# Patient Record
Sex: FEMALE | Race: WHITE | NOT HISPANIC OR LATINO | Employment: OTHER | URBAN - METROPOLITAN AREA
[De-identification: names, ages, dates, MRNs, and addresses within clinical notes are randomized per-mention and may not be internally consistent; named-entity substitution may affect disease eponyms.]

---

## 2017-01-27 ENCOUNTER — OFFICE VISIT (OUTPATIENT)
Dept: INTERNAL MEDICINE | Facility: CLINIC | Age: 67
End: 2017-01-27

## 2017-01-27 VITALS
WEIGHT: 160.8 LBS | BODY MASS INDEX: 25.84 KG/M2 | TEMPERATURE: 97.3 F | SYSTOLIC BLOOD PRESSURE: 150 MMHG | HEIGHT: 66 IN | DIASTOLIC BLOOD PRESSURE: 84 MMHG

## 2017-01-27 DIAGNOSIS — I10 BENIGN ESSENTIAL HYPERTENSION: Primary | ICD-10-CM

## 2017-01-27 DIAGNOSIS — G43.809 OTHER TYPE OF MIGRAINE: ICD-10-CM

## 2017-01-27 DIAGNOSIS — M79.605 PAIN IN BOTH LOWER EXTREMITIES: ICD-10-CM

## 2017-01-27 DIAGNOSIS — R60.9 EDEMA, UNSPECIFIED TYPE: ICD-10-CM

## 2017-01-27 DIAGNOSIS — E78.5 HYPERLIPIDEMIA, UNSPECIFIED HYPERLIPIDEMIA TYPE: ICD-10-CM

## 2017-01-27 DIAGNOSIS — M79.604 PAIN IN BOTH LOWER EXTREMITIES: ICD-10-CM

## 2017-01-27 PROCEDURE — 99214 OFFICE O/P EST MOD 30 MIN: CPT | Performed by: NURSE PRACTITIONER

## 2017-01-27 RX ORDER — VALSARTAN 320 MG/1
320 TABLET ORAL DAILY
Qty: 30 TABLET | Refills: 5 | Status: SHIPPED | OUTPATIENT
Start: 2017-01-27 | End: 2017-03-02

## 2017-01-27 NOTE — PROGRESS NOTES
Subjective   Fabiola Salmeron is a 66 y.o. female    Chief Complaint   Patient presents with   • 3 month follow up   • Hypertension     Would like to discuss changing her BP med. She was looking at side effects of med and has concerns. Hairloss, weight gain and leg/ankle swelling (daily)   • Hyperlipidemia     Has been holding Cholesterol medication.     History of Present Illness     Here for f/u; had labs last week     HL - she took Pravastatin for about 6 months, but developed terrible leg pain.  At her last visit, I asked her to hold this med until her visit today to see if sx's would improve.  States that her legs still hurt and ache.  She denies N/T.  No injury or trauma.  States that she cannot relate the pain to any activity.       Renal artery stenosis is followed by Dr. Garduno. Renal artery US about 3 months ago was stable.       HTN - chronic and stable on Lenny 5/40, but she is interested in changing this med.  She has been holding it for a few days b/c she thinks it is causing swelling in her ankles, weight gain and hair loss.       Now being followed by the pain center at  for her HA's. Not taking any oral meds for her HA's now.  Only takes the prednisone if she has a terrible flare up.       Pap - 2011; 2/2015 (GYN)   Mamm - 2/29/16   Ovarian CA screening - never   DEXA - 3/2016 (osteoporosis, but declines meds)  Colon - declines   Flu shot - declines   Tdap - declines   Pneumovax - declines   Zostavax - declines     The following portions of the patient's history were reviewed and updated as appropriate: allergies, current medications, past family history, past medical history, past social history, past surgical history and problem list.    Current Outpatient Prescriptions:   •  naproxen sodium (ALEVE) 220 MG tablet, Take 1 tablet by mouth 4 (Four) Times a Day Before Meals & at Bedtime As Needed., Disp: , Rfl:   •  predniSONE (DELTASONE) 20 MG tablet, 1-3 tablets PO QAM PRN for HA, Disp: 90 tablet,  "Rfl: 0  •  valsartan (DIOVAN) 320 MG tablet, Take 1 tablet by mouth Daily., Disp: 30 tablet, Rfl: 5     Review of Systems   Constitutional: Negative for chills, fatigue and fever.   Respiratory: Negative for cough, chest tightness and shortness of breath.    Cardiovascular: Negative for chest pain.   Gastrointestinal: Negative for abdominal pain, diarrhea, nausea and vomiting.   Endocrine: Negative for cold intolerance and heat intolerance.   Musculoskeletal: Positive for arthralgias and myalgias.        Leg pain     Neurological: Negative for dizziness.       Objective   Physical Exam   Constitutional: She is oriented to person, place, and time. She appears well-developed and well-nourished.   HENT:   Head: Normocephalic and atraumatic.   Eyes: Conjunctivae and EOM are normal. Pupils are equal, round, and reactive to light.   Neck: Normal range of motion.   Cardiovascular: Normal rate, regular rhythm and normal heart sounds.    Pulmonary/Chest: Effort normal and breath sounds normal.   Abdominal: Soft. Bowel sounds are normal.   Musculoskeletal: Normal range of motion.   Neurological: She is alert and oriented to person, place, and time. She has normal reflexes.   Skin: Skin is warm and dry.   Psychiatric: She has a normal mood and affect. Her behavior is normal. Judgment and thought content normal.     Vitals:    01/27/17 1141   BP: 150/84   Temp: 97.3 °F (36.3 °C)   TempSrc: Temporal Artery    Weight: 160 lb 12.8 oz (72.9 kg)   Height: 65.5\" (166.4 cm)         Assessment/Plan   Fabiola was seen today for 3 month follow up, hypertension and hyperlipidemia.    Diagnoses and all orders for this visit:    Benign essential hypertension  -     CBC & Differential; Future  -     Comprehensive Metabolic Panel; Future  -     Lipid Panel; Future  -     Vitamin D 25 Hydroxy; Future  -     Vitamin B12; Future  -     TSH; Future  -     C-reactive Protein; Future  -     Sedimentation Rate; Future  -     DHARMESH; Future  -     Uric " Acid; Future  -     Rheumatoid Factor; Future  -     CK; Future  -     Magnesium; Future    Other type of migraine  -     CBC & Differential; Future  -     Comprehensive Metabolic Panel; Future  -     Lipid Panel; Future  -     Vitamin D 25 Hydroxy; Future  -     Vitamin B12; Future  -     TSH; Future  -     C-reactive Protein; Future  -     Sedimentation Rate; Future  -     DHARMESH; Future  -     Uric Acid; Future  -     Rheumatoid Factor; Future  -     CK; Future  -     Magnesium; Future    Edema, unspecified type  -     CBC & Differential; Future  -     Comprehensive Metabolic Panel; Future  -     Lipid Panel; Future  -     Vitamin D 25 Hydroxy; Future  -     Vitamin B12; Future  -     TSH; Future  -     C-reactive Protein; Future  -     Sedimentation Rate; Future  -     DHARMESH; Future  -     Uric Acid; Future  -     Rheumatoid Factor; Future  -     CK; Future  -     Magnesium; Future    Hyperlipidemia, unspecified hyperlipidemia type  -     CBC & Differential; Future  -     Comprehensive Metabolic Panel; Future  -     Lipid Panel; Future  -     Vitamin D 25 Hydroxy; Future  -     Vitamin B12; Future  -     TSH; Future  -     C-reactive Protein; Future  -     Sedimentation Rate; Future  -     DHARMESH; Future  -     Uric Acid; Future  -     Rheumatoid Factor; Future  -     CK; Future  -     Magnesium; Future    Pain in both lower extremities  -     CBC & Differential; Future  -     Comprehensive Metabolic Panel; Future  -     Lipid Panel; Future  -     Vitamin D 25 Hydroxy; Future  -     Vitamin B12; Future  -     TSH; Future  -     C-reactive Protein; Future  -     Sedimentation Rate; Future  -     DHARMESH; Future  -     Uric Acid; Future  -     Rheumatoid Factor; Future  -     CK; Future  -     Magnesium; Future    Other orders  -     valsartan (DIOVAN) 320 MG tablet; Take 1 tablet by mouth Daily.      Long discussion with Fabiola in that I do not think her leg pain is still related to the statin since she has been off of this  for 3months.    We will ck some labs, if normal, I would consider vascular studies  We will stop her lotrel and change to Diovan  No other changes  F/U in 6 weeks or sooner with any problems

## 2017-01-27 NOTE — MR AVS SNAPSHOT
Fabiola Salmeron   1/27/2017 11:30 AM   Office Visit    Dept Phone:  [phone]   Encounter #:  [id number]    Provider:  KAREEM Heller   Department:  [facility] INTERNAL MEDICINE                Your Full Care Plan              Today's Medication Changes          These changes are accurate as of: 1/27/17 12:05 PM.  If you have any questions, ask your nurse or doctor.               New Medication(s)Ordered:     valsartan 320 MG tablet   Commonly known as:  DIOVAN   Take 1 tablet by mouth Daily.   Started by:  KAREEM Heller            Where to Get Your Medications      These medications were sent to [address] - [address] [facility] AT [facility] - [phone]  - [phone] FX  [address] [facility] [address]     Phone:  [phone]     valsartan 320 MG tablet                  Your Updated Medication List          This list is accurate as of: 1/27/17 12:05 PM.  Always use your most recent med list.                ALEVE 220 MG tablet   Generic drug:  naproxen sodium       amlodipine-olmesartan 5-40 MG per tablet   Commonly known as:  NATHAN   Take 1 tablet by mouth Daily.       predniSONE 20 MG tablet   Commonly known as:  DELTASONE   1-3 tablets PO QAM PRN for HA       valsartan 320 MG tablet   Commonly known as:  DIOVAN   Take 1 tablet by mouth Daily.               Instructions     None    Patient Instructions History      Upcoming Appointments     Visit Type Date Time Department    FOLLOW UP 1/27/2017 11:30 AM [address]      Qwenty Signup     [facility] Q[employer]enty allows you to send messages to your doctor, view your test results, renew your prescriptions, schedule appointments, and more. To sign up, go to [employer] and click on the Sign Up Now link in the New User? box. Enter your [employer]wenty Activation Code exactly as it appears below along with the last four digits of your Social  "Security Number and your Date of Birth () to complete the sign-up process. If you do not sign up before the expiration date, you must request a new code.    [employer] Activation Code: [email or web address]  Expires: 2/10/2017 12:05 PM    If you have questions, you can email [email or web address] or call [phone] to talk to our [employer] staff. Remember, [employer] is NOT to be used for urgent needs. For medical emergencies, dial 911.               Other Info from Your Visit           Allergies     Codeine        Reason for Visit     3 month follow up     Hypertension Would like to discuss changing her BP med. She was looking at side effects of med and has concerns. Hairloss, weight gain and leg/ankle swelling (daily)    Hyperlipidemia Has been holding Cholesterol medication.      Vital Signs     Blood Pressure Temperature Height Weight Body Mass Index Smoking Status    150/84 97.3 °F (36.3 °C) (Temporal Artery ) 65.5\" (166.4 cm) 160 lb 12.8 oz (72.9 kg) 26.35 kg/m2 Never Smoker        "

## 2017-02-28 ENCOUNTER — TELEPHONE (OUTPATIENT)
Dept: INTERNAL MEDICINE | Facility: CLINIC | Age: 67
End: 2017-02-28

## 2017-02-28 NOTE — TELEPHONE ENCOUNTER
Does not want to take Diovan. She said BP 2 days ago was stilling running high around 150's/90. She was too sure of the readings b/c she does not check it on a normal basis. Also online if says do nott take with aleve, which she needs to take daily and it also said to avoid all alcohol and she just can't do that. So she wants to know if you think this is to risky for her to take and if you need to change this BP medication to something else?

## 2017-02-28 NOTE — TELEPHONE ENCOUNTER
We can stop the Diovan if she is against taking it.  She needs to be on something.  Does she know if she has ever been on a med called metoprolol?

## 2017-03-02 RX ORDER — METOPROLOL TARTRATE 50 MG/1
50 TABLET, FILM COATED ORAL 2 TIMES DAILY
Qty: 60 TABLET | Refills: 2 | Status: SHIPPED | OUTPATIENT
Start: 2017-03-02 | End: 2017-03-24

## 2017-03-24 ENCOUNTER — TELEPHONE (OUTPATIENT)
Dept: INTERNAL MEDICINE | Facility: CLINIC | Age: 67
End: 2017-03-24

## 2017-03-24 DIAGNOSIS — G43.809 OTHER TYPE OF MIGRAINE: ICD-10-CM

## 2017-03-24 RX ORDER — PREDNISONE 20 MG/1
TABLET ORAL
Qty: 90 TABLET | Refills: 0 | Status: SHIPPED | OUTPATIENT
Start: 2017-03-24 | End: 2021-04-22

## 2017-03-24 RX ORDER — VALSARTAN 80 MG/1
80 TABLET ORAL DAILY
Qty: 30 TABLET | Refills: 0 | Status: SHIPPED | OUTPATIENT
Start: 2017-03-24 | End: 2017-04-04 | Stop reason: SDUPTHER

## 2017-03-24 NOTE — TELEPHONE ENCOUNTER
----- Message from Ana Puri sent at 3/24/2017  9:51 AM EDT -----  Pt called needs to talk to you about bp med and 2 other med please call cell [phone]

## 2017-03-24 NOTE — TELEPHONE ENCOUNTER
Patient has been notified! I let her know everything went to [facility] local pharmacy since it is only a 30 day supply.

## 2017-03-24 NOTE — TELEPHONE ENCOUNTER
I have sent both scripts in, but she definitely needs to keep her appt with me to discuss all of this.  I also need her to bring a BP log.

## 2017-03-24 NOTE — TELEPHONE ENCOUNTER
Spoke with Fabiola and she said that she never started the Metoprolol because she had been doing more research on it and it was a beta blocker and did not want to be on that. So she started taking the Diovan 320 that you originally prescribed on 1/27/17 and has been cutting that in half then cuts that half in half to where she is only taking 80mg of the Diovan and has only been doing that for almost a week now and her BP she said is running 150/90's but she is seeing that come down. She wants to know if she can just get a script for the 80mg of Diovan?    She also wanted another script for Prednisone due to her Migraines. You prescribed that back in October. I told her she was suppose to have follow up with you in 6 weeks from her January appt, and her headaches could be due to the uncontrolled BP.     I have her scheduled to see you for a follow up on Tuesday 3/28/17 @ 3:15pm. But she wants to know will you go ahead and send in a script for the Diovan 80mg and the Prednisone? Until she comes in and discuss her BP and going back on the Topamax for her migraines as well

## 2017-03-28 ENCOUNTER — OFFICE VISIT (OUTPATIENT)
Dept: INTERNAL MEDICINE | Facility: CLINIC | Age: 67
End: 2017-03-28

## 2017-03-28 VITALS
DIASTOLIC BLOOD PRESSURE: 92 MMHG | SYSTOLIC BLOOD PRESSURE: 148 MMHG | WEIGHT: 163.6 LBS | HEIGHT: 66 IN | BODY MASS INDEX: 26.29 KG/M2 | TEMPERATURE: 97.5 F

## 2017-03-28 DIAGNOSIS — I10 BENIGN ESSENTIAL HYPERTENSION: ICD-10-CM

## 2017-03-28 DIAGNOSIS — E78.5 HYPERLIPIDEMIA, UNSPECIFIED HYPERLIPIDEMIA TYPE: ICD-10-CM

## 2017-03-28 DIAGNOSIS — G43.809 OTHER TYPE OF MIGRAINE: Primary | ICD-10-CM

## 2017-03-28 PROCEDURE — 99214 OFFICE O/P EST MOD 30 MIN: CPT | Performed by: NURSE PRACTITIONER

## 2017-03-28 NOTE — PROGRESS NOTES
Subjective   Fabiola Salmeron is a 67 y.o. female    Chief Complaint   Patient presents with   • Follow-up   • Hypertension     Taking Diovan 80mg   • Migraine     Discuss starting back on the Topamax     History of Present Illness     HTN- She has tried multiple medications in the past, researched them and was not comfortable with the side effect profiles.  She did have some trouble with the brenda with weight gain and hair loss so she stopped that and was willing to  try the diovan at a low dose only. She has just started taking her diovan daily now in the last week. BP averaging 130/80 since starting the diovan. She is taking it at night and has not had her dose yet today.       Migraines- She is having headaches daily behind her eyes, on top of her head, and in the occipital region.  She used to be on Topamax 200 mg and would like to start this again. She started taking this 4-5 days ago with what she had left over from the past. She has been tolerating well; denies AE.    HL- She was not able to tolerate the statin in th past due to leg pain.  She has been off the statin for about 6 months and feels like the myalgias are finally getting better.  I recommended red yeast rice to her at her last visit, but she di not start taking this once she read up on the potential side effects of myalgia.       The following portions of the patient's history were reviewed and updated as appropriate: allergies, current medications, past family history, past medical history, past social history, past surgical history and problem list.    Current Outpatient Prescriptions:   •  naproxen sodium (ALEVE) 220 MG tablet, Take 1 tablet by mouth 4 (Four) Times a Day Before Meals & at Bedtime As Needed., Disp: , Rfl:   •  predniSONE (DELTASONE) 20 MG tablet, 1-3 tablets PO QAM PRN for HA, Disp: 90 tablet, Rfl: 0  •  valsartan (DIOVAN) 80 MG tablet, Take 1 tablet by mouth Daily., Disp: 30 tablet, Rfl: 0     Review of Systems   Constitutional:  "Negative for chills, fatigue and fever.   Respiratory: Negative for cough, chest tightness and shortness of breath.    Cardiovascular: Negative for chest pain.   Gastrointestinal: Negative for abdominal pain, diarrhea, nausea and vomiting.   Endocrine: Negative for cold intolerance and heat intolerance.   Musculoskeletal: Negative for arthralgias.   Neurological: Negative for dizziness.       Objective   Physical Exam   Constitutional: She is oriented to person, place, and time. She appears well-developed and well-nourished.   HENT:   Head: Normocephalic and atraumatic.   Eyes: Conjunctivae and EOM are normal. Pupils are equal, round, and reactive to light.   Neck: Normal range of motion.   Cardiovascular: Normal rate, regular rhythm and normal heart sounds.    Pulmonary/Chest: Effort normal and breath sounds normal.   Abdominal: Soft. Bowel sounds are normal.   Musculoskeletal: Normal range of motion.   Neurological: She is alert and oriented to person, place, and time. She has normal reflexes.   Skin: Skin is warm and dry.   Psychiatric: She has a normal mood and affect. Her behavior is normal. Judgment and thought content normal.     Vitals:    03/28/17 1519   BP: 148/92   Temp: 97.5 °F (36.4 °C)   TempSrc: Temporal Artery    Weight: 163 lb 9.6 oz (74.2 kg)   Height: 65.5\" (166.4 cm)         Assessment/Plan   Fabiola was seen today for follow-up, hypertension and migraine.    Diagnoses and all orders for this visit:    Other type of migraine  -     topiramate (TOPAMAX) 200 MG tablet; Take 1 tablet by mouth every night at bedtime.    Benign essential hypertension    Hyperlipidemia, unspecified hyperlipidemia type      Long discussion about risk factors for stroke and the need for medications to keep co-morbidities controlled to decrease this risk.   Will restart topamax at her prior dose since she has been tolerating well   Watch BP at home. Call me with a log in 2 weeks, Will need to increase diovan if BP running " >140/90  Co Q 10 OTC for 2 weeks and then add red yeast rice; alternatives include lovastatin or livalo.  RTC 6 weeks or sooner with any problems        Scribed for KAREEM Keane by KAREEM Stinson Student. 3/28/2017  3:37 PM    I, KAREEM Keane, personally performed the services described in this documentation as scribed by the above named individual in my presence, and it is both accurate and complete.  3/30/2017  9:00 AM    KAREEM Simental

## 2017-04-03 ENCOUNTER — TELEPHONE (OUTPATIENT)
Dept: INTERNAL MEDICINE | Facility: CLINIC | Age: 67
End: 2017-04-03

## 2017-04-03 NOTE — TELEPHONE ENCOUNTER
----- Message from Chris De Luna sent at 4/3/2017 11:57 AM EDT -----  WOULD LIKE A CALL BACK ABOUT HR BP MEDS PLEASE CALL @ [phone]

## 2017-04-04 RX ORDER — VALSARTAN 80 MG/1
80 TABLET ORAL DAILY
Qty: 90 TABLET | Refills: 1 | Status: SHIPPED | OUTPATIENT
Start: 2017-04-04 | End: 2017-04-11 | Stop reason: SDUPTHER

## 2017-04-04 NOTE — TELEPHONE ENCOUNTER
Spoke with Patient and she thinks that the Diovan 80mg will be just fine for her. She said that she has been monitoring it and her BP runs anywhere from the 130's/80's and sometimes lower. I went ahead and [facility]

## 2017-04-11 ENCOUNTER — TELEPHONE (OUTPATIENT)
Dept: INTERNAL MEDICINE | Facility: CLINIC | Age: 67
End: 2017-04-11

## 2017-04-11 DIAGNOSIS — G43.809 OTHER TYPE OF MIGRAINE: ICD-10-CM

## 2017-04-11 RX ORDER — VALSARTAN 80 MG/1
80 TABLET ORAL DAILY
Qty: 90 TABLET | Refills: 1 | Status: SHIPPED | OUTPATIENT
Start: 2017-04-11 | End: 2017-04-18 | Stop reason: SDUPTHER

## 2017-04-11 NOTE — TELEPHONE ENCOUNTER
PN: these were sent to the [facility] and I have resent them today.  I also called in 10 pills of each to [facility].

## 2017-04-11 NOTE — TELEPHONE ENCOUNTER
----- Message from Ana Puri sent at 4/11/2017 11:57 AM EDT -----  Pt called regarding she needs to med that was never called in diovan and jermaine send a few to [facility] but she needs [facility]

## 2017-04-18 ENCOUNTER — TELEPHONE (OUTPATIENT)
Dept: INTERNAL MEDICINE | Facility: CLINIC | Age: 67
End: 2017-04-18

## 2017-04-18 DIAGNOSIS — G43.809 OTHER TYPE OF MIGRAINE: ICD-10-CM

## 2017-04-18 RX ORDER — VALSARTAN 80 MG/1
80 TABLET ORAL DAILY
Qty: 90 TABLET | Refills: 1 | Status: SHIPPED | OUTPATIENT
Start: 2017-04-18 | End: 2021-04-22 | Stop reason: SDUPTHER

## 2017-04-18 NOTE — TELEPHONE ENCOUNTER
----- Message from Chris De Luna sent at 4/18/2017 10:24 AM EDT -----  REFILL: GERHARD ALEXIS @ [facility]

## 2021-04-22 ENCOUNTER — OFFICE VISIT (OUTPATIENT)
Dept: FAMILY MEDICINE CLINIC | Facility: CLINIC | Age: 71
End: 2021-04-22

## 2021-04-22 ENCOUNTER — LAB (OUTPATIENT)
Dept: LAB | Facility: HOSPITAL | Age: 71
End: 2021-04-22

## 2021-04-22 VITALS
WEIGHT: 148 LBS | HEIGHT: 66 IN | HEART RATE: 87 BPM | BODY MASS INDEX: 23.78 KG/M2 | DIASTOLIC BLOOD PRESSURE: 82 MMHG | OXYGEN SATURATION: 98 % | TEMPERATURE: 97.1 F | SYSTOLIC BLOOD PRESSURE: 160 MMHG

## 2021-04-22 DIAGNOSIS — E55.9 VITAMIN D DEFICIENCY: ICD-10-CM

## 2021-04-22 DIAGNOSIS — Z13.0 SCREENING FOR DEFICIENCY ANEMIA: ICD-10-CM

## 2021-04-22 DIAGNOSIS — Z13.29 SCREENING FOR THYROID DISORDER: ICD-10-CM

## 2021-04-22 DIAGNOSIS — Z11.59 ENCOUNTER FOR HEPATITIS C SCREENING TEST FOR LOW RISK PATIENT: ICD-10-CM

## 2021-04-22 DIAGNOSIS — Z76.89 ENCOUNTER TO ESTABLISH CARE: Primary | ICD-10-CM

## 2021-04-22 DIAGNOSIS — Z13.220 SCREENING FOR CHOLESTEROL LEVEL: ICD-10-CM

## 2021-04-22 DIAGNOSIS — E78.2 MIXED HYPERLIPIDEMIA: ICD-10-CM

## 2021-04-22 DIAGNOSIS — I10 BENIGN ESSENTIAL HYPERTENSION: ICD-10-CM

## 2021-04-22 DIAGNOSIS — Z13.1 SCREENING FOR DIABETES MELLITUS: ICD-10-CM

## 2021-04-22 DIAGNOSIS — M54.81 BILATERAL OCCIPITAL NEURALGIA: ICD-10-CM

## 2021-04-22 PROBLEM — M85.80 OSTEOPENIA: Status: ACTIVE | Noted: 2019-07-29

## 2021-04-22 LAB
25(OH)D3 SERPL-MCNC: 12.2 NG/ML (ref 30–100)
ALBUMIN SERPL-MCNC: 4.5 G/DL (ref 3.5–5.2)
ALBUMIN/GLOB SERPL: 1.5 G/DL
ALP SERPL-CCNC: 65 U/L (ref 39–117)
ALT SERPL W P-5'-P-CCNC: 14 U/L (ref 1–33)
ANION GAP SERPL CALCULATED.3IONS-SCNC: 12.1 MMOL/L (ref 5–15)
AST SERPL-CCNC: 15 U/L (ref 1–32)
BASOPHILS # BLD AUTO: 0.07 10*3/MM3 (ref 0–0.2)
BASOPHILS NFR BLD AUTO: 1.1 % (ref 0–1.5)
BILIRUB SERPL-MCNC: 0.3 MG/DL (ref 0–1.2)
BUN SERPL-MCNC: 25 MG/DL (ref 8–23)
BUN/CREAT SERPL: 20.5 (ref 7–25)
CALCIUM SPEC-SCNC: 9.5 MG/DL (ref 8.6–10.5)
CHLORIDE SERPL-SCNC: 107 MMOL/L (ref 98–107)
CHOLEST SERPL-MCNC: 309 MG/DL (ref 0–200)
CO2 SERPL-SCNC: 22.9 MMOL/L (ref 22–29)
CREAT SERPL-MCNC: 1.22 MG/DL (ref 0.57–1)
DEPRECATED RDW RBC AUTO: 47.5 FL (ref 37–54)
EOSINOPHIL # BLD AUTO: 0.15 10*3/MM3 (ref 0–0.4)
EOSINOPHIL NFR BLD AUTO: 2.3 % (ref 0.3–6.2)
ERYTHROCYTE [DISTWIDTH] IN BLOOD BY AUTOMATED COUNT: 13.5 % (ref 12.3–15.4)
GFR SERPL CREATININE-BSD FRML MDRD: 43 ML/MIN/1.73
GLOBULIN UR ELPH-MCNC: 3 GM/DL
GLUCOSE SERPL-MCNC: 93 MG/DL (ref 65–99)
HCT VFR BLD AUTO: 39.1 % (ref 34–46.6)
HCV AB SER DONR QL: NORMAL
HDLC SERPL-MCNC: 72 MG/DL (ref 40–60)
HGB BLD-MCNC: 13.3 G/DL (ref 12–15.9)
IMM GRANULOCYTES # BLD AUTO: 0.04 10*3/MM3 (ref 0–0.05)
IMM GRANULOCYTES NFR BLD AUTO: 0.6 % (ref 0–0.5)
LDLC SERPL CALC-MCNC: 217 MG/DL (ref 0–100)
LDLC/HDLC SERPL: 2.97 {RATIO}
LYMPHOCYTES # BLD AUTO: 1.56 10*3/MM3 (ref 0.7–3.1)
LYMPHOCYTES NFR BLD AUTO: 24 % (ref 19.6–45.3)
MCH RBC QN AUTO: 33 PG (ref 26.6–33)
MCHC RBC AUTO-ENTMCNC: 34 G/DL (ref 31.5–35.7)
MCV RBC AUTO: 97 FL (ref 79–97)
MONOCYTES # BLD AUTO: 0.51 10*3/MM3 (ref 0.1–0.9)
MONOCYTES NFR BLD AUTO: 7.9 % (ref 5–12)
NEUTROPHILS NFR BLD AUTO: 4.16 10*3/MM3 (ref 1.7–7)
NEUTROPHILS NFR BLD AUTO: 64.1 % (ref 42.7–76)
NRBC BLD AUTO-RTO: 0 /100 WBC (ref 0–0.2)
PLATELET # BLD AUTO: 334 10*3/MM3 (ref 140–450)
PMV BLD AUTO: 11.2 FL (ref 6–12)
POTASSIUM SERPL-SCNC: 4.3 MMOL/L (ref 3.5–5.2)
PROT SERPL-MCNC: 7.5 G/DL (ref 6–8.5)
RBC # BLD AUTO: 4.03 10*6/MM3 (ref 3.77–5.28)
SODIUM SERPL-SCNC: 142 MMOL/L (ref 136–145)
TRIGL SERPL-MCNC: 116 MG/DL (ref 0–150)
TSH SERPL DL<=0.05 MIU/L-ACNC: 3.15 UIU/ML (ref 0.27–4.2)
VLDLC SERPL-MCNC: 20 MG/DL (ref 5–40)
WBC # BLD AUTO: 6.49 10*3/MM3 (ref 3.4–10.8)

## 2021-04-22 PROCEDURE — 82306 VITAMIN D 25 HYDROXY: CPT

## 2021-04-22 PROCEDURE — 99204 OFFICE O/P NEW MOD 45 MIN: CPT | Performed by: PHYSICIAN ASSISTANT

## 2021-04-22 PROCEDURE — 36415 COLL VENOUS BLD VENIPUNCTURE: CPT

## 2021-04-22 PROCEDURE — 86803 HEPATITIS C AB TEST: CPT

## 2021-04-22 PROCEDURE — 84443 ASSAY THYROID STIM HORMONE: CPT

## 2021-04-22 PROCEDURE — 80061 LIPID PANEL: CPT

## 2021-04-22 PROCEDURE — 80053 COMPREHEN METABOLIC PANEL: CPT

## 2021-04-22 PROCEDURE — 85025 COMPLETE CBC W/AUTO DIFF WBC: CPT

## 2021-04-22 RX ORDER — VALSARTAN 80 MG/1
80 TABLET ORAL DAILY
Qty: 90 TABLET | Refills: 1 | Status: SHIPPED | OUTPATIENT
Start: 2021-04-22 | End: 2021-05-20

## 2021-04-22 RX ORDER — TOPIRAMATE 100 MG/1
200 TABLET, FILM COATED ORAL 2 TIMES DAILY
Qty: 180 TABLET | Refills: 1 | Status: SHIPPED | OUTPATIENT
Start: 2021-04-22 | End: 2021-06-21

## 2021-04-22 RX ORDER — BIMATOPROST 0.01 %
1 DROPS OPHTHALMIC (EYE) NIGHTLY
COMMUNITY

## 2021-04-22 RX ORDER — PREDNISONE 20 MG/1
TABLET ORAL
Qty: 6 TABLET | Refills: 0 | Status: SHIPPED | OUTPATIENT
Start: 2021-04-22 | End: 2021-05-20

## 2021-04-22 NOTE — PROGRESS NOTES
Chief Complaint   Patient presents with   • Establish Care     Pt is here to establish care. Pt states she hasn't seen her last PCP in about 4 years.    • occipital neuralgia     Pt states she was diagnosised by her neurologist. Pt states she does not see a neurologist anymore. Pt states she has not seen a neurologist in over a year. Pt states she has pain on the right side of her head and she has been taking aleve for it.        TODD Salmeron is a 71 y.o. female who presents to establish care.  Patient has decided to transfer her care to our office.  Currently seeing her sister and other family members.  She presents for management of hypertension, subdural neuralgia and hyperlipidemia.  Patient is compliant on valsartan 80 mg daily for blood pressure.  On repeat it is still elevated today.  She states that this is because she is having a really bad headache from her occipital neuralgia.  She monitors it at home and it is usually well controlled.  She has tried multiple different therapies to manage her acute flare ups of occipital neuralgia without success.  The only thing that works is a prednisone taper 60 mg on day 1, 40 mg on day 2 and 20 mg a day 3.  She has been seen by a neurologist in the past and this was the medication that was prescribed at that time.  She does take her Topamax 200 mg nightly.  She has never tried taking this twice daily.  She failed gabapentin.  She reports that she does not have these headaches very often but when she does she requires the prednisone to resolve them.  She has been taking Aleve throughout the day without any relief.  He has not had any labs recently or annual wellness since the pandemic started.  She has been on statin medication in the past and this is caused permanent myalgias in her legs.    Chief Complaint   Patient presents with   • Establish Care     Pt is here to establish care. Pt states she hasn't seen her last PCP in about 4 years.    • occipital  neuralgia     Pt states she was diagnosised by her neurologist. Pt states she does not see a neurologist anymore. Pt states she has not seen a neurologist in over a year. Pt states she has pain on the right side of her head and she has been taking aleve for it.        Past Medical History:   Diagnosis Date   • Candidiasis of vulva and vagina        Past Surgical History:   Procedure Laterality Date   • CRANIAL NEUROSTIMULATOR INSERTION/REPLACEMENT Right    • EYE SURGERY         Family History   Problem Relation Age of Onset   • Lung cancer Father         smoker   • Breast cancer Sister    • Ovarian cancer Neg Hx    • Colon cancer Neg Hx        Social History     Socioeconomic History   • Marital status:      Spouse name: Not on file   • Number of children: Not on file   • Years of education: Not on file   • Highest education level: Not on file   Tobacco Use   • Smoking status: Never Smoker   • Smokeless tobacco: Never Used   Substance and Sexual Activity   • Alcohol use: Yes     Alcohol/week: 5.0 standard drinks     Types: 5 Standard drinks or equivalent per week   • Drug use: No   • Sexual activity: Yes     Birth control/protection: None       Allergies   Allergen Reactions   • Codeine    • Statins Myalgia       ROS    Review of Systems   Constitutional: Positive for fatigue. Negative for activity change, appetite change, chills, diaphoresis, fever, unexpected weight gain and unexpected weight loss.   HENT: Negative for congestion, dental problem, ear pain, hearing loss, nosebleeds, sinus pressure, sore throat and trouble swallowing.    Eyes: Negative for blurred vision, pain, redness and visual disturbance.   Respiratory: Negative for apnea, cough, chest tightness, shortness of breath and wheezing.    Cardiovascular: Negative for chest pain, palpitations and leg swelling.   Gastrointestinal: Negative for abdominal distention, abdominal pain, anal bleeding, blood in stool, constipation, diarrhea, nausea,  "vomiting, GERD and indigestion.   Endocrine: Negative for cold intolerance, heat intolerance, polydipsia, polyphagia and polyuria.   Genitourinary: Negative for decreased urine volume, difficulty urinating, dysuria, frequency, hematuria, urgency and urinary incontinence.   Musculoskeletal: Negative for gait problem, joint swelling and bursitis.   Skin: Negative for dry skin, rash, skin lesions and bruise.   Neurological: Positive for headache. Negative for dizziness, tremors, seizures, syncope, speech difficulty, weakness, light-headedness, memory problem and confusion.   Hematological: Does not bruise/bleed easily.   Psychiatric/Behavioral: Positive for sleep disturbance. Negative for agitation, behavioral problems, decreased concentration, hallucinations, suicidal ideas, depressed mood and stress. The patient is not nervous/anxious.        Vitals:    04/22/21 1330   BP: 160/82   Pulse: 87   Temp: 97.1 °F (36.2 °C)   SpO2: 98%   Weight: 67.1 kg (148 lb)   Height: 167.6 cm (66\")   PainSc: 0-No pain     Body mass index is 23.89 kg/m².    Current Outpatient Medications on File Prior to Visit   Medication Sig Dispense Refill   • naproxen sodium (ALEVE) 220 MG tablet Take 1 tablet by mouth 4 (Four) Times a Day Before Meals & at Bedtime As Needed.     • [DISCONTINUED] topiramate (TOPAMAX) 200 MG tablet Take 1 tablet by mouth every night at bedtime. 90 tablet 1   • [DISCONTINUED] valsartan (DIOVAN) 80 MG tablet Take 1 tablet by mouth Daily. 90 tablet 1   • Acetaminophen (Tylenol) 325 MG capsule Tylenol   PRN     • bimatoprost (Lumigan) 0.01 % ophthalmic drops Lumigan 0.01 % eye drops     • [DISCONTINUED] predniSONE (DELTASONE) 20 MG tablet 1-3 tablets PO QAM PRN for HA 90 tablet 0     No current facility-administered medications on file prior to visit.       Results for orders placed or performed in visit on 10/17/16    MAMMOGRAPHY   Result Value Ref Range     Mammogram mammogram     DEXA SCAN   Result Value Ref " Range     Dexa Scan dexa     PAP SMEAR   Result Value Ref Range     Pap smear pap smear        PE  Physical Exam  Vitals reviewed.   Constitutional:       General: She is not in acute distress.     Appearance: Normal appearance. She is well-developed and normal weight. She is not ill-appearing or diaphoretic.   HENT:      Head: Normocephalic and atraumatic.   Eyes:      Extraocular Movements: Extraocular movements intact.      Conjunctiva/sclera: Conjunctivae normal.   Cardiovascular:      Rate and Rhythm: Normal rate and regular rhythm.      Heart sounds: Normal heart sounds.   Pulmonary:      Effort: Pulmonary effort is normal.      Breath sounds: Normal breath sounds.   Musculoskeletal:         General: Normal range of motion.      Cervical back: Normal range of motion.      Right lower leg: No edema.      Left lower leg: No edema.   Skin:     General: Skin is warm.      Findings: No erythema or rash.   Neurological:      General: No focal deficit present.      Mental Status: She is alert.   Psychiatric:         Attention and Perception: Attention and perception normal. She is attentive.         Mood and Affect: Mood and affect normal.         Speech: Speech normal.         Behavior: Behavior normal. Behavior is cooperative.         Thought Content: Thought content normal.         Cognition and Memory: Cognition and memory normal.         Judgment: Judgment normal.         A/P    Diagnoses and all orders for this visit:    1. Encounter to establish care (Primary)    2. Bilateral occipital neuralgia  -     predniSONE (DELTASONE) 20 MG tablet; Take 3 tablets 1 day, then 2 tablets 1 day and 1 tablet daily  Dispense: 6 tablet; Refill: 0  -     topiramate (Topamax) 100 MG tablet; Take 2 tablets by mouth 2 (Two) Times a Day.  Dispense: 180 tablet; Refill: 1  Ongoing issues for 40+ years.  Has been seen by neurology in the past and they were the ones that recommended prednisone treatment which has always  worked.  She states she does not take prednisone regularly, very infrequent use.  She is aware of side effects of prednisone including osteoporosis, hypertension, insomnia, diabetes and mood changes.  Has been on topamax 200 mg nightly for years.  Trial of 100 mg BID to see if she gets better prevention.  Okay to send in prednisone taper over 3 days.    3. Benign essential hypertension  -     valsartan (Diovan) 80 MG tablet; Take 1 tablet by mouth Daily.  Dispense: 90 tablet; Refill: 1  Elevated on repeat.  Patient monitors at home and reports it is normally stable.  She states that it is probably elevated due to her headache.    4. Mixed hyperlipidemia  Failed statins.  Reports chronic myalgias secondary to statin use.    5. Screening for thyroid disorder  -     TSH Rfx On Abnormal To Free T4; Future    6. Screening for cholesterol level  -     Lipid Panel; Future    7. Screening for deficiency anemia  -     CBC Auto Differential; Future    8. Screening for diabetes mellitus  -     Comprehensive Metabolic Panel; Future    9. Vitamin D deficiency  -     Vitamin D 25 Hydroxy; Future    10. Encounter for hepatitis C screening test for low risk patient  -     Hepatitis C Antibody; Future         Plan of care reviewed with patient at the conclusion of today's visit. Education was provided regarding diagnosis, management and any prescribed or recommended OTC medications.  Patient verbalizes understanding of and agreement with management plan.    Return in about 4 weeks (around 5/20/2021) for Medicare Wellness.     Jessica Roach PA-C

## 2021-05-20 ENCOUNTER — OFFICE VISIT (OUTPATIENT)
Dept: FAMILY MEDICINE CLINIC | Facility: CLINIC | Age: 71
End: 2021-05-20

## 2021-05-20 VITALS
WEIGHT: 147 LBS | HEART RATE: 86 BPM | DIASTOLIC BLOOD PRESSURE: 84 MMHG | SYSTOLIC BLOOD PRESSURE: 178 MMHG | BODY MASS INDEX: 23.63 KG/M2 | HEIGHT: 66 IN | OXYGEN SATURATION: 98 %

## 2021-05-20 DIAGNOSIS — M85.89 OSTEOPENIA OF MULTIPLE SITES: ICD-10-CM

## 2021-05-20 DIAGNOSIS — E78.2 MIXED HYPERLIPIDEMIA: ICD-10-CM

## 2021-05-20 DIAGNOSIS — E55.9 VITAMIN D DEFICIENCY: ICD-10-CM

## 2021-05-20 DIAGNOSIS — I10 BENIGN ESSENTIAL HYPERTENSION: ICD-10-CM

## 2021-05-20 DIAGNOSIS — G43.019 INTRACTABLE MIGRAINE WITHOUT AURA AND WITHOUT STATUS MIGRAINOSUS: ICD-10-CM

## 2021-05-20 DIAGNOSIS — M54.2 CERVICALGIA: ICD-10-CM

## 2021-05-20 DIAGNOSIS — Z00.00 MEDICARE ANNUAL WELLNESS VISIT, SUBSEQUENT: Primary | ICD-10-CM

## 2021-05-20 DIAGNOSIS — M54.81 BILATERAL OCCIPITAL NEURALGIA: ICD-10-CM

## 2021-05-20 PROCEDURE — G0439 PPPS, SUBSEQ VISIT: HCPCS | Performed by: PHYSICIAN ASSISTANT

## 2021-05-20 RX ORDER — TIMOLOL MALEATE 5 MG/ML
0.5 SOLUTION/ DROPS OPHTHALMIC 2 TIMES DAILY
COMMUNITY
Start: 2021-04-25

## 2021-05-20 RX ORDER — INDOMETHACIN 25 MG/1
25 CAPSULE ORAL 3 TIMES DAILY PRN
COMMUNITY
End: 2021-06-03

## 2021-05-20 NOTE — PATIENT INSTRUCTIONS
Stop valsartan.    Start metoprolol 25 mg morning and night.    Record blood pressure and heart rate.

## 2021-05-20 NOTE — PROGRESS NOTES
The ABCs of the Annual Wellness Visit  Initial Medicare Wellness Visit    Chief Complaint   Patient presents with   • Medicare Wellness-subsequent       Subjective   History of Present Illness:  Fabiola Salmeron is a 71 y.o. female who presents for an Initial Medicare Wellness Visit.  Patient presents for management of hypertension, hyperlipidemia, and ongoing headache.  Patient feels her blood pressure is elevated secondary to headache.  She monitors at home with new blood pressure cuff and her readings are well-controlled, in the 120s/80s.  Patient has had chronic headache issues for 40+ years.  She has tried multiple medications and procedures to improve her headaches.  She reports that they are recently worse than they have been in the past.  She has a constant headache daily.  Her recent prednisone medication helped temporarily.  She is taking several aleve daily.  She was seen by a neurologist at [facility] who prescribed a course of indomethacin 25 mg TID for 14 days.  She is currently on her second week and still having headache.  She would like a referral to [facility] neurologist.  Has had stimulator placed in past and botox for headaches without improvement.  Headaches have been diagnosed as migraine, occipital neuralgia, cervicalgia.  Has not had recent head imaging.  Unable to get MRI due to stimulator.  Patient is currently compliant on her topamax 100 mg BID.  Has never been on BB.  Currently on valsartan 80 mg for HTN.  Has been on this for years.  Reviewed labs showing hyperlipidemia.  Patient has chronic myalgia secondary to statin medication in the past.    HEALTH RISK ASSESSMENT    Recent Hospitalizations:  No hospitalization(s) within the last year.    Current Medical Providers:  Patient Care Team:  Jsesica Roach PA-C as PCP - General (Physician Assistant)    Smoking Status:  Social History     Tobacco Use   Smoking Status Never Smoker   Smokeless Tobacco Never Used       Alcohol  Consumption:  Social History     Substance and Sexual Activity   Alcohol Use Yes   • Alcohol/week: 5.0 standard drinks   • Types: 5 Standard drinks or equivalent per week       Depression Screen:   PHQ-2/PHQ-9 Depression Screening 5/20/2021   Little interest or pleasure in doing things 0   Feeling down, depressed, or hopeless 0   Total Score 0       Fall Risk Screen:  KOMAL Fall Risk Assessment was completed, and patient is at LOW risk for falls.Assessment completed on:5/20/2021    Health Habits and Functional and Cognitive Screening:  Functional & Cognitive Status 5/20/2021   Do you have difficulty preparing food and eating? No   Do you have difficulty bathing yourself, getting dressed or grooming yourself? No   Do you have difficulty using the toilet? No   Do you have difficulty moving around from place to place? No   Do you have trouble with steps or getting out of a bed or a chair? No   Current Diet Well Balanced Diet   Dental Exam Up to date   Eye Exam Up to date   Exercise (times per week) 0 times per week   Current Exercise Activities Include None   Do you need help using the phone?  No   Are you deaf or do you have serious difficulty hearing?  No   Do you need help with transportation? No   Do you need help shopping? No   Do you need help preparing meals?  No   Do you need help with housework?  No   Do you need help with laundry? No   Do you need help taking your medications? No   Do you need help managing money? No   Do you ever drive or ride in a car without wearing a seat belt? No   Have you felt unusual stress, anger or loneliness in the last month? No   Who do you live with? Spouse   If you need help, do you have trouble finding someone available to you? No   Have you been bothered in the last four weeks by sexual problems? No   Do you have difficulty concentrating, remembering or making decisions? No         Does the patient have evidence of cognitive impairment? No    Asprin use counseling:Does not  need ASA (and currently is not on it)    Age-appropriate Screening Schedule:  Refer to the list below for future screening recommendations based on patient's age, sex and/or medical conditions. Orders for these recommended tests are listed in the plan section. The patient has been provided with a written plan.    Health Maintenance   Topic Date Due   • TDAP/TD VACCINES (1 - Tdap) Never done   • ZOSTER VACCINE (1 of 2) Never done   • INFLUENZA VACCINE  08/01/2021   • DXA SCAN  01/30/2022   • LIPID PANEL  04/22/2022   • MAMMOGRAM  03/18/2023          The following portions of the patient's history were reviewed and updated as appropriate: allergies, current medications, past family history, past medical history, past social history, past surgical history and problem list.    Outpatient Medications Prior to Visit   Medication Sig Dispense Refill   • Acetaminophen (Tylenol) 325 MG capsule Tylenol   PRN     • bimatoprost (Lumigan) 0.01 % ophthalmic drops Lumigan 0.01 % eye drops     • indomethacin (INDOCIN) 25 MG capsule Take 25 mg by mouth 3 (Three) Times a Day As Needed.     • naproxen sodium (ALEVE) 220 MG tablet Take 1 tablet by mouth 4 (Four) Times a Day Before Meals & at Bedtime As Needed.     • timolol (TIMOPTIC) 0.5 % ophthalmic solution Administer 0.5 drops to both eyes 2 (two) times a day.     • topiramate (Topamax) 100 MG tablet Take 2 tablets by mouth 2 (Two) Times a Day. 180 tablet 1   • predniSONE (DELTASONE) 20 MG tablet Take 3 tablets 1 day, then 2 tablets 1 day and 1 tablet daily 6 tablet 0   • valsartan (Diovan) 80 MG tablet Take 1 tablet by mouth Daily. 90 tablet 1     No facility-administered medications prior to visit.       Patient Active Problem List   Diagnosis   • Benign essential hypertension   • Migraine   • Menopausal syndrome   • Edema   • Postmenopausal bleeding   • Low back pain   • Sciatica   • Abdominal pain   • Abdominal bloating   • Headache   • Hyperlipidemia   • Bilateral occipital  "neuralgia   • Osteopenia       Advanced Care Planning:  ACP discussion was held with the patient during this visit. Patient has an advance directive (not in EMR), copy requested.    Review of Systems   Constitutional: Positive for fatigue. Negative for chills and fever.   HENT: Negative for congestion, ear pain, rhinorrhea, sinus pain and sore throat.    Eyes: Negative for visual disturbance.   Respiratory: Negative for cough, shortness of breath and wheezing.    Cardiovascular: Negative for chest pain, palpitations and leg swelling.   Gastrointestinal: Negative for abdominal pain, blood in stool, constipation, diarrhea, nausea and vomiting.   Endocrine: Negative for polydipsia.   Genitourinary: Negative for dysuria and flank pain.   Musculoskeletal: Negative for arthralgias and myalgias.   Skin: Negative for rash.   Neurological: Positive for headaches. Negative for dizziness.   Psychiatric/Behavioral: Negative for agitation, confusion, self-injury, sleep disturbance and suicidal ideas. The patient is not nervous/anxious.        Compared to one year ago, the patient feels her physical health is worse.  Compared to one year ago, the patient feels her mental health is worse.    Reviewed chart for potential of high risk medication in the elderly: yes  Reviewed chart for potential of harmful drug interactions in the elderly:yes    Objective         Vitals:    05/20/21 1325   BP: 178/84   Pulse: 86   SpO2: 98%   Weight: 66.7 kg (147 lb)   Height: 167.6 cm (65.98\")       Body mass index is 23.74 kg/m².  Discussed the patient's BMI with her. The BMI is in the acceptable range.    Physical Exam  Vitals reviewed.   Constitutional:       General: She is not in acute distress.     Appearance: Normal appearance. She is well-developed and normal weight. She is not ill-appearing or diaphoretic.   HENT:      Head: Normocephalic and atraumatic.      Right Ear: Hearing, tympanic membrane, ear canal and external ear normal.      " Left Ear: Hearing, tympanic membrane, ear canal and external ear normal.      Nose: Nose normal.   Eyes:      General: Lids are normal.      Extraocular Movements: Extraocular movements intact.      Conjunctiva/sclera: Conjunctivae normal.   Neck:      Thyroid: No thyroid mass or thyromegaly.      Trachea: Trachea and phonation normal.   Cardiovascular:      Rate and Rhythm: Normal rate and regular rhythm.      Heart sounds: Normal heart sounds.   Pulmonary:      Effort: Pulmonary effort is normal.      Breath sounds: Normal breath sounds.   Abdominal:      General: Bowel sounds are normal. There is no distension.      Palpations: Abdomen is soft. Abdomen is not rigid.      Tenderness: There is no abdominal tenderness. There is no guarding.   Musculoskeletal:         General: Normal range of motion.      Cervical back: Normal range of motion.      Right lower leg: No edema.      Left lower leg: No edema.   Lymphadenopathy:      Cervical: No cervical adenopathy.      Right cervical: No superficial cervical adenopathy.     Left cervical: No superficial cervical adenopathy.   Skin:     General: Skin is warm.      Findings: No erythema or rash.      Nails: There is no clubbing.   Neurological:      Mental Status: She is alert and oriented to person, place, and time.      Coordination: Coordination normal.      Gait: Gait normal.      Deep Tendon Reflexes: Reflexes are normal and symmetric.      Comments: CN grossly intact   Psychiatric:         Attention and Perception: Attention and perception normal. She is attentive.         Mood and Affect: Mood and affect normal.         Speech: Speech normal.         Behavior: Behavior normal. Behavior is cooperative.         Thought Content: Thought content normal.         Cognition and Memory: Cognition and memory normal.         Judgment: Judgment normal.         Lab Results   Component Value Date    TRIG 116 04/22/2021    HDL 72 (H) 04/22/2021     (H) 04/22/2021    VLDL  20 04/22/2021        Assessment/Plan   Medicare Risks and Personalized Health Plan  CMS Preventative Services Quick Reference  Advance Directive Discussion  Breast Cancer/Mammogram Screening  Colon Cancer Screening  Immunizations Discussed/Encouraged (specific immunizations; COVID19 )    The above risks/problems have been discussed with the patient.  Pertinent information has been shared with the patient in the After Visit Summary.  Follow up plans and orders are seen below in the Assessment/Plan Section.    Diagnoses and all orders for this visit:    1. Medicare annual wellness visit, subsequent (Primary)  PE is unremarkable  Preventative labs ordered  Colonoscopy-declines today, will consider cologuard in future  [id number]  [id number]  Dentist-goes regularly  Ophthalmologist-goes regularly  Dermatologist-goes regulary  Covid-19 vaccination completed    2. Benign essential hypertension  -     metoprolol tartrate (LOPRESSOR) 25 MG tablet; Take 1 tablet by mouth 2 (Two) Times a Day.  Dispense: 60 tablet; Refill: 2  Stop valsartan.  Start metoprolol 25 mg BID.  Monitor BP at home.  Record BP and HR readings.  Call if elevated.    3. Mixed hyperlipidemia  Failed statins.  Reports chronic myalgias secondary to treatment with these years ago.  Discussed dietary changes to help with cholesterol.  Consider other medication in future once headaches are under control.    4. Cervicalgia  5. Bilateral occipital neuralgia  6. Intractable migraine without aura and without status migrainosus  -     Ambulatory Referral to Neurology  -     CT Head Without Contrast; Future  Continue topamax 100 mg BID.  Will order CT head, has been years since patient has had imaging and is reporting worsening headaches.  Can't get MRI due to stimulator.    Complete course of indomethacin.  Stop valsartan, change to metoprolol 25 mg BID.  Will refer to neurology.    7. Vitamin D deficiency  -     cholecalciferol (VITAMIN D3) 1.25 MG  (10615 UT) capsule; Take 1 capsule by mouth 1 (One) Time Per Week.  Dispense: 12 capsule; Refill: 1    8. Osteopenia of multiple sites      Follow Up:  Return in about 13 days (around 6/2/2021) for Recheck, hypertension/migraine.     An After Visit Summary and PPPS were given to the patient.

## 2021-06-03 ENCOUNTER — OFFICE VISIT (OUTPATIENT)
Dept: FAMILY MEDICINE CLINIC | Facility: CLINIC | Age: 71
End: 2021-06-03

## 2021-06-03 VITALS
HEART RATE: 103 BPM | OXYGEN SATURATION: 98 % | DIASTOLIC BLOOD PRESSURE: 92 MMHG | BODY MASS INDEX: 22.98 KG/M2 | SYSTOLIC BLOOD PRESSURE: 170 MMHG | WEIGHT: 143 LBS | HEIGHT: 66 IN

## 2021-06-03 DIAGNOSIS — M54.2 CERVICALGIA: ICD-10-CM

## 2021-06-03 DIAGNOSIS — G43.719 INTRACTABLE CHRONIC MIGRAINE WITHOUT AURA AND WITHOUT STATUS MIGRAINOSUS: ICD-10-CM

## 2021-06-03 DIAGNOSIS — I10 BENIGN ESSENTIAL HYPERTENSION: Primary | ICD-10-CM

## 2021-06-03 PROCEDURE — 99213 OFFICE O/P EST LOW 20 MIN: CPT | Performed by: PHYSICIAN ASSISTANT

## 2021-06-03 RX ORDER — METHYLPREDNISOLONE 4 MG/1
TABLET ORAL
Qty: 21 EACH | Refills: 0 | Status: SHIPPED | OUTPATIENT
Start: 2021-06-03 | End: 2021-07-30 | Stop reason: SDUPTHER

## 2021-06-03 RX ORDER — VALSARTAN 80 MG/1
80 TABLET ORAL DAILY
COMMUNITY
End: 2021-11-15

## 2021-06-03 NOTE — PROGRESS NOTES
Chief Complaint   Patient presents with   • Hypertension     f/u stopped medication after 5 days due to stomach issues and fatigue        HPI     Fabiola Salmeron is a pleasant 71 y.o. female who is here for routine follow-up of hypertension and headache.  Patient started metoprolol 25 mg BID but was unable to tolerate it.  She reports it gave her upset stomach and severe fatigue.  She discontinued and restarted her valsartan 80 mg daily.  She is monitoring her BP at home on a new cuff and it has been running in the 120s/70s at home.  It is elevated today.    She has ongoing headache.  It is unclear if this is associate with migraine or cervicalgia.  She has failed multiple treatments and medications in the past.  Has not been to physical therapy.  No benefit with chiropractor.  On topamax 100 mg BID.  Has upcoming appointment with neurology on 07/26.  Has had relief with medrol dopsak in the past.  No improvement with indomethacin.  Has CT head scheduled for tomorrow.    Past Medical History:   Diagnosis Date   • Candidiasis of vulva and vagina        Past Surgical History:   Procedure Laterality Date   • CRANIAL NEUROSTIMULATOR INSERTION/REPLACEMENT Right    • EYE SURGERY      shunt placement by ophthalmology       Family History   Problem Relation Age of Onset   • Lung cancer Father         smoker   • Breast cancer Sister    • Ovarian cancer Neg Hx    • Colon cancer Neg Hx        Social History     Socioeconomic History   • Marital status:      Spouse name: Not on file   • Number of children: Not on file   • Years of education: Not on file   • Highest education level: Not on file   Tobacco Use   • Smoking status: Never Smoker   • Smokeless tobacco: Never Used   Substance and Sexual Activity   • Alcohol use: Yes     Alcohol/week: 5.0 standard drinks     Types: 5 Standard drinks or equivalent per week   • Drug use: No   • Sexual activity: Yes     Birth control/protection: None       Allergies   Allergen  "Reactions   • Codeine    • Metoprolol Other (See Comments)     Fatigue, stomach issues   • Statins Myalgia       ROS  Review of Systems   Constitutional: Negative for chills and fever.   Respiratory: Negative for cough and shortness of breath.    Cardiovascular: Negative for chest pain and leg swelling.   Neurological: Positive for headache. Negative for dizziness.       Vitals:    06/03/21 1524   BP: 170/92   Pulse: 103   SpO2: 98%   Weight: 64.9 kg (143 lb)   Height: 167.6 cm (65.98\")     Body mass index is 23.09 kg/m².    Current Outpatient Medications on File Prior to Visit   Medication Sig Dispense Refill   • Acetaminophen (Tylenol) 325 MG capsule Tylenol   PRN     • bimatoprost (Lumigan) 0.01 % ophthalmic drops Lumigan 0.01 % eye drops     • cholecalciferol (VITAMIN D3) 1.25 MG (26805 UT) capsule Take 1 capsule by mouth 1 (One) Time Per Week. 12 capsule 1   • naproxen sodium (ALEVE) 220 MG tablet Take 1 tablet by mouth 4 (Four) Times a Day Before Meals & at Bedtime As Needed.     • timolol (TIMOPTIC) 0.5 % ophthalmic solution Administer 0.5 drops to both eyes 2 (two) times a day.     • topiramate (Topamax) 100 MG tablet Take 2 tablets by mouth 2 (Two) Times a Day. 180 tablet 1   • valsartan (DIOVAN) 80 MG tablet Take 80 mg by mouth Daily.     • [DISCONTINUED] indomethacin (INDOCIN) 25 MG capsule Take 25 mg by mouth 3 (Three) Times a Day As Needed.     • [DISCONTINUED] metoprolol tartrate (LOPRESSOR) 25 MG tablet Take 1 tablet by mouth 2 (Two) Times a Day. 60 tablet 2     No current facility-administered medications on file prior to visit.       Results for orders placed or performed in visit on 04/22/21   CBC Auto Differential    Specimen: Blood   Result Value Ref Range    WBC 6.49 3.40 - 10.80 10*3/mm3    RBC 4.03 3.77 - 5.28 10*6/mm3    Hemoglobin 13.3 12.0 - 15.9 g/dL    Hematocrit 39.1 34.0 - 46.6 %    MCV 97.0 79.0 - 97.0 fL    MCH 33.0 26.6 - 33.0 pg    MCHC 34.0 31.5 - 35.7 g/dL    RDW 13.5 12.3 - 15.4 " %    RDW-SD 47.5 37.0 - 54.0 fl    MPV 11.2 6.0 - 12.0 fL    Platelets 334 140 - 450 10*3/mm3    Neutrophil % 64.1 42.7 - 76.0 %    Lymphocyte % 24.0 19.6 - 45.3 %    Monocyte % 7.9 5.0 - 12.0 %    Eosinophil % 2.3 0.3 - 6.2 %    Basophil % 1.1 0.0 - 1.5 %    Immature Grans % 0.6 (H) 0.0 - 0.5 %    Neutrophils, Absolute 4.16 1.70 - 7.00 10*3/mm3    Lymphocytes, Absolute 1.56 0.70 - 3.10 10*3/mm3    Monocytes, Absolute 0.51 0.10 - 0.90 10*3/mm3    Eosinophils, Absolute 0.15 0.00 - 0.40 10*3/mm3    Basophils, Absolute 0.07 0.00 - 0.20 10*3/mm3    Immature Grans, Absolute 0.04 0.00 - 0.05 10*3/mm3    nRBC 0.0 0.0 - 0.2 /100 WBC   Comprehensive Metabolic Panel    Specimen: Blood   Result Value Ref Range    Glucose 93 65 - 99 mg/dL    BUN 25 (H) 8 - 23 mg/dL    Creatinine 1.22 (H) 0.57 - 1.00 mg/dL    Sodium 142 136 - 145 mmol/L    Potassium 4.3 3.5 - 5.2 mmol/L    Chloride 107 98 - 107 mmol/L    CO2 22.9 22.0 - 29.0 mmol/L    Calcium 9.5 8.6 - 10.5 mg/dL    Total Protein 7.5 6.0 - 8.5 g/dL    Albumin 4.50 3.50 - 5.20 g/dL    ALT (SGPT) 14 1 - 33 U/L    AST (SGOT) 15 1 - 32 U/L    Alkaline Phosphatase 65 39 - 117 U/L    Total Bilirubin 0.3 0.0 - 1.2 mg/dL    eGFR Non African Amer 43 (L) >60 mL/min/1.73    Globulin 3.0 gm/dL    A/G Ratio 1.5 g/dL    BUN/Creatinine Ratio 20.5 7.0 - 25.0    Anion Gap 12.1 5.0 - 15.0 mmol/L   TSH Rfx On Abnormal To Free T4    Specimen: Blood   Result Value Ref Range    TSH 3.150 0.270 - 4.200 uIU/mL   Lipid Panel    Specimen: Blood   Result Value Ref Range    Total Cholesterol 309 (H) 0 - 200 mg/dL    Triglycerides 116 0 - 150 mg/dL    HDL Cholesterol 72 (H) 40 - 60 mg/dL    LDL Cholesterol  217 (H) 0 - 100 mg/dL    VLDL Cholesterol 20 5 - 40 mg/dL    LDL/HDL Ratio 2.97    Hepatitis C Antibody    Specimen: Blood   Result Value Ref Range    Hepatitis C Ab Non-Reactive Non-Reactive   Vitamin D 25 Hydroxy    Specimen: Blood   Result Value Ref Range    25 Hydroxy, Vitamin D 12.2 (L) 30.0 - 100.0  ng/ml       PE    Physical Exam  Vitals reviewed.   Constitutional:       General: She is not in acute distress.     Appearance: Normal appearance. She is well-developed and normal weight. She is not ill-appearing or diaphoretic.   HENT:      Head: Normocephalic and atraumatic.   Eyes:      Extraocular Movements: Extraocular movements intact.      Conjunctiva/sclera: Conjunctivae normal.   Pulmonary:      Effort: No respiratory distress.   Musculoskeletal:         General: Normal range of motion.      Cervical back: Normal range of motion.   Neurological:      General: No focal deficit present.      Mental Status: She is alert.   Psychiatric:         Attention and Perception: She is attentive.         Mood and Affect: Mood normal.         Speech: Speech normal.         Behavior: Behavior normal. Behavior is cooperative.         Thought Content: Thought content normal.         Judgment: Judgment normal.         A/P    Diagnoses and all orders for this visit:    1. Benign essential hypertension (Primary)  Failed metoprolol.  Caused significant fatigue and stomach issues.  Started back on valsartan 80 mg daily.  She has been monitoring BP at home and it is usually in the 120s/70s.  It is elevated today.  She reports her BP cuff is new with new batteries at home.    2. Intractable chronic migraine without aura and without status migrainosus  3. Cervicalgia  -     methylPREDNISolone (MEDROL) 4 MG dose pack; Take as directed on package instructions.  Dispense: 21 each; Refill: 0    Patient has ongoing chronic headache.  It is unclear the etiology of headache.  Cervicalgia vs. Migraine.  Has failed multiple medications.  On topamax 100 mg BID currently.  Reports that medrol dosepak has helped in the past, will prescribe.  She will monitor BP while on steroids and will discontinue if home BP is elevated.  Has CT head scheduled for tomorrow.  Pending neurology appointment on 07/26.     Plan of care reviewed with patient at  the conclusion of today's visit. Education was provided regarding diagnosis, management and any prescribed or recommended OTC medications.  Patient verbalizes understanding of and agreement with management plan.    Return in about 2 months (around 8/4/2021) for Recheck, headaches/HTN.     Jessica Roach PA-C

## 2021-06-04 ENCOUNTER — HOSPITAL ENCOUNTER (OUTPATIENT)
Dept: CT IMAGING | Facility: HOSPITAL | Age: 71
Discharge: HOME | End: 2021-06-04
Admitting: PHYSICIAN ASSISTANT

## 2021-06-04 DIAGNOSIS — G43.019 INTRACTABLE MIGRAINE WITHOUT AURA AND WITHOUT STATUS MIGRAINOSUS: ICD-10-CM

## 2021-06-04 PROCEDURE — 70450 CT HEAD/BRAIN W/O DYE: CPT

## 2021-06-19 DIAGNOSIS — M54.81 BILATERAL OCCIPITAL NEURALGIA: ICD-10-CM

## 2021-06-21 RX ORDER — TOPIRAMATE 100 MG/1
TABLET, FILM COATED ORAL
Qty: 120 TABLET | Refills: 0 | Status: SHIPPED | OUTPATIENT
Start: 2021-06-21 | End: 2021-07-22

## 2021-07-21 DIAGNOSIS — M54.81 BILATERAL OCCIPITAL NEURALGIA: ICD-10-CM

## 2021-07-22 RX ORDER — TOPIRAMATE 100 MG/1
TABLET, FILM COATED ORAL
Qty: 120 TABLET | Refills: 0 | Status: SHIPPED | OUTPATIENT
Start: 2021-07-22 | End: 2021-12-29

## 2021-07-26 ENCOUNTER — OFFICE VISIT (OUTPATIENT)
Dept: NEUROLOGY | Facility: CLINIC | Age: 71
End: 2021-07-26

## 2021-07-26 VITALS
TEMPERATURE: 96.4 F | BODY MASS INDEX: 22.02 KG/M2 | OXYGEN SATURATION: 98 % | DIASTOLIC BLOOD PRESSURE: 80 MMHG | WEIGHT: 137 LBS | HEIGHT: 66 IN | HEART RATE: 96 BPM | SYSTOLIC BLOOD PRESSURE: 130 MMHG

## 2021-07-26 DIAGNOSIS — G44.041 INTRACTABLE CHRONIC PAROXYSMAL HEMICRANIA: Primary | ICD-10-CM

## 2021-07-26 DIAGNOSIS — M54.2 NECK PAIN: ICD-10-CM

## 2021-07-26 DIAGNOSIS — G44.021 INTRACTABLE CHRONIC CLUSTER HEADACHE: ICD-10-CM

## 2021-07-26 PROCEDURE — 99203 OFFICE O/P NEW LOW 30 MIN: CPT | Performed by: NURSE PRACTITIONER

## 2021-07-26 RX ORDER — GALCANEZUMAB 100 MG/ML
300 INJECTION, SOLUTION SUBCUTANEOUS
Qty: 3.08 ML | Refills: 2 | Status: SHIPPED | OUTPATIENT
Start: 2021-07-26 | End: 2021-09-17

## 2021-07-26 RX ORDER — GALCANEZUMAB 100 MG/ML
300 INJECTION, SOLUTION SUBCUTANEOUS
Qty: 3.08 ML | Refills: 5 | Status: SHIPPED | OUTPATIENT
Start: 2021-07-26 | End: 2021-07-26

## 2021-07-26 NOTE — PROGRESS NOTES
Subjective:     Patient ID: Fabiola Salmeron is a 71 y.o. female.    CC:   Chief Complaint   Patient presents with   • Neurologic Problem     occipital neuralgia   • Headache       HPI:   History of Present Illness     Ms. Salmeron is a very pleasant 71-year-old female here today for initial neurological evaluation with our clinic.  She reports to be having a very long history of right side occipital headaches ongoing for many years dating back into the early 2000's.  She reports that she has had a posterior headache every day of her life for years.  She has seen a number of specialist in the past including several neurologist, pain management doctors.  She had a spinal cord stimulator placed in 2006 for working diagnosis of occipital neuralgia, stimulator was no help, this is actually nonfunctioning at this time.  She has had occipital nerve blocks many times over the past years and this also was ineffective.  She previously saw Dr. Peck within our clinic, she is also recently seen at [facility] neurology and is also been to  neurology.  She tells me that nothing at all has helped her headache in the past outside of prednisone.  She uses prednisone burst as needed, prednisone does not take her headache away but does decrease the intensity of her headaches when they are severe.  She has been diagnosed in the past with migraines as well as paroxysmal hemicrania and occipital neuralgia.  She tells me she has been on many medications in the past she has taken gabapentin, gralise, amitriptyline, beta-blockers, Depakote, verapamil and sumatriptan in the past with no improvement.  She is currently taking Topamax 100 mg twice a day, she reports this has not really helped her at all either.  She describes her discomfort as severe stabbing pain in the back of her head that can come on randomly throughout the day, pain is severe and last for about 20 to 30 minutes at a time and resolves, she will have 4-5 attacks per day.   With these attacks she does sometimes have eye watering and feels pressure in her right temple, this is not a new issue and she has also been worked up with ENT in the past.  Most recently she saw [facility] neurology in March 2021 and they gave her a prescription for indomethacin to use 3 times a day, she reports that at first she thought this helped but it became ineffective and she is not currently taking this medicine either.  There has been mention in the past of cluster headaches, she does not really think she has cluster headaches because these last all year.  She is unable to have MRI due to spinal stimulator in her neck, she had a CT scan in June which showed no acute changes.  She does not think she is ever had any imaging of her C-spine.  She adamantly denies dizziness, paresthesias or weakness.  She has had surgery on her left eye which is caused her left pupil to be large and nonresponsive  She denies vision loss in the right eye    The following portions of the patient's history were reviewed and updated as appropriate: allergies, current medications, past family history, past medical history, past social history, past surgical history and problem list.    Past Medical History:   Diagnosis Date   • Candidiasis of vulva and vagina        Past Surgical History:   Procedure Laterality Date   • CRANIAL NEUROSTIMULATOR INSERTION/REPLACEMENT Right    • EYE SURGERY      shunt placement by ophthalmology       Social History     Socioeconomic History   • Marital status:      Spouse name: Not on file   • Number of children: Not on file   • Years of education: Not on file   • Highest education level: Not on file   Tobacco Use   • Smoking status: Never Smoker   • Smokeless tobacco: Never Used   Substance and Sexual Activity   • Alcohol use: Yes     Alcohol/week: 5.0 standard drinks     Types: 5 Standard drinks or equivalent per week   • Drug use: No   • Sexual activity: Yes     Birth  "control/protection: None       Family History   Problem Relation Age of Onset   • Lung cancer Father         smoker   • Breast cancer Sister    • Ovarian cancer Neg Hx    • Colon cancer Neg Hx         Review of Systems   Constitutional: Negative.    Eyes: Positive for visual disturbance (poor vision left eye due to multiple surgeries).   Respiratory: Negative.    Cardiovascular: Negative.    Gastrointestinal: Negative.    Musculoskeletal: Positive for neck pain. Negative for arthralgias, back pain, gait problem, joint swelling, myalgias and neck stiffness.   Neurological: Positive for headaches. Negative for dizziness, tremors, seizures, syncope, facial asymmetry, speech difficulty, weakness, light-headedness and numbness.   Hematological: Negative.         Objective:  /80   Pulse 96   Temp 96.4 °F (35.8 °C)   Ht 167.6 cm (66\")   Wt 62.1 kg (137 lb)   SpO2 98%   BMI 22.11 kg/m²     Neurologic Exam     Mental Status   Oriented to person, place, and time.   Attention: normal. Concentration: normal.   Speech: speech is normal   Level of consciousness: alert  Knowledge: consistent with education.   Able to read. Able to write. Normal comprehension.     Cranial Nerves   Cranial nerves II through XII intact.     CN III, IV, VI   Right pupil: Shape: regular. Reactivity: brisk. Consensual response: intact. Accommodation: intact.   Left pupil: Shape: regular. Reactivity: brisk. Consensual response: intact. Accommodation: intact.   CN III: no CN III palsy  CN VI: no CN VI palsy  Nystagmus: none   Upgaze: normal  Downgaze: normal    CN V   Facial sensation intact.     CN VII   Facial expression full, symmetric.     CN VIII   CN VIII normal.     CN IX, X   CN IX normal.   CN X normal.     CN XI   CN XI normal.     CN XII   CN XII normal.   Right pupil 2 mm, left pupil 3 to 4 mm and nonresponsive, patient reports this is due to multiple surgeries in the eye and is chronic     Motor Exam   Muscle bulk: normal  Overall " muscle tone: normal  Right arm tone: normal  Left arm tone: normal  Right arm pronator drift: absent  Left arm pronator drift: absent  Right leg tone: normal  Left leg tone: normal    Strength   Strength 5/5 throughout.     Sensory Exam   Light touch normal.     Gait, Coordination, and Reflexes     Gait  Gait: normal    Coordination   Romberg: negative  Finger to nose coordination: normal  Heel to shin coordination: normal  Tandem walking coordination: normal    Tremor   Resting tremor: absent  Intention tremor: absent  Action tremor: absent    Reflexes   Right plantar: normal  Left plantar: normal  Right Blankenship: absent  Left Blankenship: absentShe is a bit hyperreflexic in upper and lower extremities       Physical Exam  Constitutional:       General: She is not in acute distress.     Appearance: She is not ill-appearing or toxic-appearing.   HENT:      Head:      Comments: She has tenderness in the right occiput, spinal stimulator lead palpable     Nose: Nose normal.      Mouth/Throat:      Mouth: Mucous membranes are moist.   Eyes:      Extraocular Movements: Extraocular movements intact.      Conjunctiva/sclera: Conjunctivae normal.   Cardiovascular:      Rate and Rhythm: Normal rate and regular rhythm.      Pulses: Normal pulses.   Pulmonary:      Effort: Pulmonary effort is normal.   Neurological:      Mental Status: She is alert and oriented to person, place, and time.      Coordination: Finger-Nose-Finger Test, Heel to Shin Test and Romberg Test normal.      Gait: Gait is intact. Tandem walk normal.      Deep Tendon Reflexes: Strength normal.   Psychiatric:         Speech: Speech normal.         Assessment/Plan:       Diagnoses and all orders for this visit:    1. Intractable chronic paroxysmal hemicrania (Primary)  -     CT Cervical Spine Without Contrast; Future    2. Neck pain  -     CT Cervical Spine Without Contrast; Future    3. Intractable chronic cluster headache  -     Discontinue: Galcanezumab-[facility]  (Emgality, 300 MG Dose,) 100 MG/ML solution prefilled syringe; Inject 300 mg under the skin into the appropriate area as directed Every 30 (Thirty) Days. Inject 300 mg under the skin every 30 days while in cluster  Dispense: 3.08 mL; Refill: 5    Other orders  -     Galcanezumab-gnlm (Emgality, 300 MG Dose,) 100 MG/ML solution prefilled syringe; Inject 300 mg under the skin into the appropriate area as directed Every 30 (Thirty) Days.  Dispense: 3.08 mL; Refill: 2    This patient has had chronic right occipital headaches since the early 2000's, headaches are daily in occurrence but, diagnosis likely paroxysmal hemicrania versus atypical cluster.  She has been on every type of medication imaginable including beta-blockers, tricyclic antidepressants, verapamil, Topamax, gabapentin, long-acting gabapentin, Botox and has had multiple occipital nerve blocks and injections with no improvement.  She has most recently taken indomethacin through [facility] neurology with no improvement  We discussed options, will try Emgality for cluster, she does have associated watering of her right eye which could be seen with either cluster or hemicrania.  She has been nonresponsive to all treatments over the years.  If no improvement with Emgality consider oxcarbazepine, patient wants to stay away from any medications that cause adverse side effects so we will start with the Emgality injections.  She has had a recent CT of her brain, will check C-spine CT given her neck pain and she does have some brisk reflexes for her age.  She has a spinal stimulator in place that is nonfunctioning at this point  Offered CTA of the head neck due to pupil irregularity but she reports this is due to a surgical procedure and declines CTA head and neck  I will see her back in about 4 to 6 weeks after starting the injections, she will contact my office with any questions concerns or problems prior to her follow-up appointment         Reviewed  medications, potential side effects and signs and symptoms to report. Discussed risk versus benefits of treatment plan with patient and/or family-including medications, labs and radiology that may be ordered. Addressed questions and concerns during visit. Patient and/or family verbalized understanding and agree with plan.    AS THE PROVIDER, I PERSONALLY WORE PPE DURING ENTIRE FACE TO FACE ENCOUNTER IN CLINIC WITH THE PATIENT. PATIENT ALSO WORE PPE DURING ENTIRE FACE TO FACE ENCOUNTER EXCEPT FOR A MAX OF 30 SECONDS DURING NEUROLOGICAL EVALUATION OF CRANIAL NERVES AND THEN MASK WAS PLACED BACK OVER PATIENT FACE FOR REMAINDER OF VISIT. I WASHED MY HANDS BEFORE AND AFTER VISIT.      Isabella Morelos, APRN  7/26/2021

## 2021-07-28 ENCOUNTER — PRIOR AUTHORIZATION (OUTPATIENT)
Dept: NEUROLOGY | Facility: CLINIC | Age: 71
End: 2021-07-28

## 2021-07-28 NOTE — TELEPHONE ENCOUNTER
PA SUBMITTED FOR EMGALITY (3) 100 MG INJ FOR CLUSTER HA'S. KEY: [id number]. WILL AWAIT DETERMINATION

## 2021-07-30 ENCOUNTER — TELEPHONE (OUTPATIENT)
Dept: FAMILY MEDICINE CLINIC | Facility: CLINIC | Age: 71
End: 2021-07-30

## 2021-07-30 DIAGNOSIS — M54.2 CERVICALGIA: ICD-10-CM

## 2021-07-30 DIAGNOSIS — G43.719 INTRACTABLE CHRONIC MIGRAINE WITHOUT AURA AND WITHOUT STATUS MIGRAINOSUS: ICD-10-CM

## 2021-07-30 RX ORDER — METHYLPREDNISOLONE 4 MG/1
TABLET ORAL
Qty: 21 EACH | Refills: 0 | Status: SHIPPED | OUTPATIENT
Start: 2021-07-30 | End: 2021-09-17

## 2021-08-02 NOTE — TELEPHONE ENCOUNTER
Spoke with pt, let her know that prescription had been called in, pt stated that she needed nothing else at this time.

## 2021-08-09 ENCOUNTER — HOSPITAL ENCOUNTER (OUTPATIENT)
Dept: CT IMAGING | Facility: HOSPITAL | Age: 71
Discharge: HOME | End: 2021-08-09
Admitting: NURSE PRACTITIONER

## 2021-08-09 DIAGNOSIS — G44.041 INTRACTABLE CHRONIC PAROXYSMAL HEMICRANIA: ICD-10-CM

## 2021-08-09 DIAGNOSIS — M54.2 NECK PAIN: ICD-10-CM

## 2021-08-09 PROCEDURE — 72125 CT NECK SPINE W/O DYE: CPT

## 2021-08-10 NOTE — PROGRESS NOTES
Please let patient know her CT of the neck showed multilevel degenerative changes, if she would like to see a surgeon for further evaluation of her scan I would be happy to place this referral for her.  Has she seen a neurosurgeon in the past?

## 2021-08-12 ENCOUNTER — TELEPHONE (OUTPATIENT)
Dept: NEUROLOGY | Facility: CLINIC | Age: 71
End: 2021-08-12

## 2021-08-12 NOTE — TELEPHONE ENCOUNTER
PATIENT INFORMED OF RESULTS. SHE IS NOT SURE ABOUT SEEING THE NEUROSURGEON. SHE HAS A FOLLOW UP IN SEPT AND WILL DISCUSS WITH YOU THEN.

## 2021-08-12 NOTE — TELEPHONE ENCOUNTER
----- Message from KAREEM Gusman sent at 8/10/2021 11:20 AM EDT -----  Please let patient know her CT of the neck showed multilevel degenerative changes, if she would like to see a surgeon for further evaluation of her scan I would be happy to place this referral for her.  Has she seen a neurosurgeon in the past?

## 2021-09-17 ENCOUNTER — OFFICE VISIT (OUTPATIENT)
Dept: FAMILY MEDICINE CLINIC | Facility: CLINIC | Age: 71
End: 2021-09-17

## 2021-09-17 VITALS
OXYGEN SATURATION: 97 % | DIASTOLIC BLOOD PRESSURE: 84 MMHG | SYSTOLIC BLOOD PRESSURE: 128 MMHG | BODY MASS INDEX: 21.57 KG/M2 | HEART RATE: 85 BPM | HEIGHT: 66 IN | WEIGHT: 134.2 LBS

## 2021-09-17 DIAGNOSIS — M85.89 OSTEOPENIA OF MULTIPLE SITES: ICD-10-CM

## 2021-09-17 DIAGNOSIS — K21.9 GASTROESOPHAGEAL REFLUX DISEASE, UNSPECIFIED WHETHER ESOPHAGITIS PRESENT: ICD-10-CM

## 2021-09-17 DIAGNOSIS — M54.81 BILATERAL OCCIPITAL NEURALGIA: Primary | ICD-10-CM

## 2021-09-17 DIAGNOSIS — I10 BENIGN ESSENTIAL HYPERTENSION: ICD-10-CM

## 2021-09-17 PROCEDURE — 99214 OFFICE O/P EST MOD 30 MIN: CPT | Performed by: PHYSICIAN ASSISTANT

## 2021-09-17 RX ORDER — PREDNISONE 1 MG/1
5 TABLET ORAL DAILY
Qty: 30 TABLET | Refills: 2 | Status: SHIPPED | OUTPATIENT
Start: 2021-09-17 | End: 2021-12-17 | Stop reason: SDUPTHER

## 2021-09-17 RX ORDER — PREDNISONE 20 MG/1
20 TABLET ORAL DAILY
Qty: 5 TABLET | Refills: 0 | Status: SHIPPED | OUTPATIENT
Start: 2021-09-17 | End: 2021-09-22

## 2021-09-17 RX ORDER — PANTOPRAZOLE SODIUM 20 MG/1
20 TABLET, DELAYED RELEASE ORAL
Qty: 30 TABLET | Refills: 2 | Status: SHIPPED | OUTPATIENT
Start: 2021-09-17 | End: 2022-05-31 | Stop reason: SDUPTHER

## 2021-09-18 NOTE — PROGRESS NOTES
Chief Complaint   Patient presents with   • Hypertension   • Heartburn     Has a history of. Started back again       HPI     Fabiola Salmeron is a pleasant 71 y.o. female who is here for routine follow-up of bilateral occipital neuralgia, GERD and hypertension.  Patient's blood pressure is stable and well-controlled.  She reports worsening reflux lately.  Not taking antacid at this time.  Has previously taken a medication and this worked well.  Patient has ongoing bilateral occipital neuralgia.  She was recently seen by neurology who ordered CT head and CT neck.  Normal CT head.  CT neck shows C4-5, 6-7 degenerative changes.  Patient denies any radicular pain, paresthesia or weakness in her arms.  She is not interested in seeing neurosurgery.  Her insurance denied emgality.  She reports daily headaches that are improved with prednisone.  She has tried multiple other medications:  Gabapentin, lyrica, indomethacin, aleve, excederin.  Has never tried low dose prednisone daily.  She has only taken high dose prednisone for a few days and this works well temporarily.  She is aware of risk of chronic steroid use.  She is not taking vitamin D or calcium regularly.  Has osteopenia.  No history of diabetes.    Past Medical History:   Diagnosis Date   • Candidiasis of vulva and vagina        Past Surgical History:   Procedure Laterality Date   • CRANIAL NEUROSTIMULATOR INSERTION/REPLACEMENT Right    • EYE SURGERY      shunt placement by ophthalmology       Family History   Problem Relation Age of Onset   • Lung cancer Father         smoker   • Breast cancer Sister    • Ovarian cancer Neg Hx    • Colon cancer Neg Hx        Social History     Socioeconomic History   • Marital status:      Spouse name: Not on file   • Number of children: Not on file   • Years of education: Not on file   • Highest education level: Not on file   Tobacco Use   • Smoking status: Never Smoker   • Smokeless tobacco: Never Used   Substance and  "Sexual Activity   • Alcohol use: Yes     Alcohol/week: 5.0 standard drinks     Types: 5 Standard drinks or equivalent per week   • Drug use: No   • Sexual activity: Yes     Birth control/protection: None       Allergies   Allergen Reactions   • Codeine    • Metoprolol Other (See Comments)     Fatigue, stomach issues   • Statins Myalgia       ROS  Review of Systems   Constitutional: Positive for fatigue. Negative for chills, diaphoresis and fever.   HENT: Negative for congestion, postnasal drip and rhinorrhea.    Respiratory: Negative for cough, shortness of breath and wheezing.    Cardiovascular: Negative for chest pain and leg swelling.   Musculoskeletal: Positive for myalgias and neck pain.   Neurological: Positive for headache. Negative for dizziness.   Psychiatric/Behavioral: Positive for sleep disturbance and stress. Negative for self-injury, suicidal ideas and depressed mood. The patient is not nervous/anxious.        Vitals:    09/17/21 1433   BP: 128/84   Pulse: 85   SpO2: 97%   Weight: 60.9 kg (134 lb 3.2 oz)   Height: 167.6 cm (65.98\")     Body mass index is 21.67 kg/m².    Current Outpatient Medications on File Prior to Visit   Medication Sig Dispense Refill   • Acetaminophen (Tylenol) 325 MG capsule Tylenol   PRN     • bimatoprost (Lumigan) 0.01 % ophthalmic drops Lumigan 0.01 % eye drops     • cholecalciferol (VITAMIN D3) 1.25 MG (00436 UT) capsule Take 1 capsule by mouth 1 (One) Time Per Week. 12 capsule 1   • naproxen sodium (ALEVE) 220 MG tablet Take 1 tablet by mouth 4 (Four) Times a Day Before Meals & at Bedtime As Needed.     • timolol (TIMOPTIC) 0.5 % ophthalmic solution Administer 0.5 drops to both eyes 2 (two) times a day.     • topiramate (TOPAMAX) 100 MG tablet TAKE 2 TABLETS TWICE DAILY (Patient taking differently: Take 100 mg by mouth 2 (Two) Times a Day.) 120 tablet 0   • valsartan (DIOVAN) 80 MG tablet Take 80 mg by mouth Daily.     • [DISCONTINUED] Galcanezumab-gnlm (Emgality, 300 MG " Dose,) 100 MG/ML solution prefilled syringe Inject 300 mg under the skin into the appropriate area as directed Every 30 (Thirty) Days. 3.08 mL 2   • [DISCONTINUED] methylPREDNISolone (MEDROL) 4 MG dose pack Take as directed on package instructions. 21 each 0     No current facility-administered medications on file prior to visit.       Results for orders placed or performed in visit on 04/22/21   CBC Auto Differential    Specimen: Blood   Result Value Ref Range    WBC 6.49 3.40 - 10.80 10*3/mm3    RBC 4.03 3.77 - 5.28 10*6/mm3    Hemoglobin 13.3 12.0 - 15.9 g/dL    Hematocrit 39.1 34.0 - 46.6 %    MCV 97.0 79.0 - 97.0 fL    MCH 33.0 26.6 - 33.0 pg    MCHC 34.0 31.5 - 35.7 g/dL    RDW 13.5 12.3 - 15.4 %    RDW-SD 47.5 37.0 - 54.0 fl    MPV 11.2 6.0 - 12.0 fL    Platelets 334 140 - 450 10*3/mm3    Neutrophil % 64.1 42.7 - 76.0 %    Lymphocyte % 24.0 19.6 - 45.3 %    Monocyte % 7.9 5.0 - 12.0 %    Eosinophil % 2.3 0.3 - 6.2 %    Basophil % 1.1 0.0 - 1.5 %    Immature Grans % 0.6 (H) 0.0 - 0.5 %    Neutrophils, Absolute 4.16 1.70 - 7.00 10*3/mm3    Lymphocytes, Absolute 1.56 0.70 - 3.10 10*3/mm3    Monocytes, Absolute 0.51 0.10 - 0.90 10*3/mm3    Eosinophils, Absolute 0.15 0.00 - 0.40 10*3/mm3    Basophils, Absolute 0.07 0.00 - 0.20 10*3/mm3    Immature Grans, Absolute 0.04 0.00 - 0.05 10*3/mm3    nRBC 0.0 0.0 - 0.2 /100 WBC   Comprehensive Metabolic Panel    Specimen: Blood   Result Value Ref Range    Glucose 93 65 - 99 mg/dL    BUN 25 (H) 8 - 23 mg/dL    Creatinine 1.22 (H) 0.57 - 1.00 mg/dL    Sodium 142 136 - 145 mmol/L    Potassium 4.3 3.5 - 5.2 mmol/L    Chloride 107 98 - 107 mmol/L    CO2 22.9 22.0 - 29.0 mmol/L    Calcium 9.5 8.6 - 10.5 mg/dL    Total Protein 7.5 6.0 - 8.5 g/dL    Albumin 4.50 3.50 - 5.20 g/dL    ALT (SGPT) 14 1 - 33 U/L    AST (SGOT) 15 1 - 32 U/L    Alkaline Phosphatase 65 39 - 117 U/L    Total Bilirubin 0.3 0.0 - 1.2 mg/dL    eGFR Non African Amer 43 (L) >60 mL/min/1.73    Globulin 3.0 gm/dL     A/G Ratio 1.5 g/dL    BUN/Creatinine Ratio 20.5 7.0 - 25.0    Anion Gap 12.1 5.0 - 15.0 mmol/L   TSH Rfx On Abnormal To Free T4    Specimen: Blood   Result Value Ref Range    TSH 3.150 0.270 - 4.200 uIU/mL   Lipid Panel    Specimen: Blood   Result Value Ref Range    Total Cholesterol 309 (H) 0 - 200 mg/dL    Triglycerides 116 0 - 150 mg/dL    HDL Cholesterol 72 (H) 40 - 60 mg/dL    LDL Cholesterol  217 (H) 0 - 100 mg/dL    VLDL Cholesterol 20 5 - 40 mg/dL    LDL/HDL Ratio 2.97    Hepatitis C Antibody    Specimen: Blood   Result Value Ref Range    Hepatitis C Ab Non-Reactive Non-Reactive   Vitamin D 25 Hydroxy    Specimen: Blood   Result Value Ref Range    25 Hydroxy, Vitamin D 12.2 (L) 30.0 - 100.0 ng/ml       PE    Physical Exam  Vitals reviewed.   Constitutional:       General: She is not in acute distress.     Appearance: Normal appearance. She is well-developed and normal weight. She is not ill-appearing or diaphoretic.   HENT:      Head: Normocephalic and atraumatic.   Eyes:      Extraocular Movements: Extraocular movements intact.      Conjunctiva/sclera: Conjunctivae normal.   Cardiovascular:      Rate and Rhythm: Normal rate and regular rhythm.      Heart sounds: Normal heart sounds.   Pulmonary:      Effort: Pulmonary effort is normal.      Breath sounds: Normal breath sounds.   Musculoskeletal:         General: Normal range of motion.      Cervical back: Normal range of motion.        Back:       Right lower leg: No edema.      Left lower leg: No edema.   Skin:     General: Skin is warm.      Findings: No erythema or rash.   Neurological:      General: No focal deficit present.      Mental Status: She is alert.   Psychiatric:         Attention and Perception: She is attentive.         Mood and Affect: Mood normal.         Speech: Speech normal.         Behavior: Behavior normal. Behavior is cooperative.         Thought Content: Thought content normal.         Judgment: Judgment normal.          A/P    Diagnoses and all orders for this visit:    1. Bilateral occipital neuralgia (Primary)  -     predniSONE (DELTASONE) 20 MG tablet; Take 1 tablet by mouth Daily for 5 days.  Dispense: 5 tablet; Refill: 0  -     predniSONE (DELTASONE) 5 MG tablet; Take 1 tablet by mouth Daily.  Dispense: 30 tablet; Refill: 2  Trial of prednisone 20 mg daily for 5 days, then prednisone 5 mg daily.  Patient aware of risks of chronic steroid use and still wants to proceed with trial.  Return in 3 months for labs.  Repeat DEXA in 1 years.    2. Gastroesophageal reflux disease, unspecified whether esophagitis present  -     pantoprazole (Protonix) 20 MG EC tablet; Take 1 tablet by mouth Every Morning Before Breakfast.  Dispense: 30 tablet; Refill: 2  Trial of pantoprazole for at least 2 weeks.    3. Benign essential hypertension  Stable, well-controlled.  Monitor at home.    4. Osteopenia of multiple sites  -     calcium-vitamin D (Oscal 500/200 D-3) 500-200 MG-UNIT per tablet; Take 2 tablets by mouth Daily.  Dispense: 60 tablet; Refill: 11  Repeat DEXA in 1 year.       Plan of care reviewed with patient at the conclusion of today's visit. Education was provided regarding diagnosis, management and any prescribed or recommended OTC medications.  Patient verbalizes understanding of and agreement with management plan.    Return in about 3 months (around 12/17/2021) for Recheck, headaches (AM appointment).     Jessica Roach PA-C

## 2021-11-15 RX ORDER — VALSARTAN 80 MG/1
TABLET ORAL
Qty: 90 TABLET | Refills: 1 | Status: SHIPPED | OUTPATIENT
Start: 2021-11-15 | End: 2022-05-16

## 2021-11-15 NOTE — TELEPHONE ENCOUNTER
Rx Refill Note  Requested Prescriptions     Pending Prescriptions Disp Refills   • valsartan (DIOVAN) 80 MG tablet [Pharmacy Med Name: VALSARTAN 80 MG Tablet] 90 tablet      Sig: TAKE 1 TABLET EVERY DAY      Last office visit with prescribing clinician: 9/17/2021      Next office visit with prescribing clinician: 12/17/2021     Ibis Edwards MA  11/15/21, 09:33 EST     Historical provider

## 2021-11-24 ENCOUNTER — TELEPHONE (OUTPATIENT)
Dept: FAMILY MEDICINE CLINIC | Facility: CLINIC | Age: 71
End: 2021-11-24

## 2021-11-24 NOTE — TELEPHONE ENCOUNTER
PATIENT WOULD LIKE TO COME IN A WEEK BEFORE HER APPOINTMENT AND HAVE LABS DONE BUT SHE NEEDS AN ORDER.

## 2021-11-26 DIAGNOSIS — I10 BENIGN ESSENTIAL HYPERTENSION: ICD-10-CM

## 2021-11-26 DIAGNOSIS — R73.02 IMPAIRED GLUCOSE TOLERANCE: ICD-10-CM

## 2021-11-26 DIAGNOSIS — Z79.52 CURRENT CHRONIC USE OF SYSTEMIC STEROIDS: ICD-10-CM

## 2021-11-26 DIAGNOSIS — E78.2 MIXED HYPERLIPIDEMIA: ICD-10-CM

## 2021-11-26 DIAGNOSIS — E55.9 VITAMIN D DEFICIENCY: Primary | ICD-10-CM

## 2021-12-07 ENCOUNTER — LAB (OUTPATIENT)
Dept: LAB | Facility: HOSPITAL | Age: 71
End: 2021-12-07

## 2021-12-07 DIAGNOSIS — R73.02 IMPAIRED GLUCOSE TOLERANCE: ICD-10-CM

## 2021-12-07 DIAGNOSIS — E78.2 MIXED HYPERLIPIDEMIA: ICD-10-CM

## 2021-12-07 DIAGNOSIS — Z79.52 CURRENT CHRONIC USE OF SYSTEMIC STEROIDS: ICD-10-CM

## 2021-12-07 DIAGNOSIS — I10 BENIGN ESSENTIAL HYPERTENSION: ICD-10-CM

## 2021-12-07 DIAGNOSIS — E55.9 VITAMIN D DEFICIENCY: ICD-10-CM

## 2021-12-07 LAB
25(OH)D3 SERPL-MCNC: 15.3 NG/ML
ALBUMIN SERPL-MCNC: 4.3 G/DL (ref 3.5–5.2)
ALBUMIN/GLOB SERPL: 1.4 G/DL
ALP SERPL-CCNC: 83 U/L (ref 39–117)
ALT SERPL W P-5'-P-CCNC: 21 U/L (ref 1–33)
ANION GAP SERPL CALCULATED.3IONS-SCNC: 11.7 MMOL/L (ref 5–15)
AST SERPL-CCNC: 26 U/L (ref 1–32)
BILIRUB SERPL-MCNC: <0.2 MG/DL (ref 0–1.2)
BUN SERPL-MCNC: 22 MG/DL (ref 8–23)
BUN/CREAT SERPL: 25.9 (ref 7–25)
CALCIUM SPEC-SCNC: 9.8 MG/DL (ref 8.6–10.5)
CHLORIDE SERPL-SCNC: 112 MMOL/L (ref 98–107)
CHOLEST SERPL-MCNC: 261 MG/DL (ref 0–200)
CO2 SERPL-SCNC: 15.3 MMOL/L (ref 22–29)
CREAT SERPL-MCNC: 0.85 MG/DL (ref 0.57–1)
GFR SERPL CREATININE-BSD FRML MDRD: 66 ML/MIN/1.73
GLOBULIN UR ELPH-MCNC: 3.1 GM/DL
GLUCOSE SERPL-MCNC: 83 MG/DL (ref 65–99)
HBA1C MFR BLD: 5.8 % (ref 4.8–5.6)
HDLC SERPL-MCNC: 65 MG/DL (ref 40–60)
LDLC SERPL CALC-MCNC: 163 MG/DL (ref 0–100)
LDLC/HDLC SERPL: 2.45 {RATIO}
POTASSIUM SERPL-SCNC: 5.1 MMOL/L (ref 3.5–5.2)
PROT SERPL-MCNC: 7.4 G/DL (ref 6–8.5)
SODIUM SERPL-SCNC: 139 MMOL/L (ref 136–145)
TRIGL SERPL-MCNC: 183 MG/DL (ref 0–150)
VLDLC SERPL-MCNC: 33 MG/DL (ref 5–40)

## 2021-12-07 PROCEDURE — 80061 LIPID PANEL: CPT

## 2021-12-07 PROCEDURE — 83036 HEMOGLOBIN GLYCOSYLATED A1C: CPT

## 2021-12-07 PROCEDURE — 80053 COMPREHEN METABOLIC PANEL: CPT

## 2021-12-07 PROCEDURE — 82306 VITAMIN D 25 HYDROXY: CPT

## 2021-12-17 ENCOUNTER — OFFICE VISIT (OUTPATIENT)
Dept: FAMILY MEDICINE CLINIC | Facility: CLINIC | Age: 71
End: 2021-12-17

## 2021-12-17 VITALS
HEIGHT: 66 IN | SYSTOLIC BLOOD PRESSURE: 146 MMHG | BODY MASS INDEX: 21.79 KG/M2 | HEART RATE: 76 BPM | WEIGHT: 135.6 LBS | OXYGEN SATURATION: 99 % | TEMPERATURE: 97.7 F | DIASTOLIC BLOOD PRESSURE: 78 MMHG

## 2021-12-17 DIAGNOSIS — R73.02 IMPAIRED GLUCOSE TOLERANCE: ICD-10-CM

## 2021-12-17 DIAGNOSIS — M54.81 BILATERAL OCCIPITAL NEURALGIA: Primary | ICD-10-CM

## 2021-12-17 DIAGNOSIS — I10 BENIGN ESSENTIAL HYPERTENSION: ICD-10-CM

## 2021-12-17 DIAGNOSIS — M85.89 OSTEOPENIA OF MULTIPLE SITES: ICD-10-CM

## 2021-12-17 DIAGNOSIS — E55.9 VITAMIN D DEFICIENCY: ICD-10-CM

## 2021-12-17 PROCEDURE — 99214 OFFICE O/P EST MOD 30 MIN: CPT | Performed by: PHYSICIAN ASSISTANT

## 2021-12-17 RX ORDER — PREDNISONE 1 MG/1
5 TABLET ORAL DAILY
Qty: 30 TABLET | Refills: 2 | Status: SHIPPED | OUTPATIENT
Start: 2021-12-17 | End: 2022-03-22 | Stop reason: SDUPTHER

## 2021-12-17 NOTE — PROGRESS NOTES
Chief Complaint   Patient presents with   • Follow-up   • Headache       HPI     Fabiola Salmeron is a pleasant 71 y.o. female who is here for routine follow-up of bilateral occipital neuralgia.  Patient is compliant on prednisone 5 mg daily.  She reports that since starting this medication, she has had fewer headaches and the headaches she has are less intense.  She did recently have a headache flare-up that was intense but it is subsiding with ongoing prednisone and tylenol.  She has failed multiple medications in the past:  Indomethacin, gabapentin, Lyrica, Aleve and Excedrin.  In the past she has done high-dose steroids for a few days at flareup for resolution of symptoms.  She feels that the daily low-dose steroid works better.  Her blood pressure is elevated today but she reports that it is running within normal limits at home.  It is generally in the 130s/80s.  She is aware of the risks of taking chronic steroids and wants to continue with treatment.  She is compliant on daily vitamin D/calcium Gummies.  She does have a prescription for high-dose vitamin D and will start taking this in addition to her Gummies.  Her cholesterol is elevated and has been for years.  She has tried multiple statins without improvement.    Past Medical History:   Diagnosis Date   • Candidiasis of vulva and vagina        Past Surgical History:   Procedure Laterality Date   • CRANIAL NEUROSTIMULATOR INSERTION/REPLACEMENT Right    • EYE SURGERY      shunt placement by ophthalmology       Family History   Problem Relation Age of Onset   • Lung cancer Father         smoker   • Breast cancer Sister    • Ovarian cancer Neg Hx    • Colon cancer Neg Hx        Social History     Socioeconomic History   • Marital status:    Tobacco Use   • Smoking status: Never Smoker   • Smokeless tobacco: Never Used   Vaping Use   • Vaping Use: Never used   Substance and Sexual Activity   • Alcohol use: Yes     Alcohol/week: 5.0 standard drinks      "Types: 5 Standard drinks or equivalent per week   • Drug use: No   • Sexual activity: Yes     Birth control/protection: None       Allergies   Allergen Reactions   • Codeine Other (See Comments)   • Metoprolol Other (See Comments)     Fatigue, stomach issues  Fatigue, stomach issues   • Statins Myalgia, Other (See Comments) and Unknown - High Severity       ROS  Review of Systems   Constitutional: Negative for chills and fever.   Respiratory: Negative for cough, shortness of breath and wheezing.    Cardiovascular: Negative for chest pain, palpitations and leg swelling.   Gastrointestinal: Negative for nausea.   Neurological: Positive for headache. Negative for dizziness.       Vitals:    12/17/21 1348   BP: 146/78   Pulse: 76   Temp: 97.7 °F (36.5 °C)   SpO2: 99%   Weight: 61.5 kg (135 lb 9.6 oz)   Height: 167.6 cm (65.98\")   PainSc: 0-No pain     Body mass index is 21.9 kg/m².    Current Outpatient Medications on File Prior to Visit   Medication Sig Dispense Refill   • Acetaminophen (Tylenol) 325 MG capsule Tylenol   PRN     • bimatoprost (Lumigan) 0.01 % ophthalmic drops Lumigan 0.01 % eye drops     • calcium-vitamin D (Oscal 500/200 D-3) 500-200 MG-UNIT per tablet Take 2 tablets by mouth Daily. 60 tablet 11   • cholecalciferol (VITAMIN D3) 1.25 MG (35899 UT) capsule Take 1 capsule by mouth 1 (One) Time Per Week. 12 capsule 1   • pantoprazole (Protonix) 20 MG EC tablet Take 1 tablet by mouth Every Morning Before Breakfast. 30 tablet 2   • timolol (TIMOPTIC) 0.5 % ophthalmic solution Administer 0.5 drops to both eyes 2 (two) times a day.     • topiramate (TOPAMAX) 100 MG tablet TAKE 2 TABLETS TWICE DAILY (Patient taking differently: Take 100 mg by mouth 2 (Two) Times a Day.) 120 tablet 0   • valsartan (DIOVAN) 80 MG tablet TAKE 1 TABLET EVERY DAY 90 tablet 1   • [DISCONTINUED] naproxen sodium (ALEVE) 220 MG tablet Take 1 tablet by mouth 4 (Four) Times a Day Before Meals & at Bedtime As Needed.     • [DISCONTINUED] " predniSONE (DELTASONE) 5 MG tablet Take 1 tablet by mouth Daily. 30 tablet 2     No current facility-administered medications on file prior to visit.       Results for orders placed or performed in visit on 12/07/21   Comprehensive Metabolic Panel    Specimen: Blood   Result Value Ref Range    Glucose 83 65 - 99 mg/dL    BUN 22 8 - 23 mg/dL    Creatinine 0.85 0.57 - 1.00 mg/dL    Sodium 139 136 - 145 mmol/L    Potassium 5.1 3.5 - 5.2 mmol/L    Chloride 112 (H) 98 - 107 mmol/L    CO2 15.3 (L) 22.0 - 29.0 mmol/L    Calcium 9.8 8.6 - 10.5 mg/dL    Total Protein 7.4 6.0 - 8.5 g/dL    Albumin 4.30 3.50 - 5.20 g/dL    ALT (SGPT) 21 1 - 33 U/L    AST (SGOT) 26 1 - 32 U/L    Alkaline Phosphatase 83 39 - 117 U/L    Total Bilirubin <0.2 0.0 - 1.2 mg/dL    eGFR Non African Amer 66 >60 mL/min/1.73    Globulin 3.1 gm/dL    A/G Ratio 1.4 g/dL    BUN/Creatinine Ratio 25.9 (H) 7.0 - 25.0    Anion Gap 11.7 5.0 - 15.0 mmol/L   Lipid Panel    Specimen: Blood   Result Value Ref Range    Total Cholesterol 261 (H) 0 - 200 mg/dL    Triglycerides 183 (H) 0 - 150 mg/dL    HDL Cholesterol 65 (H) 40 - 60 mg/dL    LDL Cholesterol  163 (H) 0 - 100 mg/dL    VLDL Cholesterol 33 5 - 40 mg/dL    LDL/HDL Ratio 2.45    Hemoglobin A1c    Specimen: Blood   Result Value Ref Range    Hemoglobin A1C 5.80 (H) 4.80 - 5.60 %   Vitamin D 25 Hydroxy    Specimen: Blood   Result Value Ref Range    25 Hydroxy, Vitamin D 15.3 ng/ml       PE    Physical Exam  Vitals reviewed.   Constitutional:       General: She is not in acute distress.     Appearance: Normal appearance. She is well-developed and normal weight. She is not ill-appearing or diaphoretic.   HENT:      Head: Normocephalic and atraumatic.   Eyes:      Extraocular Movements: Extraocular movements intact.      Conjunctiva/sclera: Conjunctivae normal.   Cardiovascular:      Rate and Rhythm: Normal rate and regular rhythm.      Heart sounds: Normal heart sounds.   Pulmonary:      Effort: No respiratory  distress.   Musculoskeletal:         General: Normal range of motion.      Cervical back: Normal range of motion.      Right lower leg: No edema.      Left lower leg: No edema.   Skin:     General: Skin is warm.      Findings: No erythema or rash.   Neurological:      General: No focal deficit present.      Mental Status: She is alert.   Psychiatric:         Attention and Perception: Attention and perception normal. She is attentive.         Mood and Affect: Mood and affect normal.         Speech: Speech normal.         Behavior: Behavior normal. Behavior is cooperative.         Thought Content: Thought content normal.         Cognition and Memory: Cognition and memory normal.         Judgment: Judgment normal.         A/P    Diagnoses and all orders for this visit:    1. Bilateral occipital neuralgia (Primary)  -     predniSONE (DELTASONE) 5 MG tablet; Take 1 tablet by mouth Daily.  Dispense: 30 tablet; Refill: 2  Headache frequency and intensity have improved overall.  Will continue prednisone 5 mg daily.  Return in 3 months.    2. Impaired glucose tolerance  Recent hemoglobin AIC is 5.8%.  Will repeat in 3 months and monitor regularly with chronic steroid use.    3. Osteopenia of multiple sites  4. Vitamin D deficiency  Continue on vitamin D/calcium Gummies.  Add vitamin D 50,000 units weekly.    5. Benign essential hypertension  Borderline today.  Patient monitors at home and reports that it is generally well controlled.  She is compliant on her medication.             Plan of care reviewed with patient at the conclusion of today's visit. Education was provided regarding diagnosis, management and any prescribed or recommended OTC medications.  Patient verbalizes understanding of and agreement with management plan.    Return in about 3 months (around 3/17/2022) for Recheck, HTN/headache.     Jessica Roach PA-C

## 2021-12-29 DIAGNOSIS — M54.81 BILATERAL OCCIPITAL NEURALGIA: ICD-10-CM

## 2021-12-29 RX ORDER — TOPIRAMATE 100 MG/1
TABLET, FILM COATED ORAL
Qty: 120 TABLET | Refills: 0 | Status: SHIPPED | OUTPATIENT
Start: 2021-12-29 | End: 2022-04-26

## 2021-12-29 NOTE — TELEPHONE ENCOUNTER
Rx Refill Note  Requested Prescriptions     Pending Prescriptions Disp Refills   • topiramate (TOPAMAX) 100 MG tablet [Pharmacy Med Name: TOPIRAMATE 100 MG Tablet] 120 tablet 0     Sig: TAKE 2 TABLETS TWICE DAILY      Last office visit with prescribing clinician: 12/17/2021      Next office visit with prescribing clinician: 3/22/2022            Belinda Staley MA  12/29/21, 08:53 EST

## 2022-03-22 ENCOUNTER — OFFICE VISIT (OUTPATIENT)
Dept: FAMILY MEDICINE CLINIC | Facility: CLINIC | Age: 72
End: 2022-03-22

## 2022-03-22 VITALS
BODY MASS INDEX: 22.02 KG/M2 | SYSTOLIC BLOOD PRESSURE: 152 MMHG | OXYGEN SATURATION: 99 % | HEIGHT: 66 IN | HEART RATE: 74 BPM | TEMPERATURE: 98.6 F | DIASTOLIC BLOOD PRESSURE: 74 MMHG | WEIGHT: 137 LBS

## 2022-03-22 DIAGNOSIS — Z79.52 CURRENT CHRONIC USE OF SYSTEMIC STEROIDS: ICD-10-CM

## 2022-03-22 DIAGNOSIS — H40.1111 PRIMARY OPEN ANGLE GLAUCOMA (POAG) OF RIGHT EYE, MILD STAGE: ICD-10-CM

## 2022-03-22 DIAGNOSIS — I10 BENIGN ESSENTIAL HYPERTENSION: ICD-10-CM

## 2022-03-22 DIAGNOSIS — H40.1123 PRIMARY OPEN ANGLE GLAUCOMA (POAG) OF LEFT EYE, SEVERE STAGE: ICD-10-CM

## 2022-03-22 DIAGNOSIS — R73.03 PREDIABETES: ICD-10-CM

## 2022-03-22 DIAGNOSIS — M54.81 BILATERAL OCCIPITAL NEURALGIA: Primary | ICD-10-CM

## 2022-03-22 PROBLEM — H52.12 MYOPIA OF LEFT EYE: Status: ACTIVE | Noted: 2019-03-05

## 2022-03-22 PROBLEM — H52.03 HYPEROPIA OF BOTH EYES WITH REGULAR ASTIGMATISM: Status: ACTIVE | Noted: 2019-03-05

## 2022-03-22 PROBLEM — H52.223 HYPEROPIA OF BOTH EYES WITH REGULAR ASTIGMATISM: Status: ACTIVE | Noted: 2019-03-05

## 2022-03-22 PROBLEM — H26.492 OTHER SECONDARY CATARACT, LEFT EYE: Status: ACTIVE | Noted: 2018-10-26

## 2022-03-22 PROBLEM — H25.091 AGE-RELATED INCIPIENT CATARACT OF RIGHT EYE: Status: ACTIVE | Noted: 2020-08-23

## 2022-03-22 PROBLEM — H35.54 PATTERN DYSTROPHY OF MACULA: Status: ACTIVE | Noted: 2020-05-13

## 2022-03-22 PROBLEM — H52.00 HYPEROPIA: Status: ACTIVE | Noted: 2019-03-05

## 2022-03-22 LAB
EXPIRATION DATE: NORMAL
HBA1C MFR BLD: 5.7 %
Lab: NORMAL

## 2022-03-22 PROCEDURE — 83036 HEMOGLOBIN GLYCOSYLATED A1C: CPT | Performed by: PHYSICIAN ASSISTANT

## 2022-03-22 PROCEDURE — 99214 OFFICE O/P EST MOD 30 MIN: CPT | Performed by: PHYSICIAN ASSISTANT

## 2022-03-22 PROCEDURE — 3044F HG A1C LEVEL LT 7.0%: CPT | Performed by: PHYSICIAN ASSISTANT

## 2022-03-22 RX ORDER — PREDNISONE 1 MG/1
5 TABLET ORAL DAILY
Qty: 30 TABLET | Refills: 0 | Status: SHIPPED | OUTPATIENT
Start: 2022-03-22 | End: 2022-03-22 | Stop reason: SDUPTHER

## 2022-03-22 RX ORDER — PREDNISONE 1 MG/1
5 TABLET ORAL DAILY
Qty: 90 TABLET | Refills: 0 | Status: SHIPPED | OUTPATIENT
Start: 2022-03-22 | End: 2022-05-26 | Stop reason: HOSPADM

## 2022-03-23 NOTE — PROGRESS NOTES
"Chief Complaint   Patient presents with   • Follow-up     3 Month   • Joint Pain   • Leg Pain     Left Side       HPI     Fabiola Salmeron is a pleasant 72 y.o. female who is here for routine follow-up of bilateral occipital neuralgia.  Patient is taking prednisone 5 mg daily and reports she has had significant improvement in duration, intensity and frequency of headaches.  She has tried several other medications without any benefit.  Still has \"episodes\" of headaches but these are manageable with OTC medications.    Elevated blood pressure today.  Patient is not monitoring regularly at home.    She is established with ophthalmology for bilateral glaucoma.  She states that her ophthalmologist is aware she is on prednisone and is okay with her continuing this.  She is using drops for her glaudoma.       Past Medical History:   Diagnosis Date   • Candidiasis of vulva and vagina        Past Surgical History:   Procedure Laterality Date   • CRANIAL NEUROSTIMULATOR INSERTION/REPLACEMENT Right    • EYE SURGERY      shunt placement by ophthalmology       Family History   Problem Relation Age of Onset   • Lung cancer Father         smoker   • Breast cancer Sister    • Ovarian cancer Neg Hx    • Colon cancer Neg Hx        Social History     Socioeconomic History   • Marital status:    Tobacco Use   • Smoking status: Never Smoker   • Smokeless tobacco: Never Used   Vaping Use   • Vaping Use: Never used   Substance and Sexual Activity   • Alcohol use: Yes     Alcohol/week: 5.0 standard drinks     Types: 5 Standard drinks or equivalent per week   • Drug use: No   • Sexual activity: Yes     Birth control/protection: None       Allergies   Allergen Reactions   • Codeine Other (See Comments)   • Metoprolol Other (See Comments)     Fatigue, stomach issues  Fatigue, stomach issues   • Statins Myalgia, Other (See Comments) and Unknown - High Severity       ROS  Review of Systems   Constitutional: Negative for chills and fever. " "  Eyes: Positive for visual disturbance.   Respiratory: Negative for cough, shortness of breath and wheezing.    Cardiovascular: Negative for chest pain, palpitations and leg swelling.   Musculoskeletal: Positive for arthralgias.   Neurological: Positive for headache. Negative for dizziness.       Vitals:    03/22/22 1508   BP: 152/74   BP Location: Left arm   Patient Position: Sitting   Cuff Size: Adult   Pulse: 74   Temp: 98.6 °F (37 °C)   SpO2: 99%   Weight: 62.1 kg (137 lb)   Height: 167.6 cm (65.98\")   PainSc: 0-No pain     Body mass index is 22.12 kg/m².    Current Outpatient Medications on File Prior to Visit   Medication Sig Dispense Refill   • Acetaminophen (Tylenol) 325 MG capsule Tylenol   PRN     • bimatoprost (Lumigan) 0.01 % ophthalmic drops Lumigan 0.01 % eye drops     • calcium-vitamin D (Oscal 500/200 D-3) 500-200 MG-UNIT per tablet Take 2 tablets by mouth Daily. 60 tablet 11   • cholecalciferol (VITAMIN D3) 1.25 MG (31068 UT) capsule Take 1 capsule by mouth 1 (One) Time Per Week. 12 capsule 1   • pantoprazole (Protonix) 20 MG EC tablet Take 1 tablet by mouth Every Morning Before Breakfast. 30 tablet 2   • timolol (TIMOPTIC) 0.5 % ophthalmic solution Administer 0.5 drops to both eyes 2 (two) times a day.     • topiramate (TOPAMAX) 100 MG tablet TAKE 2 TABLETS TWICE DAILY 120 tablet 0   • valsartan (DIOVAN) 80 MG tablet TAKE 1 TABLET EVERY DAY 90 tablet 1     No current facility-administered medications on file prior to visit.       Results for orders placed or performed in visit on 03/22/22   POC Glycosylated Hemoglobin (Hb A1C)    Specimen: Blood   Result Value Ref Range    Hemoglobin A1C 5.7 %    Lot Number [id number]     Expiration Date 12/10/23        PE    Physical Exam  Vitals reviewed.   Constitutional:       General: She is not in acute distress.     Appearance: Normal appearance. She is well-developed and normal weight. She is not ill-appearing or diaphoretic.   HENT:      Head: " Normocephalic and atraumatic.   Eyes:      Extraocular Movements: Extraocular movements intact.      Conjunctiva/sclera: Conjunctivae normal.   Cardiovascular:      Rate and Rhythm: Normal rate and regular rhythm.      Heart sounds: Normal heart sounds.   Pulmonary:      Effort: No respiratory distress.   Musculoskeletal:         General: Normal range of motion.      Cervical back: Normal range of motion.      Right lower leg: No edema.      Left lower leg: No edema.   Skin:     General: Skin is warm.      Findings: No erythema or rash.   Neurological:      General: No focal deficit present.      Mental Status: She is alert.   Psychiatric:         Attention and Perception: She is attentive.         Mood and Affect: Mood normal.         Speech: Speech normal.         Behavior: Behavior normal. Behavior is cooperative.         Thought Content: Thought content normal.         Judgment: Judgment normal.         A/P    Diagnoses and all orders for this visit:    1. Bilateral occipital neuralgia (Primary)  -     predniSONE (DELTASONE) 5 MG tablet; Take 1 tablet by mouth Daily.  Dispense: 90 tablet; Refill: 0  Has improvement in frequency, duration and intensity of occipital neuralgia.  Has tried multiple other medications without benefit.  Patient is aware of risk of prednisone use long-term.    2. Current chronic use of systemic steroids  On prednisone.    3. Prediabetes  -     POC Glycosylated Hemoglobin (Hb A1C)  Previous hemoglobin AIC was 5.8%, hemoglobin AIC is 5.7% today.  Will continue to monitor given chronic use of steroids.    4. Benign essential hypertension  Elevated today.  Encouraged patient to monitor at home and call if it remains elevated.  Compliant on medication.    5. Primary open angle glaucoma (POAG) of left eye, severe stage  6. Primary open angle glaucoma (POAG) of right eye, mild stage  Followed by ophthalmology.  Currently using drops.       Plan of care reviewed with patient at the conclusion  of today's visit. Education was provided regarding diagnosis, management and any prescribed or recommended OTC medications.  Patient verbalizes understanding of and agreement with management plan.    Return in about 3 months (around 6/22/2022) for Recheck, 6 month follow-up.     Jessica Roach PA-C

## 2022-04-25 DIAGNOSIS — M54.81 BILATERAL OCCIPITAL NEURALGIA: ICD-10-CM

## 2022-04-26 RX ORDER — TOPIRAMATE 100 MG/1
TABLET, FILM COATED ORAL
Qty: 120 TABLET | Refills: 0 | Status: SHIPPED | OUTPATIENT
Start: 2022-04-26 | End: 2022-05-31 | Stop reason: SDUPTHER

## 2022-04-26 NOTE — TELEPHONE ENCOUNTER
Rx Refill Note  Requested Prescriptions     Pending Prescriptions Disp Refills   • topiramate (TOPAMAX) 100 MG tablet [Pharmacy Med Name: TOPIRAMATE 100 MG Tablet] 120 tablet 0     Sig: TAKE 2 TABLETS TWICE DAILY      Last office visit with prescribing clinician: 3/22/2022      Next office visit with prescribing clinician: 6/23/2022            Staci Anton MA  04/26/22, 08:10 EDT

## 2022-05-16 RX ORDER — VALSARTAN 80 MG/1
TABLET ORAL
Qty: 90 TABLET | Refills: 1 | Status: SHIPPED | OUTPATIENT
Start: 2022-05-16 | End: 2023-01-03 | Stop reason: SDUPTHER

## 2022-05-16 NOTE — TELEPHONE ENCOUNTER
Rx Refill Note  Requested Prescriptions     Pending Prescriptions Disp Refills   • valsartan (DIOVAN) 80 MG tablet [Pharmacy Med Name: VALSARTAN 80 MG Tablet] 90 tablet 1     Sig: TAKE 1 TABLET EVERY DAY      Last office visit with prescribing clinician: 3/22/2022      Next office visit with prescribing clinician: 6/23/2022            Staci Anton MA  05/16/22, 13:55 EDT

## 2022-05-17 ENCOUNTER — TELEPHONE (OUTPATIENT)
Dept: FAMILY MEDICINE CLINIC | Facility: CLINIC | Age: 72
End: 2022-05-17

## 2022-05-17 NOTE — TELEPHONE ENCOUNTER
----- Message from Fabiola Salmeron sent at 5/17/2022  4:37 PM EDT -----  Regarding: Something for Gerd  Thank you for your reply. Would like to make an appointment with you as soon as possible since this has been going on for a couple of weeks now and breathing has gotten difficult. Thank you.

## 2022-05-19 ENCOUNTER — OFFICE VISIT (OUTPATIENT)
Dept: FAMILY MEDICINE CLINIC | Facility: CLINIC | Age: 72
End: 2022-05-19

## 2022-05-19 ENCOUNTER — LAB (OUTPATIENT)
Dept: LAB | Facility: HOSPITAL | Age: 72
End: 2022-05-19

## 2022-05-19 ENCOUNTER — HOSPITAL ENCOUNTER (OUTPATIENT)
Dept: GENERAL RADIOLOGY | Facility: HOSPITAL | Age: 72
Discharge: HOME | End: 2022-05-19

## 2022-05-19 VITALS
TEMPERATURE: 98.6 F | HEART RATE: 85 BPM | BODY MASS INDEX: 21.41 KG/M2 | WEIGHT: 133.2 LBS | HEIGHT: 66 IN | DIASTOLIC BLOOD PRESSURE: 86 MMHG | OXYGEN SATURATION: 97 % | SYSTOLIC BLOOD PRESSURE: 158 MMHG

## 2022-05-19 DIAGNOSIS — I10 BENIGN ESSENTIAL HYPERTENSION: ICD-10-CM

## 2022-05-19 DIAGNOSIS — M54.32 SCIATICA OF LEFT SIDE: ICD-10-CM

## 2022-05-19 DIAGNOSIS — R73.02 IMPAIRED GLUCOSE TOLERANCE: ICD-10-CM

## 2022-05-19 DIAGNOSIS — R63.8 OTHER SYMPTOMS AND SIGNS CONCERNING FOOD AND FLUID INTAKE: ICD-10-CM

## 2022-05-19 DIAGNOSIS — R06.09 EXERTIONAL DYSPNEA: ICD-10-CM

## 2022-05-19 DIAGNOSIS — R14.0 ABDOMINAL BLOATING: ICD-10-CM

## 2022-05-19 DIAGNOSIS — R06.09 EXERTIONAL DYSPNEA: Primary | ICD-10-CM

## 2022-05-19 LAB
ALBUMIN SERPL-MCNC: 4.8 G/DL (ref 3.5–5.2)
ALBUMIN/GLOB SERPL: 1.7 G/DL
ALP SERPL-CCNC: 62 U/L (ref 39–117)
ALT SERPL W P-5'-P-CCNC: 10 U/L (ref 1–33)
ANION GAP SERPL CALCULATED.3IONS-SCNC: 15.6 MMOL/L (ref 5–15)
AST SERPL-CCNC: 27 U/L (ref 1–32)
BASOPHILS # BLD AUTO: 0.06 10*3/MM3 (ref 0–0.2)
BASOPHILS NFR BLD AUTO: 0.9 % (ref 0–1.5)
BILIRUB BLD-MCNC: NEGATIVE MG/DL
BILIRUB SERPL-MCNC: 0.2 MG/DL (ref 0–1.2)
BUN SERPL-MCNC: 22 MG/DL (ref 8–23)
BUN/CREAT SERPL: 23.2 (ref 7–25)
CALCIUM SPEC-SCNC: 9.5 MG/DL (ref 8.6–10.5)
CHLORIDE SERPL-SCNC: 111 MMOL/L (ref 98–107)
CLARITY, POC: CLEAR
CO2 SERPL-SCNC: 14.4 MMOL/L (ref 22–29)
COLOR UR: YELLOW
CREAT SERPL-MCNC: 0.95 MG/DL (ref 0.57–1)
D DIMER PPP FEU-MCNC: 0.5 MCGFEU/ML (ref 0.01–0.5)
DEPRECATED RDW RBC AUTO: 50.4 FL (ref 37–54)
EGFRCR SERPLBLD CKD-EPI 2021: 63 ML/MIN/1.73
EOSINOPHIL # BLD AUTO: 0.11 10*3/MM3 (ref 0–0.4)
EOSINOPHIL NFR BLD AUTO: 1.7 % (ref 0.3–6.2)
ERYTHROCYTE [DISTWIDTH] IN BLOOD BY AUTOMATED COUNT: 16 % (ref 12.3–15.4)
EXPIRATION DATE: ABNORMAL
GLOBULIN UR ELPH-MCNC: 2.8 GM/DL
GLUCOSE SERPL-MCNC: 90 MG/DL (ref 65–99)
GLUCOSE UR STRIP-MCNC: NEGATIVE MG/DL
HCT VFR BLD AUTO: 26.1 % (ref 34–46.6)
HGB BLD-MCNC: 7.6 G/DL (ref 12–15.9)
IMM GRANULOCYTES # BLD AUTO: 0.02 10*3/MM3 (ref 0–0.05)
IMM GRANULOCYTES NFR BLD AUTO: 0.3 % (ref 0–0.5)
IRON 24H UR-MRATE: 19 MCG/DL (ref 37–145)
IRON SATN MFR SERPL: 3 % (ref 20–50)
KETONES UR QL: NEGATIVE
LEUKOCYTE EST, POC: ABNORMAL
LYMPHOCYTES # BLD AUTO: 2.23 10*3/MM3 (ref 0.7–3.1)
LYMPHOCYTES NFR BLD AUTO: 33.5 % (ref 19.6–45.3)
Lab: ABNORMAL
MCH RBC QN AUTO: 25.5 PG (ref 26.6–33)
MCHC RBC AUTO-ENTMCNC: 29.1 G/DL (ref 31.5–35.7)
MCV RBC AUTO: 87.6 FL (ref 79–97)
MONOCYTES # BLD AUTO: 0.78 10*3/MM3 (ref 0.1–0.9)
MONOCYTES NFR BLD AUTO: 11.7 % (ref 5–12)
NEUTROPHILS NFR BLD AUTO: 3.46 10*3/MM3 (ref 1.7–7)
NEUTROPHILS NFR BLD AUTO: 51.9 % (ref 42.7–76)
NITRITE UR-MCNC: NEGATIVE MG/ML
NRBC BLD AUTO-RTO: 0.6 /100 WBC (ref 0–0.2)
NT-PROBNP SERPL-MCNC: 724 PG/ML (ref 0–900)
PH UR: 5.5 [PH] (ref 5–8)
PLATELET # BLD AUTO: 438 10*3/MM3 (ref 140–450)
PMV BLD AUTO: 10.3 FL (ref 6–12)
POTASSIUM SERPL-SCNC: 4.5 MMOL/L (ref 3.5–5.2)
PROT SERPL-MCNC: 7.6 G/DL (ref 6–8.5)
PROT UR STRIP-MCNC: ABNORMAL MG/DL
RBC # BLD AUTO: 2.98 10*6/MM3 (ref 3.77–5.28)
RBC # UR STRIP: NEGATIVE /UL
SODIUM SERPL-SCNC: 141 MMOL/L (ref 136–145)
SP GR UR: 1.02 (ref 1–1.03)
TIBC SERPL-MCNC: 595 MCG/DL (ref 298–536)
TRANSFERRIN SERPL-MCNC: 399 MG/DL (ref 200–360)
TROPONIN T SERPL-MCNC: <0.01 NG/ML (ref 0–0.03)
UROBILINOGEN UR QL: NORMAL
WBC NRBC COR # BLD: 6.66 10*3/MM3 (ref 3.4–10.8)

## 2022-05-19 PROCEDURE — 36415 COLL VENOUS BLD VENIPUNCTURE: CPT

## 2022-05-19 PROCEDURE — 84466 ASSAY OF TRANSFERRIN: CPT

## 2022-05-19 PROCEDURE — 93000 ELECTROCARDIOGRAM COMPLETE: CPT | Performed by: PHYSICIAN ASSISTANT

## 2022-05-19 PROCEDURE — 83880 ASSAY OF NATRIURETIC PEPTIDE: CPT

## 2022-05-19 PROCEDURE — 83540 ASSAY OF IRON: CPT

## 2022-05-19 PROCEDURE — 99214 OFFICE O/P EST MOD 30 MIN: CPT | Performed by: PHYSICIAN ASSISTANT

## 2022-05-19 PROCEDURE — 85025 COMPLETE CBC W/AUTO DIFF WBC: CPT

## 2022-05-19 PROCEDURE — 83036 HEMOGLOBIN GLYCOSYLATED A1C: CPT

## 2022-05-19 PROCEDURE — 81003 URINALYSIS AUTO W/O SCOPE: CPT | Performed by: PHYSICIAN ASSISTANT

## 2022-05-19 PROCEDURE — 85379 FIBRIN DEGRADATION QUANT: CPT

## 2022-05-19 PROCEDURE — 84484 ASSAY OF TROPONIN QUANT: CPT

## 2022-05-19 PROCEDURE — 80053 COMPREHEN METABOLIC PANEL: CPT

## 2022-05-19 PROCEDURE — 71046 X-RAY EXAM CHEST 2 VIEWS: CPT

## 2022-05-19 NOTE — PROGRESS NOTES
Chief Complaint   Patient presents with   • Shortness of Breath   • Difficulty Walking       HPI      Fbaiola Salmeron is a 72 y.o. female who presents for Shortness of Breath and Difficulty Walking.  Patient presents for exertional dyspnea.  She says that when she walks for short distance she gets fatigued and has to sit down.  She states that she feels short of breath.  She has no history of smoking.  Reports that this has been ongoing for the last 4 weeks.  She has attributed to reflux as she is also having burping.  She is taking her pantoprazole 20 mg in the morning.  She denies any chest pain, leg swelling, wheezing or coughing.      Past Medical History:   Diagnosis Date   • Candidiasis of vulva and vagina        Past Surgical History:   Procedure Laterality Date   • CRANIAL NEUROSTIMULATOR INSERTION/REPLACEMENT Right    • EYE SURGERY      shunt placement by ophthalmology       Family History   Problem Relation Age of Onset   • Lung cancer Father         smoker   • Breast cancer Sister    • Ovarian cancer Neg Hx    • Colon cancer Neg Hx        Social History     Socioeconomic History   • Marital status:    Tobacco Use   • Smoking status: Never Smoker   • Smokeless tobacco: Never Used   Vaping Use   • Vaping Use: Never used   Substance and Sexual Activity   • Alcohol use: Yes     Alcohol/week: 5.0 standard drinks     Types: 5 Standard drinks or equivalent per week   • Drug use: No   • Sexual activity: Yes     Birth control/protection: None       Allergies   Allergen Reactions   • Codeine Other (See Comments)   • Metoprolol Other (See Comments)     Fatigue, stomach issues  Fatigue, stomach issues   • Statins Myalgia, Other (See Comments) and Unknown - High Severity       ROS    Review of Systems   Constitutional: Positive for fatigue. Negative for chills and fever.   Respiratory: Positive for shortness of breath. Negative for cough, chest tightness and wheezing.    Cardiovascular: Negative for chest pain,  "palpitations and leg swelling.   Gastrointestinal: Positive for indigestion. Negative for anal bleeding, blood in stool, nausea, vomiting and GERD.   Musculoskeletal: Negative for gait problem.   Skin: Negative for rash.   Neurological: Negative for dizziness, light-headedness and confusion.       Vitals:    05/19/22 1508   BP: 158/86   BP Location: Left arm   Patient Position: Sitting   Cuff Size: Adult   Pulse: 85   Temp: 98.6 °F (37 °C)   SpO2: 97%   Weight: 60.4 kg (133 lb 3.2 oz)   Height: 167.6 cm (65.98\")   PainSc:   8     Body mass index is 21.51 kg/m².    Current Outpatient Medications on File Prior to Visit   Medication Sig Dispense Refill   • Acetaminophen (Tylenol) 325 MG capsule Tylenol   PRN     • bimatoprost (Lumigan) 0.01 % ophthalmic drops Lumigan 0.01 % eye drops     • calcium-vitamin D (Oscal 500/200 D-3) 500-200 MG-UNIT per tablet Take 2 tablets by mouth Daily. 60 tablet 11   • cholecalciferol (VITAMIN D3) 1.25 MG (90858 UT) capsule Take 1 capsule by mouth 1 (One) Time Per Week. 12 capsule 1   • pantoprazole (Protonix) 20 MG EC tablet Take 1 tablet by mouth Every Morning Before Breakfast. 30 tablet 2   • predniSONE (DELTASONE) 5 MG tablet Take 1 tablet by mouth Daily. 90 tablet 0   • timolol (TIMOPTIC) 0.5 % ophthalmic solution Administer 0.5 drops to both eyes 2 (two) times a day.     • topiramate (TOPAMAX) 100 MG tablet TAKE 2 TABLETS TWICE DAILY 120 tablet 0   • valsartan (DIOVAN) 80 MG tablet TAKE 1 TABLET EVERY DAY 90 tablet 1     No current facility-administered medications on file prior to visit.       Results for orders placed or performed in visit on 05/19/22   POC Urinalysis Dipstick, Automated    Specimen: Urine   Result Value Ref Range    Color Yellow Yellow, Straw, Dark Yellow, Cristina    Clarity, UA Clear Clear    Specific Gravity  1.025 1.005 - 1.030    pH, Urine 5.5 5.0 - 8.0    Leukocytes Trace (A) Negative    Nitrite, UA Negative Negative    Protein, POC Trace (A) Negative mg/dL "    Glucose, UA Negative Negative, 1000 mg/dL (3+) mg/dL    Ketones, UA Negative Negative    Urobilinogen, UA Normal Normal    Bilirubin Negative Negative    Blood, UA Negative Negative    Lot Number [id number]     Expiration Date 11/24/23          PE    Physical Exam  Vitals reviewed.   Constitutional:       General: She is not in acute distress.     Appearance: Normal appearance. She is well-developed and normal weight. She is not ill-appearing or diaphoretic.   HENT:      Head: Normocephalic and atraumatic.   Eyes:      Extraocular Movements: Extraocular movements intact.      Conjunctiva/sclera: Conjunctivae normal.   Cardiovascular:      Rate and Rhythm: Normal rate and regular rhythm.      Heart sounds: Normal heart sounds.   Pulmonary:      Effort: Pulmonary effort is normal. No respiratory distress.      Breath sounds: Normal breath sounds.   Musculoskeletal:         General: Normal range of motion.      Cervical back: Normal range of motion.      Right lower leg: No edema.      Left lower leg: No edema.   Skin:     General: Skin is warm.      Findings: No erythema or rash.   Neurological:      General: No focal deficit present.      Mental Status: She is alert.   Psychiatric:         Attention and Perception: Attention and perception normal. She is attentive.         Mood and Affect: Mood and affect normal.         Speech: Speech normal.         Behavior: Behavior normal. Behavior is cooperative.         Thought Content: Thought content normal.         Cognition and Memory: Cognition and memory normal.         Judgment: Judgment normal.         ECG 12 Lead    Date/Time: 5/19/2022 3:57 PM  Performed by: Jessica Roach PA-C  Authorized by: Jessica Roach PA-C   Comparison: not compared with previous ECG   Previous ECG: no previous ECG available  Rhythm: sinus rhythm    Clinical impression: abnormal EKG          A/P    Diagnoses and all orders for this visit:    1. Exertional dyspnea  (Primary)  -     CBC Auto Differential; Future  -     Comprehensive Metabolic Panel; Future  -     proBNP; Future  -     Troponin; Future  -     Iron Profile; Future  -     XR Chest PA & Lateral; Future  -     D-dimer, Quantitative; Future  Exertional dyspnea for the last month with walking short distances.  Feels better at rest.  No chest pain or leg swelling.  No history of smoking.  We will check an EKG, chest x-ray and labs.  Patient aware to go to the ER if symptoms worsen or change.    2. Benign essential hypertension  Elevated today.  Compliant on medication.  Monitors at home and states it is usually well-controlled.    3. Impaired glucose tolerance  On prednisone 5 mg daily.  Will check hemoglobin AIC.    4. Abdominal bloating  -     POC Urinalysis Dipstick, Automated  Only able to leave small amount of urine, not enough to culture.  Asymptomatic.  Leukocytes positive, negative blood and nitrates.  Doubt patient has infection.  Will wait for labs and imaging.    5. Sciatica of left side  Ongoing issue with mild improvement in pain since doing exercises.    6. Other symptoms and signs concerning food and fluid intake   -     Iron Profile; Future         Plan of care reviewed with patient at the conclusion of today's visit. Education was provided regarding diagnosis, management and any prescribed or recommended OTC medications.  Patient verbalizes understanding of and agreement with management plan.    No follow-ups on file.     Jessica Roach PA-C     Consent: The patient's consent was obtained including but not limited to risks of crusting, scabbing, blistering, scarring, darker or lighter pigmentary change, recurrence, incomplete removal and infection. Detail Level: Detailed Render Post-Care Instructions In Note?: yes Duration Of Freeze Thaw-Cycle (Seconds): 0 Number Of Freeze-Thaw Cycles: 1 freeze-thaw cycle Render Note In Bullet Format When Appropriate: No Post-Care Instructions: I reviewed with the patient in detail post-care instructions. Patient is to wear sunprotection, and avoid picking at any of the treated lesions. Pt may apply Vaseline to crusted or scabbing areas.

## 2022-05-20 ENCOUNTER — APPOINTMENT (OUTPATIENT)
Dept: GENERAL RADIOLOGY | Facility: HOSPITAL | Age: 72
End: 2022-05-20

## 2022-05-20 ENCOUNTER — TELEPHONE (OUTPATIENT)
Dept: FAMILY MEDICINE CLINIC | Facility: CLINIC | Age: 72
End: 2022-05-20

## 2022-05-20 ENCOUNTER — HOSPITAL ENCOUNTER (INPATIENT)
Facility: HOSPITAL | Age: 72
LOS: 6 days | Discharge: HOME | End: 2022-05-26
Attending: EMERGENCY MEDICINE | Admitting: INTERNAL MEDICINE

## 2022-05-20 DIAGNOSIS — K92.2 GASTROINTESTINAL HEMORRHAGE, UNSPECIFIED GASTROINTESTINAL HEMORRHAGE TYPE: ICD-10-CM

## 2022-05-20 DIAGNOSIS — I50.21 ACUTE SYSTOLIC CONGESTIVE HEART FAILURE: ICD-10-CM

## 2022-05-20 DIAGNOSIS — I25.119 CORONARY ARTERY DISEASE INVOLVING NATIVE CORONARY ARTERY OF NATIVE HEART WITH ANGINA PECTORIS: ICD-10-CM

## 2022-05-20 DIAGNOSIS — R19.5 OCCULT BLOOD IN STOOLS: ICD-10-CM

## 2022-05-20 DIAGNOSIS — R77.8 ELEVATED TROPONIN: ICD-10-CM

## 2022-05-20 DIAGNOSIS — I21.4 NSTEMI (NON-ST ELEVATED MYOCARDIAL INFARCTION): ICD-10-CM

## 2022-05-20 DIAGNOSIS — R19.5 FECAL OCCULT BLOOD TEST POSITIVE: ICD-10-CM

## 2022-05-20 DIAGNOSIS — D64.9 SYMPTOMATIC ANEMIA: Primary | ICD-10-CM

## 2022-05-20 DIAGNOSIS — R06.09 DYSPNEA ON EXERTION: ICD-10-CM

## 2022-05-20 LAB
ABO GROUP BLD: NORMAL
ABO GROUP BLD: NORMAL
ALBUMIN SERPL-MCNC: 4.4 G/DL (ref 3.5–5.2)
ALBUMIN/GLOB SERPL: 1.4 G/DL
ALP SERPL-CCNC: 75 U/L (ref 39–117)
ALT SERPL W P-5'-P-CCNC: 20 U/L (ref 1–33)
ANION GAP SERPL CALCULATED.3IONS-SCNC: 15 MMOL/L (ref 5–15)
AST SERPL-CCNC: 46 U/L (ref 1–32)
BASOPHILS # BLD AUTO: 0.01 10*3/MM3 (ref 0–0.2)
BASOPHILS NFR BLD AUTO: 0.2 % (ref 0–1.5)
BILIRUB SERPL-MCNC: 0.2 MG/DL (ref 0–1.2)
BLD GP AB SCN SERPL QL: NEGATIVE
BUN SERPL-MCNC: 20 MG/DL (ref 8–23)
BUN/CREAT SERPL: 21.5 (ref 7–25)
CALCIUM SPEC-SCNC: 9.5 MG/DL (ref 8.6–10.5)
CHLORIDE SERPL-SCNC: 105 MMOL/L (ref 98–107)
CO2 SERPL-SCNC: 16 MMOL/L (ref 22–29)
CREAT SERPL-MCNC: 0.93 MG/DL (ref 0.57–1)
DEPRECATED RDW RBC AUTO: 55.2 FL (ref 37–54)
DEVELOPER EXPIRATION DATE: ABNORMAL
DEVELOPER LOT NUMBER: 1975
EGFRCR SERPLBLD CKD-EPI 2021: 64.6 ML/MIN/1.73
EOSINOPHIL # BLD AUTO: 0.01 10*3/MM3 (ref 0–0.4)
EOSINOPHIL NFR BLD AUTO: 0.2 % (ref 0.3–6.2)
ERYTHROCYTE [DISTWIDTH] IN BLOOD BY AUTOMATED COUNT: 17 % (ref 12.3–15.4)
EXPIRATION DATE: ABNORMAL
FECAL OCCULT BLOOD SCREEN, POC: POSITIVE
FLUAV RNA RESP QL NAA+PROBE: NOT DETECTED
FLUBV RNA RESP QL NAA+PROBE: NOT DETECTED
GLOBULIN UR ELPH-MCNC: 3.2 GM/DL
GLUCOSE SERPL-MCNC: 130 MG/DL (ref 65–99)
HBA1C MFR BLD: 5.8 % (ref 4.8–5.6)
HCT VFR BLD AUTO: 26.5 % (ref 34–46.6)
HCT VFR BLD AUTO: 28.9 % (ref 34–46.6)
HGB BLD-MCNC: 7.6 G/DL (ref 12–15.9)
HGB BLD-MCNC: 9.1 G/DL (ref 12–15.9)
HOLD SPECIMEN: NORMAL
IMM GRANULOCYTES # BLD AUTO: 0.04 10*3/MM3 (ref 0–0.05)
IMM GRANULOCYTES NFR BLD AUTO: 0.7 % (ref 0–0.5)
LYMPHOCYTES # BLD AUTO: 0.98 10*3/MM3 (ref 0.7–3.1)
LYMPHOCYTES NFR BLD AUTO: 16.1 % (ref 19.6–45.3)
Lab: ABNORMAL
MCH RBC QN AUTO: 25.9 PG (ref 26.6–33)
MCHC RBC AUTO-ENTMCNC: 28.7 G/DL (ref 31.5–35.7)
MCV RBC AUTO: 90.1 FL (ref 79–97)
MONOCYTES # BLD AUTO: 0.49 10*3/MM3 (ref 0.1–0.9)
MONOCYTES NFR BLD AUTO: 8 % (ref 5–12)
NEGATIVE CONTROL: NEGATIVE
NEUTROPHILS NFR BLD AUTO: 4.57 10*3/MM3 (ref 1.7–7)
NEUTROPHILS NFR BLD AUTO: 74.8 % (ref 42.7–76)
NRBC BLD AUTO-RTO: 0.7 /100 WBC (ref 0–0.2)
NT-PROBNP SERPL-MCNC: 8102 PG/ML (ref 0–900)
PLATELET # BLD AUTO: 436 10*3/MM3 (ref 140–450)
PMV BLD AUTO: 10 FL (ref 6–12)
POSITIVE CONTROL: POSITIVE
POTASSIUM SERPL-SCNC: 4.4 MMOL/L (ref 3.5–5.2)
PROT SERPL-MCNC: 7.6 G/DL (ref 6–8.5)
QT INTERVAL: 374 MS
QTC INTERVAL: 450 MS
RBC # BLD AUTO: 2.94 10*6/MM3 (ref 3.77–5.28)
RH BLD: POSITIVE
RH BLD: POSITIVE
SARS-COV-2 RNA RESP QL NAA+PROBE: NOT DETECTED
SODIUM SERPL-SCNC: 136 MMOL/L (ref 136–145)
T&S EXPIRATION DATE: NORMAL
TROPONIN T SERPL-MCNC: 0.09 NG/ML (ref 0–0.03)
WBC NRBC COR # BLD: 6.1 10*3/MM3 (ref 3.4–10.8)
WHOLE BLOOD HOLD COAG: NORMAL
WHOLE BLOOD HOLD SPECIMEN: NORMAL

## 2022-05-20 PROCEDURE — 25010000002 METHYLPREDNISOLONE PER 125 MG: Performed by: HOSPITALIST

## 2022-05-20 PROCEDURE — 86901 BLOOD TYPING SEROLOGIC RH(D): CPT

## 2022-05-20 PROCEDURE — 25010000002 NA FERRIC GLUC CPLX PER 12.5 MG: Performed by: HOSPITALIST

## 2022-05-20 PROCEDURE — 82270 OCCULT BLOOD FECES: CPT | Performed by: EMERGENCY MEDICINE

## 2022-05-20 PROCEDURE — 84484 ASSAY OF TROPONIN QUANT: CPT | Performed by: EMERGENCY MEDICINE

## 2022-05-20 PROCEDURE — 87636 SARSCOV2 & INF A&B AMP PRB: CPT | Performed by: EMERGENCY MEDICINE

## 2022-05-20 PROCEDURE — P9016 RBC LEUKOCYTES REDUCED: HCPCS

## 2022-05-20 PROCEDURE — 36430 TRANSFUSION BLD/BLD COMPNT: CPT

## 2022-05-20 PROCEDURE — 85018 HEMOGLOBIN: CPT | Performed by: HOSPITALIST

## 2022-05-20 PROCEDURE — 99284 EMERGENCY DEPT VISIT MOD MDM: CPT

## 2022-05-20 PROCEDURE — 80053 COMPREHEN METABOLIC PANEL: CPT | Performed by: EMERGENCY MEDICINE

## 2022-05-20 PROCEDURE — 85025 COMPLETE CBC W/AUTO DIFF WBC: CPT | Performed by: EMERGENCY MEDICINE

## 2022-05-20 PROCEDURE — 25010000002 FUROSEMIDE PER 20 MG: Performed by: EMERGENCY MEDICINE

## 2022-05-20 PROCEDURE — 86900 BLOOD TYPING SEROLOGIC ABO: CPT

## 2022-05-20 PROCEDURE — 82607 VITAMIN B-12: CPT | Performed by: INTERNAL MEDICINE

## 2022-05-20 PROCEDURE — 86923 COMPATIBILITY TEST ELECTRIC: CPT

## 2022-05-20 PROCEDURE — 85014 HEMATOCRIT: CPT | Performed by: HOSPITALIST

## 2022-05-20 PROCEDURE — 93005 ELECTROCARDIOGRAM TRACING: CPT | Performed by: EMERGENCY MEDICINE

## 2022-05-20 PROCEDURE — 99223 1ST HOSP IP/OBS HIGH 75: CPT | Performed by: HOSPITALIST

## 2022-05-20 PROCEDURE — 36415 COLL VENOUS BLD VENIPUNCTURE: CPT

## 2022-05-20 PROCEDURE — 71045 X-RAY EXAM CHEST 1 VIEW: CPT

## 2022-05-20 PROCEDURE — 86850 RBC ANTIBODY SCREEN: CPT

## 2022-05-20 PROCEDURE — 83880 ASSAY OF NATRIURETIC PEPTIDE: CPT | Performed by: EMERGENCY MEDICINE

## 2022-05-20 RX ORDER — TIMOLOL MALEATE 5 MG/ML
1 SOLUTION/ DROPS OPHTHALMIC EVERY 12 HOURS SCHEDULED
Status: DISCONTINUED | OUTPATIENT
Start: 2022-05-20 | End: 2022-05-26 | Stop reason: HOSPADM

## 2022-05-20 RX ORDER — L.ACID,PARA/B.BIFIDUM/S.THERM 8B CELL
1 CAPSULE ORAL DAILY
Status: DISCONTINUED | OUTPATIENT
Start: 2022-05-20 | End: 2022-05-26 | Stop reason: HOSPADM

## 2022-05-20 RX ORDER — PREDNISONE 1 MG/1
5 TABLET ORAL DAILY
Status: DISCONTINUED | OUTPATIENT
Start: 2022-05-20 | End: 2022-05-20 | Stop reason: SDUPTHER

## 2022-05-20 RX ORDER — ACETAMINOPHEN 325 MG/1
650 TABLET ORAL EVERY 4 HOURS PRN
Status: DISCONTINUED | OUTPATIENT
Start: 2022-05-20 | End: 2022-05-26 | Stop reason: HOSPADM

## 2022-05-20 RX ORDER — LATANOPROST 50 UG/ML
1 SOLUTION/ DROPS OPHTHALMIC NIGHTLY
Status: DISCONTINUED | OUTPATIENT
Start: 2022-05-20 | End: 2022-05-26 | Stop reason: HOSPADM

## 2022-05-20 RX ORDER — TOPIRAMATE 100 MG/1
100 TABLET, FILM COATED ORAL 2 TIMES DAILY
Status: DISCONTINUED | OUTPATIENT
Start: 2022-05-20 | End: 2022-05-26 | Stop reason: HOSPADM

## 2022-05-20 RX ORDER — ONDANSETRON 2 MG/ML
4 INJECTION INTRAMUSCULAR; INTRAVENOUS EVERY 6 HOURS PRN
Status: DISCONTINUED | OUTPATIENT
Start: 2022-05-20 | End: 2022-05-21

## 2022-05-20 RX ORDER — SODIUM CHLORIDE 0.9 % (FLUSH) 0.9 %
10 SYRINGE (ML) INJECTION EVERY 12 HOURS SCHEDULED
Status: DISCONTINUED | OUTPATIENT
Start: 2022-05-20 | End: 2022-05-21

## 2022-05-20 RX ORDER — SODIUM CHLORIDE 0.9 % (FLUSH) 0.9 %
10 SYRINGE (ML) INJECTION AS NEEDED
Status: DISCONTINUED | OUTPATIENT
Start: 2022-05-20 | End: 2022-05-23

## 2022-05-20 RX ORDER — ACETAMINOPHEN 160 MG/5ML
650 SOLUTION ORAL EVERY 4 HOURS PRN
Status: DISCONTINUED | OUTPATIENT
Start: 2022-05-20 | End: 2022-05-26 | Stop reason: HOSPADM

## 2022-05-20 RX ORDER — SODIUM CHLORIDE 0.9 % (FLUSH) 0.9 %
10 SYRINGE (ML) INJECTION AS NEEDED
Status: DISCONTINUED | OUTPATIENT
Start: 2022-05-20 | End: 2022-05-21

## 2022-05-20 RX ORDER — FUROSEMIDE 10 MG/ML
20 INJECTION INTRAMUSCULAR; INTRAVENOUS ONCE
Status: COMPLETED | OUTPATIENT
Start: 2022-05-20 | End: 2022-05-20

## 2022-05-20 RX ORDER — PANTOPRAZOLE SODIUM 40 MG/10ML
80 INJECTION, POWDER, LYOPHILIZED, FOR SOLUTION INTRAVENOUS ONCE
Status: COMPLETED | OUTPATIENT
Start: 2022-05-20 | End: 2022-05-20

## 2022-05-20 RX ORDER — ONDANSETRON 4 MG/1
4 TABLET, FILM COATED ORAL EVERY 6 HOURS PRN
Status: DISCONTINUED | OUTPATIENT
Start: 2022-05-20 | End: 2022-05-21

## 2022-05-20 RX ORDER — ACETAMINOPHEN 650 MG/1
650 SUPPOSITORY RECTAL EVERY 4 HOURS PRN
Status: DISCONTINUED | OUTPATIENT
Start: 2022-05-20 | End: 2022-05-26 | Stop reason: HOSPADM

## 2022-05-20 RX ORDER — VALSARTAN 80 MG/1
80 TABLET ORAL DAILY
Status: DISCONTINUED | OUTPATIENT
Start: 2022-05-20 | End: 2022-05-21

## 2022-05-20 RX ORDER — METHYLPREDNISOLONE SODIUM SUCCINATE 125 MG/2ML
60 INJECTION, POWDER, LYOPHILIZED, FOR SOLUTION INTRAMUSCULAR; INTRAVENOUS 2 TIMES DAILY
Status: DISCONTINUED | OUTPATIENT
Start: 2022-05-20 | End: 2022-05-21

## 2022-05-20 RX ORDER — PANTOPRAZOLE SODIUM 40 MG/10ML
40 INJECTION, POWDER, LYOPHILIZED, FOR SOLUTION INTRAVENOUS
Status: DISCONTINUED | OUTPATIENT
Start: 2022-05-20 | End: 2022-05-23

## 2022-05-20 RX ADMIN — TOPIRAMATE 100 MG: 100 TABLET, FILM COATED ORAL at 21:41

## 2022-05-20 RX ADMIN — PANTOPRAZOLE SODIUM 80 MG: 40 INJECTION, POWDER, FOR SOLUTION INTRAVENOUS at 13:43

## 2022-05-20 RX ADMIN — METHYLPREDNISOLONE SODIUM SUCCINATE 60 MG: 125 INJECTION, POWDER, FOR SOLUTION INTRAMUSCULAR; INTRAVENOUS at 16:12

## 2022-05-20 RX ADMIN — LATANOPROST 1 DROP: 50 SOLUTION OPHTHALMIC at 21:42

## 2022-05-20 RX ADMIN — FUROSEMIDE 20 MG: 10 INJECTION INTRAMUSCULAR; INTRAVENOUS at 13:44

## 2022-05-20 RX ADMIN — METHYLPREDNISOLONE SODIUM SUCCINATE 60 MG: 125 INJECTION, POWDER, FOR SOLUTION INTRAMUSCULAR; INTRAVENOUS at 21:41

## 2022-05-20 RX ADMIN — Medication 10 ML: at 21:43

## 2022-05-20 RX ADMIN — SODIUM CHLORIDE 125 MG: 9 INJECTION, SOLUTION INTRAVENOUS at 22:13

## 2022-05-20 NOTE — TELEPHONE ENCOUNTER
Called patient's  and spoke with patient.  Advised her of anemia and recommendation to go to ER since she is experiencing shortness of breath.  She voiced understanding and is heading to ER.  Spoke with charge nurse and gave report.

## 2022-05-21 ENCOUNTER — APPOINTMENT (OUTPATIENT)
Dept: CT IMAGING | Facility: HOSPITAL | Age: 72
End: 2022-05-21

## 2022-05-21 ENCOUNTER — APPOINTMENT (OUTPATIENT)
Dept: CARDIOLOGY | Facility: HOSPITAL | Age: 72
End: 2022-05-21

## 2022-05-21 PROBLEM — I51.81 TAKOTSUBO CARDIOMYOPATHY: Status: ACTIVE | Noted: 2022-05-21

## 2022-05-21 PROBLEM — K92.2 GASTROINTESTINAL HEMORRHAGE: Status: ACTIVE | Noted: 2022-05-20

## 2022-05-21 PROBLEM — R55 SYNCOPE AND COLLAPSE: Status: ACTIVE | Noted: 2022-05-21

## 2022-05-21 LAB
ALBUMIN SERPL-MCNC: 3.8 G/DL (ref 3.5–5.2)
ALBUMIN SERPL-MCNC: 4.6 G/DL (ref 3.5–5.2)
ALBUMIN/GLOB SERPL: 1.2 G/DL
ALBUMIN/GLOB SERPL: 1.8 G/DL
ALP SERPL-CCNC: 64 U/L (ref 39–117)
ALP SERPL-CCNC: 74 U/L (ref 39–117)
ALT SERPL W P-5'-P-CCNC: 14 U/L (ref 1–33)
ALT SERPL W P-5'-P-CCNC: 17 U/L (ref 1–33)
ANION GAP SERPL CALCULATED.3IONS-SCNC: 15 MMOL/L (ref 5–15)
ANION GAP SERPL CALCULATED.3IONS-SCNC: 16 MMOL/L (ref 5–15)
AST SERPL-CCNC: 26 U/L (ref 1–32)
AST SERPL-CCNC: 28 U/L (ref 1–32)
BASOPHILS # BLD AUTO: 0.01 10*3/MM3 (ref 0–0.2)
BASOPHILS NFR BLD AUTO: 0.1 % (ref 0–1.5)
BH BB BLOOD EXPIRATION DATE: NORMAL
BH BB BLOOD TYPE BARCODE: 6200
BH BB DISPENSE STATUS: NORMAL
BH BB PRODUCT CODE: NORMAL
BH BB UNIT NUMBER: NORMAL
BH CV ECHO MEAS - AO MAX PG: 4.8 MMHG
BH CV ECHO MEAS - AO MEAN PG: 2.6 MMHG
BH CV ECHO MEAS - AO ROOT DIAM: 3.2 CM
BH CV ECHO MEAS - AO V2 MAX: 109.7 CM/SEC
BH CV ECHO MEAS - AO V2 VTI: 23 CM
BH CV ECHO MEAS - AVA(I,D): 2.18 CM2
BH CV ECHO MEAS - EDV(CUBED): 55.8 ML
BH CV ECHO MEAS - EDV(MOD-SP2): 81.9 ML
BH CV ECHO MEAS - EDV(MOD-SP4): 102 ML
BH CV ECHO MEAS - EF(MOD-BP): 36 %
BH CV ECHO MEAS - EF(MOD-SP2): 28.2 %
BH CV ECHO MEAS - EF(MOD-SP4): 39.7 %
BH CV ECHO MEAS - ESV(CUBED): 16.7 ML
BH CV ECHO MEAS - ESV(MOD-SP2): 58.8 ML
BH CV ECHO MEAS - ESV(MOD-SP4): 61.5 ML
BH CV ECHO MEAS - FS: 33.1 %
BH CV ECHO MEAS - IVS/LVPW: 1.02 CM
BH CV ECHO MEAS - IVSD: 1.1 CM
BH CV ECHO MEAS - LA DIMENSION: 2.6 CM
BH CV ECHO MEAS - LAT PEAK E' VEL: 6.5 CM/SEC
BH CV ECHO MEAS - LV MASS(C)D: 133.6 GRAMS
BH CV ECHO MEAS - LV MAX PG: 2.46 MMHG
BH CV ECHO MEAS - LV MEAN PG: 1.22 MMHG
BH CV ECHO MEAS - LV V1 MAX: 78.3 CM/SEC
BH CV ECHO MEAS - LV V1 VTI: 15.7 CM
BH CV ECHO MEAS - LVIDD: 3.8 CM
BH CV ECHO MEAS - LVIDS: 2.6 CM
BH CV ECHO MEAS - LVOT AREA: 3.2 CM2
BH CV ECHO MEAS - LVOT DIAM: 2.02 CM
BH CV ECHO MEAS - LVPWD: 1.07 CM
BH CV ECHO MEAS - MED PEAK E' VEL: 4.4 CM/SEC
BH CV ECHO MEAS - MV A MAX VEL: 108.3 CM/SEC
BH CV ECHO MEAS - MV DEC SLOPE: 421 CM/SEC2
BH CV ECHO MEAS - MV DEC TIME: 0.13 MSEC
BH CV ECHO MEAS - MV E MAX VEL: 65.5 CM/SEC
BH CV ECHO MEAS - MV E/A: 0.6
BH CV ECHO MEAS - MV P1/2T: 43.2 MSEC
BH CV ECHO MEAS - MVA(P1/2T): 5.1 CM2
BH CV ECHO MEAS - PA ACC TIME: 0.14 SEC
BH CV ECHO MEAS - PA PR(ACCEL): 15.4 MMHG
BH CV ECHO MEAS - PA V2 MAX: 76 CM/SEC
BH CV ECHO MEAS - PI END-D VEL: 104.5 CM/SEC
BH CV ECHO MEAS - RAP SYSTOLE: 3 MMHG
BH CV ECHO MEAS - RVSP: 33 MMHG
BH CV ECHO MEAS - SV(LVOT): 50.2 ML
BH CV ECHO MEAS - SV(MOD-SP2): 23.1 ML
BH CV ECHO MEAS - SV(MOD-SP4): 40.5 ML
BH CV ECHO MEAS - TAPSE (>1.6): 2.18 CM
BH CV ECHO MEAS - TR MAX PG: 30 MMHG
BH CV ECHO MEAS - TR MAX VEL: 251.9 CM/SEC
BH CV ECHO MEASUREMENTS AVERAGE E/E' RATIO: 12.02
BH CV XLRA - RV BASE: 3.2 CM
BH CV XLRA - RV LENGTH: 4.8 CM
BH CV XLRA - RV MID: 2.6 CM
BH CV XLRA - TDI S': 15.9 CM/SEC
BILIRUB SERPL-MCNC: 0.3 MG/DL (ref 0–1.2)
BILIRUB SERPL-MCNC: 0.3 MG/DL (ref 0–1.2)
BUN SERPL-MCNC: 19 MG/DL (ref 8–23)
BUN SERPL-MCNC: 20 MG/DL (ref 8–23)
BUN/CREAT SERPL: 22.9 (ref 7–25)
BUN/CREAT SERPL: 24.4 (ref 7–25)
CALCIUM SPEC-SCNC: 9.2 MG/DL (ref 8.6–10.5)
CALCIUM SPEC-SCNC: 9.6 MG/DL (ref 8.6–10.5)
CHLORIDE SERPL-SCNC: 105 MMOL/L (ref 98–107)
CHLORIDE SERPL-SCNC: 108 MMOL/L (ref 98–107)
CK MB SERPL-CCNC: 4.11 NG/ML
CK SERPL-CCNC: 96 U/L (ref 20–180)
CO2 SERPL-SCNC: 13 MMOL/L (ref 22–29)
CO2 SERPL-SCNC: 18 MMOL/L (ref 22–29)
CREAT SERPL-MCNC: 0.82 MG/DL (ref 0.57–1)
CREAT SERPL-MCNC: 0.83 MG/DL (ref 0.57–1)
CROSSMATCH INTERPRETATION: NORMAL
DEPRECATED RDW RBC AUTO: 50.3 FL (ref 37–54)
DEPRECATED RDW RBC AUTO: 50.3 FL (ref 37–54)
EGFRCR SERPLBLD CKD-EPI 2021: 74.1 ML/MIN/1.73
EGFRCR SERPLBLD CKD-EPI 2021: 75.2 ML/MIN/1.73
EOSINOPHIL # BLD AUTO: 0 10*3/MM3 (ref 0–0.4)
EOSINOPHIL NFR BLD AUTO: 0 % (ref 0.3–6.2)
ERYTHROCYTE [DISTWIDTH] IN BLOOD BY AUTOMATED COUNT: 16.3 % (ref 12.3–15.4)
ERYTHROCYTE [DISTWIDTH] IN BLOOD BY AUTOMATED COUNT: 16.5 % (ref 12.3–15.4)
GLOBULIN UR ELPH-MCNC: 2.5 GM/DL
GLOBULIN UR ELPH-MCNC: 3.1 GM/DL
GLUCOSE BLDC GLUCOMTR-MCNC: 126 MG/DL (ref 70–130)
GLUCOSE BLDC GLUCOMTR-MCNC: 126 MG/DL (ref 70–130)
GLUCOSE BLDC GLUCOMTR-MCNC: 134 MG/DL (ref 70–130)
GLUCOSE SERPL-MCNC: 103 MG/DL (ref 65–99)
GLUCOSE SERPL-MCNC: 146 MG/DL (ref 65–99)
HCT VFR BLD AUTO: 32.2 % (ref 34–46.6)
HCT VFR BLD AUTO: 35.6 % (ref 34–46.6)
HCT VFR BLD AUTO: 37 % (ref 34–46.6)
HGB BLD-MCNC: 10.8 G/DL (ref 12–15.9)
HGB BLD-MCNC: 11.4 G/DL (ref 12–15.9)
HGB BLD-MCNC: 9.9 G/DL (ref 12–15.9)
IMM GRANULOCYTES # BLD AUTO: 0.06 10*3/MM3 (ref 0–0.05)
IMM GRANULOCYTES NFR BLD AUTO: 0.7 % (ref 0–0.5)
LYMPHOCYTES # BLD AUTO: 0.87 10*3/MM3 (ref 0.7–3.1)
LYMPHOCYTES NFR BLD AUTO: 10.6 % (ref 19.6–45.3)
MAGNESIUM SERPL-MCNC: 2 MG/DL (ref 1.6–2.4)
MAXIMAL PREDICTED HEART RATE: 146 BPM
MCH RBC QN AUTO: 26 PG (ref 26.6–33)
MCH RBC QN AUTO: 26.3 PG (ref 26.6–33)
MCHC RBC AUTO-ENTMCNC: 30.7 G/DL (ref 31.5–35.7)
MCHC RBC AUTO-ENTMCNC: 30.8 G/DL (ref 31.5–35.7)
MCV RBC AUTO: 84.5 FL (ref 79–97)
MCV RBC AUTO: 85.6 FL (ref 79–97)
MONOCYTES # BLD AUTO: 0.19 10*3/MM3 (ref 0.1–0.9)
MONOCYTES NFR BLD AUTO: 2.3 % (ref 5–12)
NEUTROPHILS NFR BLD AUTO: 7.06 10*3/MM3 (ref 1.7–7)
NEUTROPHILS NFR BLD AUTO: 86.3 % (ref 42.7–76)
NRBC BLD AUTO-RTO: 1.6 /100 WBC (ref 0–0.2)
PHOSPHATE SERPL-MCNC: 3.5 MG/DL (ref 2.5–4.5)
PLATELET # BLD AUTO: 460 10*3/MM3 (ref 140–450)
PLATELET # BLD AUTO: 518 10*3/MM3 (ref 140–450)
PMV BLD AUTO: 10.7 FL (ref 6–12)
PMV BLD AUTO: 9.7 FL (ref 6–12)
POTASSIUM SERPL-SCNC: 4.4 MMOL/L (ref 3.5–5.2)
POTASSIUM SERPL-SCNC: 4.5 MMOL/L (ref 3.5–5.2)
PROT SERPL-MCNC: 6.9 G/DL (ref 6–8.5)
PROT SERPL-MCNC: 7.1 G/DL (ref 6–8.5)
RBC # BLD AUTO: 3.76 10*6/MM3 (ref 3.77–5.28)
RBC # BLD AUTO: 4.38 10*6/MM3 (ref 3.77–5.28)
SODIUM SERPL-SCNC: 134 MMOL/L (ref 136–145)
SODIUM SERPL-SCNC: 141 MMOL/L (ref 136–145)
STRESS TARGET HR: 124 BPM
TROPONIN T SERPL-MCNC: 0.03 NG/ML (ref 0–0.03)
TROPONIN T SERPL-MCNC: 0.04 NG/ML (ref 0–0.03)
UNIT  ABO: NORMAL
UNIT  RH: NORMAL
VIT B12 BLD-MCNC: 381 PG/ML (ref 211–946)
WBC NRBC COR # BLD: 8.19 10*3/MM3 (ref 3.4–10.8)
WBC NRBC COR # BLD: 8.49 10*3/MM3 (ref 3.4–10.8)

## 2022-05-21 PROCEDURE — 83735 ASSAY OF MAGNESIUM: CPT | Performed by: FAMILY MEDICINE

## 2022-05-21 PROCEDURE — 82962 GLUCOSE BLOOD TEST: CPT

## 2022-05-21 PROCEDURE — 85025 COMPLETE CBC W/AUTO DIFF WBC: CPT | Performed by: FAMILY MEDICINE

## 2022-05-21 PROCEDURE — 93306 TTE W/DOPPLER COMPLETE: CPT

## 2022-05-21 PROCEDURE — 25010000002 IOPAMIDOL 61 % SOLUTION: Performed by: INTERNAL MEDICINE

## 2022-05-21 PROCEDURE — 80053 COMPREHEN METABOLIC PANEL: CPT | Performed by: FAMILY MEDICINE

## 2022-05-21 PROCEDURE — 25010000002 SULFUR HEXAFLUORIDE MICROSPH 60.7-25 MG RECONSTITUTED SUSPENSION: Performed by: HOSPITALIST

## 2022-05-21 PROCEDURE — 94799 UNLISTED PULMONARY SVC/PX: CPT

## 2022-05-21 PROCEDURE — 84484 ASSAY OF TROPONIN QUANT: CPT | Performed by: FAMILY MEDICINE

## 2022-05-21 PROCEDURE — 84484 ASSAY OF TROPONIN QUANT: CPT | Performed by: NURSE PRACTITIONER

## 2022-05-21 PROCEDURE — 80053 COMPREHEN METABOLIC PANEL: CPT | Performed by: HOSPITALIST

## 2022-05-21 PROCEDURE — 99232 SBSQ HOSP IP/OBS MODERATE 35: CPT | Performed by: FAMILY MEDICINE

## 2022-05-21 PROCEDURE — 99232 SBSQ HOSP IP/OBS MODERATE 35: CPT | Performed by: NURSE PRACTITIONER

## 2022-05-21 PROCEDURE — 85014 HEMATOCRIT: CPT | Performed by: FAMILY MEDICINE

## 2022-05-21 PROCEDURE — 25010000002 HYDROCORTISONE SODIUM SUCCINATE 100 MG RECONSTITUTED SOLUTION: Performed by: NURSE PRACTITIONER

## 2022-05-21 PROCEDURE — 85018 HEMOGLOBIN: CPT | Performed by: FAMILY MEDICINE

## 2022-05-21 PROCEDURE — 82550 ASSAY OF CK (CPK): CPT | Performed by: NURSE PRACTITIONER

## 2022-05-21 PROCEDURE — 84100 ASSAY OF PHOSPHORUS: CPT | Performed by: FAMILY MEDICINE

## 2022-05-21 PROCEDURE — 92950 HEART/LUNG RESUSCITATION CPR: CPT

## 2022-05-21 PROCEDURE — 99223 1ST HOSP IP/OBS HIGH 75: CPT | Performed by: NURSE PRACTITIONER

## 2022-05-21 PROCEDURE — 93010 ELECTROCARDIOGRAM REPORT: CPT | Performed by: INTERNAL MEDICINE

## 2022-05-21 PROCEDURE — 93306 TTE W/DOPPLER COMPLETE: CPT | Performed by: INTERNAL MEDICINE

## 2022-05-21 PROCEDURE — 82553 CREATINE MB FRACTION: CPT | Performed by: NURSE PRACTITIONER

## 2022-05-21 PROCEDURE — 25010000002 METHYLPREDNISOLONE PER 125 MG: Performed by: HOSPITALIST

## 2022-05-21 PROCEDURE — 71270 CT THORAX DX C-/C+: CPT

## 2022-05-21 PROCEDURE — 93005 ELECTROCARDIOGRAM TRACING: CPT | Performed by: NURSE PRACTITIONER

## 2022-05-21 PROCEDURE — 25010000002 NA FERRIC GLUC CPLX PER 12.5 MG: Performed by: NURSE PRACTITIONER

## 2022-05-21 PROCEDURE — 74178 CT ABD&PLV WO CNTR FLWD CNTR: CPT

## 2022-05-21 PROCEDURE — 85027 COMPLETE CBC AUTOMATED: CPT | Performed by: HOSPITALIST

## 2022-05-21 RX ORDER — DEXTROSE MONOHYDRATE 25 G/50ML
25 INJECTION, SOLUTION INTRAVENOUS
Status: DISCONTINUED | OUTPATIENT
Start: 2022-05-21 | End: 2022-05-26 | Stop reason: HOSPADM

## 2022-05-21 RX ORDER — INSULIN LISPRO 100 [IU]/ML
0-7 INJECTION, SOLUTION INTRAVENOUS; SUBCUTANEOUS
Status: DISCONTINUED | OUTPATIENT
Start: 2022-05-21 | End: 2022-05-26 | Stop reason: HOSPADM

## 2022-05-21 RX ORDER — METHYLPREDNISOLONE SODIUM SUCCINATE 125 MG/2ML
60 INJECTION, POWDER, LYOPHILIZED, FOR SOLUTION INTRAMUSCULAR; INTRAVENOUS EVERY 24 HOURS
Status: DISCONTINUED | OUTPATIENT
Start: 2022-05-22 | End: 2022-05-21

## 2022-05-21 RX ORDER — PEG-3350, SODIUM SULFATE, SODIUM CHLORIDE, POTASSIUM CHLORIDE, SODIUM ASCORBATE AND ASCORBIC ACID 7.5-2.691G
1000 KIT ORAL
Status: DISPENSED | OUTPATIENT
Start: 2022-05-21 | End: 2022-05-22

## 2022-05-21 RX ORDER — SODIUM CHLORIDE 9 MG/ML
INJECTION, SOLUTION INTRAVENOUS
Status: COMPLETED | OUTPATIENT
Start: 2022-05-21 | End: 2022-05-21

## 2022-05-21 RX ORDER — PEG-3350, SODIUM SULFATE, SODIUM CHLORIDE, POTASSIUM CHLORIDE, SODIUM ASCORBATE AND ASCORBIC ACID 7.5-2.691G
1000 KIT ORAL
Status: ACTIVE | OUTPATIENT
Start: 2022-05-22 | End: 2022-05-22

## 2022-05-21 RX ORDER — NICOTINE POLACRILEX 4 MG
15 LOZENGE BUCCAL
Status: DISCONTINUED | OUTPATIENT
Start: 2022-05-21 | End: 2022-05-26 | Stop reason: HOSPADM

## 2022-05-21 RX ADMIN — TIMOLOL MALEATE 1 DROP: 5 SOLUTION/ DROPS OPHTHALMIC at 21:16

## 2022-05-21 RX ADMIN — SODIUM CHLORIDE 1000 ML: 9 INJECTION, SOLUTION INTRAVENOUS at 23:40

## 2022-05-21 RX ADMIN — SODIUM CHLORIDE 250 MG: 9 INJECTION, SOLUTION INTRAVENOUS at 12:50

## 2022-05-21 RX ADMIN — TIMOLOL MALEATE 1 DROP: 5 SOLUTION/ DROPS OPHTHALMIC at 08:52

## 2022-05-21 RX ADMIN — Medication 10 ML: at 08:56

## 2022-05-21 RX ADMIN — SULFUR HEXAFLUORIDE 2 ML: KIT at 12:27

## 2022-05-21 RX ADMIN — TOPIRAMATE 100 MG: 100 TABLET, FILM COATED ORAL at 21:53

## 2022-05-21 RX ADMIN — PANTOPRAZOLE SODIUM 40 MG: 40 INJECTION, POWDER, FOR SOLUTION INTRAVENOUS at 08:53

## 2022-05-21 RX ADMIN — LATANOPROST 1 DROP: 50 SOLUTION OPHTHALMIC at 21:53

## 2022-05-21 RX ADMIN — PANTOPRAZOLE SODIUM 40 MG: 40 INJECTION, POWDER, FOR SOLUTION INTRAVENOUS at 17:16

## 2022-05-21 RX ADMIN — SODIUM CHLORIDE 999 ML/HR: 9 INJECTION, SOLUTION INTRAVENOUS at 15:19

## 2022-05-21 RX ADMIN — IOPAMIDOL 100 ML: 612 INJECTION, SOLUTION INTRAVENOUS at 17:35

## 2022-05-21 RX ADMIN — METHYLPREDNISOLONE SODIUM SUCCINATE 60 MG: 125 INJECTION, POWDER, FOR SOLUTION INTRAMUSCULAR; INTRAVENOUS at 08:53

## 2022-05-21 RX ADMIN — VALSARTAN 80 MG: 160 TABLET, FILM COATED ORAL at 08:53

## 2022-05-21 RX ADMIN — TOPIRAMATE 100 MG: 100 TABLET, FILM COATED ORAL at 08:53

## 2022-05-21 RX ADMIN — HYDROCORTISONE SODIUM SUCCINATE 50 MG: 100 INJECTION, POWDER, FOR SOLUTION INTRAMUSCULAR; INTRAVENOUS at 17:15

## 2022-05-21 RX ADMIN — SODIUM CHLORIDE 500 ML: 9 INJECTION, SOLUTION INTRAVENOUS at 17:14

## 2022-05-21 RX ADMIN — SODIUM CHLORIDE 1000 ML: 9 INJECTION, SOLUTION INTRAVENOUS at 15:10

## 2022-05-21 RX ADMIN — Medication 1 CAPSULE: at 08:53

## 2022-05-22 PROBLEM — I50.21 ACUTE SYSTOLIC CONGESTIVE HEART FAILURE: Status: ACTIVE | Noted: 2022-05-22

## 2022-05-22 PROBLEM — R19.5 OCCULT BLOOD IN STOOLS: Status: RESOLVED | Noted: 2022-05-20 | Resolved: 2022-05-22

## 2022-05-22 PROBLEM — H52.00 HYPEROPIA: Status: RESOLVED | Noted: 2019-03-05 | Resolved: 2022-05-22

## 2022-05-22 PROBLEM — I25.10 CORONARY ARTERY DISEASE INVOLVING NATIVE CORONARY ARTERY OF NATIVE HEART: Status: ACTIVE | Noted: 2022-05-22

## 2022-05-22 PROBLEM — I21.4 NSTEMI, INITIAL EPISODE OF CARE: Status: ACTIVE | Noted: 2022-05-22

## 2022-05-22 PROBLEM — R06.09 DYSPNEA ON EXERTION: Status: ACTIVE | Noted: 2022-05-22

## 2022-05-22 PROBLEM — I50.20 SYSTOLIC CONGESTIVE HEART FAILURE: Status: ACTIVE | Noted: 2022-05-22

## 2022-05-22 LAB
ALBUMIN SERPL-MCNC: 3.4 G/DL (ref 3.5–5.2)
ALBUMIN/GLOB SERPL: 1.3 G/DL
ALP SERPL-CCNC: 46 U/L (ref 39–117)
ALT SERPL W P-5'-P-CCNC: 12 U/L (ref 1–33)
ANION GAP SERPL CALCULATED.3IONS-SCNC: 13 MMOL/L (ref 5–15)
AST SERPL-CCNC: 20 U/L (ref 1–32)
BASOPHILS # BLD AUTO: 0.01 10*3/MM3 (ref 0–0.2)
BASOPHILS NFR BLD AUTO: 0.1 % (ref 0–1.5)
BILIRUB SERPL-MCNC: 0.3 MG/DL (ref 0–1.2)
BUN SERPL-MCNC: 19 MG/DL (ref 8–23)
BUN/CREAT SERPL: 22.6 (ref 7–25)
CALCIUM SPEC-SCNC: 8.4 MG/DL (ref 8.6–10.5)
CHLORIDE SERPL-SCNC: 107 MMOL/L (ref 98–107)
CO2 SERPL-SCNC: 14 MMOL/L (ref 22–29)
CREAT SERPL-MCNC: 0.84 MG/DL (ref 0.57–1)
DEPRECATED RDW RBC AUTO: 50.8 FL (ref 37–54)
EGFRCR SERPLBLD CKD-EPI 2021: 73 ML/MIN/1.73
EOSINOPHIL # BLD AUTO: 0.02 10*3/MM3 (ref 0–0.4)
EOSINOPHIL NFR BLD AUTO: 0.2 % (ref 0.3–6.2)
ERYTHROCYTE [DISTWIDTH] IN BLOOD BY AUTOMATED COUNT: 16.6 % (ref 12.3–15.4)
GLOBULIN UR ELPH-MCNC: 2.6 GM/DL
GLUCOSE BLDC GLUCOMTR-MCNC: 101 MG/DL (ref 70–130)
GLUCOSE BLDC GLUCOMTR-MCNC: 109 MG/DL (ref 70–130)
GLUCOSE BLDC GLUCOMTR-MCNC: 119 MG/DL (ref 70–130)
GLUCOSE BLDC GLUCOMTR-MCNC: 133 MG/DL (ref 70–130)
GLUCOSE SERPL-MCNC: 106 MG/DL (ref 65–99)
HCT VFR BLD AUTO: 31.2 % (ref 34–46.6)
HGB BLD-MCNC: 9.6 G/DL (ref 12–15.9)
IMM GRANULOCYTES # BLD AUTO: 0.12 10*3/MM3 (ref 0–0.05)
IMM GRANULOCYTES NFR BLD AUTO: 0.9 % (ref 0–0.5)
LYMPHOCYTES # BLD AUTO: 1.15 10*3/MM3 (ref 0.7–3.1)
LYMPHOCYTES NFR BLD AUTO: 8.7 % (ref 19.6–45.3)
MCH RBC QN AUTO: 26.1 PG (ref 26.6–33)
MCHC RBC AUTO-ENTMCNC: 30.8 G/DL (ref 31.5–35.7)
MCV RBC AUTO: 84.8 FL (ref 79–97)
MONOCYTES # BLD AUTO: 0.41 10*3/MM3 (ref 0.1–0.9)
MONOCYTES NFR BLD AUTO: 3.1 % (ref 5–12)
NEUTROPHILS NFR BLD AUTO: 11.45 10*3/MM3 (ref 1.7–7)
NEUTROPHILS NFR BLD AUTO: 87 % (ref 42.7–76)
NRBC BLD AUTO-RTO: 0.8 /100 WBC (ref 0–0.2)
PLATELET # BLD AUTO: 409 10*3/MM3 (ref 140–450)
PMV BLD AUTO: 9.8 FL (ref 6–12)
POTASSIUM SERPL-SCNC: 3.9 MMOL/L (ref 3.5–5.2)
PROT SERPL-MCNC: 6 G/DL (ref 6–8.5)
RBC # BLD AUTO: 3.68 10*6/MM3 (ref 3.77–5.28)
SODIUM SERPL-SCNC: 134 MMOL/L (ref 136–145)
WBC NRBC COR # BLD: 13.16 10*3/MM3 (ref 3.4–10.8)

## 2022-05-22 PROCEDURE — 99222 1ST HOSP IP/OBS MODERATE 55: CPT | Performed by: INTERNAL MEDICINE

## 2022-05-22 PROCEDURE — 85025 COMPLETE CBC W/AUTO DIFF WBC: CPT | Performed by: NURSE PRACTITIONER

## 2022-05-22 PROCEDURE — 80053 COMPREHEN METABOLIC PANEL: CPT | Performed by: NURSE PRACTITIONER

## 2022-05-22 PROCEDURE — 82962 GLUCOSE BLOOD TEST: CPT

## 2022-05-22 PROCEDURE — 99232 SBSQ HOSP IP/OBS MODERATE 35: CPT | Performed by: INTERNAL MEDICINE

## 2022-05-22 PROCEDURE — 25010000002 HYDROCORTISONE SODIUM SUCCINATE 100 MG RECONSTITUTED SOLUTION: Performed by: NURSE PRACTITIONER

## 2022-05-22 RX ADMIN — LATANOPROST 1 DROP: 50 SOLUTION OPHTHALMIC at 20:06

## 2022-05-22 RX ADMIN — HYDROCORTISONE SODIUM SUCCINATE 50 MG: 100 INJECTION, POWDER, FOR SOLUTION INTRAMUSCULAR; INTRAVENOUS at 17:20

## 2022-05-22 RX ADMIN — HYDROCORTISONE SODIUM SUCCINATE 50 MG: 100 INJECTION, POWDER, FOR SOLUTION INTRAMUSCULAR; INTRAVENOUS at 17:13

## 2022-05-22 RX ADMIN — TOPIRAMATE 100 MG: 100 TABLET, FILM COATED ORAL at 08:11

## 2022-05-22 RX ADMIN — PANTOPRAZOLE SODIUM 40 MG: 40 INJECTION, POWDER, FOR SOLUTION INTRAVENOUS at 08:12

## 2022-05-22 RX ADMIN — HYDROCORTISONE SODIUM SUCCINATE 50 MG: 100 INJECTION, POWDER, FOR SOLUTION INTRAMUSCULAR; INTRAVENOUS at 01:59

## 2022-05-22 RX ADMIN — TOPIRAMATE 100 MG: 100 TABLET, FILM COATED ORAL at 20:06

## 2022-05-22 RX ADMIN — TIMOLOL MALEATE 1 DROP: 5 SOLUTION/ DROPS OPHTHALMIC at 20:06

## 2022-05-22 RX ADMIN — Medication 1 CAPSULE: at 08:11

## 2022-05-22 RX ADMIN — PANTOPRAZOLE SODIUM 40 MG: 40 INJECTION, POWDER, FOR SOLUTION INTRAVENOUS at 17:13

## 2022-05-22 RX ADMIN — TIMOLOL MALEATE 1 DROP: 5 SOLUTION/ DROPS OPHTHALMIC at 08:12

## 2022-05-23 ENCOUNTER — ANESTHESIA (OUTPATIENT)
Dept: PERIOP | Facility: HOSPITAL | Age: 72
End: 2022-05-23

## 2022-05-23 ENCOUNTER — ANESTHESIA EVENT (OUTPATIENT)
Dept: PERIOP | Facility: HOSPITAL | Age: 72
End: 2022-05-23

## 2022-05-23 PROBLEM — I51.81 TAKOTSUBO CARDIOMYOPATHY: Status: RESOLVED | Noted: 2022-05-21 | Resolved: 2022-05-23

## 2022-05-23 PROBLEM — R06.09 DYSPNEA ON EXERTION: Status: RESOLVED | Noted: 2022-05-22 | Resolved: 2022-05-23

## 2022-05-23 PROBLEM — K92.2 GASTROINTESTINAL HEMORRHAGE: Status: RESOLVED | Noted: 2022-05-20 | Resolved: 2022-05-23

## 2022-05-23 PROBLEM — R55 SYNCOPE AND COLLAPSE: Status: RESOLVED | Noted: 2022-05-21 | Resolved: 2022-05-23

## 2022-05-23 PROBLEM — I25.119 CORONARY ARTERY DISEASE INVOLVING NATIVE CORONARY ARTERY OF NATIVE HEART WITH ANGINA PECTORIS: Status: ACTIVE | Noted: 2022-05-22

## 2022-05-23 LAB
ABO GROUP BLD: NORMAL
ANION GAP SERPL CALCULATED.3IONS-SCNC: 11 MMOL/L (ref 5–15)
BASOPHILS # BLD AUTO: 0 10*3/MM3 (ref 0–0.2)
BASOPHILS NFR BLD AUTO: 0 % (ref 0–1.5)
BH BB BLOOD EXPIRATION DATE: NORMAL
BH BB BLOOD EXPIRATION DATE: NORMAL
BH BB BLOOD TYPE BARCODE: 6200
BH BB BLOOD TYPE BARCODE: 6200
BH BB DISPENSE STATUS: NORMAL
BH BB DISPENSE STATUS: NORMAL
BH BB PRODUCT CODE: NORMAL
BH BB PRODUCT CODE: NORMAL
BH BB UNIT NUMBER: NORMAL
BH BB UNIT NUMBER: NORMAL
BLD GP AB SCN SERPL QL: NEGATIVE
BUN SERPL-MCNC: 22 MG/DL (ref 8–23)
BUN/CREAT SERPL: 25.9 (ref 7–25)
CALCIUM SPEC-SCNC: 8.7 MG/DL (ref 8.6–10.5)
CHLORIDE SERPL-SCNC: 112 MMOL/L (ref 98–107)
CO2 SERPL-SCNC: 19 MMOL/L (ref 22–29)
CREAT SERPL-MCNC: 0.85 MG/DL (ref 0.57–1)
CROSSMATCH INTERPRETATION: NORMAL
CROSSMATCH INTERPRETATION: NORMAL
DEPRECATED RDW RBC AUTO: 55.1 FL (ref 37–54)
DEPRECATED RDW RBC AUTO: 55.4 FL (ref 37–54)
DEPRECATED RDW RBC AUTO: 55.4 FL (ref 37–54)
EGFRCR SERPLBLD CKD-EPI 2021: 72 ML/MIN/1.73
EOSINOPHIL # BLD AUTO: 0.01 10*3/MM3 (ref 0–0.4)
EOSINOPHIL NFR BLD AUTO: 0.1 % (ref 0.3–6.2)
ERYTHROCYTE [DISTWIDTH] IN BLOOD BY AUTOMATED COUNT: 17.1 % (ref 12.3–15.4)
ERYTHROCYTE [DISTWIDTH] IN BLOOD BY AUTOMATED COUNT: 17.2 % (ref 12.3–15.4)
ERYTHROCYTE [DISTWIDTH] IN BLOOD BY AUTOMATED COUNT: 17.3 % (ref 12.3–15.4)
GLUCOSE BLDC GLUCOMTR-MCNC: 102 MG/DL (ref 70–130)
GLUCOSE BLDC GLUCOMTR-MCNC: 104 MG/DL (ref 70–130)
GLUCOSE BLDC GLUCOMTR-MCNC: 117 MG/DL (ref 70–130)
GLUCOSE BLDC GLUCOMTR-MCNC: 156 MG/DL (ref 70–130)
GLUCOSE SERPL-MCNC: 91 MG/DL (ref 65–99)
HCT VFR BLD AUTO: 25.9 % (ref 34–46.6)
HCT VFR BLD AUTO: 26.9 % (ref 34–46.6)
HCT VFR BLD AUTO: 27.2 % (ref 34–46.6)
HCT VFR BLD AUTO: 28.4 % (ref 34–46.6)
HGB BLD-MCNC: 7.7 G/DL (ref 12–15.9)
HGB BLD-MCNC: 8 G/DL (ref 12–15.9)
HGB BLD-MCNC: 8.1 G/DL (ref 12–15.9)
HGB BLD-MCNC: 8.8 G/DL (ref 12–15.9)
IMM GRANULOCYTES # BLD AUTO: 0.11 10*3/MM3 (ref 0–0.05)
IMM GRANULOCYTES NFR BLD AUTO: 1.2 % (ref 0–0.5)
LYMPHOCYTES # BLD AUTO: 1.35 10*3/MM3 (ref 0.7–3.1)
LYMPHOCYTES NFR BLD AUTO: 14.7 % (ref 19.6–45.3)
MAGNESIUM SERPL-MCNC: 2 MG/DL (ref 1.6–2.4)
MCH RBC QN AUTO: 26.5 PG (ref 26.6–33)
MCH RBC QN AUTO: 26.6 PG (ref 26.6–33)
MCH RBC QN AUTO: 26.8 PG (ref 26.6–33)
MCHC RBC AUTO-ENTMCNC: 29.7 G/DL (ref 31.5–35.7)
MCHC RBC AUTO-ENTMCNC: 29.7 G/DL (ref 31.5–35.7)
MCHC RBC AUTO-ENTMCNC: 29.8 G/DL (ref 31.5–35.7)
MCV RBC AUTO: 89 FL (ref 79–97)
MCV RBC AUTO: 89.4 FL (ref 79–97)
MCV RBC AUTO: 90.1 FL (ref 79–97)
MONOCYTES # BLD AUTO: 0.59 10*3/MM3 (ref 0.1–0.9)
MONOCYTES NFR BLD AUTO: 6.4 % (ref 5–12)
NEUTROPHILS NFR BLD AUTO: 7.11 10*3/MM3 (ref 1.7–7)
NEUTROPHILS NFR BLD AUTO: 77.6 % (ref 42.7–76)
NRBC BLD AUTO-RTO: 0.4 /100 WBC (ref 0–0.2)
PHOSPHATE SERPL-MCNC: 2.7 MG/DL (ref 2.5–4.5)
PLATELET # BLD AUTO: 327 10*3/MM3 (ref 140–450)
PLATELET # BLD AUTO: 356 10*3/MM3 (ref 140–450)
PLATELET # BLD AUTO: 379 10*3/MM3 (ref 140–450)
PMV BLD AUTO: 10.3 FL (ref 6–12)
PMV BLD AUTO: 9.8 FL (ref 6–12)
PMV BLD AUTO: 9.9 FL (ref 6–12)
POTASSIUM SERPL-SCNC: 3.5 MMOL/L (ref 3.5–5.2)
RBC # BLD AUTO: 2.91 10*6/MM3 (ref 3.77–5.28)
RBC # BLD AUTO: 3.01 10*6/MM3 (ref 3.77–5.28)
RBC # BLD AUTO: 3.02 10*6/MM3 (ref 3.77–5.28)
RH BLD: POSITIVE
SODIUM SERPL-SCNC: 142 MMOL/L (ref 136–145)
T&S EXPIRATION DATE: NORMAL
UNIT  ABO: NORMAL
UNIT  ABO: NORMAL
UNIT  RH: NORMAL
UNIT  RH: NORMAL
WBC NRBC COR # BLD: 8.28 10*3/MM3 (ref 3.4–10.8)
WBC NRBC COR # BLD: 9.17 10*3/MM3 (ref 3.4–10.8)
WBC NRBC COR # BLD: 9.17 10*3/MM3 (ref 3.4–10.8)

## 2022-05-23 PROCEDURE — 86900 BLOOD TYPING SEROLOGIC ABO: CPT

## 2022-05-23 PROCEDURE — 85014 HEMATOCRIT: CPT | Performed by: NURSE PRACTITIONER

## 2022-05-23 PROCEDURE — C1725 CATH, TRANSLUMIN NON-LASER: HCPCS | Performed by: INTERNAL MEDICINE

## 2022-05-23 PROCEDURE — C1887 CATHETER, GUIDING: HCPCS | Performed by: INTERNAL MEDICINE

## 2022-05-23 PROCEDURE — 027135Z DILATION OF CORONARY ARTERY, TWO ARTERIES WITH TWO DRUG-ELUTING INTRALUMINAL DEVICES, PERCUTANEOUS APPROACH: ICD-10-PCS | Performed by: INTERNAL MEDICINE

## 2022-05-23 PROCEDURE — 25010000002 MIDAZOLAM PER 1 MG: Performed by: INTERNAL MEDICINE

## 2022-05-23 PROCEDURE — 25010000002 PROPOFOL 10 MG/ML EMULSION: Performed by: NURSE ANESTHETIST, CERTIFIED REGISTERED

## 2022-05-23 PROCEDURE — C1769 GUIDE WIRE: HCPCS | Performed by: INTERNAL MEDICINE

## 2022-05-23 PROCEDURE — 25010000002 HYDROCORTISONE SODIUM SUCCINATE 100 MG RECONSTITUTED SOLUTION: Performed by: NURSE PRACTITIONER

## 2022-05-23 PROCEDURE — 86923 COMPATIBILITY TEST ELECTRIC: CPT

## 2022-05-23 PROCEDURE — B221Z2Z COMPUTERIZED TOMOGRAPHY (CT SCAN) OF MULTIPLE CORONARY ARTERIES USING INTRAVASCULAR OPTICAL COHERENCE: ICD-10-PCS | Performed by: INTERNAL MEDICINE

## 2022-05-23 PROCEDURE — C1894 INTRO/SHEATH, NON-LASER: HCPCS | Performed by: INTERNAL MEDICINE

## 2022-05-23 PROCEDURE — 82962 GLUCOSE BLOOD TEST: CPT

## 2022-05-23 PROCEDURE — 25010000002 HYDROCORTISONE SODIUM SUCCINATE 100 MG RECONSTITUTED SOLUTION: Performed by: INTERNAL MEDICINE

## 2022-05-23 PROCEDURE — 25010000002 FENTANYL CITRATE (PF) 50 MCG/ML SOLUTION: Performed by: INTERNAL MEDICINE

## 2022-05-23 PROCEDURE — 84100 ASSAY OF PHOSPHORUS: CPT | Performed by: INTERNAL MEDICINE

## 2022-05-23 PROCEDURE — 25010000002 HEPARIN (PORCINE) PER 1000 UNITS: Performed by: INTERNAL MEDICINE

## 2022-05-23 PROCEDURE — 36430 TRANSFUSION BLD/BLD COMPNT: CPT

## 2022-05-23 PROCEDURE — C1874 STENT, COATED/COV W/DEL SYS: HCPCS | Performed by: INTERNAL MEDICINE

## 2022-05-23 PROCEDURE — 92978 ENDOLUMINL IVUS OCT C 1ST: CPT | Performed by: INTERNAL MEDICINE

## 2022-05-23 PROCEDURE — 85025 COMPLETE CBC W/AUTO DIFF WBC: CPT | Performed by: SURGERY

## 2022-05-23 PROCEDURE — 86901 BLOOD TYPING SEROLOGIC RH(D): CPT | Performed by: PHYSICIAN ASSISTANT

## 2022-05-23 PROCEDURE — 92979 ENDOLUMINL IVUS OCT C EA: CPT | Performed by: INTERNAL MEDICINE

## 2022-05-23 PROCEDURE — 0JCG0ZZ EXTIRPATION OF MATTER FROM RIGHT LOWER ARM SUBCUTANEOUS TISSUE AND FASCIA, OPEN APPROACH: ICD-10-PCS | Performed by: SURGERY

## 2022-05-23 PROCEDURE — 03QB0ZZ REPAIR RIGHT RADIAL ARTERY, OPEN APPROACH: ICD-10-PCS | Performed by: SURGERY

## 2022-05-23 PROCEDURE — 93458 L HRT ARTERY/VENTRICLE ANGIO: CPT | Performed by: INTERNAL MEDICINE

## 2022-05-23 PROCEDURE — 80048 BASIC METABOLIC PNL TOTAL CA: CPT | Performed by: INTERNAL MEDICINE

## 2022-05-23 PROCEDURE — 85347 COAGULATION TIME ACTIVATED: CPT

## 2022-05-23 PROCEDURE — 86900 BLOOD TYPING SEROLOGIC ABO: CPT | Performed by: PHYSICIAN ASSISTANT

## 2022-05-23 PROCEDURE — 85027 COMPLETE CBC AUTOMATED: CPT | Performed by: INTERNAL MEDICINE

## 2022-05-23 PROCEDURE — 93005 ELECTROCARDIOGRAM TRACING: CPT | Performed by: INTERNAL MEDICINE

## 2022-05-23 PROCEDURE — 83735 ASSAY OF MAGNESIUM: CPT | Performed by: INTERNAL MEDICINE

## 2022-05-23 PROCEDURE — 4A023N7 MEASUREMENT OF CARDIAC SAMPLING AND PRESSURE, LEFT HEART, PERCUTANEOUS APPROACH: ICD-10-PCS | Performed by: INTERNAL MEDICINE

## 2022-05-23 PROCEDURE — 25010000002 CEFAZOLIN IN DEXTROSE 2-4 GM/100ML-% SOLUTION: Performed by: NURSE ANESTHETIST, CERTIFIED REGISTERED

## 2022-05-23 PROCEDURE — 99291 CRITICAL CARE FIRST HOUR: CPT | Performed by: INTERNAL MEDICINE

## 2022-05-23 PROCEDURE — 63710000001 INSULIN LISPRO (HUMAN) PER 5 UNITS: Performed by: SURGERY

## 2022-05-23 PROCEDURE — C1753 CATH, INTRAVAS ULTRASOUND: HCPCS | Performed by: INTERNAL MEDICINE

## 2022-05-23 PROCEDURE — 0 IOPAMIDOL PER 1 ML: Performed by: INTERNAL MEDICINE

## 2022-05-23 PROCEDURE — B2111ZZ FLUOROSCOPY OF MULTIPLE CORONARY ARTERIES USING LOW OSMOLAR CONTRAST: ICD-10-PCS | Performed by: INTERNAL MEDICINE

## 2022-05-23 PROCEDURE — 85018 HEMOGLOBIN: CPT | Performed by: NURSE PRACTITIONER

## 2022-05-23 PROCEDURE — P9016 RBC LEUKOCYTES REDUCED: HCPCS

## 2022-05-23 PROCEDURE — C9600 PERC DRUG-EL COR STENT SING: HCPCS | Performed by: INTERNAL MEDICINE

## 2022-05-23 PROCEDURE — 86850 RBC ANTIBODY SCREEN: CPT | Performed by: PHYSICIAN ASSISTANT

## 2022-05-23 PROCEDURE — 92928 PRQ TCAT PLMT NTRAC ST 1 LES: CPT | Performed by: INTERNAL MEDICINE

## 2022-05-23 PROCEDURE — 0HQFXZZ REPAIR RIGHT HAND SKIN, EXTERNAL APPROACH: ICD-10-PCS | Performed by: SURGERY

## 2022-05-23 DEVICE — HEMOST ABS SURGICEL SNOW 1X2IN: Type: IMPLANTABLE DEVICE | Site: WRIST | Status: FUNCTIONAL

## 2022-05-23 DEVICE — EVEROLIMUS-ELUTING PLATINUM CHROMIUM CORONARY STENT SYSTEM
Type: IMPLANTABLE DEVICE | Status: FUNCTIONAL
Brand: SYNERGY™

## 2022-05-23 RX ORDER — MIDAZOLAM HYDROCHLORIDE 1 MG/ML
INJECTION INTRAMUSCULAR; INTRAVENOUS AS NEEDED
Status: DISCONTINUED | OUTPATIENT
Start: 2022-05-23 | End: 2022-05-23 | Stop reason: HOSPADM

## 2022-05-23 RX ORDER — CLOPIDOGREL BISULFATE 75 MG/1
TABLET ORAL AS NEEDED
Status: DISCONTINUED | OUTPATIENT
Start: 2022-05-23 | End: 2022-05-23 | Stop reason: HOSPADM

## 2022-05-23 RX ORDER — SODIUM CHLORIDE 9 MG/ML
INJECTION, SOLUTION INTRAVENOUS AS NEEDED
Status: DISCONTINUED | OUTPATIENT
Start: 2022-05-23 | End: 2022-05-23 | Stop reason: HOSPADM

## 2022-05-23 RX ORDER — FENTANYL CITRATE 50 UG/ML
50 INJECTION, SOLUTION INTRAMUSCULAR; INTRAVENOUS
Status: DISCONTINUED | OUTPATIENT
Start: 2022-05-23 | End: 2022-05-23

## 2022-05-23 RX ORDER — LIDOCAINE HYDROCHLORIDE 10 MG/ML
INJECTION, SOLUTION EPIDURAL; INFILTRATION; INTRACAUDAL; PERINEURAL AS NEEDED
Status: DISCONTINUED | OUTPATIENT
Start: 2022-05-23 | End: 2022-05-23 | Stop reason: HOSPADM

## 2022-05-23 RX ORDER — BUPIVACAINE HYDROCHLORIDE AND EPINEPHRINE 5; 5 MG/ML; UG/ML
INJECTION, SOLUTION PERINEURAL AS NEEDED
Status: DISCONTINUED | OUTPATIENT
Start: 2022-05-23 | End: 2022-05-23 | Stop reason: HOSPADM

## 2022-05-23 RX ORDER — PROPOFOL 10 MG/ML
VIAL (ML) INTRAVENOUS AS NEEDED
Status: DISCONTINUED | OUTPATIENT
Start: 2022-05-23 | End: 2022-05-23 | Stop reason: SURG

## 2022-05-23 RX ORDER — SODIUM CHLORIDE 9 MG/ML
1.5 INJECTION, SOLUTION INTRAVENOUS CONTINUOUS
Status: DISCONTINUED | OUTPATIENT
Start: 2022-05-23 | End: 2022-05-23

## 2022-05-23 RX ORDER — HEPARIN SODIUM 1000 [USP'U]/ML
INJECTION, SOLUTION INTRAVENOUS; SUBCUTANEOUS AS NEEDED
Status: DISCONTINUED | OUTPATIENT
Start: 2022-05-23 | End: 2022-05-23 | Stop reason: HOSPADM

## 2022-05-23 RX ORDER — METOPROLOL SUCCINATE 25 MG/1
25 TABLET, EXTENDED RELEASE ORAL
Status: DISCONTINUED | OUTPATIENT
Start: 2022-05-23 | End: 2022-05-24

## 2022-05-23 RX ORDER — MORPHINE SULFATE 2 MG/ML
2 INJECTION, SOLUTION INTRAMUSCULAR; INTRAVENOUS
Status: DISCONTINUED | OUTPATIENT
Start: 2022-05-23 | End: 2022-05-26 | Stop reason: HOSPADM

## 2022-05-23 RX ORDER — CLOPIDOGREL BISULFATE 75 MG/1
75 TABLET ORAL DAILY
Status: DISCONTINUED | OUTPATIENT
Start: 2022-05-24 | End: 2022-05-26 | Stop reason: HOSPADM

## 2022-05-23 RX ORDER — LIDOCAINE HYDROCHLORIDE 10 MG/ML
INJECTION, SOLUTION EPIDURAL; INFILTRATION; INTRACAUDAL; PERINEURAL AS NEEDED
Status: DISCONTINUED | OUTPATIENT
Start: 2022-05-23 | End: 2022-05-23 | Stop reason: SURG

## 2022-05-23 RX ORDER — CEFAZOLIN SODIUM 2 G/100ML
INJECTION, SOLUTION INTRAVENOUS AS NEEDED
Status: DISCONTINUED | OUTPATIENT
Start: 2022-05-23 | End: 2022-05-23 | Stop reason: SURG

## 2022-05-23 RX ORDER — VALSARTAN 160 MG/1
80 TABLET ORAL NIGHTLY
Status: DISCONTINUED | OUTPATIENT
Start: 2022-05-23 | End: 2022-05-26 | Stop reason: HOSPADM

## 2022-05-23 RX ORDER — SPIRONOLACTONE 25 MG/1
25 TABLET ORAL DAILY
Status: DISCONTINUED | OUTPATIENT
Start: 2022-05-23 | End: 2022-05-26 | Stop reason: HOSPADM

## 2022-05-23 RX ORDER — FENTANYL CITRATE 50 UG/ML
INJECTION, SOLUTION INTRAMUSCULAR; INTRAVENOUS AS NEEDED
Status: DISCONTINUED | OUTPATIENT
Start: 2022-05-23 | End: 2022-05-23 | Stop reason: HOSPADM

## 2022-05-23 RX ORDER — ASPIRIN 81 MG/1
81 TABLET ORAL DAILY
Status: DISCONTINUED | OUTPATIENT
Start: 2022-05-23 | End: 2022-05-26 | Stop reason: HOSPADM

## 2022-05-23 RX ADMIN — PROPOFOL 10 MG: 10 INJECTION, EMULSION INTRAVENOUS at 14:15

## 2022-05-23 RX ADMIN — PROPOFOL 10 MG: 10 INJECTION, EMULSION INTRAVENOUS at 14:02

## 2022-05-23 RX ADMIN — TIMOLOL MALEATE 1 DROP: 5 SOLUTION/ DROPS OPHTHALMIC at 20:50

## 2022-05-23 RX ADMIN — NICARDIPINE HYDROCHLORIDE 7.5 MG/HR: 0.1 INJECTION, SOLUTION INTRAVENOUS at 13:34

## 2022-05-23 RX ADMIN — SODIUM CHLORIDE 5 MG/HR: 0.9 INJECTION, SOLUTION INTRAVENOUS at 13:30

## 2022-05-23 RX ADMIN — TIMOLOL MALEATE 1 DROP: 5 SOLUTION/ DROPS OPHTHALMIC at 08:29

## 2022-05-23 RX ADMIN — Medication 1 CAPSULE: at 12:01

## 2022-05-23 RX ADMIN — LIDOCAINE HYDROCHLORIDE 50 MG: 10 INJECTION, SOLUTION EPIDURAL; INFILTRATION; INTRACAUDAL; PERINEURAL at 13:45

## 2022-05-23 RX ADMIN — PROPOFOL 10 MG: 10 INJECTION, EMULSION INTRAVENOUS at 13:45

## 2022-05-23 RX ADMIN — TOPIRAMATE 100 MG: 100 TABLET, FILM COATED ORAL at 20:50

## 2022-05-23 RX ADMIN — TOPIRAMATE 100 MG: 100 TABLET, FILM COATED ORAL at 12:01

## 2022-05-23 RX ADMIN — INSULIN LISPRO 2 UNITS: 100 INJECTION, SOLUTION INTRAVENOUS; SUBCUTANEOUS at 21:23

## 2022-05-23 RX ADMIN — PROPOFOL 10 MG: 10 INJECTION, EMULSION INTRAVENOUS at 13:48

## 2022-05-23 RX ADMIN — HYDROCORTISONE SODIUM SUCCINATE 50 MG: 100 INJECTION, POWDER, FOR SOLUTION INTRAMUSCULAR; INTRAVENOUS at 02:37

## 2022-05-23 RX ADMIN — SODIUM CHLORIDE: 9 INJECTION, SOLUTION INTRAVENOUS at 13:34

## 2022-05-23 RX ADMIN — VALSARTAN 80 MG: 160 TABLET, FILM COATED ORAL at 20:50

## 2022-05-23 RX ADMIN — CEFAZOLIN SODIUM 2 G: 2 INJECTION, SOLUTION INTRAVENOUS at 13:42

## 2022-05-23 RX ADMIN — LATANOPROST 1 DROP: 50 SOLUTION OPHTHALMIC at 20:50

## 2022-05-23 RX ADMIN — PANTOPRAZOLE SODIUM 40 MG: 40 INJECTION, POWDER, FOR SOLUTION INTRAVENOUS at 06:05

## 2022-05-23 RX ADMIN — HYDROCORTISONE SODIUM SUCCINATE 50 MG: 100 INJECTION, POWDER, FOR SOLUTION INTRAMUSCULAR; INTRAVENOUS at 11:25

## 2022-05-23 NOTE — ANESTHESIA PREPROCEDURE EVALUATION
Anesthesia Evaluation     Patient summary reviewed and Nursing notes reviewed   no history of anesthetic complications:  NPO Solid Status: > 8 hours  NPO Liquid Status: > 2 hours           Airway   Mallampati: II  TM distance: >3 FB  Neck ROM: full  No difficulty expected  Dental - normal exam     Pulmonary - normal exam   (-) not a smoker  Cardiovascular - normal exam    ECG reviewed    (+) hypertension, past MI , CAD, hyperlipidemia,     ROS comment: 5/23/22-·2v CAD (LAD, RCA).  Echo 5/21/2022 demonstrated LVEF 35% with mid anterior and apical hypokinesis.   Moderately elevated LV filling pressure (LVEDP 25 mmHg).   Successful OCT guided PCI of the proximal and mid LAD using Synergy 3 x 38 mm drug-eluting stent with post dilation of 4 mm proximally  · Successful OCT guided PCI of the mid RCA using a Synergy 4 x 28 mm drug-eluting stent with post dilation of 4.5 mm proximally    5/20/22 - · Left ventricular wall thickness is consistent with mild concentric hypertrophy.  · Estimated right ventricular systolic pressure from tricuspid regurgitation is normal (<35 mmHg).  · Systolic anterior motion of the anterior mitral leaflet is present.  · Left ventricular systolic function is moderately decreased.  · Lumason contrast shows that the base of the heart moves best. The mid to distal and apical segments are akinetic in a pattern consistent with Tako Tsubo stress cardiomyopathy.  · There is redundancy of the mitral valve chordae. No mitral regurgitation was noted        Neuro/Psych  (-) seizures, TIA, CVA  GI/Hepatic/Renal/Endo    (+)  GERD,      Musculoskeletal     Abdominal    Substance History   (+) alcohol use,   (-) drug use     OB/GYN          Other        ROS/Med Hx Other: Prednisone  Hx tsakotsubo CM  hgb 8.0 k 3.5                Anesthesia Plan    ASA 4 - emergent     MAC and regional   (Pt extremely high risk given recent stents (today);  Plan for minimal MAC +/- regional.  MAP 80+ given comorbitidies;  Avoid  epi given Tsakotsubo and MAURI.)  intravenous induction     Anesthetic plan, all risks, benefits, and alternatives have been provided, discussed and informed consent has been obtained with: patient.  Use of blood products discussed with patient .   Plan discussed with CRNA.        CODE STATUS:    Code Status (Patient has no pulse and is not breathing): CPR (Attempt to Resuscitate)  Medical Interventions (Patient has pulse or is breathing): Full Support

## 2022-05-23 NOTE — ANESTHESIA POSTPROCEDURE EVALUATION
Patient: Fabiola SNIDER Salmeron    Procedure Summary     Date: 05/23/22 Room / Location:  [facility] / [facility] HYBRID SERENE    Anesthesia Start: 1334 Anesthesia Stop:     Procedure: RIGHT RADIAL ARTERY REPAIR WITH HEMATOMA EVACUATION AND WASHOUT (Right ) Diagnosis:     Surgeons: Jaron Benavides MD Provider: Hyun Haines DO    Anesthesia Type: MAC, regional ASA Status: 4 - Emergent          Anesthesia Type: MAC, regional    Vitals  Vitals Value Taken Time   /84 05/23/22 1438   Temp     Pulse 102 05/23/22 1442   Resp     SpO2 99 % 05/23/22 1442   Vitals shown include unvalidated device data.  Temp: 97.4 F  RR: 15      Post Anesthesia Care and Evaluation    Patient location during evaluation: PACU  Patient participation: complete - patient participated  Level of consciousness: awake and alert  Pain score: 0  Pain management: adequate  Airway patency: patent  Anesthetic complications: No anesthetic complications  PONV Status: none  Cardiovascular status: hemodynamically stable and acceptable  Respiratory status: nonlabored ventilation, acceptable and nasal cannula  Hydration status: acceptable

## 2022-05-24 ENCOUNTER — DOCUMENTATION (OUTPATIENT)
Dept: CARDIAC REHAB | Facility: HOSPITAL | Age: 72
End: 2022-05-24

## 2022-05-24 PROBLEM — R19.5 FECAL OCCULT BLOOD TEST POSITIVE: Status: ACTIVE | Noted: 2022-05-20

## 2022-05-24 PROBLEM — R06.09 DYSPNEA ON EXERTION: Status: ACTIVE | Noted: 2022-05-20

## 2022-05-24 LAB
ACT BLD: 345 SECONDS (ref 82–152)
ACT BLD: 363 SECONDS (ref 82–152)
ANION GAP SERPL CALCULATED.3IONS-SCNC: 10 MMOL/L (ref 5–15)
BASOPHILS # BLD AUTO: 0.03 10*3/MM3 (ref 0–0.2)
BASOPHILS NFR BLD AUTO: 0.3 % (ref 0–1.5)
BUN SERPL-MCNC: 19 MG/DL (ref 8–23)
BUN/CREAT SERPL: 24.1 (ref 7–25)
CALCIUM SPEC-SCNC: 8.5 MG/DL (ref 8.6–10.5)
CHLORIDE SERPL-SCNC: 111 MMOL/L (ref 98–107)
CO2 SERPL-SCNC: 20 MMOL/L (ref 22–29)
CREAT SERPL-MCNC: 0.79 MG/DL (ref 0.57–1)
DEPRECATED RDW RBC AUTO: 50.8 FL (ref 37–54)
DEPRECATED RDW RBC AUTO: 54.4 FL (ref 37–54)
EGFRCR SERPLBLD CKD-EPI 2021: 78.6 ML/MIN/1.73
EOSINOPHIL # BLD AUTO: 0.07 10*3/MM3 (ref 0–0.4)
EOSINOPHIL NFR BLD AUTO: 0.8 % (ref 0.3–6.2)
ERYTHROCYTE [DISTWIDTH] IN BLOOD BY AUTOMATED COUNT: 16.4 % (ref 12.3–15.4)
ERYTHROCYTE [DISTWIDTH] IN BLOOD BY AUTOMATED COUNT: 18.3 % (ref 12.3–15.4)
GLUCOSE BLDC GLUCOMTR-MCNC: 110 MG/DL (ref 70–130)
GLUCOSE BLDC GLUCOMTR-MCNC: 122 MG/DL (ref 70–130)
GLUCOSE BLDC GLUCOMTR-MCNC: 84 MG/DL (ref 70–130)
GLUCOSE BLDC GLUCOMTR-MCNC: 94 MG/DL (ref 70–130)
GLUCOSE SERPL-MCNC: 82 MG/DL (ref 65–99)
HCT VFR BLD AUTO: 25.1 % (ref 34–46.6)
HCT VFR BLD AUTO: 29.4 % (ref 34–46.6)
HGB BLD-MCNC: 7.9 G/DL (ref 12–15.9)
HGB BLD-MCNC: 9 G/DL (ref 12–15.9)
IMM GRANULOCYTES # BLD AUTO: 0.16 10*3/MM3 (ref 0–0.05)
IMM GRANULOCYTES NFR BLD AUTO: 1.8 % (ref 0–0.5)
LYMPHOCYTES # BLD AUTO: 1.87 10*3/MM3 (ref 0.7–3.1)
LYMPHOCYTES NFR BLD AUTO: 20.7 % (ref 19.6–45.3)
MCH RBC QN AUTO: 27.7 PG (ref 26.6–33)
MCH RBC QN AUTO: 28.3 PG (ref 26.6–33)
MCHC RBC AUTO-ENTMCNC: 30.6 G/DL (ref 31.5–35.7)
MCHC RBC AUTO-ENTMCNC: 31.5 G/DL (ref 31.5–35.7)
MCV RBC AUTO: 88.1 FL (ref 79–97)
MCV RBC AUTO: 92.5 FL (ref 79–97)
MONOCYTES # BLD AUTO: 0.9 10*3/MM3 (ref 0.1–0.9)
MONOCYTES NFR BLD AUTO: 10 % (ref 5–12)
NEUTROPHILS NFR BLD AUTO: 6.01 10*3/MM3 (ref 1.7–7)
NEUTROPHILS NFR BLD AUTO: 66.4 % (ref 42.7–76)
NRBC BLD AUTO-RTO: 0.2 /100 WBC (ref 0–0.2)
PLATELET # BLD AUTO: 285 10*3/MM3 (ref 140–450)
PLATELET # BLD AUTO: 368 10*3/MM3 (ref 140–450)
PMV BLD AUTO: 10 FL (ref 6–12)
PMV BLD AUTO: 9.9 FL (ref 6–12)
POTASSIUM SERPL-SCNC: 3.1 MMOL/L (ref 3.5–5.2)
POTASSIUM SERPL-SCNC: 4 MMOL/L (ref 3.5–5.2)
QT INTERVAL: 494 MS
QTC INTERVAL: 551 MS
RBC # BLD AUTO: 2.85 10*6/MM3 (ref 3.77–5.28)
RBC # BLD AUTO: 3.18 10*6/MM3 (ref 3.77–5.28)
SODIUM SERPL-SCNC: 141 MMOL/L (ref 136–145)
WBC NRBC COR # BLD: 8.78 10*3/MM3 (ref 3.4–10.8)
WBC NRBC COR # BLD: 9.04 10*3/MM3 (ref 3.4–10.8)

## 2022-05-24 PROCEDURE — 99232 SBSQ HOSP IP/OBS MODERATE 35: CPT | Performed by: INTERNAL MEDICINE

## 2022-05-24 PROCEDURE — 80048 BASIC METABOLIC PNL TOTAL CA: CPT | Performed by: SURGERY

## 2022-05-24 PROCEDURE — 85027 COMPLETE CBC AUTOMATED: CPT | Performed by: SURGERY

## 2022-05-24 PROCEDURE — 84132 ASSAY OF SERUM POTASSIUM: CPT | Performed by: ANESTHESIOLOGY

## 2022-05-24 PROCEDURE — 99232 SBSQ HOSP IP/OBS MODERATE 35: CPT | Performed by: NURSE PRACTITIONER

## 2022-05-24 PROCEDURE — 82962 GLUCOSE BLOOD TEST: CPT

## 2022-05-24 PROCEDURE — 85025 COMPLETE CBC W/AUTO DIFF WBC: CPT | Performed by: SURGERY

## 2022-05-24 RX ORDER — ROSUVASTATIN CALCIUM 10 MG/1
5 TABLET, COATED ORAL
Status: DISCONTINUED | OUTPATIENT
Start: 2022-05-24 | End: 2022-05-26 | Stop reason: HOSPADM

## 2022-05-24 RX ORDER — SODIUM CHLORIDE 0.9 % (FLUSH) 0.9 %
10 SYRINGE (ML) INJECTION EVERY 12 HOURS SCHEDULED
Status: DISCONTINUED | OUTPATIENT
Start: 2022-05-24 | End: 2022-05-25

## 2022-05-24 RX ORDER — SODIUM CHLORIDE 0.9 % (FLUSH) 0.9 %
10 SYRINGE (ML) INJECTION AS NEEDED
Status: DISCONTINUED | OUTPATIENT
Start: 2022-05-24 | End: 2022-05-25

## 2022-05-24 RX ORDER — MIDAZOLAM HYDROCHLORIDE 1 MG/ML
0.5 INJECTION INTRAMUSCULAR; INTRAVENOUS
Status: DISCONTINUED | OUTPATIENT
Start: 2022-05-25 | End: 2022-05-25

## 2022-05-24 RX ORDER — FAMOTIDINE 20 MG/1
20 TABLET, FILM COATED ORAL ONCE
Status: DISCONTINUED | OUTPATIENT
Start: 2022-05-25 | End: 2022-05-25

## 2022-05-24 RX ORDER — MAGNESIUM SULFATE HEPTAHYDRATE 40 MG/ML
2 INJECTION, SOLUTION INTRAVENOUS AS NEEDED
Status: DISCONTINUED | OUTPATIENT
Start: 2022-05-24 | End: 2022-05-26 | Stop reason: HOSPADM

## 2022-05-24 RX ORDER — FAMOTIDINE 10 MG/ML
20 INJECTION, SOLUTION INTRAVENOUS ONCE
Status: DISCONTINUED | OUTPATIENT
Start: 2022-05-25 | End: 2022-05-25

## 2022-05-24 RX ORDER — POTASSIUM CHLORIDE 750 MG/1
40 CAPSULE, EXTENDED RELEASE ORAL AS NEEDED
Status: DISCONTINUED | OUTPATIENT
Start: 2022-05-24 | End: 2022-05-26 | Stop reason: HOSPADM

## 2022-05-24 RX ORDER — LIDOCAINE HYDROCHLORIDE 10 MG/ML
0.5 INJECTION, SOLUTION EPIDURAL; INFILTRATION; INTRACAUDAL; PERINEURAL ONCE AS NEEDED
Status: DISCONTINUED | OUTPATIENT
Start: 2022-05-25 | End: 2022-05-25

## 2022-05-24 RX ORDER — POTASSIUM CHLORIDE 29.8 MG/ML
20 INJECTION INTRAVENOUS
Status: DISCONTINUED | OUTPATIENT
Start: 2022-05-24 | End: 2022-05-26 | Stop reason: HOSPADM

## 2022-05-24 RX ORDER — MAGNESIUM SULFATE HEPTAHYDRATE 40 MG/ML
4 INJECTION, SOLUTION INTRAVENOUS AS NEEDED
Status: DISCONTINUED | OUTPATIENT
Start: 2022-05-24 | End: 2022-05-26 | Stop reason: HOSPADM

## 2022-05-24 RX ORDER — PEG-3350, SODIUM SULFATE, SODIUM CHLORIDE, POTASSIUM CHLORIDE, SODIUM ASCORBATE AND ASCORBIC ACID 7.5-2.691G
1000 KIT ORAL
Status: COMPLETED | OUTPATIENT
Start: 2022-05-24 | End: 2022-05-24

## 2022-05-24 RX ORDER — POTASSIUM CHLORIDE 1.5 G/1.77G
40 POWDER, FOR SOLUTION ORAL AS NEEDED
Status: DISCONTINUED | OUTPATIENT
Start: 2022-05-24 | End: 2022-05-26 | Stop reason: HOSPADM

## 2022-05-24 RX ORDER — BISOPROLOL FUMARATE 5 MG/1
2.5 TABLET, FILM COATED ORAL
Status: DISCONTINUED | OUTPATIENT
Start: 2022-05-24 | End: 2022-05-26 | Stop reason: HOSPADM

## 2022-05-24 RX ORDER — SODIUM CHLORIDE, SODIUM LACTATE, POTASSIUM CHLORIDE, CALCIUM CHLORIDE 600; 310; 30; 20 MG/100ML; MG/100ML; MG/100ML; MG/100ML
30 INJECTION, SOLUTION INTRAVENOUS CONTINUOUS PRN
Status: DISCONTINUED | OUTPATIENT
Start: 2022-05-25 | End: 2022-05-26 | Stop reason: HOSPADM

## 2022-05-24 RX ORDER — PEG-3350, SODIUM SULFATE, SODIUM CHLORIDE, POTASSIUM CHLORIDE, SODIUM ASCORBATE AND ASCORBIC ACID 7.5-2.691G
1000 KIT ORAL
Status: COMPLETED | OUTPATIENT
Start: 2022-05-25 | End: 2022-05-25

## 2022-05-24 RX ORDER — SODIUM CHLORIDE, SODIUM LACTATE, POTASSIUM CHLORIDE, CALCIUM CHLORIDE 600; 310; 30; 20 MG/100ML; MG/100ML; MG/100ML; MG/100ML
9 INJECTION, SOLUTION INTRAVENOUS CONTINUOUS
Status: DISCONTINUED | OUTPATIENT
Start: 2022-05-25 | End: 2022-05-26 | Stop reason: HOSPADM

## 2022-05-24 RX ADMIN — ASPIRIN 81 MG: 81 TABLET, COATED ORAL at 09:04

## 2022-05-24 RX ADMIN — TOPIRAMATE 100 MG: 100 TABLET, FILM COATED ORAL at 21:03

## 2022-05-24 RX ADMIN — POTASSIUM CHLORIDE 40 MEQ: 10 CAPSULE, COATED, EXTENDED RELEASE ORAL at 09:09

## 2022-05-24 RX ADMIN — LATANOPROST 1 DROP: 50 SOLUTION OPHTHALMIC at 21:03

## 2022-05-24 RX ADMIN — POTASSIUM CHLORIDE 40 MEQ: 10 CAPSULE, COATED, EXTENDED RELEASE ORAL at 17:32

## 2022-05-24 RX ADMIN — TOPIRAMATE 100 MG: 100 TABLET, FILM COATED ORAL at 09:04

## 2022-05-24 RX ADMIN — BISOPROLOL FUMARATE 2.5 MG: 5 TABLET, FILM COATED ORAL at 09:06

## 2022-05-24 RX ADMIN — EMPAGLIFLOZIN 10 MG: 10 TABLET, FILM COATED ORAL at 10:35

## 2022-05-24 RX ADMIN — Medication 1 CAPSULE: at 09:04

## 2022-05-24 RX ADMIN — PEG-3350, SODIUM SULFATE, SODIUM CHLORIDE, POTASSIUM CHLORIDE, SODIUM ASCORBATE AND ASCORBIC ACID 1000 ML: KIT at 20:50

## 2022-05-24 RX ADMIN — ROSUVASTATIN CALCIUM 5 MG: 10 TABLET, COATED ORAL at 21:03

## 2022-05-24 RX ADMIN — POTASSIUM CHLORIDE 40 MEQ: 10 CAPSULE, COATED, EXTENDED RELEASE ORAL at 13:06

## 2022-05-24 RX ADMIN — CLOPIDOGREL BISULFATE 75 MG: 75 TABLET ORAL at 09:04

## 2022-05-24 RX ADMIN — VALSARTAN 80 MG: 160 TABLET, FILM COATED ORAL at 21:02

## 2022-05-24 RX ADMIN — TIMOLOL MALEATE 1 DROP: 5 SOLUTION/ DROPS OPHTHALMIC at 21:03

## 2022-05-24 RX ADMIN — TIMOLOL MALEATE 1 DROP: 5 SOLUTION/ DROPS OPHTHALMIC at 09:12

## 2022-05-24 RX ADMIN — SPIRONOLACTONE 25 MG: 25 TABLET ORAL at 09:08

## 2022-05-24 NOTE — PROGRESS NOTES
Patient identified as qualifier for Phase II Cardiac Rehab. Staff discussed benefits of exercise, program protocol, and educational material provided. Patient wants to consider Phase II   Cardiac Rehab. Brochure for [facility] with contact information given to patient and home exercise instruction education completed. Teach back verified. Ms. Salmeron will call staff to schedule if she decides to participate.

## 2022-05-25 ENCOUNTER — ANESTHESIA (OUTPATIENT)
Dept: GASTROENTEROLOGY | Facility: HOSPITAL | Age: 72
End: 2022-05-25

## 2022-05-25 ENCOUNTER — ANESTHESIA EVENT (OUTPATIENT)
Dept: GASTROENTEROLOGY | Facility: HOSPITAL | Age: 72
End: 2022-05-25

## 2022-05-25 LAB
ANION GAP SERPL CALCULATED.3IONS-SCNC: 12 MMOL/L (ref 5–15)
BUN SERPL-MCNC: 15 MG/DL (ref 8–23)
BUN/CREAT SERPL: 19.5 (ref 7–25)
CALCIUM SPEC-SCNC: 9.1 MG/DL (ref 8.6–10.5)
CHLORIDE SERPL-SCNC: 108 MMOL/L (ref 98–107)
CO2 SERPL-SCNC: 18 MMOL/L (ref 22–29)
CREAT SERPL-MCNC: 0.77 MG/DL (ref 0.57–1)
DEPRECATED RDW RBC AUTO: 54.4 FL (ref 37–54)
EGFRCR SERPLBLD CKD-EPI 2021: 81.1 ML/MIN/1.73
ERYTHROCYTE [DISTWIDTH] IN BLOOD BY AUTOMATED COUNT: 18.7 % (ref 12.3–15.4)
GLUCOSE BLDC GLUCOMTR-MCNC: 134 MG/DL (ref 70–130)
GLUCOSE BLDC GLUCOMTR-MCNC: 156 MG/DL (ref 70–130)
GLUCOSE BLDC GLUCOMTR-MCNC: 92 MG/DL (ref 70–130)
GLUCOSE BLDC GLUCOMTR-MCNC: 97 MG/DL (ref 70–130)
GLUCOSE SERPL-MCNC: 106 MG/DL (ref 65–99)
HCT VFR BLD AUTO: 29.8 % (ref 34–46.6)
HGB BLD-MCNC: 9.1 G/DL (ref 12–15.9)
MAGNESIUM SERPL-MCNC: 2.1 MG/DL (ref 1.6–2.4)
MCH RBC QN AUTO: 28.2 PG (ref 26.6–33)
MCHC RBC AUTO-ENTMCNC: 30.5 G/DL (ref 31.5–35.7)
MCV RBC AUTO: 92.3 FL (ref 79–97)
PHOSPHATE SERPL-MCNC: 3 MG/DL (ref 2.5–4.5)
PLATELET # BLD AUTO: 340 10*3/MM3 (ref 140–450)
PMV BLD AUTO: 9.6 FL (ref 6–12)
POTASSIUM SERPL-SCNC: 3.8 MMOL/L (ref 3.5–5.2)
RBC # BLD AUTO: 3.23 10*6/MM3 (ref 3.77–5.28)
SODIUM SERPL-SCNC: 138 MMOL/L (ref 136–145)
WBC NRBC COR # BLD: 9.45 10*3/MM3 (ref 3.4–10.8)

## 2022-05-25 PROCEDURE — 43239 EGD BIOPSY SINGLE/MULTIPLE: CPT | Performed by: INTERNAL MEDICINE

## 2022-05-25 PROCEDURE — 0DB68ZX EXCISION OF STOMACH, VIA NATURAL OR ARTIFICIAL OPENING ENDOSCOPIC, DIAGNOSTIC: ICD-10-PCS | Performed by: INTERNAL MEDICINE

## 2022-05-25 PROCEDURE — 88305 TISSUE EXAM BY PATHOLOGIST: CPT | Performed by: INTERNAL MEDICINE

## 2022-05-25 PROCEDURE — 85027 COMPLETE CBC AUTOMATED: CPT | Performed by: INTERNAL MEDICINE

## 2022-05-25 PROCEDURE — 25010000002 PROPOFOL 10 MG/ML EMULSION: Performed by: NURSE ANESTHETIST, CERTIFIED REGISTERED

## 2022-05-25 PROCEDURE — 99231 SBSQ HOSP IP/OBS SF/LOW 25: CPT | Performed by: NURSE PRACTITIONER

## 2022-05-25 PROCEDURE — 84100 ASSAY OF PHOSPHORUS: CPT | Performed by: INTERNAL MEDICINE

## 2022-05-25 PROCEDURE — 80048 BASIC METABOLIC PNL TOTAL CA: CPT | Performed by: INTERNAL MEDICINE

## 2022-05-25 PROCEDURE — 99233 SBSQ HOSP IP/OBS HIGH 50: CPT | Performed by: INTERNAL MEDICINE

## 2022-05-25 PROCEDURE — 83735 ASSAY OF MAGNESIUM: CPT | Performed by: INTERNAL MEDICINE

## 2022-05-25 PROCEDURE — 45378 DIAGNOSTIC COLONOSCOPY: CPT | Performed by: INTERNAL MEDICINE

## 2022-05-25 PROCEDURE — 0DJD8ZZ INSPECTION OF LOWER INTESTINAL TRACT, VIA NATURAL OR ARTIFICIAL OPENING ENDOSCOPIC: ICD-10-PCS | Performed by: INTERNAL MEDICINE

## 2022-05-25 PROCEDURE — C1889 IMPLANT/INSERT DEVICE, NOC: HCPCS | Performed by: INTERNAL MEDICINE

## 2022-05-25 PROCEDURE — 82962 GLUCOSE BLOOD TEST: CPT

## 2022-05-25 RX ORDER — IPRATROPIUM BROMIDE AND ALBUTEROL SULFATE 2.5; .5 MG/3ML; MG/3ML
3 SOLUTION RESPIRATORY (INHALATION) ONCE AS NEEDED
Status: DISCONTINUED | OUTPATIENT
Start: 2022-05-25 | End: 2022-05-25

## 2022-05-25 RX ORDER — LIDOCAINE HYDROCHLORIDE 10 MG/ML
INJECTION, SOLUTION EPIDURAL; INFILTRATION; INTRACAUDAL; PERINEURAL AS NEEDED
Status: DISCONTINUED | OUTPATIENT
Start: 2022-05-25 | End: 2022-05-25 | Stop reason: SURG

## 2022-05-25 RX ORDER — ONDANSETRON 2 MG/ML
4 INJECTION INTRAMUSCULAR; INTRAVENOUS ONCE AS NEEDED
Status: DISCONTINUED | OUTPATIENT
Start: 2022-05-25 | End: 2022-05-25

## 2022-05-25 RX ORDER — PROPOFOL 10 MG/ML
VIAL (ML) INTRAVENOUS AS NEEDED
Status: DISCONTINUED | OUTPATIENT
Start: 2022-05-25 | End: 2022-05-25 | Stop reason: SURG

## 2022-05-25 RX ORDER — BUPIVACAINE HCL/0.9 % NACL/PF 0.125 %
PLASTIC BAG, INJECTION (ML) EPIDURAL AS NEEDED
Status: DISCONTINUED | OUTPATIENT
Start: 2022-05-25 | End: 2022-05-25 | Stop reason: SURG

## 2022-05-25 RX ORDER — PANTOPRAZOLE SODIUM 40 MG/10ML
40 INJECTION, POWDER, LYOPHILIZED, FOR SOLUTION INTRAVENOUS
Status: DISCONTINUED | OUTPATIENT
Start: 2022-05-25 | End: 2022-05-26

## 2022-05-25 RX ADMIN — PANTOPRAZOLE SODIUM 40 MG: 40 INJECTION, POWDER, FOR SOLUTION INTRAVENOUS at 08:50

## 2022-05-25 RX ADMIN — SPIRONOLACTONE 25 MG: 25 TABLET ORAL at 12:25

## 2022-05-25 RX ADMIN — TOPIRAMATE 100 MG: 100 TABLET, FILM COATED ORAL at 12:25

## 2022-05-25 RX ADMIN — PROPOFOL 50 MG: 10 INJECTION, EMULSION INTRAVENOUS at 09:57

## 2022-05-25 RX ADMIN — LIDOCAINE HYDROCHLORIDE 50 MG: 10 INJECTION, SOLUTION EPIDURAL; INFILTRATION; INTRACAUDAL; PERINEURAL at 09:22

## 2022-05-25 RX ADMIN — Medication 10 ML: at 08:51

## 2022-05-25 RX ADMIN — PROPOFOL 20 MG: 10 INJECTION, EMULSION INTRAVENOUS at 09:32

## 2022-05-25 RX ADMIN — VALSARTAN 80 MG: 160 TABLET, FILM COATED ORAL at 20:28

## 2022-05-25 RX ADMIN — PROPOFOL 20 MG: 10 INJECTION, EMULSION INTRAVENOUS at 09:23

## 2022-05-25 RX ADMIN — PROPOFOL 100 MCG/KG/MIN: 10 INJECTION, EMULSION INTRAVENOUS at 09:22

## 2022-05-25 RX ADMIN — PROPOFOL 30 MG: 10 INJECTION, EMULSION INTRAVENOUS at 09:27

## 2022-05-25 RX ADMIN — TIMOLOL MALEATE 1 DROP: 5 SOLUTION/ DROPS OPHTHALMIC at 20:28

## 2022-05-25 RX ADMIN — LATANOPROST 1 DROP: 50 SOLUTION OPHTHALMIC at 20:28

## 2022-05-25 RX ADMIN — POLYETHYLENE GLYCOL 3350, SODIUM SULFATE, SODIUM CHLORIDE, POTASSIUM CHLORIDE, SODIUM ASCORBATE, AND ASCORBIC ACID 1000 ML: KIT at 05:39

## 2022-05-25 RX ADMIN — SODIUM CHLORIDE, POTASSIUM CHLORIDE, SODIUM LACTATE AND CALCIUM CHLORIDE 9 ML/HR: 600; 310; 30; 20 INJECTION, SOLUTION INTRAVENOUS at 08:51

## 2022-05-25 RX ADMIN — ASPIRIN 81 MG: 81 TABLET, COATED ORAL at 12:25

## 2022-05-25 RX ADMIN — Medication 100 MCG: at 10:07

## 2022-05-25 RX ADMIN — BISOPROLOL FUMARATE 2.5 MG: 5 TABLET, FILM COATED ORAL at 12:25

## 2022-05-25 RX ADMIN — PROPOFOL 50 MG: 10 INJECTION, EMULSION INTRAVENOUS at 09:43

## 2022-05-25 RX ADMIN — PROPOFOL 50 MG: 10 INJECTION, EMULSION INTRAVENOUS at 09:45

## 2022-05-25 RX ADMIN — PROPOFOL 30 MG: 10 INJECTION, EMULSION INTRAVENOUS at 09:30

## 2022-05-25 RX ADMIN — PROPOFOL 50 MG: 10 INJECTION, EMULSION INTRAVENOUS at 09:52

## 2022-05-25 RX ADMIN — Medication 1 CAPSULE: at 12:25

## 2022-05-25 RX ADMIN — CLOPIDOGREL BISULFATE 75 MG: 75 TABLET ORAL at 12:26

## 2022-05-25 RX ADMIN — TIMOLOL MALEATE 1 DROP: 5 SOLUTION/ DROPS OPHTHALMIC at 12:25

## 2022-05-25 RX ADMIN — TOPIRAMATE 100 MG: 100 TABLET, FILM COATED ORAL at 20:28

## 2022-05-25 NOTE — ANESTHESIA PREPROCEDURE EVALUATION
Anesthesia Evaluation     Patient summary reviewed and Nursing notes reviewed   NPO Solid Status: > 8 hours  NPO Liquid Status: > 8 hours           Airway   Mallampati: II  TM distance: >3 FB  Neck ROM: full  No difficulty expected  Dental      Pulmonary    (+) shortness of breath,   (-) asthma, recent URI, sleep apnea, not a smoker, no home oxygen  Cardiovascular     ECG reviewed    (+) hypertension, past MI (NSTEMI )  1-7 days, CAD, cardiac stents within the past 12 months angina, CHF , hyperlipidemia,     ROS comment: ECG NSR  ST & TW abn  inferior     NSTEMI/CAD  · Status post PCI of mid LAD occlusion and severe mid RCA stenosis    Iatrogenic right hand hematoma status post surgical evacuation and washout, 5/23/2022        Ischemic cardiomyopathy  · LVEF 35% with mid anterior and apical akinesis consistent with stress-induced cardiomyopathy versus LAD infarct/ischemia   · Change metoprolol to succinate to bisoprolol 2.5 mg daily  · Continue valsartan, spironolactone, and empagliflozin       Neuro/Psych  (+) headaches, numbness,    (-) seizures, TIA, CVA  GI/Hepatic/Renal/Endo    (+)  GERD,    (-) no renal disease, diabetes, no thyroid disorder    Musculoskeletal     (+) neck pain,   Abdominal    Substance History   (+) alcohol use,   (-) drug use     OB/GYN          Other        ROS/Med Hx Other: Admitted w anemia and had hypotensive reaction to IV Iron and found to have Taki Subo CM   Cath revealed CAD x2 vessels  Stented    Needed Radial arterial repair post cath     HCT 29                 Anesthesia Plan    ASA 4     general and MAC   (PFL titrated   Aim for MAP >65   High risk recent STEMI with stents  Elevate R hand throughout)  intravenous induction     Anesthetic plan, all risks, benefits, and alternatives have been provided, discussed and informed consent has been obtained with: patient.    Plan discussed with CRNA.        CODE STATUS:    Level Of Support Discussed With: Patient  Code Status (Patient  has no pulse and is not breathing): CPR (Attempt to Resuscitate)  Medical Interventions (Patient has pulse or is breathing): Full Support

## 2022-05-25 NOTE — ANESTHESIA POSTPROCEDURE EVALUATION
Patient: Fabiola Salmeron    Procedure Summary     Date: 05/25/22 Room / Location:  [facility]    Anesthesia Start: 0918 Anesthesia Stop: 1012    Procedures:       COLONOSCOPY (N/A )      ESOPHAGOGASTRODUODENOSCOPY (N/A )      CAPSULE ENDOSCOPY ESOPHAGUS TO ILEUM DEPLOYED (N/A ) Diagnosis:       Symptomatic anemia      Dyspnea on exertion      Fecal occult blood test positive      (Symptomatic anemia [D64.9])      (Dyspnea on exertion [R06.00])      (Fecal occult blood test positive [R19.5])    Surgeons: Michele Rogers MD Provider: Carlos Tidwell MD    Anesthesia Type: general, MAC ASA Status: 4          Anesthesia Type: general, MAC    Vitals  Vitals Value Taken Time   /80 05/25/22 1025   Temp 97.6 °F (36.4 °C) 05/25/22 1012   Pulse 74 05/25/22 1029   Resp 18 05/25/22 1012   SpO2 99 % 05/25/22 1029   Vitals shown include unvalidated device data.        Post Anesthesia Care and Evaluation    Patient location during evaluation: PACU  Patient participation: complete - patient participated  Level of consciousness: awake  Pain score: 0  Pain management: adequate  Airway patency: patent  Anesthetic complications: No anesthetic complications  PONV Status: none  Cardiovascular status: hemodynamically stable and acceptable  Respiratory status: nonlabored ventilation, acceptable and nasal cannula  Hydration status: acceptable

## 2022-05-26 ENCOUNTER — READMISSION MANAGEMENT (OUTPATIENT)
Dept: CALL CENTER | Facility: HOSPITAL | Age: 72
End: 2022-05-26

## 2022-05-26 VITALS
TEMPERATURE: 98.5 F | DIASTOLIC BLOOD PRESSURE: 75 MMHG | SYSTOLIC BLOOD PRESSURE: 111 MMHG | RESPIRATION RATE: 16 BRPM | WEIGHT: 128.53 LBS | HEIGHT: 66 IN | HEART RATE: 66 BPM | OXYGEN SATURATION: 99 % | BODY MASS INDEX: 20.66 KG/M2

## 2022-05-26 LAB
ANION GAP SERPL CALCULATED.3IONS-SCNC: 15 MMOL/L (ref 5–15)
BUN SERPL-MCNC: 13 MG/DL (ref 8–23)
BUN/CREAT SERPL: 19.1 (ref 7–25)
CALCIUM SPEC-SCNC: 8.8 MG/DL (ref 8.6–10.5)
CHLORIDE SERPL-SCNC: 107 MMOL/L (ref 98–107)
CO2 SERPL-SCNC: 19 MMOL/L (ref 22–29)
CREAT SERPL-MCNC: 0.68 MG/DL (ref 0.57–1)
CYTO UR: NORMAL
DEPRECATED RDW RBC AUTO: 51.1 FL (ref 37–54)
EGFRCR SERPLBLD CKD-EPI 2021: 91.5 ML/MIN/1.73
ERYTHROCYTE [DISTWIDTH] IN BLOOD BY AUTOMATED COUNT: 18.8 % (ref 12.3–15.4)
GLUCOSE BLDC GLUCOMTR-MCNC: 103 MG/DL (ref 70–130)
GLUCOSE BLDC GLUCOMTR-MCNC: 103 MG/DL (ref 70–130)
GLUCOSE SERPL-MCNC: 91 MG/DL (ref 65–99)
HCT VFR BLD AUTO: 24 % (ref 34–46.6)
HCT VFR BLD AUTO: 24 % (ref 34–46.6)
HGB BLD-MCNC: 7.5 G/DL (ref 12–15.9)
HGB BLD-MCNC: 7.8 G/DL (ref 12–15.9)
IRON 24H UR-MRATE: 39 MCG/DL (ref 37–145)
IRON SATN MFR SERPL: 10 % (ref 20–50)
LAB AP CASE REPORT: NORMAL
LAB AP CLINICAL INFORMATION: NORMAL
MCH RBC QN AUTO: 27.9 PG (ref 26.6–33)
MCHC RBC AUTO-ENTMCNC: 31.3 G/DL (ref 31.5–35.7)
MCV RBC AUTO: 89.2 FL (ref 79–97)
PATH REPORT.FINAL DX SPEC: NORMAL
PATH REPORT.GROSS SPEC: NORMAL
PLATELET # BLD AUTO: 265 10*3/MM3 (ref 140–450)
PMV BLD AUTO: 10.3 FL (ref 6–12)
POTASSIUM SERPL-SCNC: 3.8 MMOL/L (ref 3.5–5.2)
RBC # BLD AUTO: 2.69 10*6/MM3 (ref 3.77–5.28)
SODIUM SERPL-SCNC: 141 MMOL/L (ref 136–145)
TIBC SERPL-MCNC: 392 MCG/DL (ref 298–536)
TRANSFERRIN SERPL-MCNC: 263 MG/DL (ref 200–360)
WBC NRBC COR # BLD: 6.61 10*3/MM3 (ref 3.4–10.8)

## 2022-05-26 PROCEDURE — 85018 HEMOGLOBIN: CPT | Performed by: INTERNAL MEDICINE

## 2022-05-26 PROCEDURE — 91110 GI TRC IMG INTRAL ESOPH-ILE: CPT | Performed by: INTERNAL MEDICINE

## 2022-05-26 PROCEDURE — 80048 BASIC METABOLIC PNL TOTAL CA: CPT | Performed by: INTERNAL MEDICINE

## 2022-05-26 PROCEDURE — 99239 HOSP IP/OBS DSCHRG MGMT >30: CPT | Performed by: INTERNAL MEDICINE

## 2022-05-26 PROCEDURE — 85027 COMPLETE CBC AUTOMATED: CPT | Performed by: INTERNAL MEDICINE

## 2022-05-26 PROCEDURE — 99232 SBSQ HOSP IP/OBS MODERATE 35: CPT | Performed by: NURSE PRACTITIONER

## 2022-05-26 PROCEDURE — 84466 ASSAY OF TRANSFERRIN: CPT | Performed by: INTERNAL MEDICINE

## 2022-05-26 PROCEDURE — 83540 ASSAY OF IRON: CPT | Performed by: INTERNAL MEDICINE

## 2022-05-26 PROCEDURE — 82962 GLUCOSE BLOOD TEST: CPT

## 2022-05-26 PROCEDURE — 85014 HEMATOCRIT: CPT | Performed by: INTERNAL MEDICINE

## 2022-05-26 RX ORDER — ASPIRIN 81 MG/1
81 TABLET ORAL DAILY
Qty: 90 TABLET | Refills: 1 | Status: SHIPPED | OUTPATIENT
Start: 2022-05-26 | End: 2022-06-28 | Stop reason: SDUPTHER

## 2022-05-26 RX ORDER — BISOPROLOL FUMARATE 5 MG/1
2.5 TABLET, FILM COATED ORAL
Qty: 90 TABLET | Refills: 1 | Status: SHIPPED | OUTPATIENT
Start: 2022-05-26 | End: 2022-06-28 | Stop reason: SDUPTHER

## 2022-05-26 RX ORDER — SPIRONOLACTONE 25 MG/1
25 TABLET ORAL DAILY
Qty: 90 TABLET | Refills: 1 | Status: SHIPPED | OUTPATIENT
Start: 2022-05-26 | End: 2022-06-28 | Stop reason: SDUPTHER

## 2022-05-26 RX ORDER — ROSUVASTATIN CALCIUM 5 MG/1
5 TABLET, COATED ORAL
Qty: 90 TABLET | Refills: 1 | Status: SHIPPED | OUTPATIENT
Start: 2022-05-27 | End: 2022-06-28 | Stop reason: SDUPTHER

## 2022-05-26 RX ORDER — CLOPIDOGREL BISULFATE 75 MG/1
75 TABLET ORAL DAILY
Qty: 90 TABLET | Refills: 2 | Status: SHIPPED | OUTPATIENT
Start: 2022-05-26 | End: 2022-06-28 | Stop reason: SDUPTHER

## 2022-05-26 RX ORDER — PANTOPRAZOLE SODIUM 40 MG/1
40 TABLET, DELAYED RELEASE ORAL
Status: DISCONTINUED | OUTPATIENT
Start: 2022-05-26 | End: 2022-05-26 | Stop reason: HOSPADM

## 2022-05-26 RX ADMIN — BISOPROLOL FUMARATE 2.5 MG: 5 TABLET, FILM COATED ORAL at 09:13

## 2022-05-26 RX ADMIN — CLOPIDOGREL BISULFATE 75 MG: 75 TABLET ORAL at 09:13

## 2022-05-26 RX ADMIN — PANTOPRAZOLE SODIUM 40 MG: 40 TABLET, DELAYED RELEASE ORAL at 05:27

## 2022-05-26 RX ADMIN — Medication 1 CAPSULE: at 09:13

## 2022-05-26 RX ADMIN — TOPIRAMATE 100 MG: 100 TABLET, FILM COATED ORAL at 09:59

## 2022-05-26 RX ADMIN — ASPIRIN 81 MG: 81 TABLET, COATED ORAL at 09:13

## 2022-05-26 RX ADMIN — TIMOLOL MALEATE 1 DROP: 5 SOLUTION/ DROPS OPHTHALMIC at 09:13

## 2022-05-26 NOTE — OUTREACH NOTE
Prep Survey    Flowsheet Row Responses   [facility] facility patient discharged from? [facility]   Is LACE score < 7 ? No   Emergency Room discharge w/ pulse ox? No   Eligibility [facility]   Date of Admission 05/20/22   Date of Discharge 05/26/22   Discharge Disposition Home-Health Care Svc   Discharge diagnosis CARDIAC CATHETERIZATION  Acute systolic congestive heart failure    Does the patient have one of the following disease processes/diagnoses(primary or secondary)? CHF   Does the patient have Home health ordered? No   Is there a DME ordered? No   Prep survey completed? Yes          SHANNON RAND - Registered Nurse

## 2022-05-27 ENCOUNTER — TRANSITIONAL CARE MANAGEMENT TELEPHONE ENCOUNTER (OUTPATIENT)
Dept: CALL CENTER | Facility: HOSPITAL | Age: 72
End: 2022-05-27

## 2022-05-27 LAB
BH BB BLOOD EXPIRATION DATE: NORMAL
BH BB BLOOD TYPE BARCODE: 6200
BH BB DISPENSE STATUS: NORMAL
BH BB PRODUCT CODE: NORMAL
BH BB UNIT NUMBER: NORMAL
CROSSMATCH INTERPRETATION: NORMAL
UNIT  ABO: NORMAL
UNIT  RH: NORMAL

## 2022-05-27 NOTE — OUTREACH NOTE
Call Center TCM Note    Flowsheet Row Responses   [facility] patient discharged from? [place]   Does the patient have one of the following disease processes/diagnoses(primary or secondary)? CHF   TCM attempt successful? Yes   Call start time 0949   Call end time 0957   Discharge diagnosis CARDIAC CATHETERIZATION  Acute systolic congestive heart failure    Meds reviewed with patient/caregiver? Yes   Is the patient having any side effects they believe may be caused by any medication additions or changes? No   Does the patient have all medications ordered at discharge? Yes   Is the patient taking all medications as directed (includes completed medication regime)? Yes   Does the patient have a primary care provider?  Yes   Does the patient have an appointment with their PCP within 7 days of discharge? Yes   Comments regarding PCP HOSP DC FU appt 5/31/22 @ 10:15 am with WILNER   Has the patient kept scheduled appointments due by today? N/A   Has home health visited the patient within 72 hours of discharge? N/A   Pulse Ox monitoring None   Psychosocial issues? No   Did the patient receive a copy of their discharge instructions? Yes   Nursing interventions Reviewed instructions with patient   What is the patient's perception of their health status since discharge? Improving   Nursing interventions Nurse provided patient education   Is the patient weighing daily? --  [Will start]   Does the patient have scales? Yes   Is the patient able to teach back Heart Failure diet management? Yes   Is the patient able to teach back Heart Failure Zones? Yes   Is the patient able to teach back signs and symptoms of worsening condition? (i.e. weight gain, shortness of air, etc.) Yes   If the patient is a current smoker, are they able to teach back resources for cessation? Not a smoker   Is the patient/caregiver able to teach back the hierarchy of who to call/visit for symptoms/problems? PCP, Specialist, Home health nurse, Urgent  Care, ED, 911 Yes   TCM call completed? Yes   Wrap up additional comments Educated Pt on CHF and s/s to monitor for. No needs at this time.            Maegan Kidd RN    5/27/2022, 09:57 EDT

## 2022-05-31 ENCOUNTER — OFFICE VISIT (OUTPATIENT)
Dept: FAMILY MEDICINE CLINIC | Facility: CLINIC | Age: 72
End: 2022-05-31

## 2022-05-31 VITALS
DIASTOLIC BLOOD PRESSURE: 80 MMHG | OXYGEN SATURATION: 98 % | HEART RATE: 80 BPM | TEMPERATURE: 98.6 F | HEIGHT: 66 IN | SYSTOLIC BLOOD PRESSURE: 144 MMHG | WEIGHT: 132.2 LBS | BODY MASS INDEX: 21.24 KG/M2

## 2022-05-31 DIAGNOSIS — K21.9 GASTROESOPHAGEAL REFLUX DISEASE, UNSPECIFIED WHETHER ESOPHAGITIS PRESENT: ICD-10-CM

## 2022-05-31 DIAGNOSIS — I50.21 ACUTE SYSTOLIC CONGESTIVE HEART FAILURE: ICD-10-CM

## 2022-05-31 DIAGNOSIS — M54.81 BILATERAL OCCIPITAL NEURALGIA: ICD-10-CM

## 2022-05-31 DIAGNOSIS — E78.5 HYPERLIPIDEMIA LDL GOAL <70: ICD-10-CM

## 2022-05-31 DIAGNOSIS — I21.4 NSTEMI (NON-ST ELEVATED MYOCARDIAL INFARCTION): ICD-10-CM

## 2022-05-31 DIAGNOSIS — D64.9 ANEMIA, UNSPECIFIED TYPE: ICD-10-CM

## 2022-05-31 DIAGNOSIS — Z09 HOSPITAL DISCHARGE FOLLOW-UP: Primary | ICD-10-CM

## 2022-05-31 DIAGNOSIS — S55.101S: ICD-10-CM

## 2022-05-31 DIAGNOSIS — I10 ESSENTIAL HYPERTENSION: ICD-10-CM

## 2022-05-31 PROCEDURE — 99495 TRANSJ CARE MGMT MOD F2F 14D: CPT | Performed by: PHYSICIAN ASSISTANT

## 2022-05-31 PROCEDURE — 1111F DSCHRG MED/CURRENT MED MERGE: CPT | Performed by: PHYSICIAN ASSISTANT

## 2022-05-31 RX ORDER — PANTOPRAZOLE SODIUM 20 MG/1
20 TABLET, DELAYED RELEASE ORAL
Qty: 90 TABLET | Refills: 3 | Status: SHIPPED | OUTPATIENT
Start: 2022-05-31 | End: 2022-06-28

## 2022-05-31 RX ORDER — TOPIRAMATE 100 MG/1
100 TABLET, FILM COATED ORAL 2 TIMES DAILY
Qty: 180 TABLET | Refills: 3 | Status: SHIPPED | OUTPATIENT
Start: 2022-05-31

## 2022-06-01 NOTE — PROGRESS NOTES
"[facility]  Heart [facility] Val[facility]    Chief Complaint  Congestive Heart Failure and Establish Care    Subjective    History of Present Illness {CC  Problem List  Visit  Diagnosis   Encounters  Notes  Medications  Labs  Result Review Imaging  Media :23}     Fabiola Salmeron is a 72 y.o. female with occipital neuralgia on chronic prednisone, hypertension, sciatica admitted 5/20-5/26 with worsening exertional dyspnea and found to have hemoglobin of 7.6 with positive guaiac stool.  While admitted she became tachycardic and hypotensive and was transferred to the ICU.  Echo showed EF of 36% with mid to distal and apical akinesis in a pattern consistent with Takotsubo cardiomyopathy.  She underwent left heart catheterization which showed severe two-vessel CAD of the LAD and RCA status post drug-eluting stents.  She had a iatrogenic right hand hematoma status postsurgical evacuation and washout on 5/23.  She did undergo EGD, colonoscopy and pill cam for anemia and no source of active bleeding identified.  She reports that she is doing very well.  She denies any shortness of breath, chest pain, lower extremity edema, palpitations.  She dresses her right hand daily.  She denies any paresthesias or decreased mobility.  She notes occasional dizziness and lightheadedness            Objective     Vital Signs:   Vitals:    06/02/22 1403 06/02/22 1407 06/02/22 1408   BP: 144/74 119/71 133/63   BP Location: Right arm Left arm Left arm   Patient Position: Sitting Standing Sitting   Cuff Size: Adult Adult Adult   Pulse: 74 79 73   Resp:   18   Temp: 95.6 °F (35.3 °C) 95.6 °F (35.3 °C) 95.6 °F (35.3 °C)   TempSrc: Temporal Temporal Temporal   SpO2: 99% 99% 99%   Weight:   59 kg (130 lb)   Height:   167.6 cm (66\")     Body mass index is 20.98 kg/m².  Physical Exam  Vitals reviewed.   Constitutional:       Appearance: Normal appearance.   HENT:      Head: Normocephalic.   Neck:      Vascular: No carotid bruit. "   Cardiovascular:      Rate and Rhythm: Normal rate and regular rhythm.      Pulses: Normal pulses.      Heart sounds: Normal heart sounds, S1 normal and S2 normal. No murmur heard.     Comments: Right hand wrapped  Pulmonary:      Effort: Pulmonary effort is normal. No respiratory distress.      Breath sounds: Normal breath sounds.   Chest:      Chest wall: No tenderness.   Abdominal:      General: Abdomen is flat.      Palpations: Abdomen is soft.   Musculoskeletal:      Cervical back: Neck supple.      Right lower leg: No edema.      Left lower leg: No edema.   Skin:     General: Skin is warm and dry.   Neurological:      General: No focal deficit present.      Mental Status: She is alert and oriented to person, place, and time. Mental status is at baseline.   Psychiatric:         Mood and Affect: Mood normal.         Behavior: Behavior normal.         Thought Content: Thought content normal.              Result Review  Data Reviewed:{ Labs  Result Review  Imaging  Med Tab  Media :23}   Hemoglobin & Hematocrit, Blood (05/26/2022 07:32)  Basic Metabolic Panel (05/26/2022 04:03)  CBC (No Diff) (05/26/2022 04:03)  Cardiac Catheterization/Vascular Study (05/23/2022 10:28)  Adult Transthoracic Echo Complete W/ Cont if Necessary Per Protocol (05/21/2022 12:25)    Reviewed hospital records, notes from cardiology           Assessment and Plan {CC Problem List  Visit Diagnosis  ROS  Review (Popup)  Health Maintenance  Quality  BestPractice  Medications  SmartSets  SnapShot Encounters  Media :23}   1. Acute systolic congestive heart failure (HCC)  LVEF 35% with mid anterior and apical akinesis consistent with stress-induced cardiomyopathy versus LAD infarct/ischemia  Continue bisoprolol, spironolactone, valsartan and Jardiance  Advised to monitor blood pressure at home and call if systolic blood pressures consistently on the higher end of normal so that we can increase guideline directed medical  therapy.  She already had a CBC done today, unable to add labs.  Recommend a BMP and proBNP at next lab draw  Heart failure education provided today including signs and symptoms, causes of heart failure and medications      - Basic Metabolic Panel; Future  - proBNP; Future    2. Coronary artery disease involving native coronary artery of native heart without angina pectoris  Status post PCI of mid LAD and mid RCA  Continue DAPT, statin    3. Essential hypertension  Well-controlled  Encouraged ambulatory monitoring    4. Symptomatic anemia  Has upcoming appointment with hematology    5.  Iatrogenic hematoma of right hand  Status post surgical evacuation and washout  Continue daily dressing changes  Has upcoming appointment with vascular    Keep scheduled appt with Ching SERNA    Follow Up {Instructions Charge Capture  Follow-up Communications :23}   Return if symptoms worsen or fail to improve.    Patient was given instructions and counseling regarding her condition or for health maintenance advice. Please see specific information pulled into the AVS if appropriate.  Advised to call the Heart and Valve Center with any questions, concerns, or worsening symptoms.

## 2022-06-02 ENCOUNTER — LAB (OUTPATIENT)
Dept: LAB | Facility: HOSPITAL | Age: 72
End: 2022-06-02

## 2022-06-02 ENCOUNTER — OFFICE VISIT (OUTPATIENT)
Dept: CARDIOLOGY | Facility: HOSPITAL | Age: 72
End: 2022-06-02

## 2022-06-02 VITALS
SYSTOLIC BLOOD PRESSURE: 133 MMHG | TEMPERATURE: 95.6 F | OXYGEN SATURATION: 99 % | HEART RATE: 73 BPM | WEIGHT: 130 LBS | DIASTOLIC BLOOD PRESSURE: 63 MMHG | RESPIRATION RATE: 18 BRPM | HEIGHT: 66 IN | BODY MASS INDEX: 20.89 KG/M2

## 2022-06-02 DIAGNOSIS — R23.3 SPONTANEOUS HEMATOMA OF HAND: ICD-10-CM

## 2022-06-02 DIAGNOSIS — I50.21 ACUTE SYSTOLIC CONGESTIVE HEART FAILURE: Primary | ICD-10-CM

## 2022-06-02 DIAGNOSIS — D64.9 ANEMIA, UNSPECIFIED TYPE: ICD-10-CM

## 2022-06-02 DIAGNOSIS — I10 ESSENTIAL HYPERTENSION: ICD-10-CM

## 2022-06-02 DIAGNOSIS — I25.10 CORONARY ARTERY DISEASE INVOLVING NATIVE CORONARY ARTERY OF NATIVE HEART WITHOUT ANGINA PECTORIS: ICD-10-CM

## 2022-06-02 DIAGNOSIS — D64.9 SYMPTOMATIC ANEMIA: ICD-10-CM

## 2022-06-02 LAB
BASOPHILS # BLD AUTO: 0.12 10*3/MM3 (ref 0–0.2)
BASOPHILS NFR BLD AUTO: 1.5 % (ref 0–1.5)
DEPRECATED RDW RBC AUTO: 65 FL (ref 37–54)
EOSINOPHIL # BLD AUTO: 0.19 10*3/MM3 (ref 0–0.4)
EOSINOPHIL NFR BLD AUTO: 2.4 % (ref 0.3–6.2)
ERYTHROCYTE [DISTWIDTH] IN BLOOD BY AUTOMATED COUNT: 19.5 % (ref 12.3–15.4)
HCT VFR BLD AUTO: 30.7 % (ref 34–46.6)
HGB BLD-MCNC: 9.5 G/DL (ref 12–15.9)
IMM GRANULOCYTES # BLD AUTO: 0.08 10*3/MM3 (ref 0–0.05)
IMM GRANULOCYTES NFR BLD AUTO: 1 % (ref 0–0.5)
LYMPHOCYTES # BLD AUTO: 1.39 10*3/MM3 (ref 0.7–3.1)
LYMPHOCYTES NFR BLD AUTO: 17.2 % (ref 19.6–45.3)
MCH RBC QN AUTO: 28.9 PG (ref 26.6–33)
MCHC RBC AUTO-ENTMCNC: 30.9 G/DL (ref 31.5–35.7)
MCV RBC AUTO: 93.3 FL (ref 79–97)
MONOCYTES # BLD AUTO: 0.84 10*3/MM3 (ref 0.1–0.9)
MONOCYTES NFR BLD AUTO: 10.4 % (ref 5–12)
NEUTROPHILS NFR BLD AUTO: 5.46 10*3/MM3 (ref 1.7–7)
NEUTROPHILS NFR BLD AUTO: 67.5 % (ref 42.7–76)
NRBC BLD AUTO-RTO: 0.1 /100 WBC (ref 0–0.2)
PLATELET # BLD AUTO: 387 10*3/MM3 (ref 140–450)
PMV BLD AUTO: 11 FL (ref 6–12)
QT INTERVAL: 458 MS
QTC INTERVAL: 504 MS
RBC # BLD AUTO: 3.29 10*6/MM3 (ref 3.77–5.28)
WBC NRBC COR # BLD: 8.08 10*3/MM3 (ref 3.4–10.8)

## 2022-06-02 PROCEDURE — 36415 COLL VENOUS BLD VENIPUNCTURE: CPT

## 2022-06-02 PROCEDURE — 85025 COMPLETE CBC W/AUTO DIFF WBC: CPT

## 2022-06-02 PROCEDURE — 99214 OFFICE O/P EST MOD 30 MIN: CPT | Performed by: NURSE PRACTITIONER

## 2022-06-06 ENCOUNTER — READMISSION MANAGEMENT (OUTPATIENT)
Dept: CALL CENTER | Facility: HOSPITAL | Age: 72
End: 2022-06-06

## 2022-06-06 NOTE — OUTREACH NOTE
CHF Week 2 Survey    Flowsheet Row Responses   [facility] facility patient discharged from? [place]   Does the patient have one of the following disease processes/diagnoses(primary or secondary)? CHF   Week 2 attempt successful? No   Unsuccessful attempts Attempt 1          DENNIS CASH - Registered Nurse

## 2022-06-06 NOTE — PROGRESS NOTES
Transitional Care Follow Up Visit  Subjective     Fabiola Salmeron is a 72 y.o. female who presents for a transitional care management visit.    Within 48 business hours after discharge our office contacted her via telephone to coordinate her care and needs.      I reviewed and discussed the details of that call along with the discharge summary, hospital problems, inpatient lab results, inpatient diagnostic studies, and consultation reports with Fabiola.     Current outpatient and discharge medications have been reconciled for the patient.    Date of TCM Phone Call 5/26/2022   [facility]   Date of Admission 5/20/2022   Date of Discharge 5/26/2022   Discharge Disposition [facility]     Risk for Readmission (LACE) Score: 10 (5/26/2022  6:01 AM)      Patient presents for routine hospital follow-up.  Patient was seen in office for exertional dyspnea on 5/19 with labs showing anemia with hemoglobin of 7.8.  Patient was then sent to ER where she was admitted at [facility] on 5/20 to 5/26.  She received blood transfusion, GI work-up did not reveal bleed or source of anemia.  While hospitalized she had an MI, radial artery injury and diagnosed with HF.  She was discharged on ASA, plavix, BB, ARB, statin, aldactone and oral iron.  She has upcoming appointment with heart and valve clinic, vascular surgery follow-up on 6/9 and cardiology clinic in 4 days.    Patient is present today with her .  She states she is feeling much better since being discharged.  She reports that her shortness of breath has resolved.       Duration of Hospital Stay:  5/20/22 to 5/26/22     The following portions of the patient's history were reviewed and updated as appropriate: allergies, current medications, past family history, past medical history, past social history, past surgical history and problem list.     Current outpatient and discharge medications have been reconciled for the patient.  Reviewed by: Jessica SNIDER  WILL Roach      Review of Systems   Constitutional: Positive for fatigue. Negative for chills and fever.   Respiratory: Negative for cough, shortness of breath and wheezing.    Cardiovascular: Negative for chest pain, palpitations and leg swelling.   Genitourinary: Negative for dysuria.   Skin: Positive for bruise.   Neurological: Negative for dizziness and headache.       Current Outpatient Medications on File Prior to Visit   Medication Sig Dispense Refill   • Acetaminophen (Tylenol) 325 MG capsule Take 650 mg by mouth Daily.     • aspirin 81 MG EC tablet Take 1 tablet by mouth Daily. 90 tablet 1   • bimatoprost (Lumigan) 0.01 % ophthalmic drops Lumigan 0.01 % eye drops     • bisoprolol (ZEBeta) 5 MG tablet Take 0.5 tablets by mouth Daily. 90 tablet 1   • calcium-vitamin D (Oscal 500/200 D-3) 500-200 MG-UNIT per tablet Take 2 tablets by mouth Daily. 60 tablet 11   • cholecalciferol (VITAMIN D3) 1.25 MG (84083 UT) capsule Take 1 capsule by mouth 1 (One) Time Per Week. 12 capsule 1   • clopidogrel (PLAVIX) 75 MG tablet Take 1 tablet by mouth Daily. 90 tablet 2   • Iron, Ferrous Sulfate, 325 (65 Fe) MG tablet Take 1 tablet by mouth Daily. 30 tablet 0   • rosuvastatin (CRESTOR) 5 MG tablet Take 1 tablet by mouth Every 72 (Seventy-Two) Hours. 90 tablet 1   • spironolactone (ALDACTONE) 25 MG tablet Take 1 tablet by mouth Daily. 90 tablet 1   • timolol (TIMOPTIC) 0.5 % ophthalmic solution Administer 0.5 drops to both eyes 2 (two) times a day.     • valsartan (DIOVAN) 80 MG tablet TAKE 1 TABLET EVERY DAY 90 tablet 1     No current facility-administered medications on file prior to visit.       Results for orders placed or performed during the hospital encounter of 05/20/22   COVID-19 and FLU A/B PCR - Swab, Nasopharynx    Specimen: Nasopharynx; Swab   Result Value Ref Range    COVID19 Not Detected Not Detected - Ref. Range    Influenza A PCR Not Detected Not Detected    Influenza B PCR Not Detected Not Detected    Comprehensive Metabolic Panel    Specimen: Blood   Result Value Ref Range    Glucose 130 (H) 65 - 99 mg/dL    BUN 20 8 - 23 mg/dL    Creatinine 0.93 0.57 - 1.00 mg/dL    Sodium 136 136 - 145 mmol/L    Potassium 4.4 3.5 - 5.2 mmol/L    Chloride 105 98 - 107 mmol/L    CO2 16.0 (L) 22.0 - 29.0 mmol/L    Calcium 9.5 8.6 - 10.5 mg/dL    Total Protein 7.6 6.0 - 8.5 g/dL    Albumin 4.40 3.50 - 5.20 g/dL    ALT (SGPT) 20 1 - 33 U/L    AST (SGOT) 46 (H) 1 - 32 U/L    Alkaline Phosphatase 75 39 - 117 U/L    Total Bilirubin 0.2 0.0 - 1.2 mg/dL    Globulin 3.2 gm/dL    A/G Ratio 1.4 g/dL    BUN/Creatinine Ratio 21.5 7.0 - 25.0    Anion Gap 15.0 5.0 - 15.0 mmol/L    eGFR 64.6 >60.0 mL/min/1.73   BNP    Specimen: Blood   Result Value Ref Range    proBNP 8,102.0 (H) 0.0 - 900.0 pg/mL   Troponin    Specimen: Blood   Result Value Ref Range    Troponin T 0.093 (C) 0.000 - 0.030 ng/mL   CBC Auto Differential    Specimen: Blood   Result Value Ref Range    WBC 6.10 3.40 - 10.80 10*3/mm3    RBC 2.94 (L) 3.77 - 5.28 10*6/mm3    Hemoglobin 7.6 (L) 12.0 - 15.9 g/dL    Hematocrit 26.5 (L) 34.0 - 46.6 %    MCV 90.1 79.0 - 97.0 fL    MCH 25.9 (L) 26.6 - 33.0 pg    MCHC 28.7 (L) 31.5 - 35.7 g/dL    RDW 17.0 (H) 12.3 - 15.4 %    RDW-SD 55.2 (H) 37.0 - 54.0 fl    MPV 10.0 6.0 - 12.0 fL    Platelets 436 140 - 450 10*3/mm3    Neutrophil % 74.8 42.7 - 76.0 %    Lymphocyte % 16.1 (L) 19.6 - 45.3 %    Monocyte % 8.0 5.0 - 12.0 %    Eosinophil % 0.2 (L) 0.3 - 6.2 %    Basophil % 0.2 0.0 - 1.5 %    Immature Grans % 0.7 (H) 0.0 - 0.5 %    Neutrophils, Absolute 4.57 1.70 - 7.00 10*3/mm3    Lymphocytes, Absolute 0.98 0.70 - 3.10 10*3/mm3    Monocytes, Absolute 0.49 0.10 - 0.90 10*3/mm3    Eosinophils, Absolute 0.01 0.00 - 0.40 10*3/mm3    Basophils, Absolute 0.01 0.00 - 0.20 10*3/mm3    Immature Grans, Absolute 0.04 0.00 - 0.05 10*3/mm3    nRBC 0.7 (H) 0.0 - 0.2 /100 WBC   Hemoglobin & Hematocrit, Blood    Specimen: Blood   Result Value Ref Range     Hemoglobin 9.1 (L) 12.0 - 15.9 g/dL    Hematocrit 28.9 (L) 34.0 - 46.6 %   Vitamin B12    Specimen: Blood   Result Value Ref Range    Vitamin B-12 381 211 - 946 pg/mL   CBC (No Diff)    Specimen: Blood   Result Value Ref Range    WBC 8.49 3.40 - 10.80 10*3/mm3    RBC 3.76 (L) 3.77 - 5.28 10*6/mm3    Hemoglobin 9.9 (L) 12.0 - 15.9 g/dL    Hematocrit 32.2 (L) 34.0 - 46.6 %    MCV 85.6 79.0 - 97.0 fL    MCH 26.3 (L) 26.6 - 33.0 pg    MCHC 30.7 (L) 31.5 - 35.7 g/dL    RDW 16.3 (H) 12.3 - 15.4 %    RDW-SD 50.3 37.0 - 54.0 fl    MPV 10.7 6.0 - 12.0 fL    Platelets 460 (H) 140 - 450 10*3/mm3   Comprehensive Metabolic Panel    Specimen: Blood   Result Value Ref Range    Glucose 103 (H) 65 - 99 mg/dL    BUN 20 8 - 23 mg/dL    Creatinine 0.82 0.57 - 1.00 mg/dL    Sodium 141 136 - 145 mmol/L    Potassium 4.5 3.5 - 5.2 mmol/L    Chloride 108 (H) 98 - 107 mmol/L    CO2 18.0 (L) 22.0 - 29.0 mmol/L    Calcium 9.6 8.6 - 10.5 mg/dL    Total Protein 7.1 6.0 - 8.5 g/dL    Albumin 4.60 3.50 - 5.20 g/dL    ALT (SGPT) 17 1 - 33 U/L    AST (SGOT) 26 1 - 32 U/L    Alkaline Phosphatase 74 39 - 117 U/L    Total Bilirubin 0.3 0.0 - 1.2 mg/dL    Globulin 2.5 gm/dL    A/G Ratio 1.8 g/dL    BUN/Creatinine Ratio 24.4 7.0 - 25.0    Anion Gap 15.0 5.0 - 15.0 mmol/L    eGFR 75.2 >60.0 mL/min/1.73   Comprehensive Metabolic Panel    Specimen: Blood   Result Value Ref Range    Glucose 146 (H) 65 - 99 mg/dL    BUN 19 8 - 23 mg/dL    Creatinine 0.83 0.57 - 1.00 mg/dL    Sodium 134 (L) 136 - 145 mmol/L    Potassium 4.4 3.5 - 5.2 mmol/L    Chloride 105 98 - 107 mmol/L    CO2 13.0 (L) 22.0 - 29.0 mmol/L    Calcium 9.2 8.6 - 10.5 mg/dL    Total Protein 6.9 6.0 - 8.5 g/dL    Albumin 3.80 3.50 - 5.20 g/dL    ALT (SGPT) 14 1 - 33 U/L    AST (SGOT) 28 1 - 32 U/L    Alkaline Phosphatase 64 39 - 117 U/L    Total Bilirubin 0.3 0.0 - 1.2 mg/dL    Globulin 3.1 gm/dL    A/G Ratio 1.2 g/dL    BUN/Creatinine Ratio 22.9 7.0 - 25.0    Anion Gap 16.0 (H) 5.0 - 15.0 mmol/L     eGFR 74.1 >60.0 mL/min/1.73   CBC Auto Differential    Specimen: Blood   Result Value Ref Range    WBC 8.19 3.40 - 10.80 10*3/mm3    RBC 4.38 3.77 - 5.28 10*6/mm3    Hemoglobin 11.4 (L) 12.0 - 15.9 g/dL    Hematocrit 37.0 34.0 - 46.6 %    MCV 84.5 79.0 - 97.0 fL    MCH 26.0 (L) 26.6 - 33.0 pg    MCHC 30.8 (L) 31.5 - 35.7 g/dL    RDW 16.5 (H) 12.3 - 15.4 %    RDW-SD 50.3 37.0 - 54.0 fl    MPV 9.7 6.0 - 12.0 fL    Platelets 518 (H) 140 - 450 10*3/mm3    Neutrophil % 86.3 (H) 42.7 - 76.0 %    Lymphocyte % 10.6 (L) 19.6 - 45.3 %    Monocyte % 2.3 (L) 5.0 - 12.0 %    Eosinophil % 0.0 (L) 0.3 - 6.2 %    Basophil % 0.1 0.0 - 1.5 %    Immature Grans % 0.7 (H) 0.0 - 0.5 %    Neutrophils, Absolute 7.06 (H) 1.70 - 7.00 10*3/mm3    Lymphocytes, Absolute 0.87 0.70 - 3.10 10*3/mm3    Monocytes, Absolute 0.19 0.10 - 0.90 10*3/mm3    Eosinophils, Absolute 0.00 0.00 - 0.40 10*3/mm3    Basophils, Absolute 0.01 0.00 - 0.20 10*3/mm3    Immature Grans, Absolute 0.06 (H) 0.00 - 0.05 10*3/mm3    nRBC 1.6 (H) 0.0 - 0.2 /100 WBC   Magnesium    Specimen: Blood   Result Value Ref Range    Magnesium 2.0 1.6 - 2.4 mg/dL   Phosphorus    Specimen: Blood   Result Value Ref Range    Phosphorus 3.5 2.5 - 4.5 mg/dL   Troponin    Specimen: Blood   Result Value Ref Range    Troponin T 0.025 0.000 - 0.030 ng/mL   Hemoglobin & Hematocrit, Blood    Specimen: Arm, Right; Blood   Result Value Ref Range    Hemoglobin 10.8 (L) 12.0 - 15.9 g/dL    Hematocrit 35.6 34.0 - 46.6 %   Troponin    Specimen: Blood   Result Value Ref Range    Troponin T 0.038 (C) 0.000 - 0.030 ng/mL   CK Total & CKMB    Specimen: Blood   Result Value Ref Range    CKMB 4.11 <=5.30 ng/mL    Creatine Kinase 96 20 - 180 U/L   Comprehensive Metabolic Panel    Specimen: Arm, Right; Blood   Result Value Ref Range    Glucose 106 (H) 65 - 99 mg/dL    BUN 19 8 - 23 mg/dL    Creatinine 0.84 0.57 - 1.00 mg/dL    Sodium 134 (L) 136 - 145 mmol/L    Potassium 3.9 3.5 - 5.2 mmol/L    Chloride 107  98 - 107 mmol/L    CO2 14.0 (L) 22.0 - 29.0 mmol/L    Calcium 8.4 (L) 8.6 - 10.5 mg/dL    Total Protein 6.0 6.0 - 8.5 g/dL    Albumin 3.40 (L) 3.50 - 5.20 g/dL    ALT (SGPT) 12 1 - 33 U/L    AST (SGOT) 20 1 - 32 U/L    Alkaline Phosphatase 46 39 - 117 U/L    Total Bilirubin 0.3 0.0 - 1.2 mg/dL    Globulin 2.6 gm/dL    A/G Ratio 1.3 g/dL    BUN/Creatinine Ratio 22.6 7.0 - 25.0    Anion Gap 13.0 5.0 - 15.0 mmol/L    eGFR 73.0 >60.0 mL/min/1.73   CBC Auto Differential    Specimen: Arm, Right; Blood   Result Value Ref Range    WBC 13.16 (H) 3.40 - 10.80 10*3/mm3    RBC 3.68 (L) 3.77 - 5.28 10*6/mm3    Hemoglobin 9.6 (L) 12.0 - 15.9 g/dL    Hematocrit 31.2 (L) 34.0 - 46.6 %    MCV 84.8 79.0 - 97.0 fL    MCH 26.1 (L) 26.6 - 33.0 pg    MCHC 30.8 (L) 31.5 - 35.7 g/dL    RDW 16.6 (H) 12.3 - 15.4 %    RDW-SD 50.8 37.0 - 54.0 fl    MPV 9.8 6.0 - 12.0 fL    Platelets 409 140 - 450 10*3/mm3    Neutrophil % 87.0 (H) 42.7 - 76.0 %    Lymphocyte % 8.7 (L) 19.6 - 45.3 %    Monocyte % 3.1 (L) 5.0 - 12.0 %    Eosinophil % 0.2 (L) 0.3 - 6.2 %    Basophil % 0.1 0.0 - 1.5 %    Immature Grans % 0.9 (H) 0.0 - 0.5 %    Neutrophils, Absolute 11.45 (H) 1.70 - 7.00 10*3/mm3    Lymphocytes, Absolute 1.15 0.70 - 3.10 10*3/mm3    Monocytes, Absolute 0.41 0.10 - 0.90 10*3/mm3    Eosinophils, Absolute 0.02 0.00 - 0.40 10*3/mm3    Basophils, Absolute 0.01 0.00 - 0.20 10*3/mm3    Immature Grans, Absolute 0.12 (H) 0.00 - 0.05 10*3/mm3    nRBC 0.8 (H) 0.0 - 0.2 /100 WBC   Basic Metabolic Panel    Specimen: Blood   Result Value Ref Range    Glucose 91 65 - 99 mg/dL    BUN 22 8 - 23 mg/dL    Creatinine 0.85 0.57 - 1.00 mg/dL    Sodium 142 136 - 145 mmol/L    Potassium 3.5 3.5 - 5.2 mmol/L    Chloride 112 (H) 98 - 107 mmol/L    CO2 19.0 (L) 22.0 - 29.0 mmol/L    Calcium 8.7 8.6 - 10.5 mg/dL    BUN/Creatinine Ratio 25.9 (H) 7.0 - 25.0    Anion Gap 11.0 5.0 - 15.0 mmol/L    eGFR 72.0 >60.0 mL/min/1.73   CBC (No Diff)    Specimen: Blood   Result Value Ref  Range    WBC 8.28 3.40 - 10.80 10*3/mm3    RBC 2.91 (L) 3.77 - 5.28 10*6/mm3    Hemoglobin 7.7 (L) 12.0 - 15.9 g/dL    Hematocrit 25.9 (L) 34.0 - 46.6 %    MCV 89.0 79.0 - 97.0 fL    MCH 26.5 (L) 26.6 - 33.0 pg    MCHC 29.7 (L) 31.5 - 35.7 g/dL    RDW 17.1 (H) 12.3 - 15.4 %    RDW-SD 55.1 (H) 37.0 - 54.0 fl    MPV 9.9 6.0 - 12.0 fL    Platelets 327 140 - 450 10*3/mm3   Magnesium    Specimen: Blood   Result Value Ref Range    Magnesium 2.0 1.6 - 2.4 mg/dL   Phosphorus    Specimen: Blood   Result Value Ref Range    Phosphorus 2.7 2.5 - 4.5 mg/dL   CBC (No Diff)    Specimen: Blood   Result Value Ref Range    WBC 9.17 3.40 - 10.80 10*3/mm3    RBC 3.01 (L) 3.77 - 5.28 10*6/mm3    Hemoglobin 8.0 (L) 12.0 - 15.9 g/dL    Hematocrit 26.9 (L) 34.0 - 46.6 %    MCV 89.4 79.0 - 97.0 fL    MCH 26.6 26.6 - 33.0 pg    MCHC 29.7 (L) 31.5 - 35.7 g/dL    RDW 17.2 (H) 12.3 - 15.4 %    RDW-SD 55.4 (H) 37.0 - 54.0 fl    MPV 9.8 6.0 - 12.0 fL    Platelets 356 140 - 450 10*3/mm3   CBC Auto Differential    Specimen: Blood   Result Value Ref Range    WBC 9.17 3.40 - 10.80 10*3/mm3    RBC 3.02 (L) 3.77 - 5.28 10*6/mm3    Hemoglobin 8.1 (L) 12.0 - 15.9 g/dL    Hematocrit 27.2 (L) 34.0 - 46.6 %    MCV 90.1 79.0 - 97.0 fL    MCH 26.8 26.6 - 33.0 pg    MCHC 29.8 (L) 31.5 - 35.7 g/dL    RDW 17.3 (H) 12.3 - 15.4 %    RDW-SD 55.4 (H) 37.0 - 54.0 fl    MPV 10.3 6.0 - 12.0 fL    Platelets 379 140 - 450 10*3/mm3    Neutrophil % 77.6 (H) 42.7 - 76.0 %    Lymphocyte % 14.7 (L) 19.6 - 45.3 %    Monocyte % 6.4 5.0 - 12.0 %    Eosinophil % 0.1 (L) 0.3 - 6.2 %    Basophil % 0.0 0.0 - 1.5 %    Immature Grans % 1.2 (H) 0.0 - 0.5 %    Neutrophils, Absolute 7.11 (H) 1.70 - 7.00 10*3/mm3    Lymphocytes, Absolute 1.35 0.70 - 3.10 10*3/mm3    Monocytes, Absolute 0.59 0.10 - 0.90 10*3/mm3    Eosinophils, Absolute 0.01 0.00 - 0.40 10*3/mm3    Basophils, Absolute 0.00 0.00 - 0.20 10*3/mm3    Immature Grans, Absolute 0.11 (H) 0.00 - 0.05 10*3/mm3    nRBC 0.4 (H) 0.0 -  0.2 /100 WBC   Hemoglobin & Hematocrit, Blood    Specimen: Blood   Result Value Ref Range    Hemoglobin 8.8 (L) 12.0 - 15.9 g/dL    Hematocrit 28.4 (L) 34.0 - 46.6 %   CBC (No Diff)    Specimen: Blood   Result Value Ref Range    WBC 8.78 3.40 - 10.80 10*3/mm3    RBC 2.85 (L) 3.77 - 5.28 10*6/mm3    Hemoglobin 7.9 (L) 12.0 - 15.9 g/dL    Hematocrit 25.1 (L) 34.0 - 46.6 %    MCV 88.1 79.0 - 97.0 fL    MCH 27.7 26.6 - 33.0 pg    MCHC 31.5 31.5 - 35.7 g/dL    RDW 16.4 (H) 12.3 - 15.4 %    RDW-SD 50.8 37.0 - 54.0 fl    MPV 10.0 6.0 - 12.0 fL    Platelets 285 140 - 450 10*3/mm3   Basic Metabolic Panel    Specimen: Blood   Result Value Ref Range    Glucose 82 65 - 99 mg/dL    BUN 19 8 - 23 mg/dL    Creatinine 0.79 0.57 - 1.00 mg/dL    Sodium 141 136 - 145 mmol/L    Potassium 3.1 (L) 3.5 - 5.2 mmol/L    Chloride 111 (H) 98 - 107 mmol/L    CO2 20.0 (L) 22.0 - 29.0 mmol/L    Calcium 8.5 (L) 8.6 - 10.5 mg/dL    BUN/Creatinine Ratio 24.1 7.0 - 25.0    Anion Gap 10.0 5.0 - 15.0 mmol/L    eGFR 78.6 >60.0 mL/min/1.73   POC Activated Clotting Time    Specimen: Blood   Result Value Ref Range    Activated Clotting Time  363 (H) 82 - 152 Seconds   POC Activated Clotting Time    Specimen: Blood   Result Value Ref Range    Activated Clotting Time  345 (H) 82 - 152 Seconds   CBC (No Diff)    Specimen: Blood   Result Value Ref Range    WBC 9.45 3.40 - 10.80 10*3/mm3    RBC 3.23 (L) 3.77 - 5.28 10*6/mm3    Hemoglobin 9.1 (L) 12.0 - 15.9 g/dL    Hematocrit 29.8 (L) 34.0 - 46.6 %    MCV 92.3 79.0 - 97.0 fL    MCH 28.2 26.6 - 33.0 pg    MCHC 30.5 (L) 31.5 - 35.7 g/dL    RDW 18.7 (H) 12.3 - 15.4 %    RDW-SD 54.4 (H) 37.0 - 54.0 fl    MPV 9.6 6.0 - 12.0 fL    Platelets 340 140 - 450 10*3/mm3   Basic Metabolic Panel    Specimen: Blood   Result Value Ref Range    Glucose 106 (H) 65 - 99 mg/dL    BUN 15 8 - 23 mg/dL    Creatinine 0.77 0.57 - 1.00 mg/dL    Sodium 138 136 - 145 mmol/L    Potassium 3.8 3.5 - 5.2 mmol/L    Chloride 108 (H) 98 - 107  mmol/L    CO2 18.0 (L) 22.0 - 29.0 mmol/L    Calcium 9.1 8.6 - 10.5 mg/dL    BUN/Creatinine Ratio 19.5 7.0 - 25.0    Anion Gap 12.0 5.0 - 15.0 mmol/L    eGFR 81.1 >60.0 mL/min/1.73   Magnesium    Specimen: Blood   Result Value Ref Range    Magnesium 2.1 1.6 - 2.4 mg/dL   Phosphorus    Specimen: Blood   Result Value Ref Range    Phosphorus 3.0 2.5 - 4.5 mg/dL   Potassium    Specimen: Blood   Result Value Ref Range    Potassium 4.0 3.5 - 5.2 mmol/L   CBC Auto Differential    Specimen: Blood   Result Value Ref Range    WBC 9.04 3.40 - 10.80 10*3/mm3    RBC 3.18 (L) 3.77 - 5.28 10*6/mm3    Hemoglobin 9.0 (L) 12.0 - 15.9 g/dL    Hematocrit 29.4 (L) 34.0 - 46.6 %    MCV 92.5 79.0 - 97.0 fL    MCH 28.3 26.6 - 33.0 pg    MCHC 30.6 (L) 31.5 - 35.7 g/dL    RDW 18.3 (H) 12.3 - 15.4 %    RDW-SD 54.4 (H) 37.0 - 54.0 fl    MPV 9.9 6.0 - 12.0 fL    Platelets 368 140 - 450 10*3/mm3    Neutrophil % 66.4 42.7 - 76.0 %    Lymphocyte % 20.7 19.6 - 45.3 %    Monocyte % 10.0 5.0 - 12.0 %    Eosinophil % 0.8 0.3 - 6.2 %    Basophil % 0.3 0.0 - 1.5 %    Immature Grans % 1.8 (H) 0.0 - 0.5 %    Neutrophils, Absolute 6.01 1.70 - 7.00 10*3/mm3    Lymphocytes, Absolute 1.87 0.70 - 3.10 10*3/mm3    Monocytes, Absolute 0.90 0.10 - 0.90 10*3/mm3    Eosinophils, Absolute 0.07 0.00 - 0.40 10*3/mm3    Basophils, Absolute 0.03 0.00 - 0.20 10*3/mm3    Immature Grans, Absolute 0.16 (H) 0.00 - 0.05 10*3/mm3    nRBC 0.2 0.0 - 0.2 /100 WBC   CBC (No Diff)    Specimen: Blood   Result Value Ref Range    WBC 6.61 3.40 - 10.80 10*3/mm3    RBC 2.69 (L) 3.77 - 5.28 10*6/mm3    Hemoglobin 7.5 (L) 12.0 - 15.9 g/dL    Hematocrit 24.0 (L) 34.0 - 46.6 %    MCV 89.2 79.0 - 97.0 fL    MCH 27.9 26.6 - 33.0 pg    MCHC 31.3 (L) 31.5 - 35.7 g/dL    RDW 18.8 (H) 12.3 - 15.4 %    RDW-SD 51.1 37.0 - 54.0 fl    MPV 10.3 6.0 - 12.0 fL    Platelets 265 140 - 450 10*3/mm3   Basic Metabolic Panel    Specimen: Blood   Result Value Ref Range    Glucose 91 65 - 99 mg/dL    BUN 13 8 -  23 mg/dL    Creatinine 0.68 0.57 - 1.00 mg/dL    Sodium 141 136 - 145 mmol/L    Potassium 3.8 3.5 - 5.2 mmol/L    Chloride 107 98 - 107 mmol/L    CO2 19.0 (L) 22.0 - 29.0 mmol/L    Calcium 8.8 8.6 - 10.5 mg/dL    BUN/Creatinine Ratio 19.1 7.0 - 25.0    Anion Gap 15.0 5.0 - 15.0 mmol/L    eGFR 91.5 >60.0 mL/min/1.73   Hemoglobin & Hematocrit, Blood    Specimen: Blood   Result Value Ref Range    Hemoglobin 7.8 (L) 12.0 - 15.9 g/dL    Hematocrit 24.0 (L) 34.0 - 46.6 %   Iron Profile    Specimen: Blood   Result Value Ref Range    Iron 39 37 - 145 mcg/dL    Iron Saturation 10 (L) 20 - 50 %    Transferrin 263 200 - 360 mg/dL    TIBC 392 298 - 536 mcg/dL   POC Occult Blood Stool    Specimen: Per Rectum; Stool   Result Value Ref Range    Fecal Occult Blood Positive (A) Negative    Lot Number [id number]     Expiration Date 12/24     [employer]ELOPER LOT NUMBER [id number]     DEVELOPER EXPIRATION DATE 3/25     Positive Control Positive Positive    Negative Control Negative Negative   POC Glucose Once    Specimen: Blood   Result Value Ref Range    Glucose 134 (H) 70 - 130 mg/dL   POC Glucose Once    Specimen: Blood   Result Value Ref Range    Glucose 126 70 - 130 mg/dL   POC Glucose Once    Specimen: Blood   Result Value Ref Range    Glucose 126 70 - 130 mg/dL   POC Glucose Once    Specimen: Blood   Result Value Ref Range    Glucose 101 70 - 130 mg/dL   POC Glucose Once    Specimen: Blood   Result Value Ref Range    Glucose 109 70 - 130 mg/dL   POC Glucose Once    Specimen: Blood   Result Value Ref Range    Glucose 119 70 - 130 mg/dL   POC Glucose Once    Specimen: Blood   Result Value Ref Range    Glucose 133 (H) 70 - 130 mg/dL   POC Glucose Once    Specimen: Blood   Result Value Ref Range    Glucose 104 70 - 130 mg/dL   POC Glucose Once    Specimen: Blood   Result Value Ref Range    Glucose 102 70 - 130 mg/dL   POC Glucose Once    Specimen: Blood   Result Value Ref Range    Glucose 117 70 - 130 mg/dL   POC Glucose Once    Specimen: Blood    Result Value Ref Range    Glucose 156 (H) 70 - 130 mg/dL   POC Glucose Once    Specimen: Blood   Result Value Ref Range    Glucose 94 70 - 130 mg/dL   POC Glucose Once    Specimen: Blood   Result Value Ref Range    Glucose 110 70 - 130 mg/dL   POC Glucose Once    Specimen: Blood   Result Value Ref Range    Glucose 84 70 - 130 mg/dL   POC Glucose Once    Specimen: Blood   Result Value Ref Range    Glucose 122 70 - 130 mg/dL   POC Glucose Once    Specimen: Blood   Result Value Ref Range    Glucose 97 70 - 130 mg/dL   POC Glucose Once    Specimen: Blood   Result Value Ref Range    Glucose 92 70 - 130 mg/dL   POC Glucose Once    Specimen: Blood   Result Value Ref Range    Glucose 134 (H) 70 - 130 mg/dL   POC Glucose Once    Specimen: Blood   Result Value Ref Range    Glucose 156 (H) 70 - 130 mg/dL   POC Glucose Once    Specimen: Blood   Result Value Ref Range    Glucose 103 70 - 130 mg/dL   POC Glucose Once    Specimen: Blood   Result Value Ref Range    Glucose 103 70 - 130 mg/dL   ECG 12 Lead   Result Value Ref Range    QT Interval 374 ms    QTC Interval 450 ms   ECG 12 Lead   Result Value Ref Range    QT Interval 494 ms    QTC Interval 551 ms   ECG 12 Lead   Result Value Ref Range    QT Interval 458 ms    QTC Interval 504 ms   Adult Transthoracic Echo Complete W/ Cont if Necessary Per Protocol   Result Value Ref Range    Target HR (85%) 124 bpm    Max. Pred. HR (100%) 146 bpm    RV S' 15.9 cm/sec    RV Base 3.2 cm    RV Length 4.8 cm    RV Mid 2.6 cm    Avg E/e' ratio 12.02     Ao root diam 3.2 cm    Ao pk isauro 109.7 cm/sec    Ao V2 VTI 23.0 cm    OMAR(I,D) 2.18 cm2    EDV(cubed) 55.8 ml    EDV(MOD-sp2) 81.9 ml    EDV(MOD-sp4) 102.0 ml    EF(MOD-bp) 36.0 %    EF(MOD-sp2) 28.2 %    EF(MOD-sp4) 39.7 %    ESV(cubed) 16.7 ml    ESV(MOD-sp2) 58.8 ml    ESV(MOD-sp4) 61.5 ml    IVS/LVPW 1.02 cm    Lat Peak E' Isauro 6.5 cm/sec    LV mass(C)d 133.6 grams    LV V1 max PG 2.46 mmHg    LV V1 mean PG 1.22 mmHg    LV V1 max 78.3  cm/sec    LVPWd 1.07 cm    Med Peak E' Isauro 4.4 cm/sec    MV dec slope 421.0 cm/sec2    MV dec time 0.13 msec    MV P1/2t 43.2 msec    PA acc time 0.14 sec    PA pr(Accel) 15.4 mmHg    PA V2 max 76.0 cm/sec    PI end-d isauro 104.5 cm/sec    RAP systole 3 mmHg    RVSP(TR) 33 mmHg    SV(LVOT) 50.2 ml    SV(MOD-sp2) 23.1 ml    SV(MOD-sp4) 40.5 ml    TR max PG 30 mmHg    Ao max PG 4.8 mmHg    Ao mean PG 2.6 mmHg    FS 33.1 %    IVSd 1.10 cm    LA dimension (2D)  2.6 cm    LV V1 VTI 15.7 cm    LVIDd 3.8 cm    LVIDs 2.6 cm    LVOT area 3.2 cm2    LVOT diam 2.02 cm    MV E/A 0.60     MVA(P1/2t) 5.1 cm2    MV A max isauro 108.3 cm/sec    MV E max isauro 65.5 cm/sec    TR max isauro 251.9 cm/sec    TAPSE (>1.6) 2.18 cm   Type & Screen    Specimen: Blood   Result Value Ref Range    ABO Type A     RH type Positive     Antibody Screen Negative     T&S Expiration Date 5/23/2022 11:59:59 PM    ABO RH Specimen Verification    Specimen: Blood   Result Value Ref Range    ABO Type A     RH type Positive    Prepare RBC, 1 Units   Result Value Ref Range    Product Code Q6793C00     Unit Number [id number]     UNIT  ABO A     UNIT  RH POS     Crossmatch Interpretation Compatible     Dispense Status PT     Blood Expiration Date 202205302359     Blood Type Barcode 6200    Prepare RBC, 2 Units   Result Value Ref Range    Product Code B3119K64     Unit Number [id number]     UNIT  ABO A     UNIT  RH POS     Crossmatch Interpretation Compatible     Dispense Status RE     Blood Expiration Date 202206082359     Blood Type Barcode 6200     Product Code I1863R83     Unit Number [id number]     UNIT  ABO A     UNIT  RH POS     Crossmatch Interpretation Compatible     Dispense Status RE     Blood Expiration Date 202206082359     Blood Type Barcode 6200    Type & Screen    Specimen: Blood   Result Value Ref Range    ABO Type A     RH type Positive     Antibody Screen Negative     T&S Expiration Date 5/26/2022 11:59:59 PM    Prepare RBC, 2 Units    Result Value Ref Range    Product Code Y0479H89     Unit Number [id number]     UNIT  ABO A     UNIT  RH POS     Crossmatch Interpretation Compatible     Dispense Status RE     Blood Expiration Date 202206152359     Blood Type Barcode 6200     Product Code K2520S57     Unit Number [id number]     UNIT  ABO A     UNIT  RH POS     Crossmatch Interpretation Compatible     Dispense Status PT     Blood Expiration Date 202206152359     Blood Type Barcode 6200    Prepare RBC, 1 Units   Result Value Ref Range    Product Code A5020Q06     Unit Number [id number]     UNIT  ABO A     UNIT  RH POS     Crossmatch Interpretation Compatible     Dispense Status RE     Blood Expiration Date 202206082359     Blood Type Barcode 6200    Tissue Pathology Exam    Specimen: A: Gastric, Antrum; Tissue    B: Small Intestine; Tissue    C: Gastric, Fundus; Tissue   Result Value Ref Range    Case Report       Surgical Pathology Report                         Case: [id number]                                  Authorizing Provider:  Michele Rogers MD        Collected:           05/25/2022 09:59 AM          Ordering Location:     [facility]   Received:            05/25/2022 01:08 PM                                 [facility]                                                                  Pathologist:           Payam Carpenter MD                                                             Specimens:   1) - Gastric, Antrum                                                                                2) - Small Intestine, SMALL BOWEL BX                                                                3) - Gastric, Fundus                                                                       Clinical Information       Symptomatic anemia  Dyspnea on exertion  Fecal occult blood test positive      Final Diagnosis       1. STOMACH, ANTRUM, BIOPSY:  Gastric antral type mucosa with reactive (chemical) gastropathy  Negative for  "intestinal metaplasia or dysplasia  No Helicobacter pylori-like organisms seen  2.   SMALL INTESTINE, BIOPSY:  Duodenal type mucosa with no significant histopathologic abnormalities  3.   STOMACH, FUNDUS, BIOPSY:  Gastric fundic type mucosa with mild chronic inactive gastritis  Negative for intestinal metaplasia or dysplasia  No Helicobacter pylori like organisms seen        Gross Description       1. Gastric, Antrum.  Received in formalin labeled gastric antrum is a 1 x 0.3 x 0.2 cm aggregate of multiple tan tissue fragments, submitted entirely in block 1A.      2. Small Intestine.  Received in formalin labeled small bowel biopsy is a 1.5 x 0.3 x 0.2 cm aggregate of multiple tan tissue fragments, submitted entirely in block 2A.      3. Gastric, Fundus.  Received in formalin labeled gastric fundus is a 0.8 x 0.2 x 0.1 cm aggregate of 2 tan tissue fragments, submitted entirely in block 3A.  LDP          Microscopic Description       The slides are reviewed and demonstrate histopathologic features supporting the above rendered diagnosis.       Green Top (Gel)   Result Value Ref Range    Extra Tube Hold for add-ons.    Lavender Top   Result Value Ref Range    Extra Tube hold for add-on    Gold Top - SST   Result Value Ref Range    Extra Tube Hold for add-ons.    Gray Top   Result Value Ref Range    Extra Tube Hold for add-ons.    Light Blue Top   Result Value Ref Range    Extra Tube Hold for add-ons.        Visit Vitals  /80 (BP Location: Left arm, Patient Position: Sitting, Cuff Size: Adult)   Pulse 80   Temp 98.6 °F (37 °C)   Ht 167.6 cm (65.98\")   Wt 60 kg (132 lb 3.2 oz)   SpO2 98%   BMI 21.35 kg/m²     Body mass index is 21.35 kg/m².    Objective   Physical Exam  Vitals reviewed.   Constitutional:       General: She is not in acute distress.     Appearance: Normal appearance. She is well-developed and normal weight. She is not ill-appearing or diaphoretic.   HENT:      Head: Normocephalic and atraumatic. "   Eyes:      Extraocular Movements: Extraocular movements intact.      Conjunctiva/sclera: Conjunctivae normal.   Cardiovascular:      Rate and Rhythm: Normal rate and regular rhythm.      Heart sounds: Normal heart sounds.   Pulmonary:      Effort: Pulmonary effort is normal.      Breath sounds: Normal breath sounds.   Musculoskeletal:         General: Normal range of motion.      Cervical back: Normal range of motion.      Right lower leg: No edema.      Left lower leg: No edema.   Skin:     General: Skin is warm.      Findings: No erythema or rash.   Neurological:      General: No focal deficit present.      Mental Status: She is alert.   Psychiatric:         Attention and Perception: Attention and perception normal. She is attentive.         Mood and Affect: Mood and affect normal.         Speech: Speech normal.         Behavior: Behavior normal. Behavior is cooperative.         Thought Content: Thought content normal.         Cognition and Memory: Cognition and memory normal.         Judgment: Judgment normal.         Assessment & Plan   Diagnoses and all orders for this visit:    1. Hospital discharge follow-up (Primary)    2. Anemia, unspecified type  -     CBC w AUTO Differential; Future  -     Ambulatory Referral to Hematology  Full GI work-up was unremarkable for source of anemia.  No longer symptomatic today.  Continue on iron supplement.  Referral to hematology for further evaluation and recommendations  Repeat CBC in 1 week.    3. NSTEMI (non-ST elevated myocardial infarction) (HCC)  Heart cath while hospitalized.  New medications started and patient is compliant on them.  Did not start Jardiance, it is not affordable.  Keep upcoming appointment with cardiology.    4. Injury of right radial artery, sequela  Has follow-up with vascular surgery in a few weeks.    5. Acute systolic congestive heart failure (HCC)  On aldactone, BB, ARB.    6. Essential hypertension  Borderline today.  Compliant on  medication.  Encouraged to monitor at home.  Has upcoming follow-up with cardiology.    7. Hyperlipidemia LDL goal <70  On Atorvastatin.    8. Gastroesophageal reflux disease, unspecified whether esophagitis present  -     pantoprazole (Protonix) 20 MG EC tablet; Take 1 tablet by mouth Every Morning Before Breakfast.  Dispense: 90 tablet; Refill: 3    9. Bilateral occipital neuralgia  -     topiramate (TOPAMAX) 100 MG tablet; Take 1 tablet by mouth 2 (Two) Times a Day.  Dispense: 180 tablet; Refill: 3  Takes prednisone 5 mg daily and this works well.

## 2022-06-09 ENCOUNTER — READMISSION MANAGEMENT (OUTPATIENT)
Dept: CALL CENTER | Facility: HOSPITAL | Age: 72
End: 2022-06-09

## 2022-06-09 NOTE — OUTREACH NOTE
CHF Week 2 Survey    Flowsheet Row Responses   [facility] facility patient discharged from? [facility]   Does the patient have one of the following disease processes/diagnoses(primary or secondary)? CHF   Week 2 attempt successful? No   Unsuccessful attempts Attempt 2          RACHEL ARTHUR - Registered Nurse

## 2022-06-14 ENCOUNTER — CONSULT (OUTPATIENT)
Dept: ONCOLOGY | Facility: CLINIC | Age: 72
End: 2022-06-14

## 2022-06-14 ENCOUNTER — LAB (OUTPATIENT)
Dept: LAB | Facility: HOSPITAL | Age: 72
End: 2022-06-14

## 2022-06-14 VITALS
DIASTOLIC BLOOD PRESSURE: 74 MMHG | RESPIRATION RATE: 18 BRPM | BODY MASS INDEX: 21.33 KG/M2 | TEMPERATURE: 97.4 F | WEIGHT: 128 LBS | OXYGEN SATURATION: 99 % | HEIGHT: 65 IN | SYSTOLIC BLOOD PRESSURE: 143 MMHG | HEART RATE: 82 BPM

## 2022-06-14 DIAGNOSIS — D50.8 OTHER IRON DEFICIENCY ANEMIA: Primary | ICD-10-CM

## 2022-06-14 DIAGNOSIS — I25.10 CORONARY ARTERY DISEASE INVOLVING NATIVE CORONARY ARTERY OF NATIVE HEART WITHOUT ANGINA PECTORIS: ICD-10-CM

## 2022-06-14 DIAGNOSIS — Z79.02 ANTIPLATELET OR ANTITHROMBOTIC LONG-TERM USE: ICD-10-CM

## 2022-06-14 DIAGNOSIS — D50.8 OTHER IRON DEFICIENCY ANEMIA: ICD-10-CM

## 2022-06-14 DIAGNOSIS — T50.905D ADVERSE REACTION TO DRUG, SUBSEQUENT ENCOUNTER: ICD-10-CM

## 2022-06-14 LAB
ALBUMIN SERPL-MCNC: 4.4 G/DL (ref 3.5–5.2)
ALBUMIN/GLOB SERPL: 1.5 G/DL
ALP SERPL-CCNC: 106 U/L (ref 39–117)
ALT SERPL W P-5'-P-CCNC: 17 U/L (ref 1–33)
ANION GAP SERPL CALCULATED.3IONS-SCNC: 12 MMOL/L (ref 5–15)
ANISOCYTOSIS BLD QL: NORMAL
AST SERPL-CCNC: 24 U/L (ref 1–32)
BASOPHILS # BLD AUTO: 0.06 10*3/MM3 (ref 0–0.2)
BASOPHILS NFR BLD AUTO: 1 % (ref 0–1.5)
BILIRUB SERPL-MCNC: 0.2 MG/DL (ref 0–1.2)
BUN SERPL-MCNC: 27 MG/DL (ref 8–23)
BUN/CREAT SERPL: 25.5 (ref 7–25)
CALCIUM SPEC-SCNC: 9.8 MG/DL (ref 8.6–10.5)
CHLORIDE SERPL-SCNC: 110 MMOL/L (ref 98–107)
CO2 SERPL-SCNC: 19 MMOL/L (ref 22–29)
CREAT SERPL-MCNC: 1.06 MG/DL (ref 0.57–1)
DACRYOCYTES BLD QL SMEAR: NORMAL
DAT POLY-SP REAG RBC QL: NEGATIVE
DEPRECATED RDW RBC AUTO: 83.4 FL (ref 37–54)
EGFRCR SERPLBLD CKD-EPI 2021: 55.2 ML/MIN/1.73
EOSINOPHIL # BLD AUTO: 0.14 10*3/MM3 (ref 0–0.4)
EOSINOPHIL NFR BLD AUTO: 2.4 % (ref 0.3–6.2)
ERYTHROCYTE [DISTWIDTH] IN BLOOD BY AUTOMATED COUNT: 22.5 % (ref 12.3–15.4)
FERRITIN SERPL-MCNC: 168.2 NG/ML (ref 13–150)
GLOBULIN UR ELPH-MCNC: 2.9 GM/DL
GLUCOSE SERPL-MCNC: 96 MG/DL (ref 65–99)
HCT VFR BLD AUTO: 35 % (ref 34–46.6)
HGB BLD-MCNC: 10.7 G/DL (ref 12–15.9)
IMM GRANULOCYTES # BLD AUTO: 0.02 10*3/MM3 (ref 0–0.05)
IMM GRANULOCYTES NFR BLD AUTO: 0.3 % (ref 0–0.5)
LDH SERPL-CCNC: 263 U/L (ref 135–214)
LYMPHOCYTES # BLD AUTO: 0.93 10*3/MM3 (ref 0.7–3.1)
LYMPHOCYTES NFR BLD AUTO: 16.1 % (ref 19.6–45.3)
MCH RBC QN AUTO: 31.3 PG (ref 26.6–33)
MCHC RBC AUTO-ENTMCNC: 30.6 G/DL (ref 31.5–35.7)
MCV RBC AUTO: 102.3 FL (ref 79–97)
MONOCYTES # BLD AUTO: 0.4 10*3/MM3 (ref 0.1–0.9)
MONOCYTES NFR BLD AUTO: 6.9 % (ref 5–12)
NEUTROPHILS NFR BLD AUTO: 4.21 10*3/MM3 (ref 1.7–7)
NEUTROPHILS NFR BLD AUTO: 73.3 % (ref 42.7–76)
NRBC BLD AUTO-RTO: 0 /100 WBC (ref 0–0.2)
PLAT MORPH BLD: NORMAL
PLATELET # BLD AUTO: 335 10*3/MM3 (ref 140–450)
PMV BLD AUTO: 10 FL (ref 6–12)
POTASSIUM SERPL-SCNC: 5.1 MMOL/L (ref 3.5–5.2)
PROT SERPL-MCNC: 7.3 G/DL (ref 6–8.5)
RBC # BLD AUTO: 3.42 10*6/MM3 (ref 3.77–5.28)
RETICS # AUTO: 0.06 10*6/MM3 (ref 0.02–0.13)
RETICS/RBC NFR AUTO: 1.77 % (ref 0.7–1.9)
SODIUM SERPL-SCNC: 141 MMOL/L (ref 136–145)
WBC MORPH BLD: NORMAL
WBC NRBC COR # BLD: 5.76 10*3/MM3 (ref 3.4–10.8)

## 2022-06-14 PROCEDURE — 85045 AUTOMATED RETICULOCYTE COUNT: CPT

## 2022-06-14 PROCEDURE — 99205 OFFICE O/P NEW HI 60 MIN: CPT | Performed by: INTERNAL MEDICINE

## 2022-06-14 PROCEDURE — 82728 ASSAY OF FERRITIN: CPT

## 2022-06-14 PROCEDURE — 82746 ASSAY OF FOLIC ACID SERUM: CPT

## 2022-06-14 PROCEDURE — 85007 BL SMEAR W/DIFF WBC COUNT: CPT

## 2022-06-14 PROCEDURE — 80053 COMPREHEN METABOLIC PANEL: CPT

## 2022-06-14 PROCEDURE — 85060 BLOOD SMEAR INTERPRETATION: CPT

## 2022-06-14 PROCEDURE — 86880 COOMBS TEST DIRECT: CPT

## 2022-06-14 PROCEDURE — 83010 ASSAY OF HAPTOGLOBIN QUANT: CPT

## 2022-06-14 PROCEDURE — 85025 COMPLETE CBC W/AUTO DIFF WBC: CPT

## 2022-06-14 PROCEDURE — 36415 COLL VENOUS BLD VENIPUNCTURE: CPT

## 2022-06-14 PROCEDURE — 83615 LACTATE (LD) (LDH) ENZYME: CPT

## 2022-06-14 PROCEDURE — 83070 ASSAY OF HEMOSIDERIN QUAL: CPT

## 2022-06-14 RX ORDER — FERROUS SULFATE 325(65) MG
325 TABLET ORAL 2 TIMES DAILY
Qty: 180 TABLET | Refills: 0 | Status: SHIPPED | OUTPATIENT
Start: 2022-06-14 | End: 2022-06-28 | Stop reason: SDUPTHER

## 2022-06-14 NOTE — PROGRESS NOTES
New Patient Office Visit      Date: 2022     Patient Name: Fabiola Salmeron  MRN: [id number]  : 1950  Referring Physician: Dr. Jessica Roach    Chief Complaint: Iron deficiency anemia      History of Present Illness: Fabiola Salmeron is a pleasant 72 y.o. female who presents today for evaluation of iron deficiency anemia. The patient is accompanied by her supportive  who contributes to the history of her care.     Her most recent prior CBC is from 2021 and was normal.    She then presented with progressive dyspnea on exertion.  She initially attributed her symptoms to acid reflux as she was also having accompanying belching, however her symptoms did not resolve with a PPI trial which had previously helped.  She was seen by her primary care physician and had a CBC performed on 2022.  This showed a hemoglobin of 7.6 with a slightly elevated RDW at 16 and a normal white blood cell count and platelet count.  She was referred to the emergency department and was admitted to the hospital through 2022.  There she had additional blood work including an iron battery which was notable for an iron saturation of 5% and an elevated TIBC consistent with iron deficiency anemia.  She was started on intravenous iron and received a unit of PRBCs.  She was also started on steroids for an acute sciatica flare.  She received her first dose of Ferrlecit on  without incident.  However following her second infusion on  she developed flushing, became diaphoretic, tachycardic, and hypotensive with brief episode of syncope and RRT was called.  Her  who accompanies her reports that she received chest compressions.  Per nursing records, she received 1L NS bolus with transfer to ICU for further management.     Underwent egd and c-scope 22 which revealed no source of bleeding and video endoscopy capsule was performed which was negative    She was found to have an elevated troponin and a  low ejection fraction at 36% within the first 24 hours of admission, with pattern concerning for Takotsubo cardiomyopathy.  She underwent LHC on 5/23/22 which revealed stenosis to her LAD and RCA and underwent drug-eluting stents x2. Post-procedure, patient was found to have hemorrhage from her right radial artery and underwent radial artery repair and hematoma evacuation by Dr. Benavides of vascular surgery.     She was started on oral iron once daily which she is tolerating well with no GI side effects.  Her PPI was stopped by cardiology pharmacist.  She reports her dyspnea on exertion is markedly improved since hospital discharge.  She has no recurrence of belching and no chest pain.  She had mild bleeding from the sutures on her right arm after bumping them in our office visit, but this stopped with direct pressure and she is otherwise not having any bleeding symptoms and continues aspirin and Plavix.  She denies any preceding history of hematuria, hematochezia, or melena.  No epistaxis or gum bleeding.  No vaginal bleeding.       Subjective      Review of Systems: Review of Systems   I have reviewed the review of systems completed by the patient. This is negative for clinically significant symptoms except as noted below. This document has been scanned into the patient's chart.    Past Medical History:   Past Medical History:   Diagnosis Date   • Candidiasis of vulva and vagina    • CHF (congestive heart failure) (HCC)    • Coronary artery disease    • Hypertension        Past Surgical History:   Past Surgical History:   Procedure Laterality Date   • ARTERIOVENOUS FISTULA/SHUNT SURGERY Right 5/23/2022    Procedure: RADIAL ARTERY REPAIR WITH HEMATOMA EVACUATION AND WASHOUT RIGHT;  Surgeon: Jaron Benavides MD;  Location: [facility];  Service: Vascular;  Laterality: Right;   • CAPSULE ENDOSCOPY N/A 5/25/2022    Procedure: CAPSULE ENDOSCOPY ESOPHAGUS TO ILEUM DEPLOYED;  Surgeon: Michele Rogers MD;   Location:  [facility];  Service: Gastroenterology;  Laterality: N/A;   • CARDIAC CATHETERIZATION N/A 5/23/2022    Procedure: LEFT HEART CATH;  Surgeon: Manjit Wolfe IV, MD;  Location:  [facility];  Service: Cardiovascular;  Laterality: N/A;   • CARDIAC CATHETERIZATION N/A 5/23/2022    Procedure: Optical Coherent Tomography;  Surgeon: Manjit Wolfe IV, MD;  Location:  [facility];  Service: Cardiovascular;  Laterality: N/A;   • CARDIAC CATHETERIZATION N/A 5/23/2022    Procedure: Stent MARIA ELENA coronary;  Surgeon: Manjit Wolfe IV, MD;  Location:  [facility];  Service: Cardiovascular;  Laterality: N/A;   • COLONOSCOPY N/A 5/25/2022    Procedure: COLONOSCOPY;  Surgeon: Michele Rogers MD;  Location:  [facility];  Service: Gastroenterology;  Laterality: N/A;   • CRANIAL NEUROSTIMULATOR INSERTION/REPLACEMENT Right    • ENDOSCOPY N/A 5/25/2022    Procedure: ESOPHAGOGASTRODUODENOSCOPY;  Surgeon: Michele Rogers MD;  Location:  [facility];  Service: Gastroenterology;  Laterality: N/A;   • EYE SURGERY      shunt placement by ophthalmology       Family History:   Family History   Problem Relation Age of Onset   • Heart failure Mother    • Lung cancer Father         smoker   • Heart disease Sister    • Breast cancer Sister    • Aplastic anemia Brother    • No Known Problems Maternal Grandmother    • No Known Problems Maternal Grandfather    • No Known Problems Paternal Grandmother    • No Known Problems Paternal Grandfather    • Atrial fibrillation Son    • Ovarian cancer Neg Hx    • Colon cancer Neg Hx        Social History:   Social History     Socioeconomic History   • Marital status:    Tobacco Use   • Smoking status: Never Smoker   • Smokeless tobacco: Never Used   Vaping Use   • Vaping Use: Never used   Substance and Sexual Activity   • Alcohol use: Yes     Alcohol/week: 5.0 standard drinks     Types: 5 Standard  drinks or equivalent per week   • Drug use: No   • Sexual activity: Yes     Birth control/protection: None       Medications:     Current Outpatient Medications:   •  Acetaminophen (Tylenol) 325 MG capsule, Take 650 mg by mouth Daily., Disp: , Rfl:   •  aspirin 81 MG EC tablet, Take 1 tablet by mouth Daily., Disp: 90 tablet, Rfl: 1  •  bimatoprost (Lumigan) 0.01 % ophthalmic drops, Lumigan 0.01 % eye drops, Disp: , Rfl:   •  bisoprolol (ZEBeta) 5 MG tablet, Take 0.5 tablets by mouth Daily., Disp: 90 tablet, Rfl: 1  •  calcium-vitamin D (Oscal 500/200 D-3) 500-200 MG-UNIT per tablet, Take 2 tablets by mouth Daily., Disp: 60 tablet, Rfl: 11  •  cholecalciferol (VITAMIN D3) 1.25 MG (93435 UT) capsule, Take 1 capsule by mouth 1 (One) Time Per Week., Disp: 12 capsule, Rfl: 1  •  clopidogrel (PLAVIX) 75 MG tablet, Take 1 tablet by mouth Daily., Disp: 90 tablet, Rfl: 2  •  Iron, Ferrous Sulfate, 325 (65 Fe) MG tablet, Take 1 tablet by mouth Daily., Disp: 30 tablet, Rfl: 0  •  pantoprazole (Protonix) 20 MG EC tablet, Take 1 tablet by mouth Every Morning Before Breakfast., Disp: 90 tablet, Rfl: 3  •  rosuvastatin (CRESTOR) 5 MG tablet, Take 1 tablet by mouth Every 72 (Seventy-Two) Hours., Disp: 90 tablet, Rfl: 1  •  spironolactone (ALDACTONE) 25 MG tablet, Take 1 tablet by mouth Daily., Disp: 90 tablet, Rfl: 1  •  timolol (TIMOPTIC) 0.5 % ophthalmic solution, Administer 0.5 drops to both eyes 2 (two) times a day., Disp: , Rfl:   •  topiramate (TOPAMAX) 100 MG tablet, Take 1 tablet by mouth 2 (Two) Times a Day., Disp: 180 tablet, Rfl: 3  •  valsartan (DIOVAN) 80 MG tablet, TAKE 1 TABLET EVERY DAY, Disp: 90 tablet, Rfl: 1    Allergies:   Allergies   Allergen Reactions   • Codeine Other (See Comments)   • Metoprolol Other (See Comments)     Fatigue     • Statins Myalgia       Objective     Vital Signs:   Vitals:    06/14/22 1501   BP: 143/74  Comment: LUE   Pulse: 82   Resp: 18   Temp: 97.4 °F (36.3 °C)   TempSrc: Temporal  "  SpO2: 99%  Comment: RA   Weight: 58.1 kg (128 lb)   Height: 163.8 cm (64.5\")   PainSc: 0-No pain    Body mass index is 21.63 kg/m².   Pain Score    06/14/22 1501   PainSc: 0-No pain       Physical Exam:  General: No acute distress. Well appearing   HEENT: Normocephalic, atraumatic. Sclera anicteric. Masked.  Neck: supple, no adenopathy.   Cardiovascular: regular rate and rhythm,. No murmurs.   Respiratory: Normal rate. Clear to auscultation bilaterally  Abdomen: Soft, nontender, non distended with normoactive bowel sounds  Lymph: no cervical, supraclavicular or axillary adenopathy  Neuro: Alert and oriented x 3. No focal deficits.   Ext: Symmetric, no swelling.   Psych: Euthymic      Laboratory/Imaging Reviewed:   Lab on 06/02/2022   Component Date Value Ref Range Status   • WBC 06/02/2022 8.08  3.40 - 10.80 10*3/mm3 Final   • RBC 06/02/2022 3.29 (A) 3.77 - 5.28 10*6/mm3 Final   • Hemoglobin 06/02/2022 9.5 (A) 12.0 - 15.9 g/dL Final   • Hematocrit 06/02/2022 30.7 (A) 34.0 - 46.6 % Final   • MCV 06/02/2022 93.3  79.0 - 97.0 fL Final   • MCH 06/02/2022 28.9  26.6 - 33.0 pg Final   • MCHC 06/02/2022 30.9 (A) 31.5 - 35.7 g/dL Final   • RDW 06/02/2022 19.5 (A) 12.3 - 15.4 % Final   • RDW-SD 06/02/2022 65.0 (A) 37.0 - 54.0 fl Final   • MPV 06/02/2022 11.0  6.0 - 12.0 fL Final   • Platelets 06/02/2022 387  140 - 450 10*3/mm3 Final   • Neutrophil % 06/02/2022 67.5  42.7 - 76.0 % Final   • Lymphocyte % 06/02/2022 17.2 (A) 19.6 - 45.3 % Final   • Monocyte % 06/02/2022 10.4  5.0 - 12.0 % Final   • Eosinophil % 06/02/2022 2.4  0.3 - 6.2 % Final   • Basophil % 06/02/2022 1.5  0.0 - 1.5 % Final   • Immature Grans % 06/02/2022 1.0 (A) 0.0 - 0.5 % Final   • Neutrophils, Absolute 06/02/2022 5.46  1.70 - 7.00 10*3/mm3 Final   • Lymphocytes, Absolute 06/02/2022 1.39  0.70 - 3.10 10*3/mm3 Final   • Monocytes, Absolute 06/02/2022 0.84  0.10 - 0.90 10*3/mm3 Final   • Eosinophils, Absolute 06/02/2022 0.19  0.00 - 0.40 10*3/mm3 Final   • " Basophils, Absolute 06/02/2022 0.12  0.00 - 0.20 10*3/mm3 Final   • Immature Grans, Absolute 06/02/2022 0.08 (A) 0.00 - 0.05 10*3/mm3 Final   • nRBC 06/02/2022 0.1  0.0 - 0.2 /100 WBC Final       Adult Transthoracic Echo Complete W/ Cont if Necessary Per Protocol    Result Date: 5/21/2022  Narrative: · Left ventricular wall thickness is consistent with mild concentric hypertrophy. · Estimated right ventricular systolic pressure from tricuspid regurgitation is normal (<35 mmHg). · Systolic anterior motion of the anterior mitral leaflet is present. · Left ventricular systolic function is moderately decreased. · Lumason contrast shows that the base of the heart moves best. The mid to distal and apical segments are akinetic in a pattern consistent with Tako Tsubo stress cardiomyopathy. · There is redundancy of the mitral valve chordae. No mitral regurgitation was noted      CT Abdomen Pelvis With & Without Contrast, CT Chest With & Without Contrast Diagnostic    Result Date: 5/21/2022  Narrative: CT ABDOMEN PELVIS W WO CONTRAST-, CT CHEST W WO CONTRAST DIAGNOSTIC-  Date of Exam: 5/21/2022 5:23 PM  Indication: acute abdominal pain; s/p code. assessing for acute pathology; D64.9-Anemia, unspecified; R77.8-Other specified abnormalities of plasma proteins; K92.2-Gastrointestinal hemorrhage, unspecified; R19.5-Other fecal abnormalities.  Comparison: None  Technique: Contiguous axial CT images were obtained through the chest abdomen and pelvis with contrast.  Following uneventful administration of 100 cc of Isovue-300 intravenous and oral contrast, contiguous axial images obtained from lung bases to the pubic symphysis. Sagittal and coronal reconstructions were performed.  Automated exposure control and iterative reconstruction methods were used.  FINDINGS: Chest:: Granulomas calcified lesions are present. Atherosclerotic calcifications are present. Bilateral breast prostheses are present. No focal consolidations. No pleural  effusions. No suspicious pulmonary nodule identified. Mild atelectatic changes are present within the lingula. Mild scarring is present. No acute osseous or modality identified. No evidence of focal lesions. Mild degenerative changes are present within the spine. No evidence of adenopathy.  Abdomen and pelvis:  Liver: Normal size and density. No focal lesions identified.  Gallbladder: The gallbladder appears normal in size with no evidence of radiopaque gallstones. The biliary tree is nondilated.  Pancreas: No focal masses.  No pancreatic duct dilation. The pancreas appears mildly atrophic.  Spleen: Spleen is normal size.  No focal splenic lesions.  Adrenal glands: Unremarkable.  Kidneys: The kidneys appear normal in size. No evidence of nephrolithiasis. No evidence of hydronephrosis. No suspicious focal lesions identified.  Retroperitoneum/vascular: No retroperitoneal adenopathy identified. No aneurysmal dilatation of the vascular structures. Atherosclerotic calcifications are present.  Stomach and Bowel: No abnormally dilated loops of bowel no definite mass identified. There is mild rectal wall thickening with hyperenhancement of the mucosa. This extends to the distal sigmoid colon. Questionable mild hyperenhancement is seen within the mucosa of the stomach with mild wall thickening is seen extending along the pylorus and the duodenum.. No free fluid. No focal fluid collections identified. The appendix appears within normal limits. No mesenteric adenopathy identified. No evidence of free air. No significant stool burden identified. A stimulator device is seen within the subcutaneous tissues of the right flank.  Bladder: The bladder appears unremarkable.  Pelvic Organs: No acute process identified. Trace amount free fluid is present.  Osseous structures: No aggressive focal lytic or sclerotic osseous lesions. No acute osseous abnormality. Degenerative changes present throughout the spine.      Impression:  1. Mild  wall thickening and hyperenhancement of the mucosa of the stomach in the rectum and distal sigmoid colon which may be related to gastritis and a mild proctocolitis. A trace amount of free fluid is present within the pelvis, likely reactive. Otherwise, no acute process. No acute cardiopulmonary process. 2. Ancillary findings as described above.  This report was finalized on 5/21/2022 5:43 PM by Savanna Castro MD.      Cardiac Catheterization/Vascular Study    Result Date: 5/23/2022  Narrative: · Severe two-vessel CAD (LAD, RCA) · No left ventriculography performed. However, an echocardiogram performed 5/21/2022 demonstrated LVEF 35% with mid anterior and apical hypokinesis · Moderately elevated LV filling pressure (LVEDP 25 mmHg) · Successful OCT guided PCI of the proximal and mid LAD using Synergy 3 x 38 mm drug-eluting stent with post dilation of 4 mm proximally · Successful OCT guided PCI of the mid RCA using a Synergy 4 x 28 mm drug-eluting stent with post dilation of 4.5 mm proximally      XR Chest 1 View    Result Date: 5/20/2022  Narrative: XR CHEST 1 VW-  Date of Exam: 5/20/2022 1:09 PM  Indication: soa, increased bnp; D64.9-Anemia, unspecified; R77.8-Other specified abnormalities of plasma proteins; K92.2-Gastrointestinal hemorrhage, unspecified.  Comparison:?5/19/2022  Technique:?A single view of the chest was obtained.  FINDINGS:  ?Heart size and pulmonary vessels are within normal limits.  Ossified granuloma seen within the medial right lung base.  The lungs are otherwise clear.  There are no pleural effusions.  There is no evidence pneumothorax.  Neurostimulator leads overlie the right side of the chest and lower neck.        Impression:   1.  No acute cardiopulmonary disease.   This report was finalized on 5/20/2022 1:55 PM by Kameron Victoria MD.      XR Chest PA & Lateral    Result Date: 5/19/2022  Narrative:  DATE OF EXAM: 5/19/2022 4:13 PM  PROCEDURE: XR CHEST PA AND LATERAL-  INDICATIONS: exertional  dyspnea; R06.00-Dyspnea, unspecified  COMPARISON: No Comparisons Available  TECHNIQUE: PA and lateral views of the chest were obtained.  FINDINGS: The cardiomediastinal silhouette is within normal limits. The lungs are clear. There is no pneumothorax, focal consolidation, or large pleural effusion. Osseous structures grossly intact. Calcified right basilar granuloma.      Impression: No acute process.  This report was finalized on 5/19/2022 4:24 PM by Saleem Bose MD.      I reviewed her blood work prior to, during, and following admission.  She had severe anemia of unclear chronicity given that her previous CBC was greater than 1 year ago.  She has maintained her hemoglobin following hospital discharge.  B12 levels were normal.  Iron levels were consistent with iron deficiency.  She is not a strict vegetarian and endorses eating red meat.  She has no prior history of gastro intestinal surgery.  No history of autoimmune disorders.    No personal history of transfusions prior to her current presentation.  No prior history of anemia as a child or teen or during her reproductive years.  No history of hematologic disorders, malignancy, CVA, or VTE.    Family history is notable for aplastic anemia in her brother lung cancer in her father and a sister with breast cancer at age 59.    Assessment / Plan      Assessment/Plan:   1. Other iron deficiency anemia (Primary)  2. Allergic reaction to drug  -History is via patient, her  who provides additional details surrounding her allergic reaction and review of extensive treatment records including hospital course blood count history, additional laboratory work-up and GI and cardiac work-up.  -She has history of severe iron deficiency anemia of unclear etiology.  She has had an extensive negative work-up for a GI source of blood loss.  This was unrevealing.  Her history does not reveal any obvious alternative source of blood loss.  It is possible that she had a slow  gastrointestinal bleed that was not identified on work-up during hospital admission.  I am going to screen her for hemolysis with a basic laboratory panel.  At this point she should be monitored closely to evaluate for continued improvement in her blood counts, particularly now that she is requiring dual antiplatelet therapy.  She had an anaphylactic reaction to intravenous iron while admitted to the hospital and should no longer receive Ferrlecit.  We discussed that if intravenous iron is required, we could cautiously consider use of an alternative formulation, but she would require close monitoring.  Given that she has no clear history of malabsorption and her blood counts are stable, I would prefer to continue with oral repletion rather than rechallenge in her with an alternative formulation of IV iron.  I asked her to escalate from once a day iron to twice a day iron as tolerated but to monitor for GI side effects and de-escalate to once daily if these occur.  I have ordered repeat blood counts for today and we will see her back in clinic in 6 weeks at which point we will repeat iron studies and a CBC.     3. CAD on dual antiplatelets  -We discussed that this would increase her risk for recurrent bleeding if bleeding was the source of her iron deficiency anemia, but important to continue with her cardiac medications given recent stenting, and have recommended closer monitoring of her CBC based on this.    Orders  -     ferrous sulfate 325 (65 FE) MG tablet; Take 1 tablet by mouth 2 (Two) Times a Day.  Dispense: 180 tablet; Refill: 0    Time spent 60 minutes including medical record review, medically appropriate history and physical, counseling the patient and her , documenting in the medical record and communicating with referring provider.    Follow Up:    6 weeks   Abeba Lopez MD  Hematology and Oncology

## 2022-06-15 LAB
CYTOLOGIST CVX/VAG CYTO: NORMAL
FOLATE SERPL-MCNC: 4.53 NG/ML (ref 4.78–24.2)
HAPTOGLOB SERPL-MCNC: 185 MG/DL (ref 30–200)
HEMOSIDERIN UR QL: NEGATIVE
PATH INTERP BLD-IMP: NORMAL

## 2022-06-16 ENCOUNTER — TELEPHONE (OUTPATIENT)
Dept: ONCOLOGY | Facility: CLINIC | Age: 72
End: 2022-06-16

## 2022-06-16 ENCOUNTER — READMISSION MANAGEMENT (OUTPATIENT)
Dept: CALL CENTER | Facility: HOSPITAL | Age: 72
End: 2022-06-16

## 2022-06-16 RX ORDER — FOLIC ACID 1 MG/1
1 TABLET ORAL DAILY
Qty: 90 TABLET | Refills: 1 | Status: SHIPPED | OUTPATIENT
Start: 2022-06-16 | End: 2022-10-28 | Stop reason: SDUPTHER

## 2022-06-16 NOTE — OUTREACH NOTE
CHF Week 3 Survey    Flowsheet Row Responses   [facility] facility patient discharged from? [place]   Does the patient have one of the following disease processes/diagnoses(primary or secondary)? CHF   Week 3 attempt successful? No   Unsuccessful attempts Attempt 1          BARBARA RAND - Registered Nurse

## 2022-06-16 NOTE — TELEPHONE ENCOUNTER
Called and let patient know about her folic acid.  I advised she can  the script or get a MVI with similar content.  Patient verbalized understanding.

## 2022-06-16 NOTE — TELEPHONE ENCOUNTER
----- Message from Dianelys hTayer RN sent at 6/16/2022  3:38 PM EDT -----    ----- Message -----  From: Abeba Lopez MD  Sent: 6/16/2022   3:01 PM EDT  To: Dianelys Thayer RN    Please notify patient folic acid is low. Start folic acid 1000 mg daily (I sent script but she may also find a MVI with similar content).

## 2022-06-16 NOTE — PROGRESS NOTES
Please notify patient folic acid is low. Start folic acid 1000 mg daily (I sent script but she may also find a MVI with similar content).

## 2022-06-22 PROBLEM — I50.22 CHRONIC SYSTOLIC CONGESTIVE HEART FAILURE: Status: ACTIVE | Noted: 2022-05-22

## 2022-06-22 NOTE — PROGRESS NOTES
[facility] Cardiology      Identification: Fabiola Salmeron is a 72 y.o. female who resides in [place], [place]    Reason for visit:  Coronary Artery Disease      Subjective      Patient is a 72-year-old female who returns today for follow-up after recent hospitalization at [facility] in May at the request of her primary care provider after blood work obtained showed her to be anemic.  She had saw her PCP for worsening shortness of breath.  On arrival her hemoglobin was 7.6 and iron 19.  Occult stool was positive.  GI was initially consulted and planned for an EGD and colonoscopy but after receiving an iron transfusion she had a flushing episode associated with chest pressure and syncope that resulted in a rapid response and transferred to the intensive care unit.  Her EGD and colonoscopy were canceled until cardiology evaluated.  She ended up receiving 1 unit of packed red blood cells with improvement in her H&H.  She had intermittent elevations of troponins during her stay.  She underwent echocardiogram that showed a reduced LVEF of 36% and akinesis of apical segments consistent with possible stress-induced cardiomyopathy.  She ultimately underwent cardiac catheterization which showed severe two-vessel CAD of the mid LAD and RCA for which she received drug-eluting stent placements.  Postprocedure she developed a right radial hemorrhage from her cath access site that required surgical intervention with Dr. Nugent.  She did ultimately receive EGD and colonoscopy which showed no obvious source of bleeding but patient did have gastritis.  She was discharged home on dual antiplatelet therapy and presents today for follow-up from that hospitalization.  Since then she followed up with Dr. Nugent and had her sutures removed.  Her right hand has healed well and no plastic surgery will be required.  Her H&H has improved and her hematocrit is actually within normal limits and hemoglobin 10.7.   "She has been following hematology and has been placed on supplemental iron as well as folic acid.  Since her heart cath she has not had any shortness of breath which was her main complaint on admission.  She denies any chest pain, palpitations, presyncope or syncope.  She has returned to her normal activities and has even been helping pack to move her mother.        Review of Systems   Constitutional: Negative for malaise/fatigue.   Eyes: Negative for vision loss in left eye and vision loss in right eye.   Cardiovascular: Negative for chest pain, dyspnea on exertion, near-syncope, orthopnea, palpitations, paroxysmal nocturnal dyspnea and syncope.   Musculoskeletal: Negative for myalgias.   Neurological: Negative for brief paralysis, excessive daytime sleepiness, focal weakness, numbness, paresthesias and weakness.   All other systems reviewed and are negative.      Objective     /68 (BP Location: Left arm, Patient Position: Sitting, Cuff Size: Adult)   Pulse 69   Ht 163.8 cm (64.5\")   Wt 57.6 kg (127 lb)   SpO2 99%   BMI 21.46 kg/m²       Constitutional:       Appearance: Healthy appearance. Well-developed.   Eyes:      General: Lids are normal. No scleral icterus.     Conjunctiva/sclera: Conjunctivae normal.   HENT:      Head: Normocephalic and atraumatic.   Neck:      Thyroid: No thyromegaly.      Vascular: No carotid bruit or JVD.   Pulmonary:      Effort: Pulmonary effort is normal.      Breath sounds: Normal breath sounds. No wheezing. No rhonchi. No rales.   Cardiovascular:      Normal rate. Regular rhythm.      Murmurs: There is no murmur.      No gallop. No rub.   Pulses:     Intact distal pulses.   Edema:     Peripheral edema absent.   Abdominal:      General: There is no distension.      Palpations: Abdomen is soft. There is no abdominal mass.   Musculoskeletal:      Cervical back: Normal range of motion. Skin:     General: Skin is warm and dry.      Findings: No rash.   Neurological:      " General: No focal deficit present.      Mental Status: Alert and oriented to person, place, and time.      Gait: Gait is intact.   Psychiatric:         Attention and Perception: Attention normal.         Mood and Affect: Mood normal.         Behavior: Behavior normal.         Result Review :    Lab Results   Component Value Date    GLUCOSE 96 06/14/2022    BUN 27 (H) 06/14/2022    CREATININE 1.06 (H) 06/14/2022    EGFRIFNONA 66 12/07/2021    BCR 25.5 (H) 06/14/2022    K 5.1 06/14/2022    CO2 19.0 (L) 06/14/2022    CALCIUM 9.8 06/14/2022    ALBUMIN 4.40 06/14/2022    AST 24 06/14/2022    ALT 17 06/14/2022     Lab Results   Component Value Date    WBC 5.76 06/14/2022    HGB 10.7 (L) 06/14/2022    HCT 35.0 06/14/2022    .3 (H) 06/14/2022     06/14/2022     Lab Results   Component Value Date    CHOL 261 (H) 12/07/2021    TRIG 183 (H) 12/07/2021    HDL 65 (H) 12/07/2021     (H) 12/07/2021     Lab Results   Component Value Date    HGBA1C 5.80 (H) 05/19/2022             Assessment     Problem List Items Addressed This Visit        Cardiac and Vasculature    Chronic systolic congestive heart failure (HCC)    Overview     · Echo (5/21/2022): LVEF 36%.  Systolic anterior motion of mitral valve present.  RVSP >  35 mmHg. The mid to distal and apical segments are akinetic.  · Cardiac catheterization (5/23/2022): Severe two-vessel CAD (mid LAD occlusion, severe mid RCA stenosis).  Successful OCT guided PCI of proximal/mid LAD using 3 x 38 mm MARIA ELENA.  Successful OCT guided PCI of mid RCA using 4 x 28 mm MARIA ELENA.  LVEDP 25 mmHg.           Relevant Medications    spironolactone (ALDACTONE) 25 MG tablet    clopidogrel (PLAVIX) 75 MG tablet    bisoprolol (ZEBeta) 5 MG tablet    Coronary artery disease involving native coronary artery of native heart with angina pectoris (HCC) - Primary    Overview     · Echo (5/21/2022): LVEF 36%.  Systolic anterior motion of mitral valve present.  RVSP >  35 mmHg. The mid to distal and  apical segments are akinetic  · Cardiac catheterization (5/23/2022): Severe two-vessel CAD (mid LAD occlusion, severe mid RCA stenosis).  Successful OCT guided PCI of proximal/mid LAD using 3 x 38 mm MARIA ELENA.  Successful OCT guided PCI of mid RCA using 4 x 28 mm MARIA ELENA.  LVEDP 25 mmHg.           Current Assessment & Plan     · No signs or symptoms of angina  · Continue dual antiplatelet therapy with aspirin and Plavix  · Continue bisoprolol 2.5 mg daily           Relevant Medications    clopidogrel (PLAVIX) 75 MG tablet    aspirin 81 MG EC tablet    bisoprolol (ZEBeta) 5 MG tablet    Essential hypertension    Overview     • Target blood pressure <130/80 mmHg           Current Assessment & Plan     · Continue bisoprolol 2.5 mg daily  · Continue valsartan 80 mg daily           Relevant Medications    spironolactone (ALDACTONE) 25 MG tablet    bisoprolol (ZEBeta) 5 MG tablet    Hyperlipidemia LDL goal <70    Overview     • High intensity statin therapy indicated given the presence of CAD           Current Assessment & Plan     · Continue Crestor 5 mg daily           Relevant Medications    rosuvastatin (CRESTOR) 5 MG tablet      Other Visit Diagnoses     Acute systolic congestive heart failure (HCC)        Relevant Medications    spironolactone (ALDACTONE) 25 MG tablet    clopidogrel (PLAVIX) 75 MG tablet    bisoprolol (ZEBeta) 5 MG tablet        Patient is doing well from a cardiovascular standpoint.  She has no signs or symptoms of angina or heart failure.  We will continue dual antiplatelet therapy and guideline directed medical therapy for heart failure.  She will follow-up 6 months or sooner if needed.  We will consider repeat echocardiogram at that time for reevaluation of her LVEF.  Plan   • Continue current medications including dual antiplatelet therapy and guideline directed medical therapy for her heart failure  • Consider limited echo at next follow-up for reevaluation of LVEF      Follow-up   Return in about 6  months (around 12/28/2022), or if symptoms worsen or fail to improve, for Follow-up with Dr. Wolfe next visit.      Ana Pereyra APRN  6/28/2022

## 2022-06-28 ENCOUNTER — OFFICE VISIT (OUTPATIENT)
Dept: CARDIOLOGY | Facility: CLINIC | Age: 72
End: 2022-06-28

## 2022-06-28 VITALS
BODY MASS INDEX: 21.16 KG/M2 | OXYGEN SATURATION: 99 % | HEIGHT: 65 IN | SYSTOLIC BLOOD PRESSURE: 124 MMHG | DIASTOLIC BLOOD PRESSURE: 68 MMHG | WEIGHT: 127 LBS | HEART RATE: 69 BPM

## 2022-06-28 DIAGNOSIS — I50.21 ACUTE SYSTOLIC CONGESTIVE HEART FAILURE: ICD-10-CM

## 2022-06-28 DIAGNOSIS — I25.119 CORONARY ARTERY DISEASE INVOLVING NATIVE CORONARY ARTERY OF NATIVE HEART WITH ANGINA PECTORIS: Primary | ICD-10-CM

## 2022-06-28 DIAGNOSIS — I50.22 CHRONIC SYSTOLIC CONGESTIVE HEART FAILURE: ICD-10-CM

## 2022-06-28 DIAGNOSIS — I10 ESSENTIAL HYPERTENSION: ICD-10-CM

## 2022-06-28 DIAGNOSIS — E78.5 HYPERLIPIDEMIA LDL GOAL <70: ICD-10-CM

## 2022-06-28 PROCEDURE — 99214 OFFICE O/P EST MOD 30 MIN: CPT | Performed by: NURSE PRACTITIONER

## 2022-06-28 RX ORDER — ROSUVASTATIN CALCIUM 5 MG/1
5 TABLET, COATED ORAL
Qty: 90 TABLET | Refills: 1 | Status: SHIPPED | OUTPATIENT
Start: 2022-06-28 | End: 2023-01-03

## 2022-06-28 RX ORDER — CLOPIDOGREL BISULFATE 75 MG/1
75 TABLET ORAL DAILY
Qty: 90 TABLET | Refills: 2 | Status: SHIPPED | OUTPATIENT
Start: 2022-06-28 | End: 2022-07-19 | Stop reason: SDUPTHER

## 2022-06-28 RX ORDER — ASPIRIN 81 MG/1
81 TABLET ORAL DAILY
Qty: 90 TABLET | Refills: 1 | Status: SHIPPED | OUTPATIENT
Start: 2022-06-28 | End: 2022-11-22

## 2022-06-28 RX ORDER — BISOPROLOL FUMARATE 5 MG/1
2.5 TABLET, FILM COATED ORAL
Qty: 90 TABLET | Refills: 1 | Status: SHIPPED | OUTPATIENT
Start: 2022-06-28

## 2022-06-28 RX ORDER — SPIRONOLACTONE 25 MG/1
25 TABLET ORAL DAILY
Qty: 90 TABLET | Refills: 1 | Status: SHIPPED | OUTPATIENT
Start: 2022-06-28 | End: 2022-12-27

## 2022-06-28 RX ORDER — FERROUS SULFATE 325(65) MG
325 TABLET ORAL
Qty: 60 TABLET | Refills: 0 | Status: SHIPPED | OUTPATIENT
Start: 2022-06-28 | End: 2023-01-03

## 2022-06-28 NOTE — ASSESSMENT & PLAN NOTE
· No signs or symptoms of angina  · Continue dual antiplatelet therapy with aspirin and Plavix  · Continue bisoprolol 2.5 mg daily   Consent (Temporal Branch)/Introductory Paragraph: The rationale for Mohs was explained to the patient and consent was obtained. The risks, benefits and alternatives to therapy were discussed in detail. Specifically, the risks of damage to the temporal branch of the facial nerve, infection, scarring, bleeding, prolonged wound healing, incomplete removal, allergy to anesthesia, and recurrence were addressed. Prior to the procedure, the treatment site was clearly identified and confirmed by the patient. All components of Universal Protocol/PAUSE Rule completed.

## 2022-06-30 ENCOUNTER — OFFICE VISIT (OUTPATIENT)
Dept: FAMILY MEDICINE CLINIC | Facility: CLINIC | Age: 72
End: 2022-06-30

## 2022-06-30 VITALS
HEART RATE: 77 BPM | WEIGHT: 125.8 LBS | TEMPERATURE: 97.6 F | SYSTOLIC BLOOD PRESSURE: 124 MMHG | HEIGHT: 64 IN | BODY MASS INDEX: 21.48 KG/M2 | OXYGEN SATURATION: 99 % | DIASTOLIC BLOOD PRESSURE: 62 MMHG

## 2022-06-30 DIAGNOSIS — G43.809 OTHER MIGRAINE WITHOUT STATUS MIGRAINOSUS, NOT INTRACTABLE: Chronic | ICD-10-CM

## 2022-06-30 DIAGNOSIS — M54.32 LEFT SIDED SCIATICA: Primary | ICD-10-CM

## 2022-06-30 DIAGNOSIS — E61.1 IRON DEFICIENCY: ICD-10-CM

## 2022-06-30 DIAGNOSIS — I10 ESSENTIAL HYPERTENSION: ICD-10-CM

## 2022-06-30 DIAGNOSIS — E53.8 FOLIC ACID DEFICIENCY: ICD-10-CM

## 2022-06-30 PROCEDURE — 99214 OFFICE O/P EST MOD 30 MIN: CPT | Performed by: PHYSICIAN ASSISTANT

## 2022-06-30 RX ORDER — GABAPENTIN 100 MG/1
100 CAPSULE ORAL NIGHTLY
Qty: 30 CAPSULE | Refills: 0 | Status: SHIPPED | OUTPATIENT
Start: 2022-06-30 | End: 2022-10-27

## 2022-07-02 NOTE — PROGRESS NOTES
Chief Complaint   Patient presents with   • Anemia   • Hypertension   • Follow-up     1 Month last seen 5/31/22   • Heartburn   • Hyperlipidemia       HPI     Fabiola Salmeron is a pleasant 72 y.o. female who is here for routine follow-up of left sided sciatica, iron deficiency, folic acid deficiency, migraines and hypertension.    Past Medical History:   Diagnosis Date   • Candidiasis of vulva and vagina    • CHF (congestive heart failure) (HCC)    • Coronary artery disease    • Hypertension        Past Surgical History:   Procedure Laterality Date   • ARTERIOVENOUS FISTULA/SHUNT SURGERY Right 5/23/2022    Procedure: RADIAL ARTERY REPAIR WITH HEMATOMA EVACUATION AND WASHOUT RIGHT;  Surgeon: Jaron Benavides MD;  Location: [facility];  Service: Vascular;  Laterality: Right;   • CAPSULE ENDOSCOPY N/A 5/25/2022    Procedure: CAPSULE ENDOSCOPY ESOPHAGUS TO ILEUM DEPLOYED;  Surgeon: Michele Rogers MD;  Location:  [facility];  Service: Gastroenterology;  Laterality: N/A;   • CARDIAC CATHETERIZATION N/A 5/23/2022    Procedure: LEFT HEART CATH;  Surgeon: Manjit Wolfe IV, MD;  Location:  [facility];  Service: Cardiovascular;  Laterality: N/A;   • CARDIAC CATHETERIZATION N/A 5/23/2022    Procedure: Optical Coherent Tomography;  Surgeon: Manjit Wolfe IV, MD;  Location:  [facility];  Service: Cardiovascular;  Laterality: N/A;   • CARDIAC CATHETERIZATION N/A 5/23/2022    Procedure: Stent MARIA ELENA coronary;  Surgeon: Manjit Wolfe IV, MD;  Location:  [facility];  Service: Cardiovascular;  Laterality: N/A;   • COLONOSCOPY N/A 5/25/2022    Procedure: COLONOSCOPY;  Surgeon: Michele Rogers MD;  Location:  [facility];  Service: Gastroenterology;  Laterality: N/A;   • CRANIAL NEUROSTIMULATOR INSERTION/REPLACEMENT Right    • ENDOSCOPY N/A 5/25/2022    Procedure: ESOPHAGOGASTRODUODENOSCOPY;  Surgeon: Michele Rogers MD;  Location:  "[facility];  Service: Gastroenterology;  Laterality: N/A;   • EYE SURGERY      shunt placement by ophthalmology       Family History   Problem Relation Age of Onset   • Heart failure Mother    • Lung cancer Father         smoker   • Heart disease Sister    • Breast cancer Sister    • Aplastic anemia Brother    • No Known Problems Maternal Grandmother    • No Known Problems Maternal Grandfather    • No Known Problems Paternal Grandmother    • No Known Problems Paternal Grandfather    • Atrial fibrillation Son    • Ovarian cancer Neg Hx    • Colon cancer Neg Hx        Social History     Socioeconomic History   • Marital status:    Tobacco Use   • Smoking status: Never Smoker   • Smokeless tobacco: Never Used   Vaping Use   • Vaping Use: Never used   Substance and Sexual Activity   • Alcohol use: Yes     Alcohol/week: 5.0 standard drinks     Types: 5 Standard drinks or equivalent per week   • Drug use: No   • Sexual activity: Yes     Birth control/protection: None       Allergies   Allergen Reactions   • Ferrlecit [Na Ferric Gluc Cplx In Sucrose] Anaphylaxis   • Codeine Other (See Comments)   • Metoprolol Other (See Comments)     Fatigue     • Statins Myalgia       ROS  Review of Systems   Constitutional: Negative for chills and fever.   Respiratory: Negative for cough, shortness of breath and wheezing.    Cardiovascular: Negative for chest pain, palpitations and leg swelling.   Musculoskeletal: Positive for arthralgias, back pain, myalgias and neck pain.   Neurological: Positive for headache.       Vitals:    06/30/22 1255   BP: 124/62   BP Location: Left arm   Patient Position: Sitting   Cuff Size: Adult   Pulse: 77   Temp: 97.6 °F (36.4 °C)   SpO2: 99%   Weight: 57.1 kg (125 lb 12.8 oz)   Height: 163.8 cm (64.49\")   PainSc: 0-No pain     Body mass index is 21.27 kg/m².    Current Outpatient Medications on File Prior to Visit   Medication Sig Dispense Refill   • Acetaminophen (Tylenol) 325 MG capsule " Take 650 mg by mouth 4 (Four) Times a Day.     • aspirin 81 MG EC tablet Take 1 tablet by mouth Daily. 90 tablet 1   • bimatoprost (Lumigan) 0.01 % ophthalmic drops Administer 1 drop to both eyes Every Night.     • bisoprolol (ZEBeta) 5 MG tablet Take 0.5 tablets by mouth Daily. 90 tablet 1   • calcium-vitamin D (Oscal 500/200 D-3) 500-200 MG-UNIT per tablet Take 2 tablets by mouth Daily. 60 tablet 11   • cholecalciferol (VITAMIN D3) 1.25 MG (20016 UT) capsule Take 1 capsule by mouth 1 (One) Time Per Week. 12 capsule 1   • clopidogrel (PLAVIX) 75 MG tablet Take 1 tablet by mouth Daily. 90 tablet 2   • ferrous sulfate 325 (65 FE) MG tablet Take 1 tablet by mouth Daily With Breakfast. 60 tablet 0   • folic acid (FOLVITE) 1 MG tablet Take 1 tablet by mouth Daily. (Patient taking differently: Take 0.5 mg by mouth Daily.) 90 tablet 1   • rosuvastatin (CRESTOR) 5 MG tablet Take 1 tablet by mouth Every 72 (Seventy-Two) Hours. 90 tablet 1   • spironolactone (ALDACTONE) 25 MG tablet Take 1 tablet by mouth Daily. 90 tablet 1   • timolol (TIMOPTIC) 0.5 % ophthalmic solution Administer 0.5 drops to both eyes 2 (two) times a day.     • topiramate (TOPAMAX) 100 MG tablet Take 1 tablet by mouth 2 (Two) Times a Day. 180 tablet 3   • valsartan (DIOVAN) 80 MG tablet TAKE 1 TABLET EVERY DAY 90 tablet 1     No current facility-administered medications on file prior to visit.       Results for orders placed or performed in visit on 06/14/22   Ferritin    Specimen: Blood   Result Value Ref Range    Ferritin 168.20 (H) 13.00 - 150.00 ng/mL   Folate    Specimen: Blood   Result Value Ref Range    Folate 4.53 (L) 4.78 - 24.20 ng/mL   Haptoglobin    Specimen: Blood   Result Value Ref Range    Haptoglobin 185 30 - 200 mg/dL   Reticulocytes    Specimen: Blood   Result Value Ref Range    Reticulocyte % 1.77 0.70 - 1.90 %    Reticulocyte Absolute 0.0607 0.0200 - 0.1300 10*6/mm3   Lactate Dehydrogenase    Specimen: Blood   Result Value Ref Range      (H) 135 - 214 U/L   Hemosiderin, Urine - Urine, Clean Catch    Specimen: Urine, Clean Catch   Result Value Ref Range    Hemosiderin, Urine Qualitative Negative Negative   Comprehensive Metabolic Panel    Specimen: Blood   Result Value Ref Range    Glucose 96 65 - 99 mg/dL    BUN 27 (H) 8 - 23 mg/dL    Creatinine 1.06 (H) 0.57 - 1.00 mg/dL    Sodium 141 136 - 145 mmol/L    Potassium 5.1 3.5 - 5.2 mmol/L    Chloride 110 (H) 98 - 107 mmol/L    CO2 19.0 (L) 22.0 - 29.0 mmol/L    Calcium 9.8 8.6 - 10.5 mg/dL    Total Protein 7.3 6.0 - 8.5 g/dL    Albumin 4.40 3.50 - 5.20 g/dL    ALT (SGPT) 17 1 - 33 U/L    AST (SGOT) 24 1 - 32 U/L    Alkaline Phosphatase 106 39 - 117 U/L    Total Bilirubin 0.2 0.0 - 1.2 mg/dL    Globulin 2.9 gm/dL    A/G Ratio 1.5 g/dL    BUN/Creatinine Ratio 25.5 (H) 7.0 - 25.0    Anion Gap 12.0 5.0 - 15.0 mmol/L    eGFR 55.2 (L) >60.0 mL/min/1.73   CBC Auto Differential    Specimen: Blood   Result Value Ref Range    WBC 5.76 3.40 - 10.80 10*3/mm3    RBC 3.42 (L) 3.77 - 5.28 10*6/mm3    Hemoglobin 10.7 (L) 12.0 - 15.9 g/dL    Hematocrit 35.0 34.0 - 46.6 %    .3 (H) 79.0 - 97.0 fL    MCH 31.3 26.6 - 33.0 pg    MCHC 30.6 (L) 31.5 - 35.7 g/dL    RDW 22.5 (H) 12.3 - 15.4 %    RDW-SD 83.4 (H) 37.0 - 54.0 fl    MPV 10.0 6.0 - 12.0 fL    Platelets 335 140 - 450 10*3/mm3    Neutrophil % 73.3 42.7 - 76.0 %    Lymphocyte % 16.1 (L) 19.6 - 45.3 %    Monocyte % 6.9 5.0 - 12.0 %    Eosinophil % 2.4 0.3 - 6.2 %    Basophil % 1.0 0.0 - 1.5 %    Immature Grans % 0.3 0.0 - 0.5 %    Neutrophils, Absolute 4.21 1.70 - 7.00 10*3/mm3    Lymphocytes, Absolute 0.93 0.70 - 3.10 10*3/mm3    Monocytes, Absolute 0.40 0.10 - 0.90 10*3/mm3    Eosinophils, Absolute 0.14 0.00 - 0.40 10*3/mm3    Basophils, Absolute 0.06 0.00 - 0.20 10*3/mm3    Immature Grans, Absolute 0.02 0.00 - 0.05 10*3/mm3    nRBC 0.0 0.0 - 0.2 /100 WBC   Scan Slide    Specimen: Blood   Result Value Ref Range    Anisocytosis Mod/2+ None Seen     Dacrocytes Mod/2+ None Seen    WBC Morphology Normal Normal    Platelet Morphology Normal Normal   Direct Antiglobulin Test (Direct Patel)    Specimen: Blood   Result Value Ref Range    SAI Negative    Peripheral Blood Smear    Specimen: Blood   Result Value Ref Range    Performed by: Jose D Díaz MD.     Pathologist Interpretation       Mild macrocytic anemia with moderate anisocytosis; negative for blasts.       PE    Physical Exam  Vitals reviewed.   Constitutional:       General: She is not in acute distress.     Appearance: Normal appearance. She is well-developed and normal weight. She is not ill-appearing or diaphoretic.   HENT:      Head: Normocephalic and atraumatic.   Eyes:      Extraocular Movements: Extraocular movements intact.      Conjunctiva/sclera: Conjunctivae normal.   Pulmonary:      Effort: No respiratory distress.   Musculoskeletal:         General: Normal range of motion.      Cervical back: Normal range of motion.   Neurological:      General: No focal deficit present.      Mental Status: She is alert.   Psychiatric:         Attention and Perception: She is attentive.         Mood and Affect: Mood normal.         Speech: Speech normal.         Behavior: Behavior normal. Behavior is cooperative.         Thought Content: Thought content normal.         Judgment: Judgment normal.         A/P    Diagnoses and all orders for this visit:    1. Left sided sciatica (Primary)  -     gabapentin (NEURONTIN) 100 MG capsule; Take 1 capsule by mouth Every Night.  Dispense: 30 capsule; Refill: 0  Trial of gabapentin 100 mg nightly.  If tolerating, may increase dose.  Return in 4 weeks.  If she is benefiting, will need to update CSC, wilber and UDS.    2. Iron deficiency  Reviewed CBC, improving.    3. Folic acid deficiency  Start daily folic acid supplement.    4. Essential hypertension  Stable, well-controlled.  Compliant on medication.    5. Other migraine without status migrainosus, not  intractable  Ongoing chronic migraines vs. Cervicalgia.  Previously on daily steroids with good results.  These were discontinued during hospitalization.  Discussed referral to neurology but patient declines at this time.  May get benefit with initiation of gabapentin.       Plan of care reviewed with patient at the conclusion of today's visit. Education was provided regarding diagnosis, management and any prescribed or recommended OTC medications.  Patient verbalizes understanding of and agreement with management plan.    Return in about 4 weeks (around 7/28/2022) for Recheck, migraines/sciatica.     Jessica Roach PA-C

## 2022-07-19 DIAGNOSIS — I25.119 CORONARY ARTERY DISEASE INVOLVING NATIVE CORONARY ARTERY OF NATIVE HEART WITH ANGINA PECTORIS: ICD-10-CM

## 2022-07-19 RX ORDER — CLOPIDOGREL BISULFATE 75 MG/1
75 TABLET ORAL DAILY
Qty: 90 TABLET | Refills: 3 | Status: SHIPPED | OUTPATIENT
Start: 2022-07-19 | End: 2023-03-07

## 2022-07-26 ENCOUNTER — LAB (OUTPATIENT)
Dept: LAB | Facility: HOSPITAL | Age: 72
End: 2022-07-26

## 2022-07-26 ENCOUNTER — OFFICE VISIT (OUTPATIENT)
Dept: ONCOLOGY | Facility: CLINIC | Age: 72
End: 2022-07-26

## 2022-07-26 VITALS
RESPIRATION RATE: 18 BRPM | WEIGHT: 125.56 LBS | TEMPERATURE: 96.9 F | SYSTOLIC BLOOD PRESSURE: 115 MMHG | OXYGEN SATURATION: 99 % | DIASTOLIC BLOOD PRESSURE: 71 MMHG | HEART RATE: 69 BPM | BODY MASS INDEX: 21.23 KG/M2

## 2022-07-26 DIAGNOSIS — I10 ESSENTIAL HYPERTENSION: ICD-10-CM

## 2022-07-26 DIAGNOSIS — D50.8 OTHER IRON DEFICIENCY ANEMIA: ICD-10-CM

## 2022-07-26 DIAGNOSIS — D50.8 OTHER IRON DEFICIENCY ANEMIA: Primary | ICD-10-CM

## 2022-07-26 LAB
ERYTHROCYTE [DISTWIDTH] IN BLOOD BY AUTOMATED COUNT: 21.8 % (ref 12.3–15.4)
FERRITIN SERPL-MCNC: 107.7 NG/ML (ref 13–150)
HCT VFR BLD AUTO: 36.8 % (ref 34–46.6)
HGB BLD-MCNC: 11.3 G/DL (ref 12–15.9)
IRON 24H UR-MRATE: 112 MCG/DL (ref 37–145)
IRON SATN MFR SERPL: 27 % (ref 20–50)
LYMPHOCYTES # BLD AUTO: 1.8 10*3/MM3 (ref 0.7–3.1)
LYMPHOCYTES NFR BLD AUTO: 36.2 % (ref 19.6–45.3)
MCH RBC QN AUTO: 31.4 PG (ref 26.6–33)
MCHC RBC AUTO-ENTMCNC: 30.7 G/DL (ref 31.5–35.7)
MCV RBC AUTO: 102.3 FL (ref 79–97)
MONOCYTES # BLD AUTO: 0.5 10*3/MM3 (ref 0.1–0.9)
MONOCYTES NFR BLD AUTO: 9.7 % (ref 5–12)
NEUTROPHILS NFR BLD AUTO: 2.7 10*3/MM3 (ref 1.7–7)
NEUTROPHILS NFR BLD AUTO: 54.1 % (ref 42.7–76)
PLATELET # BLD AUTO: 281 10*3/MM3 (ref 140–450)
PMV BLD AUTO: 7.7 FL (ref 6–12)
RBC # BLD AUTO: 3.6 10*6/MM3 (ref 3.77–5.28)
TIBC SERPL-MCNC: 422 MCG/DL (ref 298–536)
TRANSFERRIN SERPL-MCNC: 283 MG/DL (ref 200–360)
WBC NRBC COR # BLD: 5 10*3/MM3 (ref 3.4–10.8)

## 2022-07-26 PROCEDURE — 85025 COMPLETE CBC W/AUTO DIFF WBC: CPT

## 2022-07-26 PROCEDURE — 80048 BASIC METABOLIC PNL TOTAL CA: CPT

## 2022-07-26 PROCEDURE — 99214 OFFICE O/P EST MOD 30 MIN: CPT | Performed by: INTERNAL MEDICINE

## 2022-07-26 PROCEDURE — 82746 ASSAY OF FOLIC ACID SERUM: CPT

## 2022-07-26 PROCEDURE — 83540 ASSAY OF IRON: CPT

## 2022-07-26 PROCEDURE — 84466 ASSAY OF TRANSFERRIN: CPT

## 2022-07-26 PROCEDURE — 82728 ASSAY OF FERRITIN: CPT

## 2022-07-26 PROCEDURE — 36415 COLL VENOUS BLD VENIPUNCTURE: CPT

## 2022-07-26 RX ORDER — EZETIMIBE 10 MG/1
5 TABLET ORAL DAILY
COMMUNITY
End: 2022-10-27

## 2022-07-26 NOTE — PROGRESS NOTES
Follow up     Date: .22      Patient Name: Fabiola Salmeron  MRN: [id number]  : 1950     Referring Physician: Dr. Jessica Roach    Chief Complaint: Iron deficiency anemia      History of Present Illness: Fabiola Salmeron is a pleasant 72 y.o. female who presents today for evaluation of iron deficiency anemia. The patient is accompanied by her supportive  who contributes to the history of her care.     Her most recent prior CBC is from 2021 and was normal.    She then presented with progressive dyspnea on exertion.  She initially attributed her symptoms to acid reflux as she was also having accompanying belching, however her symptoms did not resolve with a PPI trial which had previously helped.  She was seen by her primary care physician and had a CBC performed on 2022.  This showed a hemoglobin of 7.6 with a slightly elevated RDW at 16 and a normal white blood cell count and platelet count.  She was referred to the emergency department and was admitted to the hospital through 2022.  There she had additional blood work including an iron battery which was notable for an iron saturation of 5% and an elevated TIBC consistent with iron deficiency anemia.  She was started on intravenous iron and received a unit of PRBCs.  She was also started on steroids for an acute sciatica flare.  She received her first dose of Ferrlecit on  without incident.  However following her second infusion on  she developed flushing, became diaphoretic, tachycardic, and hypotensive with brief episode of syncope and RRT was called.  Her  who accompanies her reports that she received chest compressions.  Per nursing records, she received 1L NS bolus with transfer to ICU for further management.     Underwent egd and c-scope 22 which revealed no source of bleeding and video endoscopy capsule was performed which was negative    She was found to have an elevated troponin and a low ejection  fraction at 36% within the first 24 hours of admission, with pattern concerning for Takotsubo cardiomyopathy.  She underwent LHC on 5/23/22 which revealed stenosis to her LAD and RCA and underwent drug-eluting stents x2. Post-procedure, patient was found to have hemorrhage from her right radial artery and underwent radial artery repair and hematoma evacuation by Dr. Benavides of vascular surgery.     She was started on oral iron once daily which she is tolerating well with no GI side effects.  Her PPI was stopped by cardiology pharmacist.  She reports her dyspnea on exertion is markedly improved since hospital discharge.  She has no recurrence of belching and no chest pain.  She had mild bleeding from the sutures on her right arm after bumping them in our office visit, but this stopped with direct pressure and she is otherwise not having any bleeding symptoms and continues aspirin and Plavix.  She denies any preceding history of hematuria, hematochezia, or melena.  No epistaxis or gum bleeding.  No vaginal bleeding.    Interval history:  Started folic acid 6/16/22 and is taking 500 mg daily.  She continues on oral iron 3 and 25 mg daily.  She is tolerating this well.  She does note steady improvement in her energy.  She continues to have thinning/brittle nails.  She is hoping to reschedule a trip to [place] to visit her granddaughter who recently graduated, and is awaiting cardiology input on this.    She is otherwise feeling well.  She denies any fevers, chest pain, shortness of breath, cough or bleeding.     Past Medical History:   Past Medical History:   Diagnosis Date   • Candidiasis of vulva and vagina    • CHF (congestive heart failure) (HCC)    • Coronary artery disease    • Hypertension        Past Surgical History:   Past Surgical History:   Procedure Laterality Date   • ARTERIOVENOUS FISTULA/SHUNT SURGERY Right 5/23/2022    Procedure: RADIAL ARTERY REPAIR WITH HEMATOMA EVACUATION AND WASHOUT RIGHT;   Surgeon: Jaron Benavides MD;  Location:  [facility];  Service: Vascular;  Laterality: Right;   • CAPSULE ENDOSCOPY N/A 5/25/2022    Procedure: CAPSULE ENDOSCOPY ESOPHAGUS TO ILEUM DEPLOYED;  Surgeon: Michele Rogers MD;  Location:  [facility];  Service: Gastroenterology;  Laterality: N/A;   • CARDIAC CATHETERIZATION N/A 5/23/2022    Procedure: LEFT HEART CATH;  Surgeon: Manjit Wolfe IV, MD;  Location:  [facility];  Service: Cardiovascular;  Laterality: N/A;   • CARDIAC CATHETERIZATION N/A 5/23/2022    Procedure: Optical Coherent Tomography;  Surgeon: Manjit Wolfe IV, MD;  Location:  [facility];  Service: Cardiovascular;  Laterality: N/A;   • CARDIAC CATHETERIZATION N/A 5/23/2022    Procedure: Stent MARIA ELENA coronary;  Surgeon: Manjit Wolfe IV, MD;  Location:  [facility];  Service: Cardiovascular;  Laterality: N/A;   • COLONOSCOPY N/A 5/25/2022    Procedure: COLONOSCOPY;  Surgeon: Michele Rogers MD;  Location:  [facility];  Service: Gastroenterology;  Laterality: N/A;   • CRANIAL NEUROSTIMULATOR INSERTION/REPLACEMENT Right    • ENDOSCOPY N/A 5/25/2022    Procedure: ESOPHAGOGASTRODUODENOSCOPY;  Surgeon: Michele Rogers MD;  Location:  [facility];  Service: Gastroenterology;  Laterality: N/A;   • EYE SURGERY      shunt placement by ophthalmology       Family History:   Family History   Problem Relation Age of Onset   • Heart failure Mother    • Lung cancer Father         smoker   • Heart disease Sister    • Breast cancer Sister    • Aplastic anemia Brother    • No Known Problems Maternal Grandmother    • No Known Problems Maternal Grandfather    • No Known Problems Paternal Grandmother    • No Known Problems Paternal Grandfather    • Atrial fibrillation Son    • Ovarian cancer Neg Hx    • Colon cancer Neg Hx        Social History:   Social History     Socioeconomic History   • Marital status:     Tobacco Use   • Smoking status: Never Smoker   • Smokeless tobacco: Never Used   Vaping Use   • Vaping Use: Never used   Substance and Sexual Activity   • Alcohol use: Yes     Alcohol/week: 5.0 standard drinks     Types: 5 Standard drinks or equivalent per week   • Drug use: No   • Sexual activity: Yes     Birth control/protection: None       Medications:     Current Outpatient Medications:   •  Acetaminophen (Tylenol) 325 MG capsule, Take 650 mg by mouth 4 (Four) Times a Day., Disp: , Rfl:   •  aspirin 81 MG EC tablet, Take 1 tablet by mouth Daily., Disp: 90 tablet, Rfl: 1  •  bimatoprost (Lumigan) 0.01 % ophthalmic drops, Administer 1 drop to both eyes Every Night., Disp: , Rfl:   •  bisoprolol (ZEBeta) 5 MG tablet, Take 0.5 tablets by mouth Daily., Disp: 90 tablet, Rfl: 1  •  calcium-vitamin D (Oscal 500/200 D-3) 500-200 MG-UNIT per tablet, Take 2 tablets by mouth Daily., Disp: 60 tablet, Rfl: 11  •  cholecalciferol (VITAMIN D3) 1.25 MG (67424 UT) capsule, Take 1 capsule by mouth 1 (One) Time Per Week., Disp: 12 capsule, Rfl: 1  •  clopidogrel (PLAVIX) 75 MG tablet, Take 1 tablet by mouth Daily., Disp: 90 tablet, Rfl: 3  •  ferrous sulfate 325 (65 FE) MG tablet, Take 1 tablet by mouth Daily With Breakfast., Disp: 60 tablet, Rfl: 0  •  folic acid (FOLVITE) 1 MG tablet, Take 1 tablet by mouth Daily. (Patient taking differently: Take 0.5 mg by mouth Daily.), Disp: 90 tablet, Rfl: 1  •  gabapentin (NEURONTIN) 100 MG capsule, Take 1 capsule by mouth Every Night., Disp: 30 capsule, Rfl: 0  •  rosuvastatin (CRESTOR) 5 MG tablet, Take 1 tablet by mouth Every 72 (Seventy-Two) Hours., Disp: 90 tablet, Rfl: 1  •  spironolactone (ALDACTONE) 25 MG tablet, Take 1 tablet by mouth Daily., Disp: 90 tablet, Rfl: 1  •  timolol (TIMOPTIC) 0.5 % ophthalmic solution, Administer 0.5 drops to both eyes 2 (two) times a day., Disp: , Rfl:   •  topiramate (TOPAMAX) 100 MG tablet, Take 1 tablet by mouth 2 (Two) Times a Day., Disp: 180  tablet, Rfl: 3  •  valsartan (DIOVAN) 80 MG tablet, TAKE 1 TABLET EVERY DAY, Disp: 90 tablet, Rfl: 1    Allergies:   Allergies   Allergen Reactions   • Ferrlecit [Na Ferric Gluc Cplx In Sucrose] Anaphylaxis   • Codeine Other (See Comments)   • Metoprolol Other (See Comments)     Fatigue     • Statins Myalgia       Objective     Vital Signs:   There were no vitals filed for this visit. There is no height or weight on file to calculate BMI.   There were no vitals filed for this visit.    Physical Exam:  General: No acute distress. Well appearing   HEENT: Normocephalic, atraumatic. Sclera anicteric. Masked.  Neck: supple, no adenopathy.   Cardiovascular: regular rate and rhythm,. No murmurs.   Respiratory: Normal rate. Clear to auscultation bilaterally  Abdomen: Soft, nontender, non distended with normoactive bowel sounds  Lymph: no cervical, supraclavicular or axillary adenopathy  Neuro: Alert and oriented x 3. No focal deficits.   Ext: Symmetric, no swelling.   Psych: Euthymic      Laboratory/Imaging Reviewed:   No visits with results within 2 Week(s) from this visit.   Latest known visit with results is:   Lab on 06/14/2022   Component Date Value Ref Range Status   • Ferritin 06/14/2022 168.20 (A) 13.00 - 150.00 ng/mL Final   • Folate 06/14/2022 4.53 (A) 4.78 - 24.20 ng/mL Final   • Haptoglobin 06/14/2022 185  30 - 200 mg/dL Final   • SAI 06/14/2022 Negative   Final   • Reticulocyte % 06/14/2022 1.77  0.70 - 1.90 % Final   • Reticulocyte Absolute 06/14/2022 0.0607  0.0200 - 0.1300 10*6/mm3 Final   • LDH 06/14/2022 263 (A) 135 - 214 U/L Final   • Performed by: 06/14/2022 Jose D Díaz MD.   Final   • Pathologist Interpretation 06/14/2022 Mild macrocytic anemia with moderate anisocytosis; negative for blasts.   Final   • Hemosiderin, Urine Qualitative 06/14/2022 Negative  Negative Final   • Glucose 06/14/2022 96  65 - 99 mg/dL Final   • BUN 06/14/2022 27 (A) 8 - 23 mg/dL Final   • Creatinine 06/14/2022 1.06 (A)  0.57 - 1.00 mg/dL Final   • Sodium 06/14/2022 141  136 - 145 mmol/L Final   • Potassium 06/14/2022 5.1  3.5 - 5.2 mmol/L Final    Slight hemolysis detected by analyzer. Results may be affected.   • Chloride 06/14/2022 110 (A) 98 - 107 mmol/L Final   • CO2 06/14/2022 19.0 (A) 22.0 - 29.0 mmol/L Final   • Calcium 06/14/2022 9.8  8.6 - 10.5 mg/dL Final   • Total Protein 06/14/2022 7.3  6.0 - 8.5 g/dL Final   • Albumin 06/14/2022 4.40  3.50 - 5.20 g/dL Final   • ALT (SGPT) 06/14/2022 17  1 - 33 U/L Final   • AST (SGOT) 06/14/2022 24  1 - 32 U/L Final   • Alkaline Phosphatase 06/14/2022 106  39 - 117 U/L Final   • Total Bilirubin 06/14/2022 0.2  0.0 - 1.2 mg/dL Final   • Globulin 06/14/2022 2.9  gm/dL Final    Calculated Result   • A/G Ratio 06/14/2022 1.5  g/dL Final   • BUN/Creatinine Ratio 06/14/2022 25.5 (A) 7.0 - 25.0 Final   • Anion Gap 06/14/2022 12.0  5.0 - 15.0 mmol/L Final   • eGFR 06/14/2022 55.2 (A) >60.0 mL/min/1.73 Final    National Kidney Foundation and American Society of Nephrology (ASN) Task Force recommended calculation based on the Chronic Kidney Disease Epidemiology Collaboration (CKD-EPI) equation refit without adjustment for race.   • WBC 06/14/2022 5.76  3.40 - 10.80 10*3/mm3 Final   • RBC 06/14/2022 3.42 (A) 3.77 - 5.28 10*6/mm3 Final   • Hemoglobin 06/14/2022 10.7 (A) 12.0 - 15.9 g/dL Final   • Hematocrit 06/14/2022 35.0  34.0 - 46.6 % Final   • MCV 06/14/2022 102.3 (A) 79.0 - 97.0 fL Final   • MCH 06/14/2022 31.3  26.6 - 33.0 pg Final   • MCHC 06/14/2022 30.6 (A) 31.5 - 35.7 g/dL Final   • RDW 06/14/2022 22.5 (A) 12.3 - 15.4 % Final   • RDW-SD 06/14/2022 83.4 (A) 37.0 - 54.0 fl Final   • MPV 06/14/2022 10.0  6.0 - 12.0 fL Final   • Platelets 06/14/2022 335  140 - 450 10*3/mm3 Final   • Neutrophil % 06/14/2022 73.3  42.7 - 76.0 % Final   • Lymphocyte % 06/14/2022 16.1 (A) 19.6 - 45.3 % Final   • Monocyte % 06/14/2022 6.9  5.0 - 12.0 % Final   • Eosinophil % 06/14/2022 2.4  0.3 - 6.2 % Final   •  Basophil % 06/14/2022 1.0  0.0 - 1.5 % Final   • Immature Grans % 06/14/2022 0.3  0.0 - 0.5 % Final   • Neutrophils, Absolute 06/14/2022 4.21  1.70 - 7.00 10*3/mm3 Final   • Lymphocytes, Absolute 06/14/2022 0.93  0.70 - 3.10 10*3/mm3 Final   • Monocytes, Absolute 06/14/2022 0.40  0.10 - 0.90 10*3/mm3 Final   • Eosinophils, Absolute 06/14/2022 0.14  0.00 - 0.40 10*3/mm3 Final   • Basophils, Absolute 06/14/2022 0.06  0.00 - 0.20 10*3/mm3 Final   • Immature Grans, Absolute 06/14/2022 0.02  0.00 - 0.05 10*3/mm3 Final   • nRBC 06/14/2022 0.0  0.0 - 0.2 /100 WBC Final   • Anisocytosis 06/14/2022 Mod/2+  None Seen Final   • Dacrocytes 06/14/2022 Mod/2+  None Seen Final   • WBC Morphology 06/14/2022 Normal  Normal Final   • Platelet Morphology 06/14/2022 Normal  Normal Final       Cates Visual Field - OU - Both Eyes    Result Date: 7/17/2022  Narrative: This result has an attachment that is not available. July 12 , 2022 OD:  inferior depression (MD -1.9 dB), reliability good, likely stable , possible cataract effect? OS:  large inferior annmarie-field defect (MD -10.8 dB), reliability good, likely stable    I reviewed her blood work prior to, during, and following admission.  She had severe anemia of unclear chronicity given that her previous CBC was greater than 1 year ago.  She has maintained her hemoglobin following hospital discharge.  B12 levels were normal.  Iron levels were consistent with iron deficiency.  She is not a strict vegetarian and endorses eating red meat.  She has no prior history of gastro intestinal surgery.  No history of autoimmune disorders.    No personal history of transfusions prior to her current presentation.  No prior history of anemia as a child or teen or during her reproductive years.  No history of hematologic disorders, malignancy, CVA, or VTE.    Family history is notable for aplastic anemia in her brother lung cancer in her father and a sister with breast cancer at age 59.    Assessment /  Plan      Assessment/Plan:   1. Other iron deficiency anemia (Primary)  2. Allergic reaction to drug  3. Macrocytosis  -History is via patient, her  who provides additional details surrounding her allergic reaction and review of extensive treatment records including hospital course blood count history, additional laboratory work-up and GI and cardiac work-up.  -She has history of severe iron deficiency anemia of unclear etiology.  She has had an extensive negative work-up for a GI source of blood loss.  This was unrevealing.  Her history does not reveal any obvious alternative source of blood loss.  It is possible that she had a slow gastrointestinal bleed that was not identified on work-up during hospital admission.  She had an anaphylactic reaction to intravenous iron while admitted to the hospital and should no longer receive Ferrlecit.  We discussed that if intravenous iron is required, we could cautiously consider use of an alternative formulation, but she would require close monitoring.  Given that she has no clear history of malabsorption and her blood counts are stable, I would prefer to continue with oral repletion rather than rechallenge in her with an alternative formulation of IV iron.    -She was also found to have a low folate level.  -She is tolerating oral iron and folic acid.  -Her CBC is improving.  I would like to continue to monitor her to ensure that the macrocytosis resolves, but this is likely a combination of folic acid deficiency and reticulocytosis from resolving iron deficiency anemia.  She will continue iron and folic acid supplements in the current dosing and follow-up with me with repeat labs in 3 months.  3. CAD on dual antiplatelets  -We discussed that this would increase her risk for recurrent bleeding if bleeding was the source of her iron deficiency anemia, but important to continue with her cardiac medications given recent stenting, and have recommended closer monitoring of  her CBC based on this.    Time spent 30 minutes including medical record review, medically appropriate history and physical, counseling the patient and her , documenting in the medical record and communicating with referring provider.    Follow Up:    6 weeks   Abeba Lopez MD  Hematology and Oncology

## 2022-07-27 LAB — FOLATE SERPL-MCNC: 18.3 NG/ML (ref 4.78–24.2)

## 2022-07-28 ENCOUNTER — OFFICE VISIT (OUTPATIENT)
Dept: FAMILY MEDICINE CLINIC | Facility: CLINIC | Age: 72
End: 2022-07-28

## 2022-07-28 VITALS
TEMPERATURE: 97.6 F | BODY MASS INDEX: 21.34 KG/M2 | DIASTOLIC BLOOD PRESSURE: 68 MMHG | WEIGHT: 125 LBS | HEART RATE: 69 BPM | OXYGEN SATURATION: 97 % | SYSTOLIC BLOOD PRESSURE: 126 MMHG | HEIGHT: 64 IN

## 2022-07-28 DIAGNOSIS — G43.809 OTHER MIGRAINE WITHOUT STATUS MIGRAINOSUS, NOT INTRACTABLE: Primary | Chronic | ICD-10-CM

## 2022-07-28 DIAGNOSIS — M54.32 SCIATICA OF LEFT SIDE: ICD-10-CM

## 2022-07-28 DIAGNOSIS — D64.9 SYMPTOMATIC ANEMIA: ICD-10-CM

## 2022-07-28 DIAGNOSIS — I50.22 CHRONIC SYSTOLIC CONGESTIVE HEART FAILURE: ICD-10-CM

## 2022-07-28 DIAGNOSIS — M54.81 BILATERAL OCCIPITAL NEURALGIA: ICD-10-CM

## 2022-07-28 DIAGNOSIS — I21.4 NSTEMI (NON-ST ELEVATED MYOCARDIAL INFARCTION): ICD-10-CM

## 2022-07-28 DIAGNOSIS — I10 ESSENTIAL HYPERTENSION: ICD-10-CM

## 2022-07-28 LAB
ANION GAP SERPL CALCULATED.3IONS-SCNC: 14.6 MMOL/L (ref 5–15)
BUN SERPL-MCNC: 27 MG/DL (ref 8–23)
BUN/CREAT SERPL: 25.5 (ref 7–25)
CALCIUM SPEC-SCNC: 9.7 MG/DL (ref 8.6–10.5)
CHLORIDE SERPL-SCNC: 109 MMOL/L (ref 98–107)
CO2 SERPL-SCNC: 16.4 MMOL/L (ref 22–29)
CREAT SERPL-MCNC: 1.06 MG/DL (ref 0.57–1)
EGFRCR SERPLBLD CKD-EPI 2021: 55.2 ML/MIN/1.73
GLUCOSE SERPL-MCNC: 88 MG/DL (ref 65–99)
POTASSIUM SERPL-SCNC: 5.3 MMOL/L (ref 3.5–5.2)
SODIUM SERPL-SCNC: 140 MMOL/L (ref 136–145)

## 2022-07-28 PROCEDURE — 99214 OFFICE O/P EST MOD 30 MIN: CPT | Performed by: PHYSICIAN ASSISTANT

## 2022-07-28 NOTE — PROGRESS NOTES
Chief Complaint   Patient presents with   • Sciatica     4 Week F/U   • Migraine     Pt. States her Migraine are the same since last visit   • Hypertension       HPI     Fabiola Salmeron is a pleasant 72 y.o. female who is here for routine follow-up of migraine, sciatica, anemia, CHF, MI and hypertension.  Patient is doing much better overall.  She has recently been seen by cardiology and hematology.  Her labs showed improvement in her anemia.  She is compliant on all of her medications.  Her blood pressure is stable and well controlled.  She has no chest pain, shortness of breath or leg swelling.    Never started the gabapentin for her sciatica.  She has ongoing issues with migraines which may be more of a bilateral occipital neuralgia.  She has been to multiple neurologist tried multiple medications without benefit.    Past Medical History:   Diagnosis Date   • Candidiasis of vulva and vagina    • CHF (congestive heart failure) (HCC)    • Coronary artery disease    • Hypertension        Past Surgical History:   Procedure Laterality Date   • ARTERIOVENOUS FISTULA/SHUNT SURGERY Right 5/23/2022    Procedure: RADIAL ARTERY REPAIR WITH HEMATOMA EVACUATION AND WASHOUT RIGHT;  Surgeon: Jaron Benavides MD;  [facility];  Service: Vascular;  Laterality: Right;   • CAPSULE ENDOSCOPY N/A 5/25/2022    Procedure: CAPSULE ENDOSCOPY ESOPHAGUS TO ILEUM DEPLOYED;  Surgeon: Michele Rogers MD;  [facility];  Service: Gastroenterology;  Laterality: N/A;   • CARDIAC CATHETERIZATION N/A 5/23/2022    Procedure: LEFT HEART CATH;  Surgeon: Manjit Wolfe IV, MD;  Location:  [facility];  Service: Cardiovascular;  Laterality: N/A;   • CARDIAC CATHETERIZATION N/A 5/23/2022    Procedure: Optical Coherent Tomography;  Surgeon: Manjit Wolfe IV, MD;  Location:  [facility];  Service: Cardiovascular;  Laterality: N/A;   • CARDIAC CATHETERIZATION N/A  5/23/2022    Procedure: Stent MARIA ELENA coronary;  Surgeon: Manjit Wolfe IV, MD;  Location:  [facility];  Service: Cardiovascular;  Laterality: N/A;   • COLONOSCOPY N/A 5/25/2022    Procedure: COLONOSCOPY;  Surgeon: Michele Rogers MD;  Location:  [facility];  Service: Gastroenterology;  Laterality: N/A;   • CRANIAL NEUROSTIMULATOR INSERTION/REPLACEMENT Right    • ENDOSCOPY N/A 5/25/2022    Procedure: ESOPHAGOGASTRODUODENOSCOPY;  Surgeon: Michele Rogers MD;  Location:  [facility];  Service: Gastroenterology;  Laterality: N/A;   • EYE SURGERY      shunt placement by ophthalmology       Family History   Problem Relation Age of Onset   • Heart failure Mother    • Lung cancer Father         smoker   • Heart disease Sister    • Breast cancer Sister    • Aplastic anemia Brother    • No Known Problems Maternal Grandmother    • No Known Problems Maternal Grandfather    • No Known Problems Paternal Grandmother    • No Known Problems Paternal Grandfather    • Atrial fibrillation Son    • Ovarian cancer Neg Hx    • Colon cancer Neg Hx        Social History     Socioeconomic History   • Marital status:    Tobacco Use   • Smoking status: Never Smoker   • Smokeless tobacco: Never Used   Vaping Use   • Vaping Use: Never used   Substance and Sexual Activity   • Alcohol use: Yes     Alcohol/week: 5.0 standard drinks     Types: 5 Standard drinks or equivalent per week   • Drug use: No   • Sexual activity: Yes     Birth control/protection: None       Allergies   Allergen Reactions   • Ferrlecit [Na Ferric Gluc Cplx In Sucrose] Anaphylaxis   • Codeine Other (See Comments)   • Metoprolol Other (See Comments)     Fatigue     • Statins Myalgia       ROS  Review of Systems   Constitutional: Positive for fatigue. Negative for chills and fever.   Respiratory: Negative for cough, shortness of breath and wheezing.    Cardiovascular: Negative for chest pain, palpitations and leg swelling.  "  Musculoskeletal: Positive for neck pain. Negative for gait problem.   Neurological: Positive for headache. Negative for dizziness, weakness and numbness.       Vitals:    07/28/22 1459   BP: 126/68   BP Location: Left arm   Patient Position: Sitting   Cuff Size: Adult   Pulse: 69   Temp: 97.6 °F (36.4 °C)   SpO2: 97%   Weight: 56.7 kg (125 lb)   Height: 163.8 cm (64.49\")   PainSc: 0-No pain     Body mass index is 21.13 kg/m².    Current Outpatient Medications on File Prior to Visit   Medication Sig Dispense Refill   • Acetaminophen (Tylenol) 325 MG capsule Take 650 mg by mouth 4 (Four) Times a Day.     • aspirin 81 MG EC tablet Take 1 tablet by mouth Daily. 90 tablet 1   • bimatoprost (Lumigan) 0.01 % ophthalmic drops Administer 1 drop to both eyes Every Night.     • bisoprolol (ZEBeta) 5 MG tablet Take 0.5 tablets by mouth Daily. 90 tablet 1   • calcium-vitamin D (Oscal 500/200 D-3) 500-200 MG-UNIT per tablet Take 2 tablets by mouth Daily. 60 tablet 11   • cholecalciferol (VITAMIN D3) 1.25 MG (02348 UT) capsule Take 1 capsule by mouth 1 (One) Time Per Week. 12 capsule 1   • clopidogrel (PLAVIX) 75 MG tablet Take 1 tablet by mouth Daily. 90 tablet 3   • ezetimibe (ZETIA) 10 MG tablet Take 5 mg by mouth Daily.     • ferrous sulfate 325 (65 FE) MG tablet Take 1 tablet by mouth Daily With Breakfast. 60 tablet 0   • folic acid (FOLVITE) 1 MG tablet Take 1 tablet by mouth Daily. (Patient taking differently: Take 0.5 mg by mouth Daily.) 90 tablet 1   • gabapentin (NEURONTIN) 100 MG capsule Take 1 capsule by mouth Every Night. 30 capsule 0   • Polyvinyl Alcohol-Povidone PF (HYPOTEARS) 1.4-0.6 % ophthalmic solution 1-2 drops.     • rosuvastatin (CRESTOR) 5 MG tablet Take 1 tablet by mouth Every 72 (Seventy-Two) Hours. 90 tablet 1   • spironolactone (ALDACTONE) 25 MG tablet Take 1 tablet by mouth Daily. 90 tablet 1   • timolol (TIMOPTIC) 0.5 % ophthalmic solution Administer 0.5 drops to both eyes 2 (two) times a day.     • " topiramate (TOPAMAX) 100 MG tablet Take 1 tablet by mouth 2 (Two) Times a Day. 180 tablet 3   • valsartan (DIOVAN) 80 MG tablet TAKE 1 TABLET EVERY DAY 90 tablet 1     No current facility-administered medications on file prior to visit.       Results for orders placed or performed in visit on 07/26/22   Folate    Specimen: Blood   Result Value Ref Range    Folate 18.30 4.78 - 24.20 ng/mL   Ferritin    Specimen: Blood   Result Value Ref Range    Ferritin 107.70 13.00 - 150.00 ng/mL   Iron Profile    Specimen: Blood   Result Value Ref Range    Iron 112 37 - 145 mcg/dL    Iron Saturation 27 20 - 50 %    Transferrin 283 200 - 360 mg/dL    TIBC 422 298 - 536 mcg/dL   CBC Auto Differential    Specimen: Blood   Result Value Ref Range    WBC 5.00 3.40 - 10.80 10*3/mm3    RBC 3.60 (L) 3.77 - 5.28 10*6/mm3    Hemoglobin 11.3 (L) 12.0 - 15.9 g/dL    Hematocrit 36.8 34.0 - 46.6 %    RDW 21.8 (H) 12.3 - 15.4 %    .3 (H) 79.0 - 97.0 fL    MCH 31.4 26.6 - 33.0 pg    MCHC 30.7 (L) 31.5 - 35.7 g/dL    MPV 7.7 6.0 - 12.0 fL    Platelets 281 140 - 450 10*3/mm3    Neutrophil % 54.1 42.7 - 76.0 %    Lymphocyte % 36.2 19.6 - 45.3 %    Monocyte % 9.7 5.0 - 12.0 %    Neutrophils, Absolute 2.70 1.70 - 7.00 10*3/mm3    Lymphocytes, Absolute 1.80 0.70 - 3.10 10*3/mm3    Monocytes, Absolute 0.50 0.10 - 0.90 10*3/mm3   Basic Metabolic Panel    Specimen: Blood   Result Value Ref Range    Glucose 88 65 - 99 mg/dL    BUN 27 (H) 8 - 23 mg/dL    Creatinine 1.06 (H) 0.57 - 1.00 mg/dL    Sodium 140 136 - 145 mmol/L    Potassium 5.3 (H) 3.5 - 5.2 mmol/L    Chloride 109 (H) 98 - 107 mmol/L    CO2 16.4 (L) 22.0 - 29.0 mmol/L    Calcium 9.7 8.6 - 10.5 mg/dL    BUN/Creatinine Ratio 25.5 (H) 7.0 - 25.0    Anion Gap 14.6 5.0 - 15.0 mmol/L    eGFR 55.2 (L) >60.0 mL/min/1.73       PE    Physical Exam  Vitals reviewed.   Constitutional:       General: She is not in acute distress.     Appearance: Normal appearance. She is well-developed and normal  weight. She is not ill-appearing or diaphoretic.   HENT:      Head: Normocephalic and atraumatic.   Eyes:      Extraocular Movements: Extraocular movements intact.      Conjunctiva/sclera: Conjunctivae normal.   Cardiovascular:      Rate and Rhythm: Normal rate and regular rhythm.      Heart sounds: Normal heart sounds.   Pulmonary:      Effort: Pulmonary effort is normal.      Breath sounds: Normal breath sounds.   Musculoskeletal:         General: Normal range of motion.      Cervical back: Normal range of motion.      Right lower leg: No edema.      Left lower leg: No edema.   Skin:     General: Skin is warm.      Findings: Bruising present. No erythema or rash.   Neurological:      General: No focal deficit present.      Mental Status: She is alert.   Psychiatric:         Attention and Perception: Attention and perception normal. She is attentive.         Mood and Affect: Mood and affect normal.         Speech: Speech normal.         Behavior: Behavior normal. Behavior is cooperative.         Thought Content: Thought content normal.         Cognition and Memory: Cognition and memory normal.         Judgment: Judgment normal.         A/P    Diagnoses and all orders for this visit:    1. Other migraine without status migrainosus, not intractable (Primary)  2. Bilateral occipital neuralgia  Discussed that the gabapentin may be beneficial for occipital neuralgia and I encouraged her to trial the gabapentin 100 mg nightly.  She has ongoing neck pain that radiates into her scalp causing migraines.  She has been to multiple neurologists and tried multiple medications without benefit.  She declines further referral today.  She will call if she changes her mind and wants a referral to a new neurologist.    3. Sciatica of left side  Patient did not have to start the gabapentin.    4. Essential hypertension  -     Basic Metabolic Panel; Future  Stable, well-controlled.  Compliant on medication.    5. Chronic systolic  congestive heart failure (HCC)  6. NSTEMI (non-ST elevated myocardial infarction) (HCC)  Followed by cardiology.  Compliant on all medications.  On ARB/diuretic/BB.    7. Symptomatic anemia  Patient no longer symptomatic from her anemia.  Reviewed labs today and they are improving.  She is followed by hematology.  She continues to take her iron supplementation.         Plan of care reviewed with patient at the conclusion of today's visit. Education was provided regarding diagnosis, management and any prescribed or recommended OTC medications.  Patient verbalizes understanding of and agreement with management plan.    Return in about 3 months (around 10/28/2022) for Medicare Wellness.     Jessica Roach PA-C

## 2022-07-31 DIAGNOSIS — E87.5 HYPERKALEMIA: Primary | ICD-10-CM

## 2022-08-01 ENCOUNTER — LAB (OUTPATIENT)
Dept: LAB | Facility: HOSPITAL | Age: 72
End: 2022-08-01

## 2022-08-01 DIAGNOSIS — I50.21 ACUTE SYSTOLIC CONGESTIVE HEART FAILURE: ICD-10-CM

## 2022-08-01 DIAGNOSIS — E87.5 HYPERKALEMIA: ICD-10-CM

## 2022-08-01 LAB
ANION GAP SERPL CALCULATED.3IONS-SCNC: 12 MMOL/L (ref 5–15)
BUN SERPL-MCNC: 32 MG/DL (ref 8–23)
BUN/CREAT SERPL: 26.4 (ref 7–25)
CALCIUM SPEC-SCNC: 9.3 MG/DL (ref 8.6–10.5)
CHLORIDE SERPL-SCNC: 107 MMOL/L (ref 98–107)
CO2 SERPL-SCNC: 18 MMOL/L (ref 22–29)
CREAT SERPL-MCNC: 1.21 MG/DL (ref 0.57–1)
EGFRCR SERPLBLD CKD-EPI 2021: 47.1 ML/MIN/1.73
GLUCOSE SERPL-MCNC: 116 MG/DL (ref 65–99)
POTASSIUM SERPL-SCNC: 4.4 MMOL/L (ref 3.5–5.2)
SODIUM SERPL-SCNC: 137 MMOL/L (ref 136–145)

## 2022-08-01 PROCEDURE — 36415 COLL VENOUS BLD VENIPUNCTURE: CPT

## 2022-08-01 PROCEDURE — 80048 BASIC METABOLIC PNL TOTAL CA: CPT

## 2022-08-01 PROCEDURE — 83880 ASSAY OF NATRIURETIC PEPTIDE: CPT

## 2022-08-02 ENCOUNTER — TELEPHONE (OUTPATIENT)
Dept: CARDIOLOGY | Facility: HOSPITAL | Age: 72
End: 2022-08-02

## 2022-08-02 LAB — NT-PROBNP SERPL-MCNC: 316 PG/ML (ref 0–900)

## 2022-08-02 NOTE — TELEPHONE ENCOUNTER
Called patient with lab results. Labs stable. I did encourage her to increase hydration due to slight bump in creatinine. Will continue GDMT.  She says she is doing well.  Advised to call if she has any further questions or concerns

## 2022-10-17 DIAGNOSIS — M54.2 CERVICALGIA: Primary | ICD-10-CM

## 2022-10-17 RX ORDER — METHYLPREDNISOLONE 4 MG/1
TABLET ORAL
Qty: 21 EACH | Refills: 0 | Status: SHIPPED | OUTPATIENT
Start: 2022-10-17 | End: 2023-01-03

## 2022-10-27 ENCOUNTER — OFFICE VISIT (OUTPATIENT)
Dept: ONCOLOGY | Facility: CLINIC | Age: 72
End: 2022-10-27

## 2022-10-27 ENCOUNTER — LAB (OUTPATIENT)
Dept: LAB | Facility: HOSPITAL | Age: 72
End: 2022-10-27

## 2022-10-27 VITALS
WEIGHT: 124 LBS | HEIGHT: 64 IN | BODY MASS INDEX: 21.17 KG/M2 | TEMPERATURE: 96.3 F | RESPIRATION RATE: 16 BRPM | OXYGEN SATURATION: 99 % | SYSTOLIC BLOOD PRESSURE: 135 MMHG | DIASTOLIC BLOOD PRESSURE: 75 MMHG | HEART RATE: 88 BPM

## 2022-10-27 DIAGNOSIS — D50.8 OTHER IRON DEFICIENCY ANEMIA: Primary | ICD-10-CM

## 2022-10-27 DIAGNOSIS — D50.8 OTHER IRON DEFICIENCY ANEMIA: ICD-10-CM

## 2022-10-27 LAB
ALBUMIN SERPL-MCNC: 4.3 G/DL (ref 3.5–5.2)
ALBUMIN/GLOB SERPL: 1.5 G/DL
ALP SERPL-CCNC: 73 U/L (ref 39–117)
ALT SERPL W P-5'-P-CCNC: 22 U/L (ref 1–33)
ANION GAP SERPL CALCULATED.3IONS-SCNC: 10 MMOL/L (ref 5–15)
AST SERPL-CCNC: 20 U/L (ref 1–32)
BASOPHILS # BLD AUTO: 0.04 10*3/MM3 (ref 0–0.2)
BASOPHILS NFR BLD AUTO: 0.8 % (ref 0–1.5)
BILIRUB SERPL-MCNC: 0.2 MG/DL (ref 0–1.2)
BUN SERPL-MCNC: 21 MG/DL (ref 8–23)
BUN/CREAT SERPL: 19.6 (ref 7–25)
CALCIUM SPEC-SCNC: 9.5 MG/DL (ref 8.6–10.5)
CHLORIDE SERPL-SCNC: 111 MMOL/L (ref 98–107)
CO2 SERPL-SCNC: 20 MMOL/L (ref 22–29)
CREAT SERPL-MCNC: 1.07 MG/DL (ref 0.57–1)
DEPRECATED RDW RBC AUTO: 76.3 FL (ref 37–54)
EGFRCR SERPLBLD CKD-EPI 2021: 54.6 ML/MIN/1.73
EOSINOPHIL # BLD AUTO: 0.09 10*3/MM3 (ref 0–0.4)
EOSINOPHIL NFR BLD AUTO: 1.9 % (ref 0.3–6.2)
ERYTHROCYTE [DISTWIDTH] IN BLOOD BY AUTOMATED COUNT: 16.8 % (ref 12.3–15.4)
FERRITIN SERPL-MCNC: 317.7 NG/ML (ref 13–150)
GLOBULIN UR ELPH-MCNC: 2.9 GM/DL
GLUCOSE SERPL-MCNC: 92 MG/DL (ref 65–99)
HCT VFR BLD AUTO: 35.5 % (ref 34–46.6)
HGB BLD-MCNC: 11.4 G/DL (ref 12–15.9)
IMM GRANULOCYTES # BLD AUTO: 0.03 10*3/MM3 (ref 0–0.05)
IMM GRANULOCYTES NFR BLD AUTO: 0.6 % (ref 0–0.5)
LYMPHOCYTES # BLD AUTO: 1.13 10*3/MM3 (ref 0.7–3.1)
LYMPHOCYTES NFR BLD AUTO: 23.6 % (ref 19.6–45.3)
MCH RBC QN AUTO: 39 PG (ref 26.6–33)
MCHC RBC AUTO-ENTMCNC: 32.1 G/DL (ref 31.5–35.7)
MCV RBC AUTO: 121.6 FL (ref 79–97)
MONOCYTES # BLD AUTO: 0.45 10*3/MM3 (ref 0.1–0.9)
MONOCYTES NFR BLD AUTO: 9.4 % (ref 5–12)
NEUTROPHILS NFR BLD AUTO: 3.04 10*3/MM3 (ref 1.7–7)
NEUTROPHILS NFR BLD AUTO: 63.7 % (ref 42.7–76)
PLATELET # BLD AUTO: 294 10*3/MM3 (ref 140–450)
PMV BLD AUTO: 8.9 FL (ref 6–12)
POTASSIUM SERPL-SCNC: 4.9 MMOL/L (ref 3.5–5.2)
PROT SERPL-MCNC: 7.2 G/DL (ref 6–8.5)
RBC # BLD AUTO: 2.92 10*6/MM3 (ref 3.77–5.28)
SODIUM SERPL-SCNC: 141 MMOL/L (ref 136–145)
WBC NRBC COR # BLD: 4.78 10*3/MM3 (ref 3.4–10.8)

## 2022-10-27 PROCEDURE — 85025 COMPLETE CBC W/AUTO DIFF WBC: CPT

## 2022-10-27 PROCEDURE — 82728 ASSAY OF FERRITIN: CPT

## 2022-10-27 PROCEDURE — 99214 OFFICE O/P EST MOD 30 MIN: CPT | Performed by: INTERNAL MEDICINE

## 2022-10-27 PROCEDURE — 82746 ASSAY OF FOLIC ACID SERUM: CPT

## 2022-10-27 PROCEDURE — 82607 VITAMIN B-12: CPT

## 2022-10-27 PROCEDURE — 36415 COLL VENOUS BLD VENIPUNCTURE: CPT

## 2022-10-27 PROCEDURE — 80053 COMPREHEN METABOLIC PANEL: CPT

## 2022-10-27 RX ORDER — FLUOROURACIL 50 MG/G
CREAM TOPICAL
COMMUNITY

## 2022-10-27 NOTE — PROGRESS NOTES
Follow up     Date: .10/27/22      Patient Name: Fabiola Salmeron  MRN: [id number]  : 1950     Referring Physician: Dr. Jessica Roach    Chief Complaint: Iron deficiency anemia follow up      History of Present Illness: Fabiola Salmeron is a pleasant 72 y.o. female who presents today for evaluation of iron deficiency anemia. The patient is accompanied by her supportive  who contributes to the history of her care.     Her most recent prior CBC is from 2021 and was normal.    She then presented with progressive dyspnea on exertion.  She initially attributed her symptoms to acid reflux as she was also having accompanying belching, however her symptoms did not resolve with a PPI trial which had previously helped.  She was seen by her primary care physician and had a CBC performed on 2022.  This showed a hemoglobin of 7.6 with a slightly elevated RDW at 16 and a normal white blood cell count and platelet count.  She was referred to the emergency department and was admitted to the hospital through 2022.  There she had additional blood work including an iron battery which was notable for an iron saturation of 5% and an elevated TIBC consistent with iron deficiency anemia.  She was started on intravenous iron and received a unit of PRBCs.  She was also started on steroids for an acute sciatica flare.  She received her first dose of Ferrlecit on  without incident.  However following her second infusion on  she developed flushing, became diaphoretic, tachycardic, and hypotensive with brief episode of syncope and RRT was called.  Her  who accompanies her reports that she received chest compressions.  Per nursing records, she received 1L NS bolus with transfer to ICU for further management.     Underwent egd and c-scope 22 which revealed no source of bleeding and video endoscopy capsule was performed which was negative    She was found to have an elevated troponin and a low  ejection fraction at 36% within the first 24 hours of admission, with pattern concerning for Takotsubo cardiomyopathy.  She underwent LHC on 5/23/22 which revealed stenosis to her LAD and RCA and underwent drug-eluting stents x2. Post-procedure, patient was found to have hemorrhage from her right radial artery and underwent radial artery repair and hematoma evacuation by Dr. Benavides of vascular surgery.     I reviewed her blood work prior to, during, and following admission.  She had severe anemia of unclear chronicity given that her previous CBC was greater than 1 year ago.  She has maintained her hemoglobin following hospital discharge.  B12 levels were normal.  Iron levels were consistent with iron deficiency. She is not a strict vegetarian and endorses eating red meat.  She has no prior history of gastro intestinal surgery.  No history of autoimmune disorders.    She was started on oral iron once daily which she is tolerating well with no GI side effects.  Her PPI was stopped by cardiology pharmacist.    No personal history of transfusions prior to her current presentation.  No prior history of anemia as a child or teen or during her reproductive years.  No history of hematologic disorders, malignancy, CVA, or VTE.    Family history is notable for aplastic anemia in her brother lung cancer in her father and a sister with breast cancer at age 59.    Interval history:  Hands stay cold. No SOA/Cough. Epistaxis in AM when blowing her nose, stops spontaneously. She is otherwise feeling well.  She denies any fevers, chest pain, cough or other bleeding symtoms.  Started folic acid 6/16/22 and is taking 500 mg daily.  She continues on oral iron 325 mg daily.  She is tolerating this well. Planning to go to [place] next month to visit her granddaughter.       Past Medical History:   Past Medical History:   Diagnosis Date   • Candidiasis of vulva and vagina    • CHF (congestive heart failure) (HCC)    • Coronary  artery disease    • Hypertension        Past Surgical History:   Past Surgical History:   Procedure Laterality Date   • ARTERIOVENOUS FISTULA/SHUNT SURGERY Right 5/23/2022    Procedure: RADIAL ARTERY REPAIR WITH HEMATOMA EVACUATION AND WASHOUT RIGHT;  Surgeon: Jaron Benavides MD;  Location:  [facility];  Service: Vascular;  Laterality: Right;   • CAPSULE ENDOSCOPY N/A 5/25/2022    Procedure: CAPSULE ENDOSCOPY ESOPHAGUS TO ILEUM DEPLOYED;  Surgeon: Michele Rogers MD;  Location:  [facility];  Service: Gastroenterology;  Laterality: N/A;   • CARDIAC CATHETERIZATION N/A 5/23/2022    Procedure: LEFT HEART CATH;  Surgeon: Manjit Wolfe IV, MD;  Location:  [facility];  Service: Cardiovascular;  Laterality: N/A;   • CARDIAC CATHETERIZATION N/A 5/23/2022    Procedure: Optical Coherent Tomography;  Surgeon: Manjit Wolfe IV, MD;  Location:  [facility];  Service: Cardiovascular;  Laterality: N/A;   • CARDIAC CATHETERIZATION N/A 5/23/2022    Procedure: Stent MARIA ELENA coronary;  Surgeon: Manjit Wolfe IV, MD;  Location:  [facility];  Service: Cardiovascular;  Laterality: N/A;   • COLONOSCOPY N/A 5/25/2022    Procedure: COLONOSCOPY;  Surgeon: Michele Rogers MD;  Location:  [facility];  Service: Gastroenterology;  Laterality: N/A;   • CRANIAL NEUROSTIMULATOR INSERTION/REPLACEMENT Right    • ENDOSCOPY N/A 5/25/2022    Procedure: ESOPHAGOGASTRODUODENOSCOPY;  Surgeon: Michele Rogers MD;  Location:  [facility];  Service: Gastroenterology;  Laterality: N/A;   • EYE SURGERY      shunt placement by ophthalmology       Family History:   Family History   Problem Relation Age of Onset   • Heart failure Mother    • Lung cancer Father         smoker   • Heart disease Sister    • Breast cancer Sister    • Aplastic anemia Brother    • No Known Problems Maternal Grandmother    • No Known Problems Maternal Grandfather    • No Known  Problems Paternal Grandmother    • No Known Problems Paternal Grandfather    • Atrial fibrillation Son    • Ovarian cancer Neg Hx    • Colon cancer Neg Hx        Social History:   Social History     Socioeconomic History   • Marital status:    Tobacco Use   • Smoking status: Never   • Smokeless tobacco: Never   Vaping Use   • Vaping Use: Never used   Substance and Sexual Activity   • Alcohol use: Yes     Alcohol/week: 5.0 standard drinks     Types: 5 Standard drinks or equivalent per week   • Drug use: No   • Sexual activity: Yes     Birth control/protection: None       Medications:     Current Outpatient Medications:   •  Acetaminophen (Tylenol) 325 MG capsule, Take 650 mg by mouth 4 (Four) Times a Day., Disp: , Rfl:   •  aspirin 81 MG EC tablet, Take 1 tablet by mouth Daily., Disp: 90 tablet, Rfl: 1  •  bimatoprost (Lumigan) 0.01 % ophthalmic drops, Administer 1 drop to both eyes Every Night., Disp: , Rfl:   •  bisoprolol (ZEBeta) 5 MG tablet, Take 0.5 tablets by mouth Daily., Disp: 90 tablet, Rfl: 1  •  cholecalciferol (VITAMIN D3) 1.25 MG (18774 UT) capsule, Take 1 capsule by mouth 1 (One) Time Per Week., Disp: 12 capsule, Rfl: 1  •  clopidogrel (PLAVIX) 75 MG tablet, Take 1 tablet by mouth Daily., Disp: 90 tablet, Rfl: 3  •  ferrous sulfate 325 (65 FE) MG tablet, Take 1 tablet by mouth Daily With Breakfast., Disp: 60 tablet, Rfl: 0  •  fluorouracil (EFUDEX) 5 % cream, fluorouracil 5 % topical cream  APPLY A SUFFICIENT AMOUNT TO COVER THE LESIONS IN THE AFFECTED AREA(S) BY TOPICAL ROUTE 2 TIMES PER DAY FOR 21 DAYS, Disp: , Rfl:   •  folic acid (FOLVITE) 1 MG tablet, Take 1 tablet by mouth Daily. (Patient taking differently: Take 0.5 mg by mouth Daily.), Disp: 90 tablet, Rfl: 1  •  methylPREDNISolone (MEDROL) 4 MG dose pack, Take as directed on package instructions., Disp: 21 each, Rfl: 0  •  rosuvastatin (CRESTOR) 5 MG tablet, Take 1 tablet by mouth Every 72 (Seventy-Two) Hours., Disp: 90 tablet, Rfl:  "1  •  spironolactone (ALDACTONE) 25 MG tablet, Take 1 tablet by mouth Daily., Disp: 90 tablet, Rfl: 1  •  timolol (TIMOPTIC) 0.5 % ophthalmic solution, Administer 0.5 drops to both eyes 2 (two) times a day., Disp: , Rfl:   •  topiramate (TOPAMAX) 100 MG tablet, Take 1 tablet by mouth 2 (Two) Times a Day., Disp: 180 tablet, Rfl: 3  •  valsartan (DIOVAN) 80 MG tablet, TAKE 1 TABLET EVERY DAY, Disp: 90 tablet, Rfl: 1    Allergies:   Allergies   Allergen Reactions   • Ferrlecit [Na Ferric Gluc Cplx In Sucrose] Anaphylaxis   • Codeine Other (See Comments)   • Metoprolol Other (See Comments)     Fatigue     • Statins Myalgia       Objective     Vital Signs:   Vitals:    10/27/22 1345   BP: 135/75   Pulse: 88   Resp: 16   Temp: 96.3 °F (35.7 °C)   TempSrc: Temporal   SpO2: 99%   Weight: 56.2 kg (124 lb)   Height: 163.8 cm (64.49\")   PainSc: 0-No pain    Body mass index is 20.96 kg/m².   Pain Score    10/27/22 1345   PainSc: 0-No pain       Physical Exam:  General: No acute distress. Well appearing.  HEENT: Normocephalic, atraumatic. Sclera anicteric. Masked.  Neck: supple, no adenopathy.   Cardiovascular: regular rate and rhythm. No murmurs.   Respiratory: Normal rate. Clear to auscultation bilaterally  Abdomen: Soft, nontender, non distended with normoactive bowel sounds  Lymph: no cervical, supraclavicular or axillary adenopathy  Neuro: Alert and oriented x 3. No focal deficits.   Ext: Symmetric, no swelling.   Psych: Euthymic      Laboratory/Imaging Reviewed:   No visits with results within 2 Week(s) from this visit.   Latest known visit with results is:   Lab on 08/01/2022   Component Date Value Ref Range Status   • Glucose 08/01/2022 116 (H)  65 - 99 mg/dL Final   • BUN 08/01/2022 32 (H)  8 - 23 mg/dL Final   • Creatinine 08/01/2022 1.21 (H)  0.57 - 1.00 mg/dL Final   • Sodium 08/01/2022 137  136 - 145 mmol/L Final   • Potassium 08/01/2022 4.4  3.5 - 5.2 mmol/L Final   • Chloride 08/01/2022 107  98 - 107 mmol/L Final "   • CO2 08/01/2022 18.0 (L)  22.0 - 29.0 mmol/L Final   • Calcium 08/01/2022 9.3  8.6 - 10.5 mg/dL Final   • BUN/Creatinine Ratio 08/01/2022 26.4 (H)  7.0 - 25.0 Final   • Anion Gap 08/01/2022 12.0  5.0 - 15.0 mmol/L Final   • eGFR 08/01/2022 47.1 (L)  >60.0 mL/min/1.73 Final    National Kidney Foundation and American Society of Nephrology (ASN) Task Force recommended calculation based on the Chronic Kidney Disease Epidemiology Collaboration (CKD-EPI) equation refit without adjustment for race.   • proBNP 08/01/2022 316.0  0.0 - 900.0 pg/mL Final     Component      Latest Ref Rng & Units 7/26/2022 8/1/2022 10/27/2022   WBC      3.40 - 10.80 10*3/mm3 5.00  4.78   RBC      3.77 - 5.28 10*6/mm3 3.60 (L)  2.92 (L)   Hemoglobin      12.0 - 15.9 g/dL 11.3 (L)  11.4 (L)   Hematocrit      34.0 - 46.6 % 36.8  35.5   MCV      79.0 - 97.0 fL 102.3 (H)  121.6 (H)   MCH      26.6 - 33.0 pg 31.4  39.0 (H)   MCHC      31.5 - 35.7 g/dL 30.7 (L)  32.1   RDW      12.3 - 15.4 % 21.8 (H)  16.8 (H)   RDW-SD      37.0 - 54.0 fl   76.3 (H)   MPV      6.0 - 12.0 fL 7.7  8.9   Platelets      140 - 450 10*3/mm3 281  294   Neutrophil Rel %      42.7 - 76.0 % 54.1  63.7   Lymphocyte Rel %      19.6 - 45.3 % 36.2  23.6   Monocyte Rel %      5.0 - 12.0 % 9.7  9.4   Eosinophil Rel %      0.3 - 6.2 %   1.9   Basophil Rel %      0.0 - 1.5 %   0.8   Immature Granulocyte Rel %      0.0 - 0.5 %   0.6 (H)   Neutrophils Absolute      1.70 - 7.00 10*3/mm3 2.70  3.04   Lymphocytes Absolute      0.70 - 3.10 10*3/mm3 1.80  1.13   Monocytes Absolute      0.10 - 0.90 10*3/mm3 0.50  0.45   Eosinophils Absolute      0.00 - 0.40 10*3/mm3   0.09   Basophils Absolute      0.00 - 0.20 10*3/mm3   0.04   Immature Grans, Absolute      0.00 - 0.05 10*3/mm3   0.03   nRBC      0.0 - 0.2 /100 WBC      Glucose      65 - 99 mg/dL 88 116 (H) 92   BUN      8 - 23 mg/dL 27 (H) 32 (H) 21   Creatinine      0.57 - 1.00 mg/dL 1.06 (H) 1.21 (H) 1.07 (H)   Sodium      136 - 145  mmol/L 140 137 141   Potassium      3.5 - 5.2 mmol/L 5.3 (H) 4.4 4.9   Chloride      98 - 107 mmol/L 109 (H) 107 111 (H)   CO2      22.0 - 29.0 mmol/L 16.4 (L) 18.0 (L) 20.0 (L)   Calcium      8.6 - 10.5 mg/dL 9.7 9.3 9.5   Total Protein      6.0 - 8.5 g/dL   7.2   Albumin      3.50 - 5.20 g/dL   4.30   ALT (SGPT)      1 - 33 U/L   22   AST (SGOT)      1 - 32 U/L   20   Alkaline Phosphatase      39 - 117 U/L   73   Total Bilirubin      0.0 - 1.2 mg/dL   0.2   Globulin      gm/dL   2.9   A/G Ratio      g/dL   1.5   BUN/Creatinine Ratio      7.0 - 25.0 25.5 (H) 26.4 (H) 19.6   Anion Gap      5.0 - 15.0 mmol/L 14.6 12.0 10.0   eGFR      >60.0 mL/min/1.73 55.2 (L) 47.1 (L) 54.6 (L)   Anisocytes      None Seen      Dacrocytes      None Seen      WBC Morphology      Normal      Platelet Morphology      Normal      Iron      37 - 145 mcg/dL 112     Iron Saturation      20 - 50 % 27     Transferrin      200 - 360 mg/dL 283     TIBC      298 - 536 mcg/dL 422     Performed by:            Pathologist Interpretation            Hemosiderin Urine Qual      Negative      Folate      4.78 - 24.20 ng/mL 18.30  18.70   Ferritin      13.00 - 150.00 ng/mL 107.70  317.70 (H)   proBNP      0.0 - 900.0 pg/mL  316.0      No results found.      Assessment / Plan      Assessment/Plan:   1. Other iron deficiency anemia (Primary)  2. Allergic reaction to drug  3. Macrocytosis  -History is via patient, her  who provides additional details surrounding her allergic reaction and review of extensive treatment records including hospital course blood count history, additional laboratory work-up and GI and cardiac work-up.  -She has history of severe iron deficiency anemia of unclear etiology.  She has had an extensive negative work-up for a GI source of blood loss.  This was unrevealing.  Her history does not reveal any obvious alternative source of blood loss.  It is possible that she had a slow gastrointestinal bleed that was not identified  on work-up during hospital admission.  She had an anaphylactic reaction to intravenous iron while admitted to the hospital and should no longer receive Ferrlecit.  We discussed that if intravenous iron is required, we could cautiously consider use of an alternative formulation, but she would require close monitoring.  Given that she has no clear history of malabsorption and her blood counts are stable, I would prefer to continue with oral repletion rather than rechallenge in her with an alternative formulation of IV iron.    -She was also found to have a low folate level.  -She is tolerating oral iron and folic acid once daily. Can stop oral iron  -Her CBC is improving but MCV from today 120 which is an acute rise and higher than I would expect from reticulocytosis. Repeat B12. Repeat CBC and PBS in one month. If progressive macrocytosis consider bone marrow biopsy to evaluate for underlying bm disease    3. CAD on dual antiplatelets  -We discussed that this would increase her risk for recurrent bleeding if bleeding was the source of her iron deficiency anemia, but important to continue with her cardiac medications given recent stenting, and have recommended closer monitoring of her CBC based on this.        Follow Up:   1 mo with cbc prior  Abeba Lopez MD   Hematology and Oncology

## 2022-10-28 ENCOUNTER — TELEPHONE (OUTPATIENT)
Dept: ONCOLOGY | Facility: CLINIC | Age: 72
End: 2022-10-28

## 2022-10-28 LAB
FOLATE SERPL-MCNC: 18.7 NG/ML (ref 4.78–24.2)
VIT B12 BLD-MCNC: 472 PG/ML (ref 211–946)

## 2022-10-28 RX ORDER — FOLIC ACID 1 MG/1
1 TABLET ORAL DAILY
Qty: 90 TABLET | Refills: 1 | Status: SHIPPED | OUTPATIENT
Start: 2022-10-28

## 2022-10-28 NOTE — TELEPHONE ENCOUNTER
Notified patient per Dr. Lopez to stop taking iron and to continue folate once daily.  Also, notified patient per Dr. Lopez that her hemoglobin is stable but MCV has increased so MD wants her to have a cbc repeated and a follow up with her in 4 weeks. Asked patient per MD to have the CBC drawn the day she has follow up with Dr. Lopez.  Advised her that scheduling will be reaching out to her to schedule her appointment.  She v/u.             Patient v/u.

## 2022-10-28 NOTE — PROGRESS NOTES
Please add repeat b12 to her labs, order placed. Please let her know her hemoglobin is stable but MCV has increased so I want her to have a cbc repeated and see me in 4 weeks. Please ask her to have the CBC drawn the day she comes to see me.

## 2022-11-21 DIAGNOSIS — I25.119 CORONARY ARTERY DISEASE INVOLVING NATIVE CORONARY ARTERY OF NATIVE HEART WITH ANGINA PECTORIS: ICD-10-CM

## 2022-11-22 RX ORDER — ASPIRIN 81 MG/1
TABLET, COATED ORAL
Qty: 90 TABLET | Refills: 3 | Status: SHIPPED | OUTPATIENT
Start: 2022-11-22

## 2022-11-29 ENCOUNTER — LAB (OUTPATIENT)
Dept: LAB | Facility: HOSPITAL | Age: 72
End: 2022-11-29

## 2022-11-29 ENCOUNTER — OFFICE VISIT (OUTPATIENT)
Dept: ONCOLOGY | Facility: CLINIC | Age: 72
End: 2022-11-29

## 2022-11-29 VITALS
HEIGHT: 65 IN | WEIGHT: 124 LBS | TEMPERATURE: 97.1 F | RESPIRATION RATE: 18 BRPM | SYSTOLIC BLOOD PRESSURE: 121 MMHG | DIASTOLIC BLOOD PRESSURE: 74 MMHG | HEART RATE: 76 BPM | OXYGEN SATURATION: 98 % | BODY MASS INDEX: 20.66 KG/M2

## 2022-11-29 DIAGNOSIS — D50.8 OTHER IRON DEFICIENCY ANEMIA: Primary | ICD-10-CM

## 2022-11-29 DIAGNOSIS — D50.8 OTHER IRON DEFICIENCY ANEMIA: ICD-10-CM

## 2022-11-29 LAB
BASOPHILS # BLD AUTO: 0.03 10*3/MM3 (ref 0–0.2)
BASOPHILS NFR BLD AUTO: 0.6 % (ref 0–1.5)
DEPRECATED RDW RBC AUTO: 59.6 FL (ref 37–54)
EOSINOPHIL # BLD AUTO: 0.11 10*3/MM3 (ref 0–0.4)
EOSINOPHIL NFR BLD AUTO: 2.2 % (ref 0.3–6.2)
ERYTHROCYTE [DISTWIDTH] IN BLOOD BY AUTOMATED COUNT: 12.4 % (ref 12.3–15.4)
FERRITIN SERPL-MCNC: 216.6 NG/ML (ref 13–150)
HCT VFR BLD AUTO: 38.8 % (ref 34–46.6)
HGB BLD-MCNC: 12.5 G/DL (ref 12–15.9)
IMM GRANULOCYTES # BLD AUTO: 0.02 10*3/MM3 (ref 0–0.05)
IMM GRANULOCYTES NFR BLD AUTO: 0.4 % (ref 0–0.5)
IRON 24H UR-MRATE: 88 MCG/DL (ref 37–145)
IRON SATN MFR SERPL: 23 % (ref 20–50)
LYMPHOCYTES # BLD AUTO: 1.26 10*3/MM3 (ref 0.7–3.1)
LYMPHOCYTES NFR BLD AUTO: 25.1 % (ref 19.6–45.3)
MCH RBC QN AUTO: 40.5 PG (ref 26.6–33)
MCHC RBC AUTO-ENTMCNC: 32.2 G/DL (ref 31.5–35.7)
MCV RBC AUTO: 125.6 FL (ref 79–97)
MONOCYTES # BLD AUTO: 0.42 10*3/MM3 (ref 0.1–0.9)
MONOCYTES NFR BLD AUTO: 8.4 % (ref 5–12)
NEUTROPHILS NFR BLD AUTO: 3.17 10*3/MM3 (ref 1.7–7)
NEUTROPHILS NFR BLD AUTO: 63.3 % (ref 42.7–76)
PLATELET # BLD AUTO: 250 10*3/MM3 (ref 140–450)
PMV BLD AUTO: 9.1 FL (ref 6–12)
RBC # BLD AUTO: 3.09 10*6/MM3 (ref 3.77–5.28)
TIBC SERPL-MCNC: 390 MCG/DL (ref 298–536)
TRANSFERRIN SERPL-MCNC: 262 MG/DL (ref 200–360)
WBC NRBC COR # BLD: 5.01 10*3/MM3 (ref 3.4–10.8)

## 2022-11-29 PROCEDURE — 36415 COLL VENOUS BLD VENIPUNCTURE: CPT

## 2022-11-29 PROCEDURE — 82746 ASSAY OF FOLIC ACID SERUM: CPT

## 2022-11-29 PROCEDURE — 99214 OFFICE O/P EST MOD 30 MIN: CPT | Performed by: INTERNAL MEDICINE

## 2022-11-29 PROCEDURE — 85060 BLOOD SMEAR INTERPRETATION: CPT

## 2022-11-29 PROCEDURE — 83540 ASSAY OF IRON: CPT

## 2022-11-29 PROCEDURE — 82728 ASSAY OF FERRITIN: CPT

## 2022-11-29 PROCEDURE — 85025 COMPLETE CBC W/AUTO DIFF WBC: CPT

## 2022-11-29 PROCEDURE — 84466 ASSAY OF TRANSFERRIN: CPT

## 2022-11-29 NOTE — PROGRESS NOTES
Follow up     Date: .22      Patient Name: Fabiola Salmeron  MRN: [id number]  : 1950     Referring Physician: Dr. Jessica Roach    Chief Complaint: Iron deficiency anemia follow up      History of Present Illness: Fabiola Salmeron is a pleasant 72 y.o. female who presents today for evaluation of iron deficiency anemia. The patient is accompanied by her supportive  who contributes to the history of her care.     Her most recent prior CBC is from 2021 and was normal.    She then presented with progressive dyspnea on exertion.  She initially attributed her symptoms to acid reflux as she was also having accompanying belching, however her symptoms did not resolve with a PPI trial which had previously helped.  She was seen by her primary care physician and had a CBC performed on 2022.  This showed a hemoglobin of 7.6 with a slightly elevated RDW at 16 and a normal white blood cell count and platelet count.  She was referred to the emergency department and was admitted to the hospital through 2022.  There she had additional blood work including an iron battery which was notable for an iron saturation of 5% and an elevated TIBC consistent with iron deficiency anemia.  She was started on intravenous iron and received a unit of PRBCs.  She was also started on steroids for an acute sciatica flare.  She received her first dose of Ferrlecit on  without incident.  However following her second infusion on  she developed flushing, became diaphoretic, tachycardic, and hypotensive with brief episode of syncope and RRT was called.  Her  who accompanies her reports that she received chest compressions.  Per nursing records, she received 1L NS bolus with transfer to ICU for further management.     Underwent egd and c-scope 22 which revealed no source of bleeding and video endoscopy capsule was performed which was negative    She was found to have an elevated troponin and a low  ejection fraction at 36% within the first 24 hours of admission, with pattern concerning for Takotsubo cardiomyopathy.  She underwent LHC on 5/23/22 which revealed stenosis to her LAD and RCA and underwent drug-eluting stents x2. Post-procedure, patient was found to have hemorrhage from her right radial artery and underwent radial artery repair and hematoma evacuation by Dr. Benavides of vascular surgery.     I reviewed her blood work prior to, during, and following admission.  She had severe anemia of unclear chronicity given that her previous CBC was greater than 1 year ago.  She has maintained her hemoglobin following hospital discharge.  B12 levels were normal.  Iron levels were consistent with iron deficiency. She is not a strict vegetarian and endorses eating red meat.  She has no prior history of gastro intestinal surgery.  No history of autoimmune disorders.    She was started on oral iron once daily which she is tolerating well with no GI side effects.  Her PPI was stopped by cardiology pharmacist.    No personal history of transfusions prior to her current presentation.  No prior history of anemia as a child or teen or during her reproductive years.  No history of hematologic disorders, malignancy, CVA, or VTE.    Family history is notable for aplastic anemia in her brother lung cancer in her father and a sister with breast cancer at age 59.    Interval history:  Hands stay cold. Got to travel to see granddaughter in [place]. . Continues folic acid supplement. Remains off oral iron. No shortness of breath. No recent infections. No other new complaints.    Past Medical History:   Past Medical History:   Diagnosis Date   • Candidiasis of vulva and vagina    • CHF (congestive heart failure) (HCC)    • Coronary artery disease    • Hypertension        Past Surgical History:   Past Surgical History:   Procedure Laterality Date   • ARTERIOVENOUS FISTULA/SHUNT SURGERY Right 5/23/2022    Procedure: RADIAL ARTERY  REPAIR WITH HEMATOMA EVACUATION AND WASHOUT RIGHT;  Surgeon: Jaron Benavides MD;  Location:  [facility];  Service: Vascular;  Laterality: Right;   • CAPSULE ENDOSCOPY N/A 5/25/2022    Procedure: CAPSULE ENDOSCOPY ESOPHAGUS TO ILEUM DEPLOYED;  Surgeon: Michele Rogers MD;  Location:  [facility];  Service: Gastroenterology;  Laterality: N/A;   • CARDIAC CATHETERIZATION N/A 5/23/2022    Procedure: LEFT HEART CATH;  Surgeon: Manjit Wolfe IV, MD;  Location:  [facility];  Service: Cardiovascular;  Laterality: N/A;   • CARDIAC CATHETERIZATION N/A 5/23/2022    Procedure: Optical Coherent Tomography;  Surgeon: Manjit Wolfe IV, MD;  Location:  [facility];  Service: Cardiovascular;  Laterality: N/A;   • CARDIAC CATHETERIZATION N/A 5/23/2022    Procedure: Stent MARIA ELENA coronary;  Surgeon: Manjit Wolfe IV, MD;  Location:  [facility];  Service: Cardiovascular;  Laterality: N/A;   • COLONOSCOPY N/A 5/25/2022    Procedure: COLONOSCOPY;  Surgeon: Michele Rogers MD;  Location:  [facility];  Service: Gastroenterology;  Laterality: N/A;   • CRANIAL NEUROSTIMULATOR INSERTION/REPLACEMENT Right    • ENDOSCOPY N/A 5/25/2022    Procedure: ESOPHAGOGASTRODUODENOSCOPY;  Surgeon: Michele Rogers MD;  Location:  [facility];  Service: Gastroenterology;  Laterality: N/A;   • EYE SURGERY      shunt placement by ophthalmology       Family History:   Family History   Problem Relation Age of Onset   • Heart failure Mother    • Lung cancer Father         smoker   • Heart disease Sister    • Breast cancer Sister    • Aplastic anemia Brother    • No Known Problems Maternal Grandmother    • No Known Problems Maternal Grandfather    • No Known Problems Paternal Grandmother    • No Known Problems Paternal Grandfather    • Atrial fibrillation Son    • Ovarian cancer Neg Hx    • Colon cancer Neg Hx        Social History:   Social History      Socioeconomic History   • Marital status:    Tobacco Use   • Smoking status: Never   • Smokeless tobacco: Never   Vaping Use   • Vaping Use: Never used   Substance and Sexual Activity   • Alcohol use: Yes     Alcohol/week: 5.0 standard drinks     Types: 5 Standard drinks or equivalent per week   • Drug use: No   • Sexual activity: Yes     Birth control/protection: None       Medications:     Current Outpatient Medications:   •  Acetaminophen (Tylenol) 325 MG capsule, Take 650 mg by mouth 4 (Four) Times a Day., Disp: , Rfl:   •  Aspirin Low Dose 81 MG EC tablet, TAKE 1 TABLET EVERY DAY, Disp: 90 tablet, Rfl: 3  •  bimatoprost (Lumigan) 0.01 % ophthalmic drops, Administer 1 drop to both eyes Every Night., Disp: , Rfl:   •  bisoprolol (ZEBeta) 5 MG tablet, Take 0.5 tablets by mouth Daily., Disp: 90 tablet, Rfl: 1  •  cholecalciferol (VITAMIN D3) 1.25 MG (25065 UT) capsule, Take 1 capsule by mouth 1 (One) Time Per Week., Disp: 12 capsule, Rfl: 1  •  clopidogrel (PLAVIX) 75 MG tablet, Take 1 tablet by mouth Daily., Disp: 90 tablet, Rfl: 3  •  ferrous sulfate 325 (65 FE) MG tablet, Take 1 tablet by mouth Daily With Breakfast., Disp: 60 tablet, Rfl: 0  •  fluorouracil (EFUDEX) 5 % cream, fluorouracil 5 % topical cream  APPLY A SUFFICIENT AMOUNT TO COVER THE LESIONS IN THE AFFECTED AREA(S) BY TOPICAL ROUTE 2 TIMES PER DAY FOR 21 DAYS, Disp: , Rfl:   •  folic acid (FOLVITE) 1 MG tablet, Take 1 tablet by mouth Daily., Disp: 90 tablet, Rfl: 1  •  methylPREDNISolone (MEDROL) 4 MG dose pack, Take as directed on package instructions., Disp: 21 each, Rfl: 0  •  rosuvastatin (CRESTOR) 5 MG tablet, Take 1 tablet by mouth Every 72 (Seventy-Two) Hours., Disp: 90 tablet, Rfl: 1  •  spironolactone (ALDACTONE) 25 MG tablet, Take 1 tablet by mouth Daily., Disp: 90 tablet, Rfl: 1  •  timolol (TIMOPTIC) 0.5 % ophthalmic solution, Administer 0.5 drops to both eyes 2 (two) times a day., Disp: , Rfl:   •  topiramate (TOPAMAX) 100 MG  "tablet, Take 1 tablet by mouth 2 (Two) Times a Day., Disp: 180 tablet, Rfl: 3  •  valsartan (DIOVAN) 80 MG tablet, TAKE 1 TABLET EVERY DAY, Disp: 90 tablet, Rfl: 1    Allergies:   Allergies   Allergen Reactions   • Ferrlecit [Na Ferric Gluc Cplx In Sucrose] Anaphylaxis   • Codeine Other (See Comments)   • Metoprolol Other (See Comments)     Fatigue     • Statins Myalgia       Objective     Vital Signs:   Vitals:    11/29/22 1359   BP: 121/74  Comment: LUE   Pulse: 76   Resp: 18   Temp: 97.1 °F (36.2 °C)   TempSrc: Infrared   SpO2: 98%  Comment: RA   Weight: 56.2 kg (124 lb)   Height: 163.8 cm (64.5\")   PainSc: 0-No pain    Body mass index is 20.96 kg/m².   Pain Score    11/29/22 1359   PainSc: 0-No pain       Physical Exam:  General: No acute distress. Well appearing.  HEENT: Normocephalic, atraumatic. Sclera anicteric. Masked.  Neck: supple, no adenopathy.   Cardiovascular: regular rate and rhythm. No murmurs.   Respiratory: Normal rate. Clear to auscultation bilaterally  Abdomen: Soft, nontender, non distended with normoactive bowel sounds  Lymph: no cervical, supraclavicular or axillary adenopathy  Neuro: Alert and oriented x 3. No focal deficits.   Ext: Symmetric, no swelling.   Psych: Euthymic      Laboratory/Imaging Reviewed:   No visits with results within 2 Week(s) from this visit.   Latest known visit with results is:   Lab on 10/27/2022   Component Date Value Ref Range Status   • Glucose 10/27/2022 92  65 - 99 mg/dL Final   • BUN 10/27/2022 21  8 - 23 mg/dL Final   • Creatinine 10/27/2022 1.07 (H)  0.57 - 1.00 mg/dL Final   • Sodium 10/27/2022 141  136 - 145 mmol/L Final   • Potassium 10/27/2022 4.9  3.5 - 5.2 mmol/L Final    Slight hemolysis detected by analyzer. Results may be affected.   • Chloride 10/27/2022 111 (H)  98 - 107 mmol/L Final   • CO2 10/27/2022 20.0 (L)  22.0 - 29.0 mmol/L Final   • Calcium 10/27/2022 9.5  8.6 - 10.5 mg/dL Final   • Total Protein 10/27/2022 7.2  6.0 - 8.5 g/dL Final   • " Albumin 10/27/2022 4.30  3.50 - 5.20 g/dL Final   • ALT (SGPT) 10/27/2022 22  1 - 33 U/L Final   • AST (SGOT) 10/27/2022 20  1 - 32 U/L Final   • Alkaline Phosphatase 10/27/2022 73  39 - 117 U/L Final   • Total Bilirubin 10/27/2022 0.2  0.0 - 1.2 mg/dL Final   • Globulin 10/27/2022 2.9  gm/dL Final    Calculated Result   • A/G Ratio 10/27/2022 1.5  g/dL Final   • BUN/Creatinine Ratio 10/27/2022 19.6  7.0 - 25.0 Final   • Anion Gap 10/27/2022 10.0  5.0 - 15.0 mmol/L Final   • eGFR 10/27/2022 54.6 (L)  >60.0 mL/min/1.73 Final    National Kidney Foundation and American Society of Nephrology (ASN) Task Force recommended calculation based on the Chronic Kidney Disease Epidemiology Collaboration (CKD-EPI) equation refit without adjustment for race.   • Folate 10/27/2022 18.70  4.78 - 24.20 ng/mL Final   • Ferritin 10/27/2022 317.70 (H)  13.00 - 150.00 ng/mL Final   • WBC 10/27/2022 4.78  3.40 - 10.80 10*3/mm3 Final   • RBC 10/27/2022 2.92 (L)  3.77 - 5.28 10*6/mm3 Final   • Hemoglobin 10/27/2022 11.4 (L)  12.0 - 15.9 g/dL Final   • Hematocrit 10/27/2022 35.5  34.0 - 46.6 % Final   • MCV 10/27/2022 121.6 (H)  79.0 - 97.0 fL Final   • MCH 10/27/2022 39.0 (H)  26.6 - 33.0 pg Final   • MCHC 10/27/2022 32.1  31.5 - 35.7 g/dL Final   • RDW 10/27/2022 16.8 (H)  12.3 - 15.4 % Final   • RDW-SD 10/27/2022 76.3 (H)  37.0 - 54.0 fl Final   • MPV 10/27/2022 8.9  6.0 - 12.0 fL Final   • Platelets 10/27/2022 294  140 - 450 10*3/mm3 Final   • Neutrophil % 10/27/2022 63.7  42.7 - 76.0 % Final   • Lymphocyte % 10/27/2022 23.6  19.6 - 45.3 % Final   • Monocyte % 10/27/2022 9.4  5.0 - 12.0 % Final   • Eosinophil % 10/27/2022 1.9  0.3 - 6.2 % Final   • Basophil % 10/27/2022 0.8  0.0 - 1.5 % Final   • Immature Grans % 10/27/2022 0.6 (H)  0.0 - 0.5 % Final   • Neutrophils, Absolute 10/27/2022 3.04  1.70 - 7.00 10*3/mm3 Final   • Lymphocytes, Absolute 10/27/2022 1.13  0.70 - 3.10 10*3/mm3 Final   • Monocytes, Absolute 10/27/2022 0.45  0.10 -  0.90 10*3/mm3 Final   • Eosinophils, Absolute 10/27/2022 0.09  0.00 - 0.40 10*3/mm3 Final   • Basophils, Absolute 10/27/2022 0.04  0.00 - 0.20 10*3/mm3 Final   • Immature Grans, Absolute 10/27/2022 0.03  0.00 - 0.05 10*3/mm3 Final   • Vitamin B-12 10/27/2022 472  211 - 946 pg/mL Final     Component      Latest Ref Rng & Units 7/26/2022 8/1/2022 10/27/2022   WBC      3.40 - 10.80 10*3/mm3 5.00  4.78   RBC      3.77 - 5.28 10*6/mm3 3.60 (L)  2.92 (L)   Hemoglobin      12.0 - 15.9 g/dL 11.3 (L)  11.4 (L)   Hematocrit      34.0 - 46.6 % 36.8  35.5   MCV      79.0 - 97.0 fL 102.3 (H)  121.6 (H)   MCH      26.6 - 33.0 pg 31.4  39.0 (H)   MCHC      31.5 - 35.7 g/dL 30.7 (L)  32.1   RDW      12.3 - 15.4 % 21.8 (H)  16.8 (H)   RDW-SD      37.0 - 54.0 fl   76.3 (H)   MPV      6.0 - 12.0 fL 7.7  8.9   Platelets      140 - 450 10*3/mm3 281  294   Neutrophil Rel %      42.7 - 76.0 % 54.1  63.7   Lymphocyte Rel %      19.6 - 45.3 % 36.2  23.6   Monocyte Rel %      5.0 - 12.0 % 9.7  9.4   Eosinophil Rel %      0.3 - 6.2 %   1.9   Basophil Rel %      0.0 - 1.5 %   0.8   Immature Granulocyte Rel %      0.0 - 0.5 %   0.6 (H)   Neutrophils Absolute      1.70 - 7.00 10*3/mm3 2.70  3.04   Lymphocytes Absolute      0.70 - 3.10 10*3/mm3 1.80  1.13   Monocytes Absolute      0.10 - 0.90 10*3/mm3 0.50  0.45   Eosinophils Absolute      0.00 - 0.40 10*3/mm3   0.09   Basophils Absolute      0.00 - 0.20 10*3/mm3   0.04   Immature Grans, Absolute      0.00 - 0.05 10*3/mm3   0.03   nRBC      0.0 - 0.2 /100 WBC      Glucose      65 - 99 mg/dL 88 116 (H) 92   BUN      8 - 23 mg/dL 27 (H) 32 (H) 21   Creatinine      0.57 - 1.00 mg/dL 1.06 (H) 1.21 (H) 1.07 (H)   Sodium      136 - 145 mmol/L 140 137 141   Potassium      3.5 - 5.2 mmol/L 5.3 (H) 4.4 4.9   Chloride      98 - 107 mmol/L 109 (H) 107 111 (H)   CO2      22.0 - 29.0 mmol/L 16.4 (L) 18.0 (L) 20.0 (L)   Calcium      8.6 - 10.5 mg/dL 9.7 9.3 9.5   Total Protein      6.0 - 8.5 g/dL   7.2    Albumin      3.50 - 5.20 g/dL   4.30   ALT (SGPT)      1 - 33 U/L   22   AST (SGOT)      1 - 32 U/L   20   Alkaline Phosphatase      39 - 117 U/L   73   Total Bilirubin      0.0 - 1.2 mg/dL   0.2   Globulin      gm/dL   2.9   A/G Ratio      g/dL   1.5   BUN/Creatinine Ratio      7.0 - 25.0 25.5 (H) 26.4 (H) 19.6   Anion Gap      5.0 - 15.0 mmol/L 14.6 12.0 10.0   eGFR      >60.0 mL/min/1.73 55.2 (L) 47.1 (L) 54.6 (L)   Anisocytes      None Seen      Dacrocytes      None Seen      WBC Morphology      Normal      Platelet Morphology      Normal      Iron      37 - 145 mcg/dL 112     Iron Saturation      20 - 50 % 27     Transferrin      200 - 360 mg/dL 283     TIBC      298 - 536 mcg/dL 422     Performed by:            Pathologist Interpretation            Hemosiderin Urine Qual      Negative      Folate      4.78 - 24.20 ng/mL 18.30  18.70   Ferritin      13.00 - 150.00 ng/mL 107.70  317.70 (H)   proBNP      0.0 - 900.0 pg/mL  316.0      No results found.  Component      Latest Ref Rng & Units 10/27/2022   WBC      3.40 - 10.80 10*3/mm3 4.78   RBC      3.77 - 5.28 10*6/mm3 2.92 (L)   Hemoglobin      12.0 - 15.9 g/dL 11.4 (L)   Hematocrit      34.0 - 46.6 % 35.5   MCV      79.0 - 97.0 fL 121.6 (H)   MCH      26.6 - 33.0 pg 39.0 (H)   MCHC      31.5 - 35.7 g/dL 32.1   RDW      12.3 - 15.4 % 16.8 (H)   RDW-SD      37.0 - 54.0 fl 76.3 (H)   MPV      6.0 - 12.0 fL 8.9   Platelets      140 - 450 10*3/mm3 294   Neutrophil Rel %      42.7 - 76.0 % 63.7   Lymphocyte Rel %      19.6 - 45.3 % 23.6   Monocyte Rel %      5.0 - 12.0 % 9.4   Eosinophil Rel %      0.3 - 6.2 % 1.9   Basophil Rel %      0.0 - 1.5 % 0.8   Immature Granulocyte Rel %      0.0 - 0.5 % 0.6 (H)   Neutrophils Absolute      1.70 - 7.00 10*3/mm3 3.04   Lymphocytes Absolute      0.70 - 3.10 10*3/mm3 1.13   Monocytes Absolute      0.10 - 0.90 10*3/mm3 0.45   Eosinophils Absolute      0.00 - 0.40 10*3/mm3 0.09   Basophils Absolute      0.00 - 0.20 10*3/mm3 0.04    Immature Grans, Absolute      0.00 - 0.05 10*3/mm3 0.03   Glucose      65 - 99 mg/dL 92   BUN      8 - 23 mg/dL 21   Creatinine      0.57 - 1.00 mg/dL 1.07 (H)   Sodium      136 - 145 mmol/L 141   Potassium      3.5 - 5.2 mmol/L 4.9   Chloride      98 - 107 mmol/L 111 (H)   CO2      22.0 - 29.0 mmol/L 20.0 (L)   Calcium      8.6 - 10.5 mg/dL 9.5   Total Protein      6.0 - 8.5 g/dL 7.2   Albumin      3.50 - 5.20 g/dL 4.30   ALT (SGPT)      1 - 33 U/L 22   AST (SGOT)      1 - 32 U/L 20   Alkaline Phosphatase      39 - 117 U/L 73   Total Bilirubin      0.0 - 1.2 mg/dL 0.2   Globulin      gm/dL 2.9   A/G Ratio      g/dL 1.5   BUN/Creatinine Ratio      7.0 - 25.0 19.6   Anion Gap      5.0 - 15.0 mmol/L 10.0   eGFR      >60.0 mL/min/1.73 54.6 (L)   Folate      4.78 - 24.20 ng/mL 18.70   Ferritin      13.00 - 150.00 ng/mL 317.70 (H)   Vitamin B-12      211 - 946 pg/mL 472       Assessment / Plan      Assessment/Plan:   1. Other iron deficiency anemia (Primary)  2. Allergic reaction to drug  3. Macrocytosis  -She has history of severe iron deficiency anemia of unclear etiology.  She has had an extensive negative work-up for a GI source of blood loss.  This was unrevealing.  Her history does not reveal any obvious alternative source of blood loss.  It is possible that she had a slow gastrointestinal bleed that was not identified on work-up during hospital admission.  She had an anaphylactic reaction to intravenous iron while admitted to the hospital and should no longer receive Ferrlecit.  We discussed that if intravenous iron is required, we could cautiously consider use of an alternative formulation, but she would require close monitoring.  Given that she has no clear history of malabsorption and her blood counts are stable she completed an oral iron trial with resolution of iron deficiency anemia. She is now off oral iron. -Continue folic acid daily.   -Repeat CBC shows ongoing resolution of anemia last month.   -We  discussed obtaining a repeat CBC today and continued noninvasive monitoring if anemia continues to resolve vs workup for an underlying bone marrow disorder if progressive macrocytosis/persistent anemia.  A peripheral smear is pending. MCV remains >120 and is slightly worse. Would favor proceeding with bone marrow biopsy.     3. CAD on dual antiplatelets  -Following with cardiology      Follow Up:   Pending decision re: Bone marrow biopsy    Abeba Lopez MD   Hematology and Oncology     I have spent a total of 30 minutes on reviewing test results, preparing to see patient, counseling patient, performing medically appropriate exam and documenting clinical information in the electronic health record.

## 2022-11-30 LAB — FOLATE SERPL-MCNC: >20 NG/ML (ref 4.78–24.2)

## 2022-12-01 LAB
CYTOLOGIST CVX/VAG CYTO: NORMAL
PATH INTERP BLD-IMP: NORMAL

## 2022-12-27 DIAGNOSIS — I50.21 ACUTE SYSTOLIC CONGESTIVE HEART FAILURE: ICD-10-CM

## 2022-12-27 RX ORDER — SPIRONOLACTONE 25 MG/1
TABLET ORAL
Qty: 90 TABLET | Refills: 3 | Status: SHIPPED | OUTPATIENT
Start: 2022-12-27

## 2023-01-03 ENCOUNTER — OFFICE VISIT (OUTPATIENT)
Dept: FAMILY MEDICINE CLINIC | Facility: CLINIC | Age: 73
End: 2023-01-03
Payer: MEDICARE

## 2023-01-03 VITALS
HEIGHT: 64 IN | BODY MASS INDEX: 21.03 KG/M2 | HEART RATE: 73 BPM | WEIGHT: 123.2 LBS | SYSTOLIC BLOOD PRESSURE: 118 MMHG | OXYGEN SATURATION: 100 % | DIASTOLIC BLOOD PRESSURE: 72 MMHG

## 2023-01-03 DIAGNOSIS — I10 ESSENTIAL HYPERTENSION: ICD-10-CM

## 2023-01-03 DIAGNOSIS — M54.81 BILATERAL OCCIPITAL NEURALGIA: ICD-10-CM

## 2023-01-03 DIAGNOSIS — M43.02 CERVICAL SPONDYLOLYSIS: ICD-10-CM

## 2023-01-03 DIAGNOSIS — I25.119 CORONARY ARTERY DISEASE INVOLVING NATIVE CORONARY ARTERY OF NATIVE HEART WITH ANGINA PECTORIS: ICD-10-CM

## 2023-01-03 DIAGNOSIS — D53.9 MACROCYTIC ANEMIA: ICD-10-CM

## 2023-01-03 DIAGNOSIS — M85.88 OTHER SPECIFIED DISORDERS OF BONE DENSITY AND STRUCTURE, OTHER SITE: ICD-10-CM

## 2023-01-03 DIAGNOSIS — E78.5 HYPERLIPIDEMIA LDL GOAL <70: ICD-10-CM

## 2023-01-03 DIAGNOSIS — M85.89 OSTEOPENIA OF MULTIPLE SITES: ICD-10-CM

## 2023-01-03 DIAGNOSIS — I50.22 CHRONIC SYSTOLIC CONGESTIVE HEART FAILURE: ICD-10-CM

## 2023-01-03 DIAGNOSIS — Z00.00 MEDICARE ANNUAL WELLNESS VISIT, SUBSEQUENT: Primary | ICD-10-CM

## 2023-01-03 PROCEDURE — 1160F RVW MEDS BY RX/DR IN RCRD: CPT | Performed by: PHYSICIAN ASSISTANT

## 2023-01-03 PROCEDURE — 1159F MED LIST DOCD IN RCRD: CPT | Performed by: PHYSICIAN ASSISTANT

## 2023-01-03 PROCEDURE — 1170F FXNL STATUS ASSESSED: CPT | Performed by: PHYSICIAN ASSISTANT

## 2023-01-03 PROCEDURE — G0439 PPPS, SUBSEQ VISIT: HCPCS | Performed by: PHYSICIAN ASSISTANT

## 2023-01-03 RX ORDER — MAGNESIUM 200 MG
1 TABLET ORAL DAILY
Qty: 90 EACH | Refills: 3 | Status: SHIPPED | OUTPATIENT
Start: 2023-01-03

## 2023-01-03 RX ORDER — PREDNISONE 1 MG/1
5 TABLET ORAL DAILY
Qty: 90 TABLET | Refills: 1 | Status: SHIPPED | OUTPATIENT
Start: 2023-01-03

## 2023-01-03 RX ORDER — VALSARTAN 80 MG/1
80 TABLET ORAL DAILY
Qty: 90 TABLET | Refills: 1 | Status: SHIPPED | OUTPATIENT
Start: 2023-01-03

## 2023-01-03 RX ORDER — ROSUVASTATIN CALCIUM 5 MG/1
5 TABLET, COATED ORAL DAILY
COMMUNITY

## 2023-01-03 NOTE — PROGRESS NOTES
The ABCs of the Annual Wellness Visit  Subsequent Medicare Wellness Visit    Subjective    Fabiola Salmeron is a 72 y.o. female who presents for a Subsequent Medicare Wellness Visit.    The following portions of the patient's history were reviewed and   updated as appropriate: allergies, current medications, past family history, past medical history, past social history, past surgical history and problem list.    Compared to one year ago, the patient feels her physical   health is the same.    Compared to one year ago, the patient feels her mental   health is the same.    Recent Hospitalizations:  This patient has had a [facility] admission record on file within the last 365 days.    Current Medical Providers:  Patient Care Team:  Jessica Roach PA-C as PCP - General (Physician Assistant)  Minnie Sellers MD as Consulting Physician (Ophthalmology)  Abeba Lopez MD as Consulting Physician (Hematology and Oncology)  Manjit Wolfe IV, MD as Consulting Physician (Interventional Cardiology)    Outpatient Medications Prior to Visit   Medication Sig Dispense Refill   • Acetaminophen (Tylenol) 325 MG capsule Take 650 mg by mouth 4 (Four) Times a Day.     • Aspirin Low Dose 81 MG EC tablet TAKE 1 TABLET EVERY DAY 90 tablet 3   • bimatoprost (Lumigan) 0.01 % ophthalmic drops Administer 1 drop to both eyes Every Night.     • bisoprolol (ZEBeta) 5 MG tablet Take 0.5 tablets by mouth Daily. 90 tablet 1   • clopidogrel (PLAVIX) 75 MG tablet Take 1 tablet by mouth Daily. 90 tablet 3   • fluorouracil (EFUDEX) 5 % cream fluorouracil 5 % topical cream   APPLY A SUFFICIENT AMOUNT TO COVER THE LESIONS IN THE AFFECTED AREA(S) BY TOPICAL ROUTE 2 TIMES PER DAY FOR 21 DAYS     • folic acid (FOLVITE) 1 MG tablet Take 1 tablet by mouth Daily. 90 tablet 1   • rosuvastatin (CRESTOR) 5 MG tablet Take 5 mg by mouth Daily.     • spironolactone (ALDACTONE) 25 MG tablet TAKE 1 TABLET EVERY DAY 90 tablet 3   •  timolol (TIMOPTIC) 0.5 % ophthalmic solution Administer 0.5 drops to both eyes 2 (two) times a day.     • topiramate (TOPAMAX) 100 MG tablet Take 1 tablet by mouth 2 (Two) Times a Day. 180 tablet 3   • rosuvastatin (CRESTOR) 5 MG tablet Take 1 tablet by mouth Every 72 (Seventy-Two) Hours. 90 tablet 1   • valsartan (DIOVAN) 80 MG tablet TAKE 1 TABLET EVERY DAY 90 tablet 1   • cholecalciferol (VITAMIN D3) 1.25 MG (66737 UT) capsule Take 1 capsule by mouth 1 (One) Time Per Week. 12 capsule 1   • ferrous sulfate 325 (65 FE) MG tablet Take 1 tablet by mouth Daily With Breakfast. 60 tablet 0   • methylPREDNISolone (MEDROL) 4 MG dose pack Take as directed on package instructions. 21 each 0     No facility-administered medications prior to visit.       No opioid medication identified on active medication list. I have reviewed chart for other potential  high risk medication/s and harmful drug interactions in the elderly.          Aspirin is on active medication list. Aspirin use is indicated based on review of current medical condition/s. Pros and cons of this therapy have been discussed today. Benefits of this medication outweigh potential harm.  Patient has been encouraged to continue taking this medication.  .      Patient Active Problem List   Diagnosis   • Essential hypertension   • Migraines   • Low back pain   • Sciatica   • Hyperlipidemia LDL goal <70   • Bilateral occipital neuralgia   • Osteopenia   • Impaired glucose tolerance   • Vitamin D deficiency   • Primary open angle glaucoma (POAG) of left eye, severe stage   • Primary open angle glaucoma (POAG) of right eye, mild stage   • Age-related incipient cataract of right eye   • Cervical spondylolysis   • Hyperopia of both eyes with regular astigmatism   • Intractable chronic migraine without aura   • Myopia of left eye   • Other secondary cataract, left eye   • Pattern dystrophy of macula   • Symptomatic anemia   • Chronic systolic congestive heart failure (HCC)    • NSTEMI (non-ST elevated myocardial infarction) (HCC)   • Coronary artery disease involving native coronary artery of native heart with angina pectoris (HCC)   • Dyspnea on exertion   • Fecal occult blood test positive     Advance Care Planning  Advance Directive is not on file.  ACP discussion was held with the patient during this visit. Patient has an advance directive (not in EMR), copy requested.     Objective    Vitals:    01/03/23 1309   BP: 118/72   Pulse: 73   SpO2: 100%   Weight: 55.9 kg (123 lb 3.2 oz)   Height: 163.8 cm (64.49\")     Estimated body mass index is 20.83 kg/m² as calculated from the following:    Height as of this encounter: 163.8 cm (64.49\").    Weight as of this encounter: 55.9 kg (123 lb 3.2 oz).    BMI is within normal parameters. No other follow-up for BMI required.      Does the patient have evidence of cognitive impairment?   No            HEALTH RISK ASSESSMENT    Smoking Status:  Social History     Tobacco Use   Smoking Status Never   Smokeless Tobacco Never     Alcohol Consumption:  Social History     Substance and Sexual Activity   Alcohol Use Not Currently     Fall Risk Screen:    STEADI Fall Risk Assessment was completed, and patient is at LOW risk for falls.Assessment completed on:1/3/2023    Depression Screening:  PHQ-2/PHQ-9 Depression Screening 1/3/2023   Little Interest or Pleasure in Doing Things 0-->not at all   Feeling Down, Depressed or Hopeless 0-->not at all   PHQ-9: Brief Depression Severity Measure Score 0       Health Habits and Functional and Cognitive Screening:  Functional & Cognitive Status 1/3/2023   Do you have difficulty preparing food and eating? No   Do you have difficulty bathing yourself, getting dressed or grooming yourself? No   Do you have difficulty using the toilet? No   Do you have difficulty moving around from place to place? No   Do you have trouble with steps or getting out of a bed or a chair? No   Current Diet Well Balanced Diet   Dental  Exam Up to date   Eye Exam Up to date   Exercise (times per week) 2 times per week   Current Exercises Include Walking;House Cleaning   Current Exercise Activities Include -   Do you need help using the phone?  No   Are you deaf or do you have serious difficulty hearing?  No   Do you need help with transportation? No   Do you need help shopping? No   Do you need help preparing meals?  No   Do you need help with housework?  No   Do you need help with laundry? No   Do you need help taking your medications? No   Do you need help managing money? No   Do you ever drive or ride in a car without wearing a seat belt? No   Have you felt unusual stress, anger or loneliness in the last month? -   Who do you live with? -   If you need help, do you have trouble finding someone available to you? -   Have you been bothered in the last four weeks by sexual problems? -   Do you have difficulty concentrating, remembering or making decisions? -       Age-appropriate Screening Schedule:  Refer to the list below for future screening recommendations based on patient's age, sex and/or medical conditions. Orders for these recommended tests are listed in the plan section. The patient has been provided with a written plan.    Health Maintenance   Topic Date Due   • TDAP/TD VACCINES (1 - Tdap) Never done   • ZOSTER VACCINE (1 of 2) Never done   • DXA SCAN  01/30/2022   • LIPID PANEL  12/07/2022   • INFLUENZA VACCINE  03/31/2023 (Originally 8/1/2022)   • MAMMOGRAM  03/22/2024                CMS Preventative Services Quick Reference  Risk Factors Identified During Encounter:    Glaucoma or Family History of Glaucoma:  Condition Stable with Current Medications  Hearing Problem: denies any issues with hearing  Dental Screening Recommended  Vision Screening Recommended    The above risks/problems have been discussed with the patient.  Pertinent information has been shared with the patient in the After Visit Summary.    Diagnoses and all orders for  this visit:    1. Medicare annual wellness visit, subsequent (Primary)    2. Essential hypertension  -     valsartan (DIOVAN) 80 MG tablet; Take 1 tablet by mouth Daily.  Dispense: 90 tablet; Refill: 1  3. Chronic systolic congestive heart failure (HCC)  4. Coronary artery disease involving native coronary artery of native heart with angina pectoris (HCC)  Has appointment with cardiology but it is in several months.  Denies any chest pain, leg swelling, weight gain or shortness of breath today.  Compliant on all medications.  Blood pressure is stable and well-controlled.    5. Macrocytic anemia  -     Cyanocobalamin (Vitamin B-12) 1000 MCG sublingual tablet; Place 1 tablet under the tongue Daily.  Dispense: 90 each; Refill: 3  Continue on folate.  Okay to start daily vitamin B12.  Keep follow-up with hematology.    6. Hyperlipidemia LDL goal <70  -     Lipid Panel; Future  -     Comprehensive Metabolic Panel; Future  Taking rosuvastatin 5 mg every 72 hours and tolerating well.  Wants to trial increase in dose.  Will go to every other day and if tolerating, will increase to nightly.  Return for lipid panel when fasting.    7. Cervical spondylolysis  8. Bilateral occipital neuralgia  -     predniSONE (DELTASONE) 5 MG tablet; Take 1 tablet by mouth Daily.  Dispense: 90 tablet; Refill: 1  Has been on prednisone 5 mg daily in the past.  Feels this is very helpful in managing her migraines and cervicalgia.  She is aware of risks of chronic prednisone use.  She has glaucoma and uses drops.  She states ophthalmologist is aware of prednisone use.  Next appointment with ophthalmology is in February.    9. Other specified disorders of bone density and structure, other site  -     DEXA Bone Density Axial    10. Osteopenia of multiple sites  -     DEXA Bone Density Axial  Not on prescription medication.  On vitamin D and calcium.  Encouraged exercise and strength training.      Follow Up:   Next Medicare Wellness visit to be  scheduled in 1 year.      An After Visit Summary and PPPS were made available to the patient.

## 2023-01-31 ENCOUNTER — LAB (OUTPATIENT)
Dept: LAB | Facility: HOSPITAL | Age: 73
End: 2023-01-31
Payer: MEDICARE

## 2023-01-31 ENCOUNTER — OFFICE VISIT (OUTPATIENT)
Dept: ONCOLOGY | Facility: CLINIC | Age: 73
End: 2023-01-31
Payer: MEDICARE

## 2023-01-31 ENCOUNTER — PREP FOR SURGERY (OUTPATIENT)
Dept: OTHER | Facility: HOSPITAL | Age: 73
End: 2023-01-31
Payer: MEDICARE

## 2023-01-31 VITALS
DIASTOLIC BLOOD PRESSURE: 72 MMHG | HEIGHT: 64 IN | WEIGHT: 123 LBS | TEMPERATURE: 96.9 F | OXYGEN SATURATION: 98 % | HEART RATE: 70 BPM | SYSTOLIC BLOOD PRESSURE: 147 MMHG | BODY MASS INDEX: 21 KG/M2

## 2023-01-31 DIAGNOSIS — I25.10 CORONARY ARTERY DISEASE DUE TO LIPID RICH PLAQUE: ICD-10-CM

## 2023-01-31 DIAGNOSIS — D53.9 MACROCYTIC ANEMIA: ICD-10-CM

## 2023-01-31 DIAGNOSIS — I25.83 CORONARY ARTERY DISEASE DUE TO LIPID RICH PLAQUE: ICD-10-CM

## 2023-01-31 DIAGNOSIS — D75.89 MACROCYTOSIS WITHOUT ANEMIA: Primary | ICD-10-CM

## 2023-01-31 DIAGNOSIS — D50.8 OTHER IRON DEFICIENCY ANEMIA: Primary | ICD-10-CM

## 2023-01-31 LAB
ALBUMIN SERPL-MCNC: 4.8 G/DL (ref 3.5–5.2)
ALBUMIN/GLOB SERPL: 1.8 G/DL
ALP SERPL-CCNC: 62 U/L (ref 39–117)
ALT SERPL W P-5'-P-CCNC: 22 U/L (ref 1–33)
ANION GAP SERPL CALCULATED.3IONS-SCNC: 11 MMOL/L (ref 5–15)
AST SERPL-CCNC: 21 U/L (ref 1–32)
BASOPHILS # BLD AUTO: 0.03 10*3/MM3 (ref 0–0.2)
BASOPHILS NFR BLD AUTO: 0.6 % (ref 0–1.5)
BILIRUB SERPL-MCNC: 0.3 MG/DL (ref 0–1.2)
BUN SERPL-MCNC: 29 MG/DL (ref 8–23)
BUN/CREAT SERPL: 31.5 (ref 7–25)
CALCIUM SPEC-SCNC: 9.6 MG/DL (ref 8.6–10.5)
CHLORIDE SERPL-SCNC: 111 MMOL/L (ref 98–107)
CHOLEST SERPL-MCNC: 208 MG/DL (ref 0–200)
CO2 SERPL-SCNC: 21 MMOL/L (ref 22–29)
CREAT SERPL-MCNC: 0.92 MG/DL (ref 0.57–1)
DEPRECATED RDW RBC AUTO: 58 FL (ref 37–54)
EGFRCR SERPLBLD CKD-EPI 2021: 65.5 ML/MIN/1.73
EOSINOPHIL # BLD AUTO: 0.09 10*3/MM3 (ref 0–0.4)
EOSINOPHIL NFR BLD AUTO: 1.9 % (ref 0.3–6.2)
ERYTHROCYTE [DISTWIDTH] IN BLOOD BY AUTOMATED COUNT: 12.3 % (ref 12.3–15.4)
FERRITIN SERPL-MCNC: 177.6 NG/ML (ref 13–150)
GLOBULIN UR ELPH-MCNC: 2.7 GM/DL
GLUCOSE SERPL-MCNC: 111 MG/DL (ref 65–99)
HAPTOGLOB SERPL-MCNC: 164 MG/DL (ref 30–200)
HCT VFR BLD AUTO: 40.6 % (ref 34–46.6)
HDLC SERPL-MCNC: 65 MG/DL (ref 40–60)
HGB BLD-MCNC: 12.8 G/DL (ref 12–15.9)
IMM GRANULOCYTES # BLD AUTO: 0.02 10*3/MM3 (ref 0–0.05)
IMM GRANULOCYTES NFR BLD AUTO: 0.4 % (ref 0–0.5)
LDH SERPL-CCNC: 200 U/L (ref 135–214)
LDLC SERPL CALC-MCNC: 111 MG/DL (ref 0–100)
LDLC/HDLC SERPL: 1.62 {RATIO}
LYMPHOCYTES # BLD AUTO: 1.31 10*3/MM3 (ref 0.7–3.1)
LYMPHOCYTES NFR BLD AUTO: 28.2 % (ref 19.6–45.3)
MCH RBC QN AUTO: 39.5 PG (ref 26.6–33)
MCHC RBC AUTO-ENTMCNC: 31.5 G/DL (ref 31.5–35.7)
MCV RBC AUTO: 125.3 FL (ref 79–97)
MONOCYTES # BLD AUTO: 0.38 10*3/MM3 (ref 0.1–0.9)
MONOCYTES NFR BLD AUTO: 8.2 % (ref 5–12)
NEUTROPHILS NFR BLD AUTO: 2.82 10*3/MM3 (ref 1.7–7)
NEUTROPHILS NFR BLD AUTO: 60.7 % (ref 42.7–76)
PLATELET # BLD AUTO: 240 10*3/MM3 (ref 140–450)
PMV BLD AUTO: 8.8 FL (ref 6–12)
POTASSIUM SERPL-SCNC: 4.8 MMOL/L (ref 3.5–5.2)
PROT SERPL-MCNC: 7.5 G/DL (ref 6–8.5)
RBC # BLD AUTO: 3.24 10*6/MM3 (ref 3.77–5.28)
RETICS # AUTO: 0.04 10*6/MM3 (ref 0.02–0.13)
RETICS/RBC NFR AUTO: 1.3 % (ref 0.7–1.9)
SODIUM SERPL-SCNC: 143 MMOL/L (ref 136–145)
T4 FREE SERPL-MCNC: 0.64 NG/DL (ref 0.93–1.7)
TRIGL SERPL-MCNC: 187 MG/DL (ref 0–150)
TSH SERPL DL<=0.05 MIU/L-ACNC: 2.23 UIU/ML (ref 0.27–4.2)
VIT B12 BLD-MCNC: 1372 PG/ML (ref 211–946)
VLDLC SERPL-MCNC: 32 MG/DL (ref 5–40)
WBC NRBC COR # BLD: 4.65 10*3/MM3 (ref 3.4–10.8)

## 2023-01-31 PROCEDURE — 82728 ASSAY OF FERRITIN: CPT

## 2023-01-31 PROCEDURE — 36415 COLL VENOUS BLD VENIPUNCTURE: CPT

## 2023-01-31 PROCEDURE — 85025 COMPLETE CBC W/AUTO DIFF WBC: CPT

## 2023-01-31 PROCEDURE — 99214 OFFICE O/P EST MOD 30 MIN: CPT | Performed by: INTERNAL MEDICINE

## 2023-01-31 PROCEDURE — 85045 AUTOMATED RETICULOCYTE COUNT: CPT

## 2023-01-31 PROCEDURE — 86334 IMMUNOFIX E-PHORESIS SERUM: CPT

## 2023-01-31 PROCEDURE — 83521 IG LIGHT CHAINS FREE EACH: CPT

## 2023-01-31 PROCEDURE — 84443 ASSAY THYROID STIM HORMONE: CPT

## 2023-01-31 PROCEDURE — 82607 VITAMIN B-12: CPT

## 2023-01-31 PROCEDURE — 83615 LACTATE (LD) (LDH) ENZYME: CPT

## 2023-01-31 PROCEDURE — 84165 PROTEIN E-PHORESIS SERUM: CPT

## 2023-01-31 PROCEDURE — 84439 ASSAY OF FREE THYROXINE: CPT

## 2023-01-31 PROCEDURE — 80053 COMPREHEN METABOLIC PANEL: CPT

## 2023-01-31 PROCEDURE — 80061 LIPID PANEL: CPT

## 2023-01-31 PROCEDURE — 83010 ASSAY OF HAPTOGLOBIN QUANT: CPT

## 2023-01-31 PROCEDURE — 82784 ASSAY IGA/IGD/IGG/IGM EACH: CPT

## 2023-01-31 NOTE — PROGRESS NOTES
Follow up     Date: .23      Patient Name: Fabiola Salmeron  MRN: [id number]  : 1950     Referring Physician: Dr. Jessica Roach    Chief Complaint: Iron deficiency anemia follow up    History of Present Illness: Fabiola Salmeron is a pleasant 72 y.o. female who presents today for evaluation of iron deficiency anemia.    Her most recent prior CBC is from 2021 and was normal.    She then presented with progressive dyspnea on exertion.  She initially attributed her symptoms to acid reflux as she was also having accompanying belching, however her symptoms did not resolve with a PPI trial which had previously helped.  She was seen by her primary care physician and had a CBC performed on 2022.  This showed a hemoglobin of 7.6 with a slightly elevated RDW at 16 and a normal white blood cell count and platelet count.  She was referred to the emergency department and was admitted to the hospital through 2022.  There she had additional blood work including an iron battery which was notable for an iron saturation of 5% and an elevated TIBC consistent with iron deficiency anemia.  She was started on intravenous iron and received a unit of PRBCs.  She was also started on steroids for an acute sciatica flare.  She received her first dose of Ferrlecit on  without incident.  However following her second infusion on  she developed flushing, became diaphoretic, tachycardic, and hypotensive with brief episode of syncope and RRT was called.  Her  who accompanies her reports that she received chest compressions.  Per nursing records, she received 1L NS bolus with transfer to ICU for further management.     Underwent egd and c-scope 22 which revealed no source of bleeding and video endoscopy capsule was performed which was negative    She was found to have an elevated troponin and a low ejection fraction at 36% within the first 24 hours of admission, with pattern concerning for  Takotsubo cardiomyopathy.  She underwent LHC on 5/23/22 which revealed stenosis to her LAD and RCA and underwent drug-eluting stents x2. Post-procedure, patient was found to have hemorrhage from her right radial artery and underwent radial artery repair and hematoma evacuation by Dr. Benavides of vascular surgery.     I reviewed her blood work prior to, during, and following admission.  She had severe anemia of unclear chronicity given that her previous CBC was greater than 1 year ago.  She has maintained her hemoglobin following hospital discharge.  B12 levels were normal.  Iron levels were consistent with iron deficiency. She is not a strict vegetarian and endorses eating red meat.  She has no prior history of gastro intestinal surgery.  No history of autoimmune disorders.    She was started on oral iron once daily which she is tolerating well with no GI side effects.  Her PPI was stopped by cardiology pharmacist.    No personal history of transfusions prior to her current presentation.  No prior history of anemia as a child or teen or during her reproductive years.  No history of hematologic disorders, malignancy, CVA, or VTE.    Family history is notable for aplastic anemia in her brother lung cancer in her father and a sister with breast cancer at age 59.    Interval history:  Traveled to  [place] to see her granddaughter.   Feeling well. Has remained off iron. Continues folic acid and also started OTC B12  No recent infections.   On annual follow up with cardiology. Continues ASA/plavis.   No bleeding.  No shortness of breath.   Feels back to 100% with her energy.   No recent infections. No other new complaints.        Past Medical History:   Past Medical History:   Diagnosis Date   • Candidiasis of vulva and vagina    • CHF (congestive heart failure) (HCC)    • Coronary artery disease    • Hyperlipidemia    • Hypertension        Past Surgical History:   Past Surgical History:   Procedure Laterality Date    • ARTERIOVENOUS FISTULA/SHUNT SURGERY Right 5/23/2022    Procedure: RADIAL ARTERY REPAIR WITH HEMATOMA EVACUATION AND WASHOUT RIGHT;  Surgeon: Jaron Benavides MD;  Location:  [facility];  Service: Vascular;  Laterality: Right;   • CAPSULE ENDOSCOPY N/A 5/25/2022    Procedure: CAPSULE ENDOSCOPY ESOPHAGUS TO ILEUM DEPLOYED;  Surgeon: Michele Rogers MD;  Location:  [facility];  Service: Gastroenterology;  Laterality: N/A;   • CARDIAC CATHETERIZATION N/A 5/23/2022    Procedure: LEFT HEART CATH;  Surgeon: Manjit Wolfe IV, MD;  Location:  [facility];  Service: Cardiovascular;  Laterality: N/A;   • CARDIAC CATHETERIZATION N/A 5/23/2022    Procedure: Optical Coherent Tomography;  Surgeon: Manjit Wolfe IV, MD;  Location:  [facility];  Service: Cardiovascular;  Laterality: N/A;   • CARDIAC CATHETERIZATION N/A 5/23/2022    Procedure: Stent MARIA ELENA coronary;  Surgeon: Manjit Wolfe IV, MD;  Location:  [facility];  Service: Cardiovascular;  Laterality: N/A;   • COLONOSCOPY N/A 5/25/2022    Procedure: COLONOSCOPY;  Surgeon: Michele Rogers MD;  Location:  [facility];  Service: Gastroenterology;  Laterality: N/A;   • CRANIAL NEUROSTIMULATOR INSERTION/REPLACEMENT Right    • ENDOSCOPY N/A 5/25/2022    Procedure: ESOPHAGOGASTRODUODENOSCOPY;  Surgeon: Michele Rogers MD;  Location:  [facility];  Service: Gastroenterology;  Laterality: N/A;   • EYE SURGERY      shunt placement by ophthalmology       Family History:   Family History   Problem Relation Age of Onset   • Heart failure Mother    • Lung cancer Father         smoker   • Heart disease Sister    • Breast cancer Sister    • Aplastic anemia Brother    • No Known Problems Maternal Grandmother    • No Known Problems Maternal Grandfather    • No Known Problems Paternal Grandmother    • No Known Problems Paternal Grandfather    • Atrial fibrillation Son    • Ovarian  cancer Neg Hx    • Colon cancer Neg Hx        Social History:   Social History     Socioeconomic History   • Marital status:    Tobacco Use   • Smoking status: Never   • Smokeless tobacco: Never   Vaping Use   • Vaping Use: Never used   Substance and Sexual Activity   • Alcohol use: Not Currently   • Drug use: Never   • Sexual activity: Yes     Partners: Male     Birth control/protection: None, Vasectomy       Medications:     Current Outpatient Medications:   •  Acetaminophen (Tylenol) 325 MG capsule, Take 650 mg by mouth 4 (Four) Times a Day., Disp: , Rfl:   •  Aspirin Low Dose 81 MG EC tablet, TAKE 1 TABLET EVERY DAY, Disp: 90 tablet, Rfl: 3  •  bimatoprost (Lumigan) 0.01 % ophthalmic drops, Administer 1 drop to both eyes Every Night., Disp: , Rfl:   •  bisoprolol (ZEBeta) 5 MG tablet, Take 0.5 tablets by mouth Daily., Disp: 90 tablet, Rfl: 1  •  cholecalciferol (VITAMIN D3) 1.25 MG (99340 UT) capsule, Take 1 capsule by mouth 1 (One) Time Per Week., Disp: 12 capsule, Rfl: 1  •  clopidogrel (PLAVIX) 75 MG tablet, Take 1 tablet by mouth Daily., Disp: 90 tablet, Rfl: 3  •  Cyanocobalamin (Vitamin B-12) 1000 MCG sublingual tablet, Place 1 tablet under the tongue Daily., Disp: 90 each, Rfl: 3  •  fluorouracil (EFUDEX) 5 % cream, fluorouracil 5 % topical cream  APPLY A SUFFICIENT AMOUNT TO COVER THE LESIONS IN THE AFFECTED AREA(S) BY TOPICAL ROUTE 2 TIMES PER DAY FOR 21 DAYS, Disp: , Rfl:   •  folic acid (FOLVITE) 1 MG tablet, Take 1 tablet by mouth Daily., Disp: 90 tablet, Rfl: 1  •  predniSONE (DELTASONE) 5 MG tablet, Take 1 tablet by mouth Daily., Disp: 90 tablet, Rfl: 1  •  rosuvastatin (CRESTOR) 5 MG tablet, Take 5 mg by mouth Daily., Disp: , Rfl:   •  spironolactone (ALDACTONE) 25 MG tablet, TAKE 1 TABLET EVERY DAY, Disp: 90 tablet, Rfl: 3  •  timolol (TIMOPTIC) 0.5 % ophthalmic solution, Administer 0.5 drops to both eyes 2 (two) times a day., Disp: , Rfl:   •  topiramate (TOPAMAX) 100 MG tablet, Take 1  "tablet by mouth 2 (Two) Times a Day., Disp: 180 tablet, Rfl: 3  •  valsartan (DIOVAN) 80 MG tablet, Take 1 tablet by mouth Daily., Disp: 90 tablet, Rfl: 1    Allergies:   Allergies   Allergen Reactions   • Ferrlecit [Na Ferric Gluc Cplx In Sucrose] Anaphylaxis   • Codeine Other (See Comments)   • Metoprolol Other (See Comments)     Fatigue     • Statins Myalgia       Objective     Vital Signs:   Vitals:    01/31/23 1357   BP: 147/72   Pulse: 70   Temp: 96.9 °F (36.1 °C)   TempSrc: Infrared   SpO2: 98%   Weight: 55.8 kg (123 lb)   Height: 163.8 cm (64.49\")   PainSc: 0-No pain    Body mass index is 20.79 kg/m².   Pain Score    01/31/23 1357   PainSc: 0-No pain       Physical Exam:  General: No acute distress. Well appearing.  HEENT: Normocephalic, atraumatic. Sclera anicteric. Masked.  Neck: supple, no adenopathy.   Cardiovascular: regular rate and rhythm. No murmurs.   Respiratory: Normal rate. Clear to auscultation bilaterally  Abdomen: Soft, nontender, non distended with normoactive bowel sounds  Lymph: no cervical, supraclavicular or axillary adenopathy  Neuro: Alert and oriented x 3. No focal deficits.   Ext: Symmetric, no swelling.   Psych: Euthymic      Laboratory/Imaging Reviewed:   Lab on 01/31/2023   Component Date Value Ref Range Status   • WBC 01/31/2023 4.65  3.40 - 10.80 10*3/mm3 Final   • RBC 01/31/2023 3.24 (L)  3.77 - 5.28 10*6/mm3 Final   • Hemoglobin 01/31/2023 12.8  12.0 - 15.9 g/dL Final   • Hematocrit 01/31/2023 40.6  34.0 - 46.6 % Final   • MCV 01/31/2023 125.3 (H)  79.0 - 97.0 fL Final   • MCH 01/31/2023 39.5 (H)  26.6 - 33.0 pg Final   • MCHC 01/31/2023 31.5  31.5 - 35.7 g/dL Final   • RDW 01/31/2023 12.3  12.3 - 15.4 % Final   • RDW-SD 01/31/2023 58.0 (H)  37.0 - 54.0 fl Final   • MPV 01/31/2023 8.8  6.0 - 12.0 fL Final   • Platelets 01/31/2023 240  140 - 450 10*3/mm3 Final   • Neutrophil % 01/31/2023 60.7  42.7 - 76.0 % Final   • Lymphocyte % 01/31/2023 28.2  19.6 - 45.3 % Final   • Monocyte " % 01/31/2023 8.2  5.0 - 12.0 % Final   • Eosinophil % 01/31/2023 1.9  0.3 - 6.2 % Final   • Basophil % 01/31/2023 0.6  0.0 - 1.5 % Final   • Immature Grans % 01/31/2023 0.4  0.0 - 0.5 % Final   • Neutrophils, Absolute 01/31/2023 2.82  1.70 - 7.00 10*3/mm3 Final   • Lymphocytes, Absolute 01/31/2023 1.31  0.70 - 3.10 10*3/mm3 Final   • Monocytes, Absolute 01/31/2023 0.38  0.10 - 0.90 10*3/mm3 Final   • Eosinophils, Absolute 01/31/2023 0.09  0.00 - 0.40 10*3/mm3 Final   • Basophils, Absolute 01/31/2023 0.03  0.00 - 0.20 10*3/mm3 Final   • Immature Grans, Absolute 01/31/2023 0.02  0.00 - 0.05 10*3/mm3 Final       eGFR      >60.0 mL/min/1.73 54.6 (L)   Folate      4.78 - 24.20 ng/mL 18.70   Ferritin      13.00 - 150.00 ng/mL 317.70 (H)   Vitamin B-12      211 - 946 pg/mL 472       Assessment / Plan      Assessment/Plan:   1. Other iron deficiency anemia  2. Allergic reaction to drug  3. Macrocytosis  -She has history of severe iron deficiency anemia of unclear etiology.  She has had an extensive negative work-up for a GI source of blood loss.  This was unrevealing.  Her history does not reveal any obvious alternative source of blood loss.  It is possible that she had a slow gastrointestinal bleed that was not identified on work-up during hospital admission.  She had an anaphylactic reaction to intravenous iron while admitted to the hospital and should no longer receive Ferrlecit.  We discussed that if intravenous iron is required, we could cautiously consider use of an alternative formulation, but she would require close monitoring.  Given that she has no clear history of malabsorption and her blood counts are stable she completed an oral iron trial with resolution of iron deficiency anemia. She is now off oral iron. -Continues folic acid daily.   -Repeat CBC shows ongoing resolution of anemia but she has progressive macrocytosis on CBC today with MCV > 120. I recommended proceeding with bone marrow biopsy unless there  is an obvious alternative explanation on pending blood work. She will need to remain on ASA/plavix because of recent stent placement.    4. CAD on dual antiplatelets  -Following with cardiology    Follow Up:   With biopsy results.     Abeba Lopez MD   Hematology and Oncology       I have spent a total of 30 minutes on reviewing test results, preparing to see patient, counseling patient, performing medically appropriate exam and documenting clinical information in the electronic health record.

## 2023-02-01 ENCOUNTER — PATIENT MESSAGE (OUTPATIENT)
Dept: ONCOLOGY | Facility: CLINIC | Age: 73
End: 2023-02-01
Payer: MEDICARE

## 2023-02-01 DIAGNOSIS — D53.9 MACROCYTIC ANEMIA: Primary | ICD-10-CM

## 2023-02-01 LAB
ALBUMIN SERPL ELPH-MCNC: 4.5 G/DL (ref 2.9–4.4)
ALBUMIN/GLOB SERPL: 1.5 {RATIO} (ref 0.7–1.7)
ALPHA1 GLOB SERPL ELPH-MCNC: 0.2 G/DL (ref 0–0.4)
ALPHA2 GLOB SERPL ELPH-MCNC: 0.9 G/DL (ref 0.4–1)
B-GLOBULIN SERPL ELPH-MCNC: 1.1 G/DL (ref 0.7–1.3)
GAMMA GLOB SERPL ELPH-MCNC: 0.8 G/DL (ref 0.4–1.8)
GLOBULIN SER-MCNC: 3.1 G/DL (ref 2.2–3.9)
IGA SERPL-MCNC: 389 MG/DL (ref 64–422)
IGG SERPL-MCNC: 781 MG/DL (ref 586–1602)
IGM SERPL-MCNC: 96 MG/DL (ref 26–217)
INTERPRETATION SERPL IEP-IMP: ABNORMAL
KAPPA LC FREE SER-MCNC: 23.8 MG/L (ref 3.3–19.4)
KAPPA LC FREE/LAMBDA FREE SER: 1.86 {RATIO} (ref 0.26–1.65)
LABORATORY COMMENT REPORT: ABNORMAL
LAMBDA LC FREE SERPL-MCNC: 12.8 MG/L (ref 5.7–26.3)
M PROTEIN SERPL ELPH-MCNC: ABNORMAL G/DL
PROT SERPL-MCNC: 7.6 G/DL (ref 6–8.5)

## 2023-02-01 NOTE — TELEPHONE ENCOUNTER
Contacted patient via telephone number provided. Patient stated she wants to cancel bone marrow biopsy ordered.  Patient stated she has some concerns regarding test.  Asked patient if there was anything I can answer for her and she stated she wishes to speak with Dr. Lopez before agreeing later to test.  Notified Dr. Lopez and per MD, order received to cancel bone marrow biopsy, have patient get CBC and peripheral blood smear prior to RTC in 6 weeks instead of 3 months as scheduled.  Patient notified and agreed to get labs prior to appointment.  Patient verbalized understanding of plan and was notified that scheduling will be contacting her with a new appointment in 6 weeks.  Message sent to scheduling.

## 2023-03-01 ENCOUNTER — HOSPITAL ENCOUNTER (OUTPATIENT)
Dept: BONE DENSITY | Facility: HOSPITAL | Age: 73
Discharge: HOME | End: 2023-03-01
Admitting: PHYSICIAN ASSISTANT
Payer: MEDICARE

## 2023-03-01 PROCEDURE — 77080 DXA BONE DENSITY AXIAL: CPT

## 2023-03-07 DIAGNOSIS — M81.0 AGE-RELATED OSTEOPOROSIS WITHOUT CURRENT PATHOLOGICAL FRACTURE: Primary | ICD-10-CM

## 2023-03-07 DIAGNOSIS — I25.119 CORONARY ARTERY DISEASE INVOLVING NATIVE CORONARY ARTERY OF NATIVE HEART WITH ANGINA PECTORIS: ICD-10-CM

## 2023-03-07 RX ORDER — CLOPIDOGREL BISULFATE 75 MG/1
TABLET ORAL
Qty: 90 TABLET | Refills: 3 | Status: SHIPPED | OUTPATIENT
Start: 2023-03-07

## 2023-03-13 ENCOUNTER — LAB (OUTPATIENT)
Dept: LAB | Facility: HOSPITAL | Age: 73
End: 2023-03-13
Payer: MEDICARE

## 2023-03-13 DIAGNOSIS — D53.9 MACROCYTIC ANEMIA: ICD-10-CM

## 2023-03-13 LAB
BASOPHILS # BLD AUTO: 0.01 10*3/MM3 (ref 0–0.2)
BASOPHILS NFR BLD AUTO: 0.2 % (ref 0–1.5)
DEPRECATED RDW RBC AUTO: 64.3 FL (ref 37–54)
EOSINOPHIL # BLD AUTO: 0.02 10*3/MM3 (ref 0–0.4)
EOSINOPHIL NFR BLD AUTO: 0.3 % (ref 0.3–6.2)
ERYTHROCYTE [DISTWIDTH] IN BLOOD BY AUTOMATED COUNT: 13.8 % (ref 12.3–15.4)
HCT VFR BLD AUTO: 38.5 % (ref 34–46.6)
HGB BLD-MCNC: 12.4 G/DL (ref 12–15.9)
IMM GRANULOCYTES # BLD AUTO: 0.01 10*3/MM3 (ref 0–0.05)
IMM GRANULOCYTES NFR BLD AUTO: 0.2 % (ref 0–0.5)
LYMPHOCYTES # BLD AUTO: 1.8 10*3/MM3 (ref 0.7–3.1)
LYMPHOCYTES NFR BLD AUTO: 29.4 % (ref 19.6–45.3)
MCH RBC QN AUTO: 39.6 PG (ref 26.6–33)
MCHC RBC AUTO-ENTMCNC: 32.2 G/DL (ref 31.5–35.7)
MCV RBC AUTO: 123 FL (ref 79–97)
MONOCYTES # BLD AUTO: 0.52 10*3/MM3 (ref 0.1–0.9)
MONOCYTES NFR BLD AUTO: 8.5 % (ref 5–12)
NEUTROPHILS NFR BLD AUTO: 3.77 10*3/MM3 (ref 1.7–7)
NEUTROPHILS NFR BLD AUTO: 61.4 % (ref 42.7–76)
PLATELET # BLD AUTO: 267 10*3/MM3 (ref 140–450)
PMV BLD AUTO: 8.7 FL (ref 6–12)
RBC # BLD AUTO: 3.13 10*6/MM3 (ref 3.77–5.28)
WBC NRBC COR # BLD: 6.13 10*3/MM3 (ref 3.4–10.8)

## 2023-03-13 PROCEDURE — 85025 COMPLETE CBC W/AUTO DIFF WBC: CPT

## 2023-03-13 PROCEDURE — 85060 BLOOD SMEAR INTERPRETATION: CPT

## 2023-03-13 PROCEDURE — 36415 COLL VENOUS BLD VENIPUNCTURE: CPT

## 2023-03-14 LAB
CYTOLOGIST CVX/VAG CYTO: NORMAL
PATH INTERP BLD-IMP: NORMAL

## 2023-03-15 ENCOUNTER — OFFICE VISIT (OUTPATIENT)
Dept: ONCOLOGY | Facility: CLINIC | Age: 73
End: 2023-03-15
Payer: MEDICARE

## 2023-03-15 VITALS
OXYGEN SATURATION: 96 % | TEMPERATURE: 96.9 F | DIASTOLIC BLOOD PRESSURE: 66 MMHG | SYSTOLIC BLOOD PRESSURE: 157 MMHG | BODY MASS INDEX: 20.12 KG/M2 | HEIGHT: 66 IN | HEART RATE: 69 BPM | WEIGHT: 125.2 LBS

## 2023-03-15 DIAGNOSIS — D53.9 MACROCYTIC ANEMIA: Primary | ICD-10-CM

## 2023-03-15 PROCEDURE — 3078F DIAST BP <80 MM HG: CPT | Performed by: INTERNAL MEDICINE

## 2023-03-15 PROCEDURE — 1126F AMNT PAIN NOTED NONE PRSNT: CPT | Performed by: INTERNAL MEDICINE

## 2023-03-15 PROCEDURE — 3077F SYST BP >= 140 MM HG: CPT | Performed by: INTERNAL MEDICINE

## 2023-03-15 PROCEDURE — 99214 OFFICE O/P EST MOD 30 MIN: CPT | Performed by: INTERNAL MEDICINE

## 2023-03-15 NOTE — PROGRESS NOTES
Follow up     Date: .03/15/23      Patient Name: Fabiola Salmeron  MRN: [id number]  : 1950     Referring Physician: Dr. Jessica Roach    Chief Complaint: Iron deficiency anemia follow up    History of Present Illness: Fabiola Salmeron is a pleasant 73 y.o. female who presents today for evaluation of iron deficiency anemia.    Her most recent prior CBC is from 2021 and was normal.    She then presented with progressive dyspnea on exertion.  She initially attributed her symptoms to acid reflux as she was also having accompanying belching, however her symptoms did not resolve with a PPI trial which had previously helped.  She was seen by her primary care physician and had a CBC performed on 2022.  This showed a hemoglobin of 7.6 with a slightly elevated RDW at 16 and a normal white blood cell count and platelet count.  She was referred to the emergency department and was admitted to the hospital through 2022.  There she had additional blood work including an iron battery which was notable for an iron saturation of 5% and an elevated TIBC consistent with iron deficiency anemia.  She was started on intravenous iron and received a unit of PRBCs.  She was also started on steroids for an acute sciatica flare.  She received her first dose of Ferrlecit on  without incident.  However following her second infusion on  she developed flushing, became diaphoretic, tachycardic, and hypotensive with brief episode of syncope and RRT was called.  Her  who accompanies her reports that she received chest compressions.  Per nursing records, she received 1L NS bolus with transfer to ICU for further management.     Underwent egd and c-scope 22 which revealed no source of bleeding and video endoscopy capsule was performed which was negative    She was found to have an elevated troponin and a low ejection fraction at 36% within the first 24 hours of admission, with pattern concerning for  Takotsubo cardiomyopathy.  She underwent LHC on 5/23/22 which revealed stenosis to her LAD and RCA and underwent drug-eluting stents x2. Post-procedure, patient was found to have hemorrhage from her right radial artery and underwent radial artery repair and hematoma evacuation by Dr. Benavides of vascular surgery.     I reviewed her blood work prior to, during, and following admission.  She had severe anemia of unclear chronicity given that her previous CBC was greater than 1 year ago.  She has maintained her hemoglobin following hospital discharge.  B12 levels were normal.  Iron levels were consistent with iron deficiency. She is not a strict vegetarian and endorses eating red meat.  She has no prior history of gastro intestinal surgery.  No history of autoimmune disorders.    She was started on oral iron once daily which she is tolerating well with no GI side effects.  Her PPI was stopped by cardiology pharmacist.    No personal history of transfusions prior to her current presentation.  No prior history of anemia as a child or teen or during her reproductive years.  No history of hematologic disorders, malignancy, CVA, or VTE.    Family history is notable for aplastic anemia in her brother lung cancer in her father and a sister with breast cancer at age 59.    Interval history:    Feeling well. Has remained off iron. Continues folic acid and  OTC B12  No recent infections.   On annual follow up with cardiology. Continues ASA/plavis.   No bleeding.  No shortness of breath.   No recent infections. No other new complaints.    Does not want to proceed with bone marrow biopsy. Did a glaucoma surgery that she was minimal risk and now nearly blind and has always regretted doing that procedure. Worried about longterm pain after bone marrow biopsy.    Past Medical History:   Past Medical History:   Diagnosis Date   • Candidiasis of vulva and vagina    • CHF (congestive heart failure) (HCC)    • Coronary artery disease     • Hyperlipidemia    • Hypertension        Past Surgical History:   Past Surgical History:   Procedure Laterality Date   • ARTERIOVENOUS FISTULA/SHUNT SURGERY Right 5/23/2022    Procedure: RADIAL ARTERY REPAIR WITH HEMATOMA EVACUATION AND WASHOUT RIGHT;  Surgeon: Jaron Benavides MD;  Location:  [facility];  Service: Vascular;  Laterality: Right;   • CAPSULE ENDOSCOPY N/A 5/25/2022    Procedure: CAPSULE ENDOSCOPY ESOPHAGUS TO ILEUM DEPLOYED;  Surgeon: Michele Rogers MD;  Location:  [facility];  Service: Gastroenterology;  Laterality: N/A;   • CARDIAC CATHETERIZATION N/A 5/23/2022    Procedure: LEFT HEART CATH;  Surgeon: Manjit Wolfe IV, MD;  Location:  [facility];  Service: Cardiovascular;  Laterality: N/A;   • CARDIAC CATHETERIZATION N/A 5/23/2022    Procedure: Optical Coherent Tomography;  Surgeon: Manjit Wolfe IV, MD;  Location:  [facility];  Service: Cardiovascular;  Laterality: N/A;   • CARDIAC CATHETERIZATION N/A 5/23/2022    Procedure: Stent MARIA ELENA coronary;  Surgeon: Manjit Wolfe IV, MD;  Location:  [facility];  Service: Cardiovascular;  Laterality: N/A;   • COLONOSCOPY N/A 5/25/2022    Procedure: COLONOSCOPY;  Surgeon: Michele Rogers MD;  Location:  [facility];  Service: Gastroenterology;  Laterality: N/A;   • CRANIAL NEUROSTIMULATOR INSERTION/REPLACEMENT Right    • ENDOSCOPY N/A 5/25/2022    Procedure: ESOPHAGOGASTRODUODENOSCOPY;  Surgeon: Michele Rogers MD;  Location:  [facility];  Service: Gastroenterology;  Laterality: N/A;   • EYE SURGERY      shunt placement by ophthalmology       Family History:   Family History   Problem Relation Age of Onset   • Heart failure Mother    • Lung cancer Father         smoker   • Heart disease Sister    • Breast cancer Sister    • Aplastic anemia Brother    • No Known Problems Maternal Grandmother    • No Known Problems Maternal Grandfather    • No  Known Problems Paternal Grandmother    • No Known Problems Paternal Grandfather    • Atrial fibrillation Son    • Ovarian cancer Neg Hx    • Colon cancer Neg Hx        Social History:   Social History     Socioeconomic History   • Marital status:    Tobacco Use   • Smoking status: Never   • Smokeless tobacco: Never   Vaping Use   • Vaping Use: Never used   Substance and Sexual Activity   • Alcohol use: Not Currently   • Drug use: Never   • Sexual activity: Yes     Partners: Male     Birth control/protection: None, Vasectomy       Medications:     Current Outpatient Medications:   •  Acetaminophen (Tylenol) 325 MG capsule, Take 650 mg by mouth 4 (Four) Times a Day., Disp: , Rfl:   •  Aspirin Low Dose 81 MG EC tablet, TAKE 1 TABLET EVERY DAY, Disp: 90 tablet, Rfl: 3  •  bimatoprost (Lumigan) 0.01 % ophthalmic drops, Administer 1 drop to both eyes Every Night., Disp: , Rfl:   •  bisoprolol (ZEBeta) 5 MG tablet, Take 0.5 tablets by mouth Daily., Disp: 90 tablet, Rfl: 1  •  cholecalciferol (VITAMIN D3) 1.25 MG (46487 UT) capsule, Take 1 capsule by mouth 1 (One) Time Per Week., Disp: 12 capsule, Rfl: 1  •  clopidogrel (PLAVIX) 75 MG tablet, TAKE 1 TABLET EVERY DAY, Disp: 90 tablet, Rfl: 3  •  Cyanocobalamin (Vitamin B-12) 1000 MCG sublingual tablet, Place 1 tablet under the tongue Daily., Disp: 90 each, Rfl: 3  •  fluorouracil (EFUDEX) 5 % cream, fluorouracil 5 % topical cream  APPLY A SUFFICIENT AMOUNT TO COVER THE LESIONS IN THE AFFECTED AREA(S) BY TOPICAL ROUTE 2 TIMES PER DAY FOR 21 DAYS, Disp: , Rfl:   •  folic acid (FOLVITE) 1 MG tablet, Take 1 tablet by mouth Daily., Disp: 90 tablet, Rfl: 1  •  predniSONE (DELTASONE) 5 MG tablet, Take 1 tablet by mouth Daily., Disp: 90 tablet, Rfl: 1  •  rosuvastatin (CRESTOR) 5 MG tablet, Take 1 tablet by mouth Daily., Disp: , Rfl:   •  spironolactone (ALDACTONE) 25 MG tablet, TAKE 1 TABLET EVERY DAY, Disp: 90 tablet, Rfl: 3  •  timolol (TIMOPTIC) 0.5 % ophthalmic solution,  "Administer 0.5 drops to both eyes 2 (two) times a day., Disp: , Rfl:   •  topiramate (TOPAMAX) 100 MG tablet, Take 1 tablet by mouth 2 (Two) Times a Day., Disp: 180 tablet, Rfl: 3  •  valsartan (DIOVAN) 80 MG tablet, Take 1 tablet by mouth Daily., Disp: 90 tablet, Rfl: 1    Allergies:   Allergies   Allergen Reactions   • Ferrlecit [Na Ferric Gluc Cplx In Sucrose] Anaphylaxis   • Codeine Other (See Comments)   • Metoprolol Other (See Comments)     Fatigue     • Statins Myalgia       Objective     Vital Signs:   Vitals:    03/15/23 1408   BP: 157/66   Pulse: 69   Temp: 96.9 °F (36.1 °C)   TempSrc: Infrared   SpO2: 96%   Weight: 56.8 kg (125 lb 3.2 oz)   Height: 167.6 cm (66\")   PainSc: 0-No pain    Body mass index is 20.21 kg/m².   Pain Score    03/15/23 1408   PainSc: 0-No pain       Physical Exam:  General: No acute distress. Well appearing.  HEENT: Normocephalic, atraumatic. Sclera anicteric. Masked.  Neck: supple, no adenopathy.   Cardiovascular: regular rate and rhythm. No murmurs.   Respiratory: Normal rate. Clear to auscultation bilaterally  Abdomen: Soft, nontender, non distended with normoactive bowel sounds  Lymph: no cervical, supraclavicular or axillary adenopathy  Neuro: Alert and oriented x 3. No focal deficits.   Ext: Symmetric, no swelling.   Psych: Euthymic      Laboratory/Imaging Reviewed:   Lab on 03/13/2023   Component Date Value Ref Range Status   • Performed by: 03/13/2023 Abebe Rodriguez MD   Final   • Pathologist Interpretation 03/13/2023    Final                    Value:Normal total white blood cell count with differential compatible with the automated differential reported.  No abnormal/immature white blood cells noted.  Macrocytic red blood cells without anemia with no significant anisopoikilocytosis.  Platelets present in adequate numbers with normal appearance.   • WBC 03/13/2023 6.13  3.40 - 10.80 10*3/mm3 Final   • RBC 03/13/2023 3.13 (L)  3.77 - 5.28 10*6/mm3 Final   • Hemoglobin " 03/13/2023 12.4  12.0 - 15.9 g/dL Final   • Hematocrit 03/13/2023 38.5  34.0 - 46.6 % Final   • MCV 03/13/2023 123.0 (H)  79.0 - 97.0 fL Final   • MCH 03/13/2023 39.6 (H)  26.6 - 33.0 pg Final   • MCHC 03/13/2023 32.2  31.5 - 35.7 g/dL Final   • RDW 03/13/2023 13.8  12.3 - 15.4 % Final   • RDW-SD 03/13/2023 64.3 (H)  37.0 - 54.0 fl Final   • MPV 03/13/2023 8.7  6.0 - 12.0 fL Final   • Platelets 03/13/2023 267  140 - 450 10*3/mm3 Final   • Neutrophil % 03/13/2023 61.4  42.7 - 76.0 % Final   • Lymphocyte % 03/13/2023 29.4  19.6 - 45.3 % Final   • Monocyte % 03/13/2023 8.5  5.0 - 12.0 % Final   • Eosinophil % 03/13/2023 0.3  0.3 - 6.2 % Final   • Basophil % 03/13/2023 0.2  0.0 - 1.5 % Final   • Immature Grans % 03/13/2023 0.2  0.0 - 0.5 % Final   • Neutrophils, Absolute 03/13/2023 3.77  1.70 - 7.00 10*3/mm3 Final   • Lymphocytes, Absolute 03/13/2023 1.80  0.70 - 3.10 10*3/mm3 Final   • Monocytes, Absolute 03/13/2023 0.52  0.10 - 0.90 10*3/mm3 Final   • Eosinophils, Absolute 03/13/2023 0.02  0.00 - 0.40 10*3/mm3 Final   • Basophils, Absolute 03/13/2023 0.01  0.00 - 0.20 10*3/mm3 Final   • Immature Grans, Absolute 03/13/2023 0.01  0.00 - 0.05 10*3/mm3 Final       eGFR      >60.0 mL/min/1.73 54.6 (L)   Folate      4.78 - 24.20 ng/mL 18.70   Ferritin      13.00 - 150.00 ng/mL 317.70 (H)   Vitamin B-12      211 - 946 pg/mL 472     Component      Latest Ref Rng & Units 3/13/2023   WBC      3.40 - 10.80 10*3/mm3 6.13   RBC      3.77 - 5.28 10*6/mm3 3.13 (L)   Hemoglobin      12.0 - 15.9 g/dL 12.4   Hematocrit      34.0 - 46.6 % 38.5   MCV      79.0 - 97.0 fL 123.0 (H)   MCH      26.6 - 33.0 pg 39.6 (H)   MCHC      31.5 - 35.7 g/dL 32.2   RDW      12.3 - 15.4 % 13.8   RDW-SD      37.0 - 54.0 fl 64.3 (H)   MPV      6.0 - 12.0 fL 8.7   Platelets      140 - 450 10*3/mm3 267   Neutrophil Rel %      42.7 - 76.0 % 61.4   Lymphocyte Rel %      19.6 - 45.3 % 29.4   Monocyte Rel %      5.0 - 12.0 % 8.5   Eosinophil Rel %      0.3 - 6.2  % 0.3   Basophil Rel %      0.0 - 1.5 % 0.2   Immature Granulocyte Rel %      0.0 - 0.5 % 0.2   Neutrophils Absolute      1.70 - 7.00 10*3/mm3 3.77   Lymphocytes Absolute      0.70 - 3.10 10*3/mm3 1.80   Monocytes Absolute      0.10 - 0.90 10*3/mm3 0.52   Eosinophils Absolute      0.00 - 0.40 10*3/mm3 0.02   Basophils Absolute      0.00 - 0.20 10*3/mm3 0.01   Immature Grans, Absolute      0.00 - 0.05 10*3/mm3 0.01   Performed by:       Abebe Rodriguez MD   Pathologist Interpretation       Normal total white blood cell count with differential compatible with the automated differential reported.  No abnormal/immature white blood cells noted. . . .     Component      Latest Ref Rng & Units 11/29/2022 1/31/2023   Folate      4.78 - 24.20 ng/mL >20.00    Vitamin B-12      211 - 946 pg/mL  1,372 (H)   TSH Baseline      0.270 - 4.200 uIU/mL  2.230   Free T4      0.93 - 1.70 ng/dL  0.64 (L)   Haptoglobin      30 - 200 mg/dL  164     Assessment / Plan      Assessment/Plan:   1. Other iron deficiency anemia  2. Allergic reaction to drug  3. Macrocytosis  -She has history of severe iron deficiency anemia of unclear etiology.  She has had an extensive negative work-up for a GI source of blood loss.  This was unrevealing.  Her history does not reveal any obvious alternative source of blood loss.  It is likely that she had a slow gastrointestinal bleed that was not identified on work-up during hospital admission.  She had an anaphylactic reaction to intravenous iron while admitted to the hospital and should no longer receive Ferrlecit.  We discussed that if intravenous iron is required, we could cautiously consider use of an alternative formulation, but she would require close monitoring.  Given that she has no clear history of malabsorption and her blood counts are stable she completed an oral iron trial with resolution of iron deficiency anemia. She is now off oral iron. -Continues folic acid daily and also taking OTC  B12.  -Repeat CBC shows resolved anemia but she has progressive macrocytosis on CBC with MCV remaining > 120. I recommended proceeding with bone marrow biopsy unless there is an obvious alternative explanation.   -Continues to decline bm bmx. Reviewed pros and cons extensively today. She may reconsider if recurrent cytopenias. Her free t4 was mildly low with normal TSH. Will repeat with her next blood count.     4. CAD on dual antiplatelets  -Following with cardiology    Follow Up:   3 mo    Abeba Lopez MD   Hematology and Oncology

## 2023-03-20 ENCOUNTER — TELEPHONE (OUTPATIENT)
Dept: FAMILY MEDICINE CLINIC | Facility: CLINIC | Age: 73
End: 2023-03-20
Payer: MEDICARE

## 2023-03-20 DIAGNOSIS — E78.5 HYPERLIPIDEMIA LDL GOAL <70: Primary | ICD-10-CM

## 2023-03-20 NOTE — TELEPHONE ENCOUNTER
I called patient to reschedule her appt. With Jessica Roach on 07/06/23. Patient would like to have a fasting cholesterol test ordered so that she can get that before coming into her appointment on 7/10/23.

## 2023-04-08 DIAGNOSIS — M54.81 BILATERAL OCCIPITAL NEURALGIA: ICD-10-CM

## 2023-04-08 RX ORDER — TOPIRAMATE 100 MG/1
TABLET, FILM COATED ORAL
Qty: 180 TABLET | Refills: 3 | Status: SHIPPED | OUTPATIENT
Start: 2023-04-08

## 2023-04-08 NOTE — TELEPHONE ENCOUNTER
Rx Refill Note  Requested Prescriptions     Pending Prescriptions Disp Refills   • topiramate (TOPAMAX) 100 MG tablet [Pharmacy Med Name: TOPIRAMATE 100 MG Tablet] 180 tablet 3     Sig: TAKE 1 TABLET TWICE DAILY      Last office visit with prescribing clinician: 1/3/2023   Last telemedicine visit with prescribing clinician: 7/10/2023   Next office visit with prescribing clinician: 7/10/2023                         Would you like a call back once the refill request has been completed: [] Yes [] No    If the office needs to give you a call back, can they leave a voicemail: [] Yes [] No    Staci Anton MA  04/08/23, 09:06 EDT

## 2023-04-25 ENCOUNTER — LAB (OUTPATIENT)
Dept: LAB | Facility: HOSPITAL | Age: 73
End: 2023-04-25
Payer: MEDICARE

## 2023-04-25 DIAGNOSIS — D53.9 MACROCYTIC ANEMIA: ICD-10-CM

## 2023-04-25 LAB
BASOPHILS # BLD AUTO: 0.03 10*3/MM3 (ref 0–0.2)
BASOPHILS NFR BLD AUTO: 0.7 % (ref 0–1.5)
DEPRECATED RDW RBC AUTO: 59.4 FL (ref 37–54)
EOSINOPHIL # BLD AUTO: 0.14 10*3/MM3 (ref 0–0.4)
EOSINOPHIL NFR BLD AUTO: 3.1 % (ref 0.3–6.2)
ERYTHROCYTE [DISTWIDTH] IN BLOOD BY AUTOMATED COUNT: 12.3 % (ref 12.3–15.4)
HCT VFR BLD AUTO: 38.5 % (ref 34–46.6)
HGB BLD-MCNC: 12.3 G/DL (ref 12–15.9)
IMM GRANULOCYTES # BLD AUTO: 0.02 10*3/MM3 (ref 0–0.05)
IMM GRANULOCYTES NFR BLD AUTO: 0.4 % (ref 0–0.5)
LYMPHOCYTES # BLD AUTO: 0.98 10*3/MM3 (ref 0.7–3.1)
LYMPHOCYTES NFR BLD AUTO: 21.9 % (ref 19.6–45.3)
MCH RBC QN AUTO: 40.5 PG (ref 26.6–33)
MCHC RBC AUTO-ENTMCNC: 31.9 G/DL (ref 31.5–35.7)
MCV RBC AUTO: 126.6 FL (ref 79–97)
MONOCYTES # BLD AUTO: 0.54 10*3/MM3 (ref 0.1–0.9)
MONOCYTES NFR BLD AUTO: 12.1 % (ref 5–12)
NEUTROPHILS NFR BLD AUTO: 2.77 10*3/MM3 (ref 1.7–7)
NEUTROPHILS NFR BLD AUTO: 61.8 % (ref 42.7–76)
PLATELET # BLD AUTO: 276 10*3/MM3 (ref 140–450)
PMV BLD AUTO: 8.8 FL (ref 6–12)
RBC # BLD AUTO: 3.04 10*6/MM3 (ref 3.77–5.28)
T3FREE SERPL-MCNC: 2.44 PG/ML (ref 2–4.4)
T4 FREE SERPL-MCNC: 0.73 NG/DL (ref 0.93–1.7)
TSH SERPL DL<=0.05 MIU/L-ACNC: 2.01 UIU/ML (ref 0.27–4.2)
WBC NRBC COR # BLD: 4.48 10*3/MM3 (ref 3.4–10.8)

## 2023-04-25 PROCEDURE — 84481 FREE ASSAY (FT-3): CPT

## 2023-04-25 PROCEDURE — 36415 COLL VENOUS BLD VENIPUNCTURE: CPT

## 2023-04-25 PROCEDURE — 84443 ASSAY THYROID STIM HORMONE: CPT

## 2023-04-25 PROCEDURE — 85025 COMPLETE CBC W/AUTO DIFF WBC: CPT

## 2023-04-25 PROCEDURE — 84439 ASSAY OF FREE THYROXINE: CPT

## 2023-05-18 ENCOUNTER — OFFICE VISIT (OUTPATIENT)
Dept: FAMILY MEDICINE CLINIC | Facility: CLINIC | Age: 73
End: 2023-05-18
Payer: MEDICARE

## 2023-05-18 ENCOUNTER — HOSPITAL ENCOUNTER (OUTPATIENT)
Dept: GENERAL RADIOLOGY | Facility: HOSPITAL | Age: 73
Discharge: HOME | End: 2023-05-18
Payer: MEDICARE

## 2023-05-18 VITALS
WEIGHT: 125.6 LBS | DIASTOLIC BLOOD PRESSURE: 74 MMHG | SYSTOLIC BLOOD PRESSURE: 126 MMHG | HEART RATE: 82 BPM | HEIGHT: 66 IN | OXYGEN SATURATION: 94 % | TEMPERATURE: 98.1 F | BODY MASS INDEX: 20.18 KG/M2

## 2023-05-18 DIAGNOSIS — S89.92XA LOWER LEG INJURY, LEFT, INITIAL ENCOUNTER: ICD-10-CM

## 2023-05-18 DIAGNOSIS — R22.42 LOCALIZED SWELLING OF LEFT LOWER LEG: ICD-10-CM
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
## 2023-05-18 PROCEDURE — 73560 X-RAY EXAM OF KNEE 1 OR 2: CPT

## 2023-05-18 PROCEDURE — 73590 X-RAY EXAM OF LOWER LEG: CPT

## 2023-05-18 RX ORDER — BISOPROLOL FUMARATE 5 MG/1
TABLET, FILM COATED ORAL
Qty: 45 TABLET | Refills: 0 | Status: SHIPPED | OUTPATIENT
Start: 2023-05-18

## 2023-05-18 NOTE — PROGRESS NOTES
"Subjective   Fabiola Salmeron is a 73 y.o. female.   Chief Complaint   Patient presents with   • Leg Swelling     Pt. States she has burning in the Left Leg after the Fall Yesterday.   • Fall     Pt. States she had a Fall Yesterday.   • Leg Injury     Leg       History of Present Illness   Patient of Christy Roach is here with complaint of left leg swelling after a fall yesterday.  States she tripped on root aboveground from a bush while working in the [employer] she fell forward, cut her left palm and both knees.  The worst injury was below left knee which immediately started to swell and bruise as soon as she stood up. feels \"burning\" inside her leg, denies actual pain, able to bear weight,has been icing the area, has wrapped in ACE bandage.  She is on Plavix and aspirin.  The following portions of the patient's history were reviewed and updated as appropriate: allergies, current medications, past family history, past medical history, past social history, past surgical history and problem list.    Review of Systems   Cardiovascular: Positive for leg swelling.   Musculoskeletal: Positive for gait problem.   Skin: Positive for color change and wound.   Neurological: Negative for dizziness, light-headedness and headaches.   Hematological: Bruises/bleeds easily.       Objective   Physical Exam  Vitals reviewed.   Constitutional:       General: She is not in acute distress.     Appearance: Normal appearance. She is not ill-appearing, toxic-appearing or diaphoretic.   Cardiovascular:      Rate and Rhythm: Normal rate and regular rhythm.      Pulses: Normal pulses.   Pulmonary:      Effort: Pulmonary effort is normal. No respiratory distress.      Breath sounds: Normal breath sounds.   Musculoskeletal:         General: Swelling (LLE) and tenderness (LLE) present.   Skin:     Findings: Bruising (LLE), erythema (LLE) and lesion present.             Comments: Swelling and bruising below the left knee, likely large " "hematoma.  Chronic appearing erythema and discoloration anterior lower leg   Neurological:      Mental Status: She is alert.       /74 (BP Location: Right arm, Patient Position: Sitting, Cuff Size: Adult)   Pulse 82   Temp 98.1 °F (36.7 °C) (Temporal)   Ht 167.6 cm (65.98\")   Wt 57 kg (125 lb 9.6 oz)   SpO2 94%   BMI 20.28 kg/m²     Assessment & Plan   Diagnoses and all orders for this visit:    1. Injury due to fall, initial encounter (Primary)  -     XR Tibia Fibula 2 View Left; Future  -     XR Knee 1 or 2 View Left; Future    2. Localized swelling of left lower leg  -     XR Tibia Fibula 2 View Left; Future  -     XR Knee 1 or 2 View Left; Future    3. Lower leg injury, left, initial encounter  -     XR Tibia Fibula 2 View Left; Future  -     XR Knee 1 or 2 View Left; Future      Immediate swelling and bruising after fall is concerning for possible fracture, x-ray ordered stat.  This came back negative for fracture.  Rest, ice, compression wrap from foot to knee, and elevation recommended.  Reviewed signs and symptoms of compartment syndrome.  Patient understands what to watch for encouraged her to schedule a follow-up for next week but go to the emergency room for further evaluation if she develops any signs or symptoms of compartment syndrome  Patient was encouraged to keep me informed of any acute changes, lack of improvement, or any new concerning symptoms.  At this time patient describes improvement in swelling and bruising since yesterday.  She understands the importance of monitoring due to increased risk of bleeding and expanding hematoma due to Plavix and aspirin.         "

## 2023-05-29 DIAGNOSIS — M43.02 CERVICAL SPONDYLOLYSIS: ICD-10-CM

## 2023-05-29 DIAGNOSIS — M54.81 BILATERAL OCCIPITAL NEURALGIA: ICD-10-CM

## 2023-05-29 DIAGNOSIS — I10 ESSENTIAL HYPERTENSION: ICD-10-CM

## 2023-05-30 RX ORDER — FOLIC ACID 1 MG/1
TABLET ORAL
Qty: 90 TABLET | Refills: 1 | Status: SHIPPED | OUTPATIENT
Start: 2023-05-30

## 2023-05-30 RX ORDER — PREDNISONE 5 MG/1
TABLET ORAL
Qty: 90 TABLET | Refills: 1 | Status: SHIPPED | OUTPATIENT
Start: 2023-05-30

## 2023-05-30 RX ORDER — VALSARTAN 80 MG/1
TABLET ORAL
Qty: 90 TABLET | Refills: 1 | Status: SHIPPED | OUTPATIENT
Start: 2023-05-30

## 2023-05-31 ENCOUNTER — OFFICE VISIT (OUTPATIENT)
Dept: FAMILY MEDICINE CLINIC | Facility: CLINIC | Age: 73
End: 2023-05-31

## 2023-05-31 VITALS
HEART RATE: 86 BPM | SYSTOLIC BLOOD PRESSURE: 132 MMHG | OXYGEN SATURATION: 95 % | WEIGHT: 125 LBS | HEIGHT: 66 IN | DIASTOLIC BLOOD PRESSURE: 80 MMHG | BODY MASS INDEX: 20.09 KG/M2

## 2023-05-31 DIAGNOSIS — S89.92XS: Primary | ICD-10-CM

## 2023-05-31 DIAGNOSIS — T14.8XXA HEMATOMA: ICD-10-CM

## 2023-05-31 NOTE — PROGRESS NOTES
Chief Complaint   Patient presents with   • left leg pain x 1 week ago        Fabiola Salmeron is a pleasant 73 y.o. female who is here for routine follow-up of left leg injury with hematoma.  Patient reports that hematoma size has improved compared to onset.  X-ray imaging was reassuring with no fracture.  She is wrapping with ACE bandage for compression and elevating when resting at home.  Pain is manageable.  She is on plavix and asa.    Past Medical History:   Diagnosis Date   • Candidiasis of vulva and vagina    • CHF (congestive heart failure)    • Coronary artery disease    • Hyperlipidemia    • Hypertension        Past Surgical History:   Procedure Laterality Date   • ARTERIOVENOUS FISTULA/SHUNT SURGERY Right 5/23/2022    Procedure: RADIAL ARTERY REPAIR WITH HEMATOMA EVACUATION AND WASHOUT RIGHT;  Surgeon: Jaron Benavides MD;  Location: [facility];  Service: Vascular;  Laterality: Right;   • CAPSULE ENDOSCOPY N/A 5/25/2022    Procedure: CAPSULE ENDOSCOPY ESOPHAGUS TO ILEUM DEPLOYED;  Surgeon: Michele Rogers MD;  Location:  [facility];  Service: Gastroenterology;  Laterality: N/A;   • CARDIAC CATHETERIZATION N/A 5/23/2022    Procedure: LEFT HEART CATH;  Surgeon: Manjit Wolfe IV, MD;  Location:  [facility];  Service: Cardiovascular;  Laterality: N/A;   • CARDIAC CATHETERIZATION N/A 5/23/2022    Procedure: Optical Coherent Tomography;  Surgeon: Manjit Wolfe IV, MD;  Location:  [facility];  Service: Cardiovascular;  Laterality: N/A;   • CARDIAC CATHETERIZATION N/A 5/23/2022    Procedure: Stent MARIA ELENA coronary;  Surgeon: Manjit Wolfe IV, MD;  Location:  [facility];  Service: Cardiovascular;  Laterality: N/A;   • COLONOSCOPY N/A 5/25/2022    Procedure: COLONOSCOPY;  Surgeon: Michele Rogers MD;  Location:  [facility];  Service: Gastroenterology;  Laterality: N/A;   • CRANIAL NEUROSTIMULATOR  "INSERTION/REPLACEMENT Right    • ENDOSCOPY N/A 5/25/2022    Procedure: ESOPHAGOGASTRODUODENOSCOPY;  Surgeon: Michele Rogers MD;  Location: [facility] ENDOSCOPY;  Service: Gastroenterology;  Laterality: N/A;   • EYE SURGERY      shunt placement by ophthalmology       Family History   Problem Relation Age of Onset   • Heart failure Mother    • Lung cancer Father         smoker   • Heart disease Sister    • Breast cancer Sister    • Aplastic anemia Brother    • No Known Problems Maternal Grandmother    • No Known Problems Maternal Grandfather    • No Known Problems Paternal Grandmother    • No Known Problems Paternal Grandfather    • Atrial fibrillation Son    • Ovarian cancer Neg Hx    • Colon cancer Neg Hx        Social History     Socioeconomic History   • Marital status:    Tobacco Use   • Smoking status: Never     Passive exposure: Never   • Smokeless tobacco: Never   Vaping Use   • Vaping Use: Never used   Substance and Sexual Activity   • Alcohol use: Not Currently   • Drug use: Never   • Sexual activity: Yes     Partners: Male     Birth control/protection: None, Vasectomy       Allergies   Allergen Reactions   • Ferrlecit [Na Ferric Gluc Cplx In Sucrose] Anaphylaxis   • Codeine Other (See Comments)   • Metoprolol Other (See Comments)     Fatigue     • Statins Myalgia       ROS  Review of Systems   Musculoskeletal: Positive for myalgias.   Skin: Positive for color change, wound and bruise.       Vitals:    05/31/23 1500   BP: 132/80   Pulse: 86   SpO2: 95%   Weight: 56.7 kg (125 lb)   Height: 167.6 cm (65.98\")   PainSc:   5   PainLoc: Leg     Body mass index is 20.19 kg/m².    Current Outpatient Medications on File Prior to Visit   Medication Sig Dispense Refill   • Acetaminophen (Tylenol) 325 MG capsule Take 650 mg by mouth 4 (Four) Times a Day.     • Aspirin Low Dose 81 MG EC tablet TAKE 1 TABLET EVERY DAY 90 tablet 3   • bimatoprost (Lumigan) 0.01 % ophthalmic drops Administer 1 drop to both eyes Every " Night.     • bisoprolol (ZEBeta) 5 MG tablet TAKE 1/2 TABLET EVERY DAY 45 tablet 0   • cholecalciferol (VITAMIN D3) 1.25 MG (35777 UT) capsule Take 1 capsule by mouth 1 (One) Time Per Week. 12 capsule 1   • clopidogrel (PLAVIX) 75 MG tablet TAKE 1 TABLET EVERY DAY 90 tablet 3   • Cyanocobalamin (Vitamin B-12) 1000 MCG sublingual tablet Place 1 tablet under the tongue Daily. 90 each 3   • fluorouracil (EFUDEX) 5 % cream fluorouracil 5 % topical cream   APPLY A SUFFICIENT AMOUNT TO COVER THE LESIONS IN THE AFFECTED AREA(S) BY TOPICAL ROUTE 2 TIMES PER DAY FOR 21 DAYS     • folic acid (FOLVITE) 1 MG tablet TAKE 1 TABLET EVERY DAY 90 tablet 1   • predniSONE (DELTASONE) 5 MG tablet TAKE 1 TABLET EVERY DAY 90 tablet 1   • rosuvastatin (CRESTOR) 5 MG tablet Take 1 tablet by mouth Daily.     • spironolactone (ALDACTONE) 25 MG tablet TAKE 1 TABLET EVERY DAY 90 tablet 3   • timolol (TIMOPTIC) 0.5 % ophthalmic solution Administer 0.5 drops to both eyes 2 (two) times a day.     • topiramate (TOPAMAX) 100 MG tablet TAKE 1 TABLET TWICE DAILY 180 tablet 3   • valsartan (DIOVAN) 80 MG tablet TAKE 1 TABLET EVERY DAY 90 tablet 1     No current facility-administered medications on file prior to visit.       Results for orders placed or performed in visit on 04/25/23   TSH    Specimen: Blood   Result Value Ref Range    TSH 2.010 0.270 - 4.200 uIU/mL   T4, free    Specimen: Blood   Result Value Ref Range    Free T4 0.73 (L) 0.93 - 1.70 ng/dL   T3, Free    Specimen: Blood   Result Value Ref Range    T3, Free 2.44 2.00 - 4.40 pg/mL   CBC Auto Differential    Specimen: Blood   Result Value Ref Range    WBC 4.48 3.40 - 10.80 10*3/mm3    RBC 3.04 (L) 3.77 - 5.28 10*6/mm3    Hemoglobin 12.3 12.0 - 15.9 g/dL    Hematocrit 38.5 34.0 - 46.6 %    .6 (H) 79.0 - 97.0 fL    MCH 40.5 (H) 26.6 - 33.0 pg    MCHC 31.9 31.5 - 35.7 g/dL    RDW 12.3 12.3 - 15.4 %    RDW-SD 59.4 (H) 37.0 - 54.0 fl    MPV 8.8 6.0 - 12.0 fL    Platelets 276 140 - 450  10*3/mm3    Neutrophil % 61.8 42.7 - 76.0 %    Lymphocyte % 21.9 19.6 - 45.3 %    Monocyte % 12.1 (H) 5.0 - 12.0 %    Eosinophil % 3.1 0.3 - 6.2 %    Basophil % 0.7 0.0 - 1.5 %    Immature Grans % 0.4 0.0 - 0.5 %    Neutrophils, Absolute 2.77 1.70 - 7.00 10*3/mm3    Lymphocytes, Absolute 0.98 0.70 - 3.10 10*3/mm3    Monocytes, Absolute 0.54 0.10 - 0.90 10*3/mm3    Eosinophils, Absolute 0.14 0.00 - 0.40 10*3/mm3    Basophils, Absolute 0.03 0.00 - 0.20 10*3/mm3    Immature Grans, Absolute 0.02 0.00 - 0.05 10*3/mm3       PE    Physical Exam  Vitals reviewed.   Constitutional:       General: She is not in acute distress.     Appearance: Normal appearance. She is well-developed and normal weight. She is not ill-appearing or diaphoretic.   HENT:      Head: Normocephalic and atraumatic.   Eyes:      Extraocular Movements: Extraocular movements intact.      Conjunctiva/sclera: Conjunctivae normal.   Pulmonary:      Effort: No respiratory distress.   Musculoskeletal:         General: Normal range of motion.      Cervical back: Normal range of motion.        Legs:    Neurological:      General: No focal deficit present.      Mental Status: She is alert.   Psychiatric:         Attention and Perception: She is attentive.         Mood and Affect: Mood normal.         Speech: Speech normal.         Behavior: Behavior normal. Behavior is cooperative.         Thought Content: Thought content normal.         Judgment: Judgment normal.         A/P    Diagnoses and all orders for this visit:    1. Injury of left lower leg, sequela (Primary)  2. Hematoma  Reviewed x-ray which does not show fracture.  Patient reports hematoma size has improved compared to onset.  Has extensive bruising that is fading and turning yellow-green.  Encouraged patient to continue with ACE wrap during day, elevating while home at rest.  Will take awhile to complete heal - suspect 4-6 weeks.  Call if symptoms worsen at any point or change.       Plan of care  reviewed with patient at the conclusion of today's visit. Education was provided regarding diagnosis, management and any prescribed or recommended OTC medications.  Patient verbalizes understanding of and agreement with management plan.    Dictated Utilizing Dragon Dictation     Please note that portions of this note were completed with a voice recognition program.     Part of this note may be an electronic transcription/translation of spoken language to printed text using the Dragon Dictation System.    No follow-ups on file.     Jessica Roach PA-C  Answers for HPI/ROS submitted by the patient on 5/31/2023  What is the primary reason for your visit?: Lower Extremity Injury  Incident occurred: more than 1 week ago  Incident location: at home  Injury mechanism: a fall  Pain location: left leg  Pain quality: burning  Pain - numeric: 5/10  Pain course: intermittent  tingling: No  inability to bear weight: No  loss of motion: No  loss of sensation: No  muscle weakness: No  Foreign body present: no foreign bodies  Aggravated by: palpation

## 2023-06-12 ENCOUNTER — LAB (OUTPATIENT)
Dept: LAB | Facility: HOSPITAL | Age: 73
End: 2023-06-12
Payer: MEDICARE

## 2023-06-12 DIAGNOSIS — D50.8 OTHER IRON DEFICIENCY ANEMIA: ICD-10-CM

## 2023-06-12 DIAGNOSIS — D53.9 MACROCYTIC ANEMIA: ICD-10-CM

## 2023-06-12 DIAGNOSIS — D53.9 MACROCYTIC ANEMIA: Primary | ICD-10-CM

## 2023-06-12 LAB
ALBUMIN SERPL-MCNC: 4.1 G/DL (ref 3.5–5.2)
ALBUMIN/GLOB SERPL: 1.3 G/DL
ALP SERPL-CCNC: 74 U/L (ref 39–117)
ALT SERPL W P-5'-P-CCNC: 16 U/L (ref 1–33)
ANION GAP SERPL CALCULATED.3IONS-SCNC: 9 MMOL/L (ref 5–15)
AST SERPL-CCNC: 22 U/L (ref 1–32)
BASOPHILS # BLD AUTO: 0.05 10*3/MM3 (ref 0–0.2)
BASOPHILS NFR BLD AUTO: 1.1 % (ref 0–1.5)
BILIRUB SERPL-MCNC: 0.3 MG/DL (ref 0–1.2)
BUN SERPL-MCNC: 26 MG/DL (ref 8–23)
BUN/CREAT SERPL: 29.5 (ref 7–25)
CALCIUM SPEC-SCNC: 9.9 MG/DL (ref 8.6–10.5)
CHLORIDE SERPL-SCNC: 109 MMOL/L (ref 98–107)
CO2 SERPL-SCNC: 21 MMOL/L (ref 22–29)
CREAT SERPL-MCNC: 0.88 MG/DL (ref 0.57–1)
DEPRECATED RDW RBC AUTO: 58.9 FL (ref 37–54)
EGFRCR SERPLBLD CKD-EPI 2021: 68.6 ML/MIN/1.73
EOSINOPHIL # BLD AUTO: 0.07 10*3/MM3 (ref 0–0.4)
EOSINOPHIL NFR BLD AUTO: 1.5 % (ref 0.3–6.2)
ERYTHROCYTE [DISTWIDTH] IN BLOOD BY AUTOMATED COUNT: 12.3 % (ref 12.3–15.4)
GLOBULIN UR ELPH-MCNC: 3.1 GM/DL
GLUCOSE SERPL-MCNC: 104 MG/DL (ref 65–99)
HCT VFR BLD AUTO: 34 % (ref 34–46.6)
HGB BLD-MCNC: 10.5 G/DL (ref 12–15.9)
IMM GRANULOCYTES # BLD AUTO: 0.03 10*3/MM3 (ref 0–0.05)
IMM GRANULOCYTES NFR BLD AUTO: 0.7 % (ref 0–0.5)
LYMPHOCYTES # BLD AUTO: 0.9 10*3/MM3 (ref 0.7–3.1)
LYMPHOCYTES NFR BLD AUTO: 19.6 % (ref 19.6–45.3)
MCH RBC QN AUTO: 39.3 PG (ref 26.6–33)
MCHC RBC AUTO-ENTMCNC: 30.9 G/DL (ref 31.5–35.7)
MCV RBC AUTO: 127.3 FL (ref 79–97)
MONOCYTES # BLD AUTO: 0.6 10*3/MM3 (ref 0.1–0.9)
MONOCYTES NFR BLD AUTO: 13.1 % (ref 5–12)
NEUTROPHILS NFR BLD AUTO: 2.94 10*3/MM3 (ref 1.7–7)
NEUTROPHILS NFR BLD AUTO: 64 % (ref 42.7–76)
PLATELET # BLD AUTO: 312 10*3/MM3 (ref 140–450)
PMV BLD AUTO: 8.8 FL (ref 6–12)
POTASSIUM SERPL-SCNC: 4.8 MMOL/L (ref 3.5–5.2)
PROT SERPL-MCNC: 7.2 G/DL (ref 6–8.5)
RBC # BLD AUTO: 2.67 10*6/MM3 (ref 3.77–5.28)
SODIUM SERPL-SCNC: 139 MMOL/L (ref 136–145)
WBC NRBC COR # BLD: 4.59 10*3/MM3 (ref 3.4–10.8)

## 2023-06-12 PROCEDURE — 80053 COMPREHEN METABOLIC PANEL: CPT

## 2023-06-12 PROCEDURE — 85025 COMPLETE CBC W/AUTO DIFF WBC: CPT

## 2023-06-12 PROCEDURE — 86334 IMMUNOFIX E-PHORESIS SERUM: CPT

## 2023-06-12 PROCEDURE — 82784 ASSAY IGA/IGD/IGG/IGM EACH: CPT

## 2023-06-12 PROCEDURE — 84165 PROTEIN E-PHORESIS SERUM: CPT

## 2023-06-12 PROCEDURE — 83521 IG LIGHT CHAINS FREE EACH: CPT

## 2023-06-12 PROCEDURE — 36415 COLL VENOUS BLD VENIPUNCTURE: CPT

## 2023-06-13 LAB
ALBUMIN SERPL ELPH-MCNC: 3.3 G/DL (ref 2.9–4.4)
ALBUMIN/GLOB SERPL: 1.1 {RATIO} (ref 0.7–1.7)
ALPHA1 GLOB SERPL ELPH-MCNC: 0.3 G/DL (ref 0–0.4)
ALPHA2 GLOB SERPL ELPH-MCNC: 0.9 G/DL (ref 0.4–1)
B-GLOBULIN SERPL ELPH-MCNC: 1.1 G/DL (ref 0.7–1.3)
GAMMA GLOB SERPL ELPH-MCNC: 0.8 G/DL (ref 0.4–1.8)
GLOBULIN SER-MCNC: 3.2 G/DL (ref 2.2–3.9)
IGA SERPL-MCNC: 359 MG/DL (ref 64–422)
IGG SERPL-MCNC: 733 MG/DL (ref 586–1602)
IGM SERPL-MCNC: 71 MG/DL (ref 26–217)
INTERPRETATION SERPL IEP-IMP: ABNORMAL
KAPPA LC FREE SER-MCNC: 26.4 MG/L (ref 3.3–19.4)
KAPPA LC FREE/LAMBDA FREE SER: 1.76 {RATIO} (ref 0.26–1.65)
LABORATORY COMMENT REPORT: ABNORMAL
LAMBDA LC FREE SERPL-MCNC: 15 MG/L (ref 5.7–26.3)
M PROTEIN SERPL ELPH-MCNC: ABNORMAL G/DL
PROT SERPL-MCNC: 6.5 G/DL (ref 6–8.5)

## 2023-07-10 PROBLEM — E78.2 MIXED HYPERLIPIDEMIA: Status: ACTIVE | Noted: 2017-03-28

## 2023-07-10 PROBLEM — J30.2 SEASONAL ALLERGIES: Status: ACTIVE | Noted: 2023-07-10

## 2023-09-22 NOTE — PROGRESS NOTES
Cardiology Outpatient Visit      Identification: Fabiola Salmeron is a 73 y.o. female who resides in [place], [place].     Reason for visit:  HFrEF  CAD with previous MI      Subjective      Fabiola returns the office today.  She is having no chest pain or heart failure symptoms.  She did not have chest pain when she presented with delayed myocardial infarction.  She is on DAPT and is >1-year out from ACS event.  She remains on prednisone for occipital neuralgia.  No syncope or near syncope.    She reports cold hands and feet ever since her heart attack and being started on medications including bisoprolol.  She was previously taking rosuvastatin every other day but recently started taking a daily.    At the time of her index heart catheterization, she developed large hematoma of her wrist and hand resulting in open wound requiring surgical repair by Dr. COPPOLA.  Her hand has completely recovered and looks the same as her other.    ROS    Allergies   Allergen Reactions    Ferrlecit [Na Ferric Gluc Cplx In Sucrose] Anaphylaxis    Codeine Other (See Comments)    Metoprolol Other (See Comments)     Fatigue      Statins Myalgia         Current Outpatient Medications   Medication Instructions    aspirin (ASPIRIN LOW DOSE) 81 mg, Oral, Daily    bimatoprost (Lumigan) 0.01 % ophthalmic drops 1 drop, Both Eyes, Nightly    bisoprolol (ZEBETA) 5 mg, Oral, Daily    cholecalciferol (VITAMIN D3) 50,000 Units, Oral, Weekly    fluorouracil (EFUDEX) 5 % cream fluorouracil 5 % topical cream   APPLY A SUFFICIENT AMOUNT TO COVER THE LESIONS IN THE AFFECTED AREA(S) BY TOPICAL ROUTE 2 TIMES PER DAY FOR 21 DAYS    folic acid (FOLVITE) 1 MG tablet TAKE 1 TABLET EVERY DAY    loratadine (CLARITIN) 10 mg, Oral, Daily    predniSONE (DELTASONE) 5 MG tablet TAKE 1 TABLET EVERY DAY    rosuvastatin (CRESTOR) 5 mg, Oral, Nightly    spironolactone (ALDACTONE) 25 mg, Oral, Daily    timolol (TIMOPTIC) 0.5 % ophthalmic solution 0.5 drops, Both Eyes, 2 times  "daily    topiramate (TOPAMAX) 100 MG tablet TAKE 1 TABLET TWICE DAILY    Tylenol 650 mg, Oral, 4 Times Daily    valsartan (DIOVAN) 80 mg, Oral, Daily    Vitamin B-12 1,000 mcg, Sublingual, Daily         Objective     /76 (BP Location: Left arm, Patient Position: Sitting, Cuff Size: Adult)   Pulse 78   Ht 167.6 cm (66\")   Wt 55.5 kg (122 lb 6.4 oz)   SpO2 99%   BMI 19.76 kg/m²       Constitutional:       Appearance: Healthy appearance.   Eyes:      General: No scleral icterus.  Neck:      Thyroid: No thyroid mass.      Vascular: No carotid bruit or JVD. JVD normal.   Pulmonary:      Effort: Pulmonary effort is normal.      Breath sounds: Normal breath sounds.   Cardiovascular:      Normal rate. Regular rhythm.      Murmurs: There is no murmur.      No gallop.    Edema:     Peripheral edema absent.   Skin:     General: Skin is warm. There is no cyanosis.   Neurological:      General: No focal deficit present.      Mental Status: Alert.   Psychiatric:         Attention and Perception: Attention normal.       Result Review  (reviewed with patient):            Lab Results   Component Value Date    GLUCOSE 121 (H) 07/06/2023    BUN 25 (H) 07/06/2023    CREATININE 0.95 07/06/2023    EGFR 62.6 07/06/2023    BCR 26.3 (H) 07/06/2023    K 4.6 07/06/2023    CO2 14.0 (L) 07/06/2023    CALCIUM 9.8 07/06/2023    PROTENTOTREF 6.5 06/12/2023    ALBUMIN 4.3 07/06/2023    BILITOT 0.2 07/06/2023    AST 23 07/06/2023    ALT 17 07/06/2023     Lab Results   Component Value Date    WBC 5.62 07/06/2023    HGB 11.1 (L) 07/06/2023    HCT 35.8 07/06/2023    .6 (H) 07/06/2023     07/06/2023     Lab Results   Component Value Date    CHOL 201 (H) 07/06/2023    TRIG 106 07/06/2023    HDL 57 07/06/2023     (H) 07/06/2023     Lab Results   Component Value Date    HGBA1C 5.6 07/10/2023           Assessment     Diagnoses and all orders for this visit:    1. Chronic systolic congestive heart failure " (Primary)  Overview:  Echo (5/21/2022): LVEF 36%.  Systolic anterior motion of mitral valve present.  RVSP >  35 mmHg. The mid to distal and apical segments are akinetic.  Cardiac catheterization for NSTEMI (5/23/2022): Severe 2-vessel CAD (mid LAD occlusion, severe mid RCA stenosis).  PCI of proximal/mid LAD using 3 x 38 mm MARIA ELENA.  PCI of mid RCA using 4 x 28 mm MARIA ELENA.  LVEDP 25 mmHg.    Assessment & Plan:  Stage C HFrEF with stable NYHA class II symptoms  Ischemic etiology  Increase bisoprolol to 5 mg daily  Continue valsartan and spironolactone  Obtain echo to reassess LVEF  Consider SGL 2 inhibitor therapy if LVEF remains reduced      Orders:  -     Adult Transthoracic Echo Complete W/ Cont if Necessary Per Protocol; Future  -     spironolactone (ALDACTONE) 25 MG tablet; Take 1 tablet by mouth Daily.  Dispense: 90 tablet; Refill: 3  -     valsartan (DIOVAN) 80 MG tablet; Take 1 tablet by mouth Daily.  Dispense: 90 tablet; Refill: 3  -     bisoprolol (ZEBeta) 5 MG tablet; Take 1 tablet by mouth Daily.  Dispense: 90 tablet; Refill: 3    2. Coronary artery disease involving native coronary artery of native heart with angina pectoris  Overview:  Echo (5/21/2022): LVEF 36%.  Systolic anterior motion of mitral valve present.  RVSP >  35 mmHg. The mid to distal and apical segments are akinetic  Cardiac catheterization (5/23/2022): Severe 2-vessel CAD (mid LAD occlusion, severe mid RCA stenosis).  Successful OCT guided PCI of proximal/mid LAD using 3 x 38 mm MARIA ELENA.  Successful OCT guided PCI of mid RCA using 4 x 28 mm MARIA ELENA.  LVEDP 25 mmHg.    Assessment & Plan:  No angina  De-escalate to SAPT with aspirin monotherapy due to bleed risk (low body weight, chronic prednisone use)    Orders:  -     aspirin (Aspirin Low Dose) 81 MG EC tablet; Take 1 tablet by mouth Daily.  Dispense: 90 tablet; Refill: 3  -     bisoprolol (ZEBeta) 5 MG tablet; Take 1 tablet by mouth Daily.  Dispense: 90 tablet; Refill: 3    3. Hyperlipidemia LDL goal  <55  Overview:  High intensity statin therapy indicated given the presence of CAD    Assessment & Plan:  LDL not to goal on rosuvastatin 5 mg every other day  Repeat direct LDL now taking rosuvastatin daily  If LDL >55, recommend Repatha or inclisiran    Orders:  -     LDL Cholesterol, Direct; Future  -     Comprehensive Metabolic Panel; Future    Other orders  -     rosuvastatin (CRESTOR) 5 MG tablet; Take 1 tablet by mouth Every Night.  Dispense: 90 tablet; Refill: 3          Plan   Echo  Increase bisoprolol to 5 mg daily  Obtain direct LDL and CMP in 4 to 6 weeks  If LDL >55, start Repatha or inclisiran  Consider empagliflozin for HFrEF      Follow-up   Return in about 8 months (around 5/26/2024).        David Wolfe MD, FACC, [facility]  9/26/2023

## 2023-09-26 ENCOUNTER — OFFICE VISIT (OUTPATIENT)
Dept: CARDIOLOGY | Facility: CLINIC | Age: 73
End: 2023-09-26
Payer: MEDICARE

## 2023-09-26 VITALS
DIASTOLIC BLOOD PRESSURE: 76 MMHG | HEART RATE: 78 BPM | WEIGHT: 122.4 LBS | HEIGHT: 66 IN | SYSTOLIC BLOOD PRESSURE: 138 MMHG | OXYGEN SATURATION: 99 % | BODY MASS INDEX: 19.67 KG/M2

## 2023-09-26 DIAGNOSIS — I25.119 CORONARY ARTERY DISEASE INVOLVING NATIVE CORONARY ARTERY OF NATIVE HEART WITH ANGINA PECTORIS: ICD-10-CM

## 2023-09-26 DIAGNOSIS — I50.22 CHRONIC SYSTOLIC CONGESTIVE HEART FAILURE: Primary | ICD-10-CM

## 2023-09-26 DIAGNOSIS — E78.5 HYPERLIPIDEMIA LDL GOAL <70: ICD-10-CM

## 2023-09-26 PROBLEM — R73.02 IMPAIRED GLUCOSE TOLERANCE: Status: RESOLVED | Noted: 2021-12-17 | Resolved: 2023-09-26

## 2023-09-26 PROBLEM — R19.5 FECAL OCCULT BLOOD TEST POSITIVE: Status: RESOLVED | Noted: 2022-05-20 | Resolved: 2023-09-26

## 2023-09-26 PROBLEM — I21.4 NSTEMI (NON-ST ELEVATED MYOCARDIAL INFARCTION): Status: RESOLVED | Noted: 2022-05-22 | Resolved: 2023-09-26

## 2023-09-26 PROBLEM — R06.09 DYSPNEA ON EXERTION: Status: RESOLVED | Noted: 2022-05-20 | Resolved: 2023-09-26

## 2023-09-26 PROBLEM — H35.54 PATTERN DYSTROPHY OF MACULA: Status: RESOLVED | Noted: 2020-05-13 | Resolved: 2023-09-26

## 2023-09-26 PROCEDURE — 1160F RVW MEDS BY RX/DR IN RCRD: CPT | Performed by: INTERNAL MEDICINE

## 2023-09-26 PROCEDURE — 99214 OFFICE O/P EST MOD 30 MIN: CPT | Performed by: INTERNAL MEDICINE

## 2023-09-26 PROCEDURE — 1159F MED LIST DOCD IN RCRD: CPT | Performed by: INTERNAL MEDICINE

## 2023-09-26 RX ORDER — ASPIRIN 81 MG/1
81 TABLET ORAL DAILY
Qty: 90 TABLET | Refills: 3 | Status: SHIPPED | OUTPATIENT
Start: 2023-09-26

## 2023-09-26 RX ORDER — VALSARTAN 80 MG/1
80 TABLET ORAL DAILY
Qty: 90 TABLET | Refills: 3 | Status: SHIPPED | OUTPATIENT
Start: 2023-09-26

## 2023-09-26 RX ORDER — ROSUVASTATIN CALCIUM 5 MG/1
5 TABLET, COATED ORAL NIGHTLY
Qty: 90 TABLET | Refills: 3 | Status: SHIPPED | OUTPATIENT
Start: 2023-09-26

## 2023-09-26 RX ORDER — CLOPIDOGREL BISULFATE 75 MG/1
75 TABLET ORAL DAILY
Qty: 90 TABLET | Refills: 3 | Status: CANCELLED | OUTPATIENT
Start: 2023-09-26

## 2023-09-26 RX ORDER — SPIRONOLACTONE 25 MG/1
25 TABLET ORAL DAILY
Qty: 90 TABLET | Refills: 3 | Status: SHIPPED | OUTPATIENT
Start: 2023-09-26

## 2023-09-26 RX ORDER — BISOPROLOL FUMARATE 5 MG/1
5 TABLET, FILM COATED ORAL DAILY
Qty: 90 TABLET | Refills: 3 | Status: SHIPPED | OUTPATIENT
Start: 2023-09-26

## 2023-09-26 NOTE — ASSESSMENT & PLAN NOTE
Stage C HFrEF with stable NYHA class II symptoms  Ischemic etiology  Increase bisoprolol to 5 mg daily  Continue valsartan and spironolactone  Obtain echo to reassess LVEF  Consider SGL 2 inhibitor therapy if LVEF remains reduced

## 2023-09-26 NOTE — ASSESSMENT & PLAN NOTE
LDL not to goal on rosuvastatin 5 mg every other day  Repeat direct LDL now taking rosuvastatin daily  If LDL >55, recommend Repatha or inclisiran

## 2023-09-26 NOTE — ASSESSMENT & PLAN NOTE
No angina  De-escalate to SAPT with aspirin monotherapy due to bleed risk (low body weight, chronic prednisone use)

## 2023-10-17 ENCOUNTER — LAB (OUTPATIENT)
Dept: LAB | Facility: HOSPITAL | Age: 73
End: 2023-10-17
Payer: MEDICARE

## 2023-10-17 ENCOUNTER — OFFICE VISIT (OUTPATIENT)
Dept: ONCOLOGY | Facility: CLINIC | Age: 73
End: 2023-10-17
Payer: MEDICARE

## 2023-10-17 VITALS
OXYGEN SATURATION: 99 % | WEIGHT: 124 LBS | SYSTOLIC BLOOD PRESSURE: 158 MMHG | DIASTOLIC BLOOD PRESSURE: 75 MMHG | RESPIRATION RATE: 16 BRPM | TEMPERATURE: 97.1 F | HEIGHT: 66 IN | HEART RATE: 72 BPM | BODY MASS INDEX: 19.93 KG/M2

## 2023-10-17 DIAGNOSIS — E78.5 HYPERLIPIDEMIA LDL GOAL <70: ICD-10-CM

## 2023-10-17 DIAGNOSIS — D53.9 MACROCYTIC ANEMIA: Primary | ICD-10-CM

## 2023-10-17 DIAGNOSIS — D53.9 MACROCYTIC ANEMIA: ICD-10-CM

## 2023-10-17 LAB
ALBUMIN SERPL-MCNC: 4.6 G/DL (ref 3.5–5.2)
ALBUMIN/GLOB SERPL: 1.5 G/DL
ALP SERPL-CCNC: 67 U/L (ref 39–117)
ALT SERPL W P-5'-P-CCNC: 22 U/L (ref 1–33)
ANION GAP SERPL CALCULATED.3IONS-SCNC: 12 MMOL/L (ref 5–15)
ARTICHOKE IGE QN: 84 MG/DL (ref 0–100)
AST SERPL-CCNC: 28 U/L (ref 1–32)
BASOPHILS # BLD AUTO: 0.03 10*3/MM3 (ref 0–0.2)
BASOPHILS NFR BLD AUTO: 0.8 % (ref 0–1.5)
BILIRUB SERPL-MCNC: 0.3 MG/DL (ref 0–1.2)
BUN SERPL-MCNC: 19 MG/DL (ref 8–23)
BUN/CREAT SERPL: 21.6 (ref 7–25)
CALCIUM SPEC-SCNC: 10.2 MG/DL (ref 8.6–10.5)
CHLORIDE SERPL-SCNC: 108 MMOL/L (ref 98–107)
CO2 SERPL-SCNC: 21 MMOL/L (ref 22–29)
CREAT SERPL-MCNC: 0.88 MG/DL (ref 0.57–1)
DEPRECATED RDW RBC AUTO: 60.4 FL (ref 37–54)
EGFRCR SERPLBLD CKD-EPI 2021: 68.6 ML/MIN/1.73
EOSINOPHIL # BLD AUTO: 0.11 10*3/MM3 (ref 0–0.4)
EOSINOPHIL NFR BLD AUTO: 2.8 % (ref 0.3–6.2)
ERYTHROCYTE [DISTWIDTH] IN BLOOD BY AUTOMATED COUNT: 13.1 % (ref 12.3–15.4)
GLOBULIN UR ELPH-MCNC: 3 GM/DL
GLUCOSE SERPL-MCNC: 78 MG/DL (ref 65–99)
HCT VFR BLD AUTO: 40 % (ref 34–46.6)
HGB BLD-MCNC: 12.6 G/DL (ref 12–15.9)
IMM GRANULOCYTES # BLD AUTO: 0.01 10*3/MM3 (ref 0–0.05)
IMM GRANULOCYTES NFR BLD AUTO: 0.3 % (ref 0–0.5)
LYMPHOCYTES # BLD AUTO: 1.15 10*3/MM3 (ref 0.7–3.1)
LYMPHOCYTES NFR BLD AUTO: 29 % (ref 19.6–45.3)
MCH RBC QN AUTO: 38.5 PG (ref 26.6–33)
MCHC RBC AUTO-ENTMCNC: 31.5 G/DL (ref 31.5–35.7)
MCV RBC AUTO: 122.3 FL (ref 79–97)
MONOCYTES # BLD AUTO: 0.5 10*3/MM3 (ref 0.1–0.9)
MONOCYTES NFR BLD AUTO: 12.6 % (ref 5–12)
NEUTROPHILS NFR BLD AUTO: 2.16 10*3/MM3 (ref 1.7–7)
NEUTROPHILS NFR BLD AUTO: 54.5 % (ref 42.7–76)
PLATELET # BLD AUTO: 297 10*3/MM3 (ref 140–450)
PMV BLD AUTO: 9 FL (ref 6–12)
POTASSIUM SERPL-SCNC: 5.2 MMOL/L (ref 3.5–5.2)
PROT SERPL-MCNC: 7.6 G/DL (ref 6–8.5)
RBC # BLD AUTO: 3.27 10*6/MM3 (ref 3.77–5.28)
SODIUM SERPL-SCNC: 141 MMOL/L (ref 136–145)
WBC NRBC COR # BLD: 3.96 10*3/MM3 (ref 3.4–10.8)

## 2023-10-17 PROCEDURE — 36415 COLL VENOUS BLD VENIPUNCTURE: CPT

## 2023-10-17 PROCEDURE — 83721 ASSAY OF BLOOD LIPOPROTEIN: CPT

## 2023-10-17 PROCEDURE — 85025 COMPLETE CBC W/AUTO DIFF WBC: CPT

## 2023-10-17 PROCEDURE — 80053 COMPREHEN METABOLIC PANEL: CPT

## 2023-10-17 NOTE — PROGRESS NOTES
Follow up     Date: 10/17/23    Patient Name: Fabiola Salmeron  MRN: [id number]  : 1950     Referring Physician: Dr. Jessica Roach    Chief Complaint: Iron deficiency anemia, progressive macrocytosis, MGUS follow up    History of Present Illness: Fabiola Salmeron is a pleasant 73 y.o. female who presents today for evaluation of iron deficiency anemia.    CBC from 2021 was normal.    She then presented with progressive dyspnea on exertion.  She initially attributed her symptoms to acid reflux as she was also having accompanying belching, however her symptoms did not resolve with a PPI trial which had previously helped.  She was seen by her primary care physician and had a CBC performed on 2022.  This showed a hemoglobin of 7.6 with a slightly elevated RDW at 16 and a normal white blood cell count and platelet count.  She was referred to the emergency department and was admitted to the hospital through 2022.  There she had additional blood work including an iron battery which was notable for an iron saturation of 5% and an elevated TIBC consistent with iron deficiency anemia.  She was started on intravenous iron and received a unit of PRBCs.  She was also started on steroids for an acute sciatica flare.  She received her first dose of Ferrlecit on  without incident.  However following her second infusion on  she developed flushing, became diaphoretic, tachycardic, and hypotensive with brief episode of syncope and RRT was called.  Her  who accompanies her reports that she received chest compressions.  Per nursing records, she received 1L NS bolus with transfer to ICU for further management.     Underwent egd and c-scope 22 which revealed no source of bleeding and video endoscopy capsule was performed which was negative    She was found to have an elevated troponin and a low ejection fraction at 36% within the first 24 hours of admission, with pattern concerning for  Takotsubo cardiomyopathy.  She underwent LHC on 5/23/22 which revealed stenosis to her LAD and RCA and underwent drug-eluting stents x2. Post-procedure, patient was found to have hemorrhage from her right radial artery and underwent radial artery repair and hematoma evacuation by Dr. Benavides of vascular surgery.     I reviewed her blood work prior to, during, and following admission.  She had severe anemia of unclear chronicity given that her previous CBC was greater than 1 year ago.  She has maintained her hemoglobin following hospital discharge.  B12 levels were normal.  Iron levels were consistent with iron deficiency. She is not a strict vegetarian and endorses eating red meat.  She has no prior history of gastro intestinal surgery.  No history of autoimmune disorders.    She was started on oral iron once daily which she is tolerating well with no GI side effects.  Her PPI was stopped by cardiology pharmacist.    No personal history of transfusions prior to her current presentation.  No prior history of anemia as a child or teen or during her reproductive years.  No history of hematologic disorders, malignancy, CVA, or VTE.    Family history is notable for aplastic anemia in her brother lung cancer in her father and a sister with breast cancer at age 59.    Does not want to proceed with bone marrow biopsy. Did a glaucoma surgery that she was minimal risk and now nearly blind and has always regretted doing that procedure. Worried about longterm pain after bone marrow biopsy.    Interval history:  She is here for follow-up for blood count recheck.    Cardiologist stopped her plavix and recommended increasing her beta blocker, has not changed that yet. Hands stay cold and stable fatigue.  Changed her sublingual B12 to oral 1 week ago because pharmacy ran out of sublingual B12. No other new changes or concerns.      Past Medical History:   Past Medical History:   Diagnosis Date    Candidiasis of vulva and  vagina     CHF (congestive heart failure)     Coronary artery disease     Hyperlipidemia     Hypertension        Past Surgical History:   Past Surgical History:   Procedure Laterality Date    ARTERIOVENOUS FISTULA/SHUNT SURGERY Right 5/23/2022    Procedure: RADIAL ARTERY REPAIR WITH HEMATOMA EVACUATION AND WASHOUT RIGHT;  Surgeon: Jaron Benavides MD;  Location:  [facility];  Service: Vascular;  Laterality: Right;    CAPSULE ENDOSCOPY N/A 5/25/2022    Procedure: CAPSULE ENDOSCOPY ESOPHAGUS TO ILEUM DEPLOYED;  Surgeon: Michele Rogers MD;  Location:  [facility];  Service: Gastroenterology;  Laterality: N/A;    CARDIAC CATHETERIZATION N/A 5/23/2022    Procedure: LEFT HEART CATH;  Surgeon: Manjit Wolfe IV, MD;  Location:  [facility];  Service: Cardiovascular;  Laterality: N/A;    CARDIAC CATHETERIZATION N/A 5/23/2022    Procedure: Optical Coherent Tomography;  Surgeon: Manjit Wolfe IV, MD;  Location:  [facility];  Service: Cardiovascular;  Laterality: N/A;    CARDIAC CATHETERIZATION N/A 5/23/2022    Procedure: Stent MARIA ELENA coronary;  Surgeon: Manjit Wolfe IV, MD;  Location:  [facility];  Service: Cardiovascular;  Laterality: N/A;    COLONOSCOPY N/A 5/25/2022    Procedure: COLONOSCOPY;  Surgeon: Michele Rogers MD;  Location:  [facility];  Service: Gastroenterology;  Laterality: N/A;    CRANIAL NEUROSTIMULATOR INSERTION/REPLACEMENT Right     ENDOSCOPY N/A 5/25/2022    Procedure: ESOPHAGOGASTRODUODENOSCOPY;  Surgeon: Michele Rogers MD;  Location:  [facility];  Service: Gastroenterology;  Laterality: N/A;    EYE SURGERY      shunt placement by ophthalmology       Family History:   Family History   Problem Relation Age of Onset    Heart failure Mother     Lung cancer Father         smoker    Heart disease Sister     Breast cancer Sister     Aplastic anemia Brother     No Known Problems Maternal Grandmother      No Known Problems Maternal Grandfather     No Known Problems Paternal Grandmother     No Known Problems Paternal Grandfather     Atrial fibrillation Son     Ovarian cancer Neg Hx     Colon cancer Neg Hx        Social History:   Social History     Socioeconomic History    Marital status:    Tobacco Use    Smoking status: Never     Passive exposure: Never    Smokeless tobacco: Never   Vaping Use    Vaping Use: Never used   Substance and Sexual Activity    Alcohol use: Not Currently    Drug use: Never    Sexual activity: Yes     Partners: Male     Birth control/protection: None, Vasectomy       Medications:     Current Outpatient Medications:     Acetaminophen (Tylenol) 325 MG capsule, Take 650 mg by mouth 4 (Four) Times a Day., Disp: , Rfl:     aspirin (Aspirin Low Dose) 81 MG EC tablet, Take 1 tablet by mouth Daily., Disp: 90 tablet, Rfl: 3    bimatoprost (Lumigan) 0.01 % ophthalmic drops, Administer 1 drop to both eyes Every Night., Disp: , Rfl:     bisoprolol (ZEBeta) 5 MG tablet, Take 1 tablet by mouth Daily., Disp: 90 tablet, Rfl: 3    cholecalciferol (VITAMIN D3) 1.25 MG (33978 UT) capsule, Take 1 capsule by mouth 1 (One) Time Per Week., Disp: 12 capsule, Rfl: 1    Cyanocobalamin (Vitamin B-12) 1000 MCG sublingual tablet, Place 1 tablet under the tongue Daily., Disp: 90 each, Rfl: 3    fluorouracil (EFUDEX) 5 % cream, fluorouracil 5 % topical cream  APPLY A SUFFICIENT AMOUNT TO COVER THE LESIONS IN THE AFFECTED AREA(S) BY TOPICAL ROUTE 2 TIMES PER DAY FOR 21 DAYS, Disp: , Rfl:     folic acid (FOLVITE) 1 MG tablet, TAKE 1 TABLET EVERY DAY, Disp: 90 tablet, Rfl: 1    loratadine (Claritin) 10 MG tablet, Take 1 tablet by mouth Daily., Disp: 90 tablet, Rfl: 1    predniSONE (DELTASONE) 5 MG tablet, TAKE 1 TABLET EVERY DAY, Disp: 90 tablet, Rfl: 1    rosuvastatin (CRESTOR) 5 MG tablet, Take 1 tablet by mouth Every Night., Disp: 90 tablet, Rfl: 3    spironolactone (ALDACTONE) 25 MG tablet, Take 1 tablet by  "mouth Daily., Disp: 90 tablet, Rfl: 3    timolol (TIMOPTIC) 0.5 % ophthalmic solution, Administer 0.5 drops to both eyes 2 (two) times a day., Disp: , Rfl:     topiramate (TOPAMAX) 100 MG tablet, TAKE 1 TABLET TWICE DAILY, Disp: 180 tablet, Rfl: 3    valsartan (DIOVAN) 80 MG tablet, Take 1 tablet by mouth Daily., Disp: 90 tablet, Rfl: 3    Allergies:   Allergies   Allergen Reactions    Ferrlecit [Na Ferric Gluc Cplx In Sucrose] Anaphylaxis    Codeine Other (See Comments)    Metoprolol Other (See Comments)     Fatigue      Statins Myalgia       Objective     Vital Signs:   Vitals:    10/17/23 1402   BP: 158/75   Pulse: 72   Resp: 16   Temp: 97.1 °F (36.2 °C)   TempSrc: Infrared   SpO2: 99%   Weight: 56.2 kg (124 lb)   Height: 167.6 cm (65.98\")   PainSc: 0-No pain    Body mass index is 20.02 kg/m².   Pain Score    10/17/23 1402   PainSc: 0-No pain       Physical Exam:  General: No acute distress. Well appearing.  HEENT: Normocephalic, atraumatic. Sclera anicteric.   Neck: supple, no adenopathy.   Cardiovascular: regular rate and rhythm. No murmurs.   Respiratory: Normal rate. Clear to auscultation bilaterally  Abdomen: Soft, nontender, non distended with normoactive bowel sounds  Lymph: no cervical, supraclavicular or axillary adenopathy  Neuro: Alert and oriented x 3. No focal deficits.   Ext: No edema.      Laboratory/Imaging Reviewed:   Lab on 10/17/2023   Component Date Value Ref Range Status    Glucose 10/17/2023 78  65 - 99 mg/dL Final    BUN 10/17/2023 19  8 - 23 mg/dL Final    Creatinine 10/17/2023 0.88  0.57 - 1.00 mg/dL Final    Sodium 10/17/2023 141  136 - 145 mmol/L Final    Potassium 10/17/2023 5.2  3.5 - 5.2 mmol/L Final    Slight hemolysis detected by analyzer. Results may be affected.    Chloride 10/17/2023 108 (H)  98 - 107 mmol/L Final    CO2 10/17/2023 21.0 (L)  22.0 - 29.0 mmol/L Final    Calcium 10/17/2023 10.2  8.6 - 10.5 mg/dL Final    Total Protein 10/17/2023 7.6  6.0 - 8.5 g/dL Final    " Albumin 10/17/2023 4.6  3.5 - 5.2 g/dL Final    ALT (SGPT) 10/17/2023 22  1 - 33 U/L Final    AST (SGOT) 10/17/2023 28  1 - 32 U/L Final    Alkaline Phosphatase 10/17/2023 67  39 - 117 U/L Final    Total Bilirubin 10/17/2023 0.3  0.0 - 1.2 mg/dL Final    Globulin 10/17/2023 3.0  gm/dL Final    Calculated Result    A/G Ratio 10/17/2023 1.5  g/dL Final    BUN/Creatinine Ratio 10/17/2023 21.6  7.0 - 25.0 Final    Anion Gap 10/17/2023 12.0  5.0 - 15.0 mmol/L Final    eGFR 10/17/2023 68.6  >60.0 mL/min/1.73 Final    WBC 10/17/2023 3.96  3.40 - 10.80 10*3/mm3 Final    RBC 10/17/2023 3.27 (L)  3.77 - 5.28 10*6/mm3 Final    Hemoglobin 10/17/2023 12.6  12.0 - 15.9 g/dL Final    Hematocrit 10/17/2023 40.0  34.0 - 46.6 % Final    MCV 10/17/2023 122.3 (H)  79.0 - 97.0 fL Final    MCH 10/17/2023 38.5 (H)  26.6 - 33.0 pg Final    MCHC 10/17/2023 31.5  31.5 - 35.7 g/dL Final    RDW 10/17/2023 13.1  12.3 - 15.4 % Final    RDW-SD 10/17/2023 60.4 (H)  37.0 - 54.0 fl Final    MPV 10/17/2023 9.0  6.0 - 12.0 fL Final    Platelets 10/17/2023 297  140 - 450 10*3/mm3 Final    Neutrophil % 10/17/2023 54.5  42.7 - 76.0 % Final    Lymphocyte % 10/17/2023 29.0  19.6 - 45.3 % Final    Monocyte % 10/17/2023 12.6 (H)  5.0 - 12.0 % Final    Eosinophil % 10/17/2023 2.8  0.3 - 6.2 % Final    Basophil % 10/17/2023 0.8  0.0 - 1.5 % Final    Immature Grans % 10/17/2023 0.3  0.0 - 0.5 % Final    Neutrophils, Absolute 10/17/2023 2.16  1.70 - 7.00 10*3/mm3 Final    Lymphocytes, Absolute 10/17/2023 1.15  0.70 - 3.10 10*3/mm3 Final    Monocytes, Absolute 10/17/2023 0.50  0.10 - 0.90 10*3/mm3 Final    Eosinophils, Absolute 10/17/2023 0.11  0.00 - 0.40 10*3/mm3 Final    Basophils, Absolute 10/17/2023 0.03  0.00 - 0.20 10*3/mm3 Final    Immature Grans, Absolute 10/17/2023 0.01  0.00 - 0.05 10*3/mm3 Final       eGFR      >60.0 mL/min/1.73 54.6 (L)   Folate      4.78 - 24.20 ng/mL 18.70   Ferritin      13.00 - 150.00 ng/mL 317.70 (H)   Vitamin B-12      211 -  946 pg/mL 472     Pathologist Interpretation       Normal total white blood cell count with differential compatible with the automated differential reported.  No abnormal/immature white blood cells noted. . . .     Component      Latest Ref Rng & Units 11/29/2022 1/31/2023   Folate      4.78 - 24.20 ng/mL >20.00    Vitamin B-12      211 - 946 pg/mL  1,372 (H)   TSH Baseline      0.270 - 4.200 uIU/mL  2.230   Free T4      0.93 - 1.70 ng/dL  0.64 (L)   Haptoglobin      30 - 200 mg/dL  164     Assessment / Plan      Assessment/Plan:   1.  Persistent macrocytosis  2.  H/O iron deficiency anemia, with anaphylactic reaction to Ferrlecit  2.  Progressive macrocytosis  5.  FLC elevation MGUS  -She has history of severe iron deficiency anemia of unclear etiology.  She has had an extensive negative work-up for a GI source of blood loss.  This was unrevealing.  Her history does not reveal any obvious alternative source of blood loss.  It is likely that she had a slow gastrointestinal bleed that was not identified on work-up during hospital admission. No convincing evidence of hemolysis.  She had an anaphylactic reaction to intravenous iron while admitted to the hospital and should no longer receive Ferrlecit.  We discussed that if intravenous iron is required, we could cautiously consider use of an alternative formulation, but she would require close monitoring.  Given that she has no clear history of malabsorption and her blood counts are stable she completed an oral iron trial with resolution of iron deficiency anemia. She is now off oral iron.   -Continues folic acid daily and also taking OTC B12.  -Repeat CBC shows a persistent macrocytosis but normal hemoglobin.  CMP is normal.  Given the severity of macrocytosis and no specific etiology identified I have recommended bone marrow biopsy which she declines.  We will continue with serial blood count surveillance every 3 months.  I have ordered repeat vitamin studies and MGUS  labs for her return in 3 months.   I asked her to call me in the interim for new onset symptoms.    Follow Up:   3 mo  Orders Placed This Encounter   Procedures    Comprehensive Metabolic Panel    ABNER,PE and FLC, Serum    Vitamin B12    Folate    Reticulocytes    Ferritin    CBC & Differential        Abeba Lopez MD   Hematology and Oncology

## 2023-11-07 ENCOUNTER — TELEPHONE (OUTPATIENT)
Dept: CARDIOLOGY | Facility: CLINIC | Age: 73
End: 2023-11-07
Payer: MEDICARE

## 2023-11-07 DIAGNOSIS — E78.5 HYPERLIPIDEMIA LDL GOAL <70: ICD-10-CM

## 2023-11-07 DIAGNOSIS — I25.119 CORONARY ARTERY DISEASE INVOLVING NATIVE CORONARY ARTERY OF NATIVE HEART WITH ANGINA PECTORIS: Primary | ICD-10-CM

## 2023-11-07 RX ORDER — EZETIMIBE 10 MG/1
10 TABLET ORAL DAILY
Qty: 90 TABLET | Refills: 3 | Status: SHIPPED | OUTPATIENT
Start: 2023-11-07

## 2023-11-07 NOTE — TELEPHONE ENCOUNTER
----- Message from Manjit Wolfe IV, MD sent at 11/6/2023  2:49 PM EST -----  Ezetimibe 10 mg daily.  Direct LDL in 8 weeks.

## 2023-11-17 ENCOUNTER — HOSPITAL ENCOUNTER (OUTPATIENT)
Dept: CARDIOLOGY | Facility: HOSPITAL | Age: 73
Discharge: HOME | End: 2023-11-17
Payer: MEDICARE

## 2023-11-17 VITALS — HEIGHT: 66 IN | WEIGHT: 124 LBS | BODY MASS INDEX: 19.93 KG/M2

## 2023-11-17 DIAGNOSIS — I50.22 CHRONIC SYSTOLIC CONGESTIVE HEART FAILURE: ICD-10-CM

## 2023-11-17 LAB
BH CV ECHO MEAS - AO MAX PG: 6.2 MMHG
BH CV ECHO MEAS - AO MEAN PG: 4 MMHG
BH CV ECHO MEAS - AO ROOT DIAM: 2.9 CM
BH CV ECHO MEAS - AO V2 MAX: 124 CM/SEC
BH CV ECHO MEAS - AO V2 VTI: 31.1 CM
BH CV ECHO MEAS - AVA(I,D): 1.79 CM2
BH CV ECHO MEAS - EDV(CUBED): 40 ML
BH CV ECHO MEAS - EDV(MOD-SP2): 69 ML
BH CV ECHO MEAS - EDV(MOD-SP4): 88 ML
BH CV ECHO MEAS - EF(MOD-BP): 57 %
BH CV ECHO MEAS - EF(MOD-SP2): 56.5 %
BH CV ECHO MEAS - EF(MOD-SP4): 61.4 %
BH CV ECHO MEAS - ESV(CUBED): 13.3 ML
BH CV ECHO MEAS - ESV(MOD-SP2): 30 ML
BH CV ECHO MEAS - ESV(MOD-SP4): 34 ML
BH CV ECHO MEAS - FS: 30.7 %
BH CV ECHO MEAS - IVS/LVPW: 1.15 CM
BH CV ECHO MEAS - IVSD: 0.97 CM
BH CV ECHO MEAS - LA DIMENSION: 2.7 CM
BH CV ECHO MEAS - LAT PEAK E' VEL: 10 CM/SEC
BH CV ECHO MEAS - LV DIASTOLIC VOL/BSA (35-75): 53.9 CM2
BH CV ECHO MEAS - LV MASS(C)D: 86.3 GRAMS
BH CV ECHO MEAS - LV MAX PG: 2.6 MMHG
BH CV ECHO MEAS - LV MEAN PG: 1 MMHG
BH CV ECHO MEAS - LV SYSTOLIC VOL/BSA (12-30): 20.8 CM2
BH CV ECHO MEAS - LV V1 MAX: 81.2 CM/SEC
BH CV ECHO MEAS - LV V1 VTI: 17.7 CM
BH CV ECHO MEAS - LVIDD: 3.4 CM
BH CV ECHO MEAS - LVIDS: 2.37 CM
BH CV ECHO MEAS - LVOT AREA: 3.1 CM2
BH CV ECHO MEAS - LVOT DIAM: 2 CM
BH CV ECHO MEAS - LVPWD: 0.84 CM
BH CV ECHO MEAS - MED PEAK E' VEL: 7.57 CM/SEC
BH CV ECHO MEAS - MV A MAX VEL: 84.4 CM/SEC
BH CV ECHO MEAS - MV DEC SLOPE: 383 CM/SEC2
BH CV ECHO MEAS - MV DEC TIME: 0.18 SEC
BH CV ECHO MEAS - MV E MAX VEL: 72.6 CM/SEC
BH CV ECHO MEAS - MV E/A: 0.86
BH CV ECHO MEAS - MV MAX PG: 4.2 MMHG
BH CV ECHO MEAS - MV MEAN PG: 2 MMHG
BH CV ECHO MEAS - MV P1/2T: 71.4 MSEC
BH CV ECHO MEAS - MV V2 VTI: 27 CM
BH CV ECHO MEAS - MVA(P1/2T): 3.1 CM2
BH CV ECHO MEAS - MVA(VTI): 2.06 CM2
BH CV ECHO MEAS - PA ACC SLOPE: 573 CM/SEC2
BH CV ECHO MEAS - PA ACC TIME: 0.12 SEC
BH CV ECHO MEAS - PA V2 MAX: 76 CM/SEC
BH CV ECHO MEAS - PI END-D VEL: 78.7 CM/SEC
BH CV ECHO MEAS - RAP SYSTOLE: 3 MMHG
BH CV ECHO MEAS - RVSP: 31 MMHG
BH CV ECHO MEAS - SI(MOD-SP2): 23.9 ML/M2
BH CV ECHO MEAS - SI(MOD-SP4): 33.1 ML/M2
BH CV ECHO MEAS - SV(LVOT): 55.6 ML
BH CV ECHO MEAS - SV(MOD-SP2): 39 ML
BH CV ECHO MEAS - SV(MOD-SP4): 54 ML
BH CV ECHO MEAS - TAPSE (>1.6): 2.7 CM
BH CV ECHO MEAS - TR MAX PG: 27.9 MMHG
BH CV ECHO MEAS - TR MAX VEL: 264 CM/SEC
BH CV ECHO MEASUREMENTS AVERAGE E/E' RATIO: 8.26
BH CV XLRA - RV BASE: 3.3 CM
BH CV XLRA - RV LENGTH: 5.5 CM
BH CV XLRA - RV MID: 2.5 CM
BH CV XLRA - TDI S': 18.2 CM/SEC
LEFT ATRIUM VOLUME INDEX: 25.8 ML/M2
LV EF 2D ECHO EST: 60 %

## 2023-11-17 PROCEDURE — 93306 TTE W/DOPPLER COMPLETE: CPT

## 2024-01-04 DIAGNOSIS — M43.02 CERVICAL SPONDYLOLYSIS: ICD-10-CM

## 2024-01-04 DIAGNOSIS — M54.81 BILATERAL OCCIPITAL NEURALGIA: ICD-10-CM

## 2024-01-04 RX ORDER — FOLIC ACID 1 MG/1
TABLET ORAL
Qty: 90 TABLET | Refills: 3 | Status: SHIPPED | OUTPATIENT
Start: 2024-01-04

## 2024-01-04 RX ORDER — PREDNISONE 5 MG/1
TABLET ORAL
Qty: 90 TABLET | Refills: 3 | Status: SHIPPED | OUTPATIENT
Start: 2024-01-04

## 2024-01-04 NOTE — TELEPHONE ENCOUNTER
UPCOMING APPTS  With Oncology (Abeba Lopez MD)  01/17/2024 at 2:30 PM  LAST OFFICE VISIT - THIS DEPT  10/17/2023 Abeba Lopez MD  Last refill 5/30/23 90 tablets 1 refill

## 2024-01-04 NOTE — TELEPHONE ENCOUNTER
Rx Refill Note  Requested Prescriptions     Pending Prescriptions Disp Refills    predniSONE (DELTASONE) 5 MG tablet [Pharmacy Med Name: PREDNISONE 5 MG Tablet] 90 tablet 3     Sig: TAKE 1 TABLET EVERY DAY      Last office visit with prescribing clinician: 7/10/2023   Last telemedicine visit with prescribing clinician: Visit date not found   Next office visit with prescribing clinician: 1/10/2024                         Would you like a call back once the refill request has been completed: [] Yes [] No    If the office needs to give you a call back, can they leave a voicemail: [] Yes [] No    Yohana Dawn MA  01/04/24, 08:29 EST

## 2024-01-09 ENCOUNTER — TELEPHONE (OUTPATIENT)
Dept: FAMILY MEDICINE CLINIC | Facility: CLINIC | Age: 74
End: 2024-01-09

## 2024-01-09 RX ORDER — RIMEGEPANT SULFATE 75 MG/75MG
1 TABLET, ORALLY DISINTEGRATING ORAL DAILY PRN
Qty: 8 TABLET | Refills: 0 | Status: SHIPPED | OUTPATIENT
Start: 2024-01-09

## 2024-01-09 NOTE — TELEPHONE ENCOUNTER
Patient was sleeping during video visit.  Talked to  but made him aware that I could not prescribe or do anything or have a patient visit without the patient being present.    He reports that she has had worst headache for the last 2 weeks and is not eating.  Recommend ER or in office appointment.  Telehealth is not an appropriate visit given her symptoms and the need for in person evaluation.

## 2024-01-10 ENCOUNTER — PRIOR AUTHORIZATION (OUTPATIENT)
Dept: FAMILY MEDICINE CLINIC | Facility: CLINIC | Age: 74
End: 2024-01-10

## 2024-01-10 NOTE — TELEPHONE ENCOUNTER
(Key: [id number])  Nurtec 75MG dispersible tablets  Status: Sent To Plan  Created: January 10th, 2024  Sent: January 10th, 2024

## 2024-01-12 ENCOUNTER — OFFICE VISIT (OUTPATIENT)
Dept: FAMILY MEDICINE CLINIC | Facility: CLINIC | Age: 74
End: 2024-01-12
Payer: MEDICARE

## 2024-01-12 VITALS
SYSTOLIC BLOOD PRESSURE: 118 MMHG | HEIGHT: 66 IN | BODY MASS INDEX: 19.58 KG/M2 | WEIGHT: 121.8 LBS | DIASTOLIC BLOOD PRESSURE: 68 MMHG

## 2024-01-12 DIAGNOSIS — R10.84 GENERALIZED ABDOMINAL PAIN: Primary | ICD-10-CM

## 2024-01-12 DIAGNOSIS — R10.9 STOMACH PAIN: ICD-10-CM

## 2024-01-12 DIAGNOSIS — R63.0 LACK OF APPETITE: ICD-10-CM

## 2024-01-12 PROCEDURE — 1160F RVW MEDS BY RX/DR IN RCRD: CPT | Performed by: PHYSICIAN ASSISTANT

## 2024-01-12 PROCEDURE — 1159F MED LIST DOCD IN RCRD: CPT | Performed by: PHYSICIAN ASSISTANT

## 2024-01-12 PROCEDURE — 99213 OFFICE O/P EST LOW 20 MIN: CPT | Performed by: PHYSICIAN ASSISTANT

## 2024-01-12 RX ORDER — CLOPIDOGREL BISULFATE 75 MG/1
75 TABLET ORAL DAILY
COMMUNITY
Start: 2023-12-15

## 2024-01-12 RX ORDER — PANTOPRAZOLE SODIUM 40 MG/1
40 TABLET, DELAYED RELEASE ORAL DAILY
Qty: 90 TABLET | Refills: 1 | Status: SHIPPED | OUTPATIENT
Start: 2024-01-12

## 2024-01-12 NOTE — PROGRESS NOTES
Chief Complaint   Patient presents with    Headache     Pt co increased headaches for approx 2 weeks         Fabiola Salmeron is a 73 y.o. female who presents for Headache (Pt co increased headaches for approx 2 weeks)    Patient reports that she has a cervical migraine/headache that has been ongoing for the last 2 weeks.  She states that the headaches are improving since onset and are more at her baseline.    Her main complaint today is stomach pain.  She reports lack of appetite and stomach cramping.  Has not been eating much.  No nausea, vomiting, bowel changes or fever/chills.  She denies eating contributing to pain.  No heart burn.  Not taking omeprazole.    Past Medical History:   Diagnosis Date    Candidiasis of vulva and vagina     CHF (congestive heart failure)     Coronary artery disease     Hyperlipidemia     Hypertension        Past Surgical History:   Procedure Laterality Date    ARTERIOVENOUS FISTULA/SHUNT SURGERY Right 5/23/2022    Procedure: RADIAL ARTERY REPAIR WITH HEMATOMA EVACUATION AND WASHOUT RIGHT;  Surgeon: Jaron Benavides MD;  Location: [facility] HYBRID SERENE;  Service: Vascular;  Laterality: Right;    CAPSULE ENDOSCOPY N/A 5/25/2022    Procedure: CAPSULE ENDOSCOPY ESOPHAGUS TO ILEUM DEPLOYED;  Surgeon: Michele Rogers MD;  Location:  [facility];  Service: Gastroenterology;  Laterality: N/A;    CARDIAC CATHETERIZATION N/A 5/23/2022    Procedure: LEFT HEART CATH;  Surgeon: Manjit Wolfe IV, MD;  Location:  [facility] CATH INVASIVE LOCATION;  Service: Cardiovascular;  Laterality: N/A;    CARDIAC CATHETERIZATION N/A 5/23/2022    Procedure: Optical Coherent Tomography;  Surgeon: Manjit Wolfe IV, MD;  Location:  [facility] CATH INVASIVE LOCATION;  Service: Cardiovascular;  Laterality: N/A;    CARDIAC CATHETERIZATION N/A 5/23/2022    Procedure: Stent MARIA ELENA coronary;  Surgeon: Manjit Wolfe IV, MD;  Location:  [facility] CATH INVASIVE LOCATION;  Service: Cardiovascular;   Laterality: N/A;    COLONOSCOPY N/A 5/25/2022    Procedure: COLONOSCOPY;  Surgeon: Michele Rogers MD;  Location:  [facility];  Service: Gastroenterology;  Laterality: N/A;    CRANIAL NEUROSTIMULATOR INSERTION/REPLACEMENT Right     ENDOSCOPY N/A 5/25/2022    Procedure: ESOPHAGOGASTRODUODENOSCOPY;  Surgeon: Michele Rogers MD;  Location:  [facility];  Service: Gastroenterology;  Laterality: N/A;    EYE SURGERY      shunt placement by ophthalmology       Family History   Problem Relation Age of Onset    Heart failure Mother     Lung cancer Father         smoker    Heart disease Sister     Breast cancer Sister     Aplastic anemia Brother     No Known Problems Maternal Grandmother     No Known Problems Maternal Grandfather     No Known Problems Paternal Grandmother     No Known Problems Paternal Grandfather     Atrial fibrillation Son     Ovarian cancer Neg Hx     Colon cancer Neg Hx        Social History     Socioeconomic History    Marital status:    Tobacco Use    Smoking status: Never     Passive exposure: Never    Smokeless tobacco: Never   Vaping Use    Vaping Use: Never used   Substance and Sexual Activity    Alcohol use: Not Currently    Drug use: Never    Sexual activity: Yes     Partners: Male     Birth control/protection: None, Vasectomy       Allergies   Allergen Reactions    Ferrlecit [Na Ferric Gluc Cplx In Sucrose] Anaphylaxis    Codeine Other (See Comments)    Metoprolol Other (See Comments)     Fatigue      Statins Myalgia       ROS    Review of Systems   Constitutional:  Positive for appetite change and fatigue. Negative for chills and fever.   Gastrointestinal:  Positive for abdominal pain and indigestion. Negative for abdominal distention, blood in stool, constipation, diarrhea, nausea, vomiting and GERD.   Neurological:  Positive for headache. Negative for dizziness.       Vitals:    01/12/24 1530   BP: 118/68   BP Location: Left arm   Patient Position: Sitting   Cuff Size: Adult  "  Weight: 55.2 kg (121 lb 12.8 oz)   Height: 167.6 cm (65.98\")     Body mass index is 19.67 kg/m².    Current Outpatient Medications on File Prior to Visit   Medication Sig Dispense Refill    Acetaminophen (Tylenol) 325 MG capsule Take 650 mg by mouth 4 (Four) Times a Day.      aspirin (Aspirin Low Dose) 81 MG EC tablet Take 1 tablet by mouth Daily. 90 tablet 3    bimatoprost (Lumigan) 0.01 % ophthalmic drops Administer 1 drop to both eyes Every Night.      bisoprolol (ZEBeta) 5 MG tablet Take 1 tablet by mouth Daily. 90 tablet 3    cholecalciferol (VITAMIN D3) 1.25 MG (37654 UT) capsule Take 1 capsule by mouth 1 (One) Time Per Week. 12 capsule 1    clopidogrel (PLAVIX) 75 MG tablet Take 1 tablet by mouth Daily.      Cyanocobalamin (Vitamin B-12) 1000 MCG sublingual tablet Place 1 tablet under the tongue Daily. 90 each 3    ezetimibe (ZETIA) 10 MG tablet Take 1 tablet by mouth Daily. 90 tablet 3    fluorouracil (EFUDEX) 5 % cream fluorouracil 5 % topical cream   APPLY A SUFFICIENT AMOUNT TO COVER THE LESIONS IN THE AFFECTED AREA(S) BY TOPICAL ROUTE 2 TIMES PER DAY FOR 21 DAYS      folic acid (FOLVITE) 1 MG tablet TAKE 1 TABLET EVERY DAY 90 tablet 3    loratadine (Claritin) 10 MG tablet Take 1 tablet by mouth Daily. 90 tablet 1    predniSONE (DELTASONE) 5 MG tablet TAKE 1 TABLET EVERY DAY 90 tablet 3    Rimegepant Sulfate (Nurtec) 75 MG tablet dispersible tablet Take 1 tablet by mouth Daily As Needed (migraine). 8 tablet 0    rosuvastatin (CRESTOR) 5 MG tablet Take 1 tablet by mouth Every Night. 90 tablet 3    spironolactone (ALDACTONE) 25 MG tablet Take 1 tablet by mouth Daily. 90 tablet 3    timolol (TIMOPTIC) 0.5 % ophthalmic solution Administer 0.5 drops to both eyes 2 (two) times a day.      topiramate (TOPAMAX) 100 MG tablet TAKE 1 TABLET TWICE DAILY 180 tablet 3    valsartan (DIOVAN) 80 MG tablet Take 1 tablet by mouth Daily. 90 tablet 3     No current facility-administered medications on file prior to visit. "       Results for orders placed or performed during the hospital encounter of 11/17/23   Adult Transthoracic Echo Complete W/ Cont if Necessary Per Protocol   Result Value Ref Range    EF(MOD-bp) 57.0 %    LVIDd 3.4 cm    LVIDs 2.37 cm    IVSd 0.97 cm    LVPWd 0.84 cm    FS 30.7 %    IVS/LVPW 1.15 cm    ESV(cubed) 13.3 ml    LV Sys Vol (BSA corrected) 20.8 cm2    EDV(cubed) 40.0 ml    LV [place] Vol (BSA corrected) 53.9 cm2    LV mass(C)d 86.3 grams    LVOT area 3.1 cm2    LVOT diam 2.00 cm    EDV(MOD-sp2) 69.0 ml    EDV(MOD-sp4) 88.0 ml    ESV(MOD-sp2) 30.0 ml    ESV(MOD-sp4) 34.0 ml    SV(MOD-sp2) 39.0 ml    SV(MOD-sp4) 54.0 ml    SI(MOD-sp2) 23.9 ml/m2    SI(MOD-sp4) 33.1 ml/m2    EF(MOD-sp2) 56.5 %    EF(MOD-sp4) 61.4 %    MV E max isauro 72.6 cm/sec    MV A max isauro 84.4 cm/sec    MV dec time 0.18 sec    MV E/A 0.86     LA ESV Index (BP) 25.8 ml/m2    SV(LVOT) 55.6 ml    LA dimension (2D)  2.7 cm    LV V1 max 81.2 cm/sec    LV V1 max PG 2.6 mmHg    LV V1 mean PG 1.00 mmHg    LV V1 VTI 17.7 cm    Ao pk isauro 124.0 cm/sec    Ao max PG 6.2 mmHg    Ao mean PG 4.0 mmHg    Ao V2 VTI 31.1 cm    OMAR(I,D) 1.79 cm2    MV max PG 4.2 mmHg    MV mean PG 2.00 mmHg    MV V2 VTI 27.0 cm    MV P1/2t 71.4 msec    MVA(P1/2t) 3.1 cm2    MVA(VTI) 2.06 cm2    MV dec slope 383.0 cm/sec2    TR max isauro 264.0 cm/sec    TR max PG 27.9 mmHg    PA V2 max 76.0 cm/sec    PA acc slope 573.0 cm/sec2    PA acc time 0.12 sec    PI end-d isauro 78.7 cm/sec    Ao root diam 2.9 cm    Med Peak E' Isauro 7.57 cm/sec    Lat Peak E' Isauro 10.0 cm/sec    Avg E/e' ratio 8.26     RV Base 3.30 cm    RV Mid 2.50 cm    RV Length 5.50 cm    TAPSE (>1.6) 2.70 cm    RV S' 18.20 cm/sec    RAP systole 3 mmHg    RVSP(TR) 31 mmHg    Echo EF Estimated 60.0 %       PE    Physical Exam  Vitals reviewed.   Constitutional:       General: She is not in acute distress.     Appearance: Normal appearance. She is well-developed and underweight. She is not ill-appearing or diaphoretic.    HENT:      Head: Normocephalic and atraumatic.   Eyes:      Extraocular Movements: Extraocular movements intact.      Conjunctiva/sclera: Conjunctivae normal.   Pulmonary:      Effort: No respiratory distress.   Abdominal:      General: Abdomen is flat. Bowel sounds are decreased.      Palpations: Abdomen is soft.      Tenderness: There is no abdominal tenderness. There is no guarding or rebound.   Musculoskeletal:         General: Normal range of motion.      Cervical back: Normal range of motion.   Neurological:      General: No focal deficit present.      Mental Status: She is alert.   Psychiatric:         Attention and Perception: She is attentive.         Mood and Affect: Mood normal.         Speech: Speech normal.         Behavior: Behavior normal. Behavior is cooperative.         Thought Content: Thought content normal.         Judgment: Judgment normal.          A/P    Diagnoses and all orders for this visit:    1. Generalized abdominal pain (Primary)  -     Lipase; Future    2. Lack of appetite    3. Stomach pain  -     pantoprazole (PROTONIX) 40 MG EC tablet; Take 1 tablet by mouth Daily.  Dispense: 90 tablet; Refill: 1  -     Lipase; Future    No significant pain on exam today.  Will obtain labs on Wednesday with scheduled visit.  Will add lipase to these labs.  Encouraged patient to eat more frequent smaller meals.  [place] diet (BRAT - bananas, rice, apple sauce, toast).  Restart Pantoprazole 40 mg in the morning.  Take ASA, plavix and steroids after eating.  If symptoms worsen, call office or go to ER.         Plan of care reviewed with patient at the conclusion of today's visit. Education was provided regarding diagnosis, management and any prescribed or recommended OTC medications.  Patient verbalizes understanding of and agreement with management plan.    Dictated Utilizing Dragon Dictation     Please note that portions of this note were completed with a voice recognition program.     Part of this  note may be an electronic transcription/translation of spoken language to printed text using the Dragon Dictation System.    No follow-ups on file.     Jessica Roach PA-C

## 2024-01-17 ENCOUNTER — OFFICE VISIT (OUTPATIENT)
Dept: ONCOLOGY | Facility: CLINIC | Age: 74
End: 2024-01-17
Payer: MEDICARE

## 2024-01-17 ENCOUNTER — LAB (OUTPATIENT)
Dept: LAB | Facility: HOSPITAL | Age: 74
End: 2024-01-17
Payer: MEDICARE

## 2024-01-17 VITALS
HEART RATE: 53 BPM | SYSTOLIC BLOOD PRESSURE: 177 MMHG | BODY MASS INDEX: 20.09 KG/M2 | TEMPERATURE: 97.4 F | OXYGEN SATURATION: 99 % | HEIGHT: 66 IN | DIASTOLIC BLOOD PRESSURE: 80 MMHG | RESPIRATION RATE: 16 BRPM | WEIGHT: 125 LBS

## 2024-01-17 DIAGNOSIS — D53.9 MACROCYTIC ANEMIA: Primary | ICD-10-CM

## 2024-01-17 DIAGNOSIS — D53.9 MACROCYTIC ANEMIA: ICD-10-CM

## 2024-01-17 LAB
ALBUMIN SERPL-MCNC: 4.2 G/DL (ref 3.5–5.2)
ALBUMIN/GLOB SERPL: 1.6 G/DL
ALP SERPL-CCNC: 53 U/L (ref 39–117)
ALT SERPL W P-5'-P-CCNC: 23 U/L (ref 1–33)
ANION GAP SERPL CALCULATED.3IONS-SCNC: 11 MMOL/L (ref 5–15)
AST SERPL-CCNC: 18 U/L (ref 1–32)
BASOPHILS # BLD AUTO: 0.05 10*3/MM3 (ref 0–0.2)
BASOPHILS NFR BLD AUTO: 0.7 % (ref 0–1.5)
BILIRUB SERPL-MCNC: 0.2 MG/DL (ref 0–1.2)
BUN SERPL-MCNC: 20 MG/DL (ref 8–23)
BUN/CREAT SERPL: 18.7 (ref 7–25)
CALCIUM SPEC-SCNC: 9.4 MG/DL (ref 8.6–10.5)
CHLORIDE SERPL-SCNC: 108 MMOL/L (ref 98–107)
CO2 SERPL-SCNC: 20 MMOL/L (ref 22–29)
CREAT SERPL-MCNC: 1.07 MG/DL (ref 0.57–1)
DEPRECATED RDW RBC AUTO: 62.2 FL (ref 37–54)
EGFRCR SERPLBLD CKD-EPI 2021: 54.3 ML/MIN/1.73
EOSINOPHIL # BLD AUTO: 0.11 10*3/MM3 (ref 0–0.4)
EOSINOPHIL NFR BLD AUTO: 1.5 % (ref 0.3–6.2)
ERYTHROCYTE [DISTWIDTH] IN BLOOD BY AUTOMATED COUNT: 13.6 % (ref 12.3–15.4)
FOLATE SERPL-MCNC: >20 NG/ML (ref 4.78–24.2)
GLOBULIN UR ELPH-MCNC: 2.6 GM/DL
GLUCOSE SERPL-MCNC: 98 MG/DL (ref 65–99)
HCT VFR BLD AUTO: 37 % (ref 34–46.6)
HGB BLD-MCNC: 11.9 G/DL (ref 12–15.9)
IMM GRANULOCYTES # BLD AUTO: 0.1 10*3/MM3 (ref 0–0.05)
IMM GRANULOCYTES NFR BLD AUTO: 1.4 % (ref 0–0.5)
LYMPHOCYTES # BLD AUTO: 1.52 10*3/MM3 (ref 0.7–3.1)
LYMPHOCYTES NFR BLD AUTO: 20.8 % (ref 19.6–45.3)
MCH RBC QN AUTO: 39.3 PG (ref 26.6–33)
MCHC RBC AUTO-ENTMCNC: 32.2 G/DL (ref 31.5–35.7)
MCV RBC AUTO: 122.1 FL (ref 79–97)
MONOCYTES # BLD AUTO: 0.61 10*3/MM3 (ref 0.1–0.9)
MONOCYTES NFR BLD AUTO: 8.4 % (ref 5–12)
NEUTROPHILS NFR BLD AUTO: 4.91 10*3/MM3 (ref 1.7–7)
NEUTROPHILS NFR BLD AUTO: 67.2 % (ref 42.7–76)
PLATELET # BLD AUTO: 288 10*3/MM3 (ref 140–450)
PMV BLD AUTO: 8.6 FL (ref 6–12)
POTASSIUM SERPL-SCNC: 4.8 MMOL/L (ref 3.5–5.2)
PROT SERPL-MCNC: 6.8 G/DL (ref 6–8.5)
RBC # BLD AUTO: 3.03 10*6/MM3 (ref 3.77–5.28)
RETICS # AUTO: 0.04 10*6/MM3 (ref 0.02–0.13)
RETICS/RBC NFR AUTO: 1.21 % (ref 0.7–1.9)
SODIUM SERPL-SCNC: 139 MMOL/L (ref 136–145)
VIT B12 BLD-MCNC: >2000 PG/ML (ref 211–946)
WBC NRBC COR # BLD AUTO: 7.3 10*3/MM3 (ref 3.4–10.8)

## 2024-01-17 PROCEDURE — 82746 ASSAY OF FOLIC ACID SERUM: CPT

## 2024-01-17 PROCEDURE — 82607 VITAMIN B-12: CPT

## 2024-01-17 PROCEDURE — 83521 IG LIGHT CHAINS FREE EACH: CPT

## 2024-01-17 PROCEDURE — 86334 IMMUNOFIX E-PHORESIS SERUM: CPT

## 2024-01-17 PROCEDURE — 84165 PROTEIN E-PHORESIS SERUM: CPT

## 2024-01-17 PROCEDURE — 36415 COLL VENOUS BLD VENIPUNCTURE: CPT

## 2024-01-17 PROCEDURE — 85025 COMPLETE CBC W/AUTO DIFF WBC: CPT

## 2024-01-17 PROCEDURE — 82784 ASSAY IGA/IGD/IGG/IGM EACH: CPT

## 2024-01-17 PROCEDURE — 80053 COMPREHEN METABOLIC PANEL: CPT

## 2024-01-17 PROCEDURE — 85045 AUTOMATED RETICULOCYTE COUNT: CPT

## 2024-01-17 NOTE — PROGRESS NOTES
Follow up     Date: 24    Patient Name: Fabiola Salmeron  MRN: [id number]  : 1950     Referring Physician: Dr. Jessica Raoch    Chief Complaint: Iron deficiency anemia, progressive macrocytosis, MGUS follow up    History of Present Illness: Fabiola Salmeron is a pleasant 73 y.o. female who presents today for evaluation of iron deficiency anemia.    CBC from 2021 was normal.    She then presented with progressive dyspnea on exertion.  She initially attributed her symptoms to acid reflux as she was also having accompanying belching, however her symptoms did not resolve with a PPI trial which had previously helped.  She was seen by her primary care physician and had a CBC performed on 2022.  This showed a hemoglobin of 7.6 with a slightly elevated RDW at 16 and a normal white blood cell count and platelet count.  She was referred to the emergency department and was admitted to the hospital through 2022.  There she had additional blood work including an iron battery which was notable for an iron saturation of 5% and an elevated TIBC consistent with iron deficiency anemia.  She was started on intravenous iron and received a unit of PRBCs.  She was also started on steroids for an acute sciatica flare.  She received her first dose of Ferrlecit on  without incident.  However following her second infusion on  she developed flushing, became diaphoretic, tachycardic, and hypotensive with brief episode of syncope and RRT was called.  Her  who accompanies her reports that she received chest compressions.  Per nursing records, she received 1L NS bolus with transfer to ICU for further management.     Underwent egd and c-scope 22 which revealed no source of bleeding and video endoscopy capsule was performed which was negative    She was found to have an elevated troponin and a low ejection fraction at 36% within the first 24 hours of admission, with pattern concerning for  Takotsubo cardiomyopathy.  She underwent LHC on 5/23/22 which revealed stenosis to her LAD and RCA and underwent drug-eluting stents x2. Post-procedure, patient was found to have hemorrhage from her right radial artery and underwent radial artery repair and hematoma evacuation by Dr. Benavides of vascular surgery.     I reviewed her blood work prior to, during, and following admission.  She had severe anemia of unclear chronicity given that her previous CBC was greater than 1 year ago.  She has maintained her hemoglobin following hospital discharge.  B12 levels were normal.  Iron levels were consistent with iron deficiency. She is not a strict vegetarian and endorses eating red meat.  She has no prior history of gastro intestinal surgery.  No history of autoimmune disorders.    She was started on oral iron once daily which she is tolerating well with no GI side effects.  Her PPI was stopped by cardiology pharmacist.    No personal history of transfusions prior to her current presentation.  No prior history of anemia as a child or teen or during her reproductive years.  No history of hematologic disorders, malignancy, CVA, or VTE.    Family history is notable for aplastic anemia in her brother lung cancer in her father and a sister with breast cancer at age 59.    Does not want to proceed with bone marrow biopsy. Did a glaucoma surgery that she was minimal risk and now nearly blind and has always regretted doing that procedure. Worried about longterm pain after bone marrow biopsy.    Interval history:  Recent severe daily headaches secondary to occipital neuralgia flare. Symptoms are now improving.  Taking folic acid and vitamin B12 daily.  Reports she is on aspirin and no longer on plavix after cardiologist decreased this.     Past Medical History:   Past Medical History:   Diagnosis Date    Candidiasis of vulva and vagina     CHF (congestive heart failure)     Coronary artery disease     Hyperlipidemia      Hypertension        Past Surgical History:   Past Surgical History:   Procedure Laterality Date    ARTERIOVENOUS FISTULA/SHUNT SURGERY Right 5/23/2022    Procedure: RADIAL ARTERY REPAIR WITH HEMATOMA EVACUATION AND WASHOUT RIGHT;  Surgeon: Jaron Benavides MD;  Location:  [facility];  Service: Vascular;  Laterality: Right;    CAPSULE ENDOSCOPY N/A 5/25/2022    Procedure: CAPSULE ENDOSCOPY ESOPHAGUS TO ILEUM DEPLOYED;  Surgeon: Michele Rogers MD;  Location:  [facility];  Service: Gastroenterology;  Laterality: N/A;    CARDIAC CATHETERIZATION N/A 5/23/2022    Procedure: LEFT HEART CATH;  Surgeon: Manjit Wolfe IV, MD;  Location:  [facility];  Service: Cardiovascular;  Laterality: N/A;    CARDIAC CATHETERIZATION N/A 5/23/2022    Procedure: Optical Coherent Tomography;  Surgeon: Manjit Wolfe IV, MD;  Location:  [facility];  Service: Cardiovascular;  Laterality: N/A;    CARDIAC CATHETERIZATION N/A 5/23/2022    Procedure: Stent MARIA ELENA coronary;  Surgeon: Manjit Wolfe IV, MD;  Location:  [facility];  Service: Cardiovascular;  Laterality: N/A;    COLONOSCOPY N/A 5/25/2022    Procedure: COLONOSCOPY;  Surgeon: Michele Rogers MD;  Location:  [facility];  Service: Gastroenterology;  Laterality: N/A;    CRANIAL NEUROSTIMULATOR INSERTION/REPLACEMENT Right     ENDOSCOPY N/A 5/25/2022    Procedure: ESOPHAGOGASTRODUODENOSCOPY;  Surgeon: Michele Rogers MD;  Location:  [facility];  Service: Gastroenterology;  Laterality: N/A;    EYE SURGERY      shunt placement by ophthalmology       Family History:   Family History   Problem Relation Age of Onset    Heart failure Mother     Lung cancer Father         smoker    Heart disease Sister     Breast cancer Sister     Aplastic anemia Brother     No Known Problems Maternal Grandmother     No Known Problems Maternal Grandfather     No Known Problems Paternal Grandmother     No  Known Problems Paternal Grandfather     Atrial fibrillation Son     Ovarian cancer Neg Hx     Colon cancer Neg Hx        Social History:   Social History     Socioeconomic History    Marital status:    Tobacco Use    Smoking status: Never     Passive exposure: Never    Smokeless tobacco: Never   Vaping Use    Vaping Use: Never used   Substance and Sexual Activity    Alcohol use: Not Currently    Drug use: Never    Sexual activity: Yes     Partners: Male     Birth control/protection: None, Vasectomy       Medications:     Current Outpatient Medications:     Acetaminophen (Tylenol) 325 MG capsule, Take 650 mg by mouth 4 (Four) Times a Day., Disp: , Rfl:     aspirin (Aspirin Low Dose) 81 MG EC tablet, Take 1 tablet by mouth Daily., Disp: 90 tablet, Rfl: 3    bimatoprost (Lumigan) 0.01 % ophthalmic drops, Administer 1 drop to both eyes Every Night., Disp: , Rfl:     bisoprolol (ZEBeta) 5 MG tablet, Take 1 tablet by mouth Daily., Disp: 90 tablet, Rfl: 3    cholecalciferol (VITAMIN D3) 1.25 MG (21484 UT) capsule, Take 1 capsule by mouth 1 (One) Time Per Week., Disp: 12 capsule, Rfl: 1    clopidogrel (PLAVIX) 75 MG tablet, Take 1 tablet by mouth Daily., Disp: , Rfl:     Cyanocobalamin (Vitamin B-12) 1000 MCG sublingual tablet, Place 1 tablet under the tongue Daily., Disp: 90 each, Rfl: 3    ezetimibe (ZETIA) 10 MG tablet, Take 1 tablet by mouth Daily., Disp: 90 tablet, Rfl: 3    fluorouracil (EFUDEX) 5 % cream, fluorouracil 5 % topical cream  APPLY A SUFFICIENT AMOUNT TO COVER THE LESIONS IN THE AFFECTED AREA(S) BY TOPICAL ROUTE 2 TIMES PER DAY FOR 21 DAYS, Disp: , Rfl:     folic acid (FOLVITE) 1 MG tablet, TAKE 1 TABLET EVERY DAY, Disp: 90 tablet, Rfl: 3    loratadine (Claritin) 10 MG tablet, Take 1 tablet by mouth Daily., Disp: 90 tablet, Rfl: 1    pantoprazole (PROTONIX) 40 MG EC tablet, Take 1 tablet by mouth Daily., Disp: 90 tablet, Rfl: 1    predniSONE (DELTASONE) 5 MG tablet, TAKE 1 TABLET EVERY DAY, Disp: 90  "tablet, Rfl: 3    Rimegepant Sulfate (Nurtec) 75 MG tablet dispersible tablet, Take 1 tablet by mouth Daily As Needed (migraine)., Disp: 8 tablet, Rfl: 0    rosuvastatin (CRESTOR) 5 MG tablet, Take 1 tablet by mouth Every Night., Disp: 90 tablet, Rfl: 3    spironolactone (ALDACTONE) 25 MG tablet, Take 1 tablet by mouth Daily., Disp: 90 tablet, Rfl: 3    timolol (TIMOPTIC) 0.5 % ophthalmic solution, Administer 0.5 drops to both eyes 2 (two) times a day., Disp: , Rfl:     topiramate (TOPAMAX) 100 MG tablet, TAKE 1 TABLET TWICE DAILY, Disp: 180 tablet, Rfl: 3    valsartan (DIOVAN) 80 MG tablet, Take 1 tablet by mouth Daily., Disp: 90 tablet, Rfl: 3    Allergies:   Allergies   Allergen Reactions    Ferrlecit [Na Ferric Gluc Cplx In Sucrose] Anaphylaxis    Codeine Other (See Comments)    Metoprolol Other (See Comments)     Fatigue      Statins Myalgia       Objective     Vital Signs:   Vitals:    01/17/24 1430   BP: 177/80   Pulse: 53   Resp: 16   Temp: 97.4 °F (36.3 °C)   TempSrc: Temporal   SpO2: 99%   Weight: 56.7 kg (125 lb)   Height: 167.6 cm (65.98\")   PainSc: 0-No pain    Body mass index is 20.19 kg/m².   Pain Score    01/17/24 1430   PainSc: 0-No pain       Physical Exam:  General: No acute distress. Well appearing.  HEENT: Normocephalic, atraumatic. Sclera anicteric.   Neck: supple, no adenopathy.   Cardiovascular: regular rate and rhythm. No murmurs.   Respiratory: Normal rate. Clear to auscultation bilaterally  Abdomen: Soft, nontender, non distended with normoactive bowel sounds  Lymph: no cervical, supraclavicular or axillary adenopathy  Neuro: Alert and oriented x 3. No focal deficits.   Ext: No edema.  Accurate as of 1/17/2024      Laboratory/Imaging Reviewed:   Lab on 01/17/2024   Component Date Value Ref Range Status    WBC 01/17/2024 7.30  3.40 - 10.80 10*3/mm3 Final    RBC 01/17/2024 3.03 (L)  3.77 - 5.28 10*6/mm3 Final    Hemoglobin 01/17/2024 11.9 (L)  12.0 - 15.9 g/dL Final    Hematocrit 01/17/2024 " 37.0  34.0 - 46.6 % Final    MCV 01/17/2024 122.1 (H)  79.0 - 97.0 fL Final    MCH 01/17/2024 39.3 (H)  26.6 - 33.0 pg Final    MCHC 01/17/2024 32.2  31.5 - 35.7 g/dL Final    RDW 01/17/2024 13.6  12.3 - 15.4 % Final    RDW-SD 01/17/2024 62.2 (H)  37.0 - 54.0 fl Final    MPV 01/17/2024 8.6  6.0 - 12.0 fL Final    Platelets 01/17/2024 288  140 - 450 10*3/mm3 Final    Neutrophil % 01/17/2024 67.2  42.7 - 76.0 % Final    Lymphocyte % 01/17/2024 20.8  19.6 - 45.3 % Final    Monocyte % 01/17/2024 8.4  5.0 - 12.0 % Final    Eosinophil % 01/17/2024 1.5  0.3 - 6.2 % Final    Basophil % 01/17/2024 0.7  0.0 - 1.5 % Final    Immature Grans % 01/17/2024 1.4 (H)  0.0 - 0.5 % Final    Neutrophils, Absolute 01/17/2024 4.91  1.70 - 7.00 10*3/mm3 Final    Lymphocytes, Absolute 01/17/2024 1.52  0.70 - 3.10 10*3/mm3 Final    Monocytes, Absolute 01/17/2024 0.61  0.10 - 0.90 10*3/mm3 Final    Eosinophils, Absolute 01/17/2024 0.11  0.00 - 0.40 10*3/mm3 Final    Basophils, Absolute 01/17/2024 0.05  0.00 - 0.20 10*3/mm3 Final    Immature Grans, Absolute 01/17/2024 0.10 (H)  0.00 - 0.05 10*3/mm3 Final       eGFR      >60.0 mL/min/1.73 54.6 (L)   Folate      4.78 - 24.20 ng/mL 18.70   Ferritin      13.00 - 150.00 ng/mL 317.70 (H)   Vitamin B-12      211 - 946 pg/mL 472     Pathologist Interpretation       Normal total white blood cell count with differential compatible with the automated differential reported.  No abnormal/immature white blood cells noted. . . .     Component      Latest Ref Rng & Units 11/29/2022 1/31/2023   Folate      4.78 - 24.20 ng/mL >20.00    Vitamin B-12      211 - 946 pg/mL  1,372 (H)   TSH Baseline      0.270 - 4.200 uIU/mL  2.230   Free T4      0.93 - 1.70 ng/dL  0.64 (L)   Haptoglobin      30 - 200 mg/dL  164     Assessment / Plan      Assessment/Plan:   1.  Persistent macrocytosis  2.  H/O iron deficiency anemia, with anaphylactic reaction to Ferrlecit  2.  Progressive macrocytosis    -She has history of severe  iron deficiency anemia of unclear etiology.  She has had an extensive negative work-up for a GI source of blood loss.  This was unrevealing.  Her history does not reveal any obvious alternative source of blood loss.  It is likely that she had a slow gastrointestinal bleed that was not identified on work-up during hospital admission. No convincing evidence of hemolysis.  She had an anaphylactic reaction to intravenous iron while admitted to the hospital and should no longer receive Ferrlecit.  We discussed that if intravenous iron is required, we could cautiously consider use of an alternative formulation, but she would require close monitoring.  Given that she has no clear history of malabsorption and her blood counts are stable she completed an oral iron trial with resolution of iron deficiency anemia. She is now off oral iron.   -Continues folic acid daily and also taking OTC B12 daily.  -Repeat CBC shows persistent macrocytic anemia.  Overall, blood disease have not changed substantially over the last year.  Given the severity of macrocytosis and no specific etiology identified I have recommended bone marrow biopsy which she continues to decline.  She understands that our clinical concern is for an underlying bone marrow dyscrasia.  She prefers noninvasive monitoring unless her blood counts worsen.  We will continue with serial blood count surveillance, and change frequency to every 4 months.    5.  FLC elevation MGUS  -Serum protein electrophoresis is pending.    Follow Up:   4 mo  Orders Placed This Encounter   Procedures    Comprehensive Metabolic Panel    ABNER, PE & Free LT Chains, Ser    CBC & Differential        Abeba Lopez MD   Hematology and Oncology

## 2024-01-18 LAB
ALBUMIN SERPL ELPH-MCNC: 3.9 G/DL (ref 2.9–4.4)
ALBUMIN/GLOB SERPL: 1.5 {RATIO} (ref 0.7–1.7)
ALPHA1 GLOB SERPL ELPH-MCNC: 0.2 G/DL (ref 0–0.4)
ALPHA2 GLOB SERPL ELPH-MCNC: 0.9 G/DL (ref 0.4–1)
B-GLOBULIN SERPL ELPH-MCNC: 1.1 G/DL (ref 0.7–1.3)
GAMMA GLOB SERPL ELPH-MCNC: 0.5 G/DL (ref 0.4–1.8)
GLOBULIN SER-MCNC: 2.7 G/DL (ref 2.2–3.9)
IGA SERPL-MCNC: 326 MG/DL (ref 64–422)
IGG SERPL-MCNC: 654 MG/DL (ref 586–1602)
IGM SERPL-MCNC: 82 MG/DL (ref 26–217)
INTERPRETATION SERPL IEP-IMP: NORMAL
KAPPA LC FREE SER-MCNC: 18.8 MG/L (ref 3.3–19.4)
KAPPA LC FREE/LAMBDA FREE SER: 1.4 {RATIO} (ref 0.26–1.65)
LABORATORY COMMENT REPORT: NORMAL
LAMBDA LC FREE SERPL-MCNC: 13.4 MG/L (ref 5.7–26.3)
M PROTEIN SERPL ELPH-MCNC: NORMAL G/DL
PROT SERPL-MCNC: 6.6 G/DL (ref 6–8.5)

## 2024-01-22 ENCOUNTER — OFFICE VISIT (OUTPATIENT)
Dept: FAMILY MEDICINE CLINIC | Facility: CLINIC | Age: 74
End: 2024-01-22
Payer: MEDICARE

## 2024-01-22 VITALS
SYSTOLIC BLOOD PRESSURE: 122 MMHG | DIASTOLIC BLOOD PRESSURE: 74 MMHG | HEART RATE: 84 BPM | RESPIRATION RATE: 14 BRPM | HEIGHT: 66 IN | WEIGHT: 122.6 LBS | OXYGEN SATURATION: 98 % | BODY MASS INDEX: 19.7 KG/M2

## 2024-01-22 DIAGNOSIS — M43.02 CERVICAL SPONDYLOLYSIS: ICD-10-CM

## 2024-01-22 DIAGNOSIS — E78.5 HYPERLIPIDEMIA LDL GOAL <70: ICD-10-CM

## 2024-01-22 DIAGNOSIS — Z00.00 MEDICARE ANNUAL WELLNESS VISIT, SUBSEQUENT: Primary | ICD-10-CM

## 2024-01-22 DIAGNOSIS — H61.22 IMPACTED CERUMEN OF LEFT EAR: ICD-10-CM

## 2024-01-22 DIAGNOSIS — I50.22 CHRONIC SYSTOLIC CONGESTIVE HEART FAILURE: ICD-10-CM

## 2024-01-22 DIAGNOSIS — M81.0 AGE-RELATED OSTEOPOROSIS WITHOUT CURRENT PATHOLOGICAL FRACTURE: ICD-10-CM

## 2024-01-22 DIAGNOSIS — H91.90 HEARING LOSS, UNSPECIFIED HEARING LOSS TYPE, UNSPECIFIED LATERALITY: ICD-10-CM

## 2024-01-22 DIAGNOSIS — G43.909 MIGRAINE WITHOUT STATUS MIGRAINOSUS, NOT INTRACTABLE, UNSPECIFIED MIGRAINE TYPE: ICD-10-CM

## 2024-01-22 NOTE — PROGRESS NOTES
The ABCs of the Annual Wellness Visit  Subsequent Medicare Wellness Visit    Subjective    Fabiola Salmeron is a 73 y.o. female who presents for a Subsequent Medicare Wellness Visit.    The following portions of the patient's history were reviewed and   updated as appropriate: allergies, current medications, past family history, past medical history, past social history, past surgical history, and problem list.    Compared to one year ago, the patient feels her physical   health is worse.    Compared to one year ago, the patient feels her mental   health is the same.    Recent Hospitalizations:  She was not admitted to the hospital during the last year.       Current Medical Providers:  Patient Care Team:  Jessica Roach PA-C as PCP - General (Physician Assistant)  Minnie Sellers MD as Consulting Physician (Ophthalmology)  Abeba Lopez MD as Consulting Physician (Hematology and Oncology)  Manjit Wolfe IV, MD as Consulting Physician (Interventional Cardiology)    Outpatient Medications Prior to Visit   Medication Sig Dispense Refill   • Acetaminophen (Tylenol) 325 MG capsule Take 650 mg by mouth 4 (Four) Times a Day.     • aspirin (Aspirin Low Dose) 81 MG EC tablet Take 1 tablet by mouth Daily. 90 tablet 3   • bimatoprost (Lumigan) 0.01 % ophthalmic drops Administer 1 drop to both eyes Every Night.     • bisoprolol (ZEBeta) 5 MG tablet Take 1 tablet by mouth Daily. 90 tablet 3   • cholecalciferol (VITAMIN D3) 1.25 MG (79529 UT) capsule Take 1 capsule by mouth 1 (One) Time Per Week. 12 capsule 1   • clopidogrel (PLAVIX) 75 MG tablet Take 1 tablet by mouth Daily.     • Cyanocobalamin (Vitamin B-12) 1000 MCG sublingual tablet Place 1 tablet under the tongue Daily. 90 each 3   • ezetimibe (ZETIA) 10 MG tablet Take 1 tablet by mouth Daily. 90 tablet 3   • fluorouracil (EFUDEX) 5 % cream fluorouracil 5 % topical cream   APPLY A SUFFICIENT AMOUNT TO COVER THE LESIONS IN THE AFFECTED AREA(S)  BY TOPICAL ROUTE 2 TIMES PER DAY FOR 21 DAYS     • folic acid (FOLVITE) 1 MG tablet TAKE 1 TABLET EVERY DAY 90 tablet 3   • loratadine (Claritin) 10 MG tablet Take 1 tablet by mouth Daily. 90 tablet 1   • pantoprazole (PROTONIX) 40 MG EC tablet Take 1 tablet by mouth Daily. 90 tablet 1   • predniSONE (DELTASONE) 5 MG tablet TAKE 1 TABLET EVERY DAY 90 tablet 3   • rosuvastatin (CRESTOR) 5 MG tablet Take 1 tablet by mouth Every Night. 90 tablet 3   • spironolactone (ALDACTONE) 25 MG tablet Take 1 tablet by mouth Daily. 90 tablet 3   • timolol (TIMOPTIC) 0.5 % ophthalmic solution Administer 0.5 drops to both eyes 2 (two) times a day.     • topiramate (TOPAMAX) 100 MG tablet TAKE 1 TABLET TWICE DAILY 180 tablet 3   • valsartan (DIOVAN) 80 MG tablet Take 1 tablet by mouth Daily. 90 tablet 3   • Rimegepant Sulfate (Nurtec) 75 MG tablet dispersible tablet Take 1 tablet by mouth Daily As Needed (migraine). 8 tablet 0     No facility-administered medications prior to visit.       No opioid medication identified on active medication list. I have reviewed chart for other potential  high risk medication/s and harmful drug interactions in the elderly.        Aspirin is on active medication list. Aspirin use is indicated based on review of current medical condition/s. Pros and cons of this therapy have been discussed today. Benefits of this medication outweigh potential harm.  Patient has been encouraged to continue taking this medication.  .      Patient Active Problem List   Diagnosis   • Migraines   • Low back pain   • Sciatica   • Hyperlipidemia LDL goal <55   • Bilateral occipital neuralgia   • Vitamin D deficiency   • Primary open angle glaucoma (POAG) of left eye, severe stage   • Primary open angle glaucoma (POAG) of right eye, mild stage   • Age-related incipient cataract of right eye   • Cervical spondylolysis   • Hyperopia of both eyes with regular astigmatism   • Intractable chronic migraine without aura   • Myopia of  "left eye   • Other secondary cataract, left eye   • Symptomatic anemia   • Chronic systolic congestive heart failure   • Coronary artery disease involving native coronary artery of native heart with angina pectoris   • Age-related osteoporosis without current pathological fracture   • Seasonal allergies     Advance Care Planning   Advance Care Planning     Advance Directive is not on file.  ACP discussion was held with the patient during this visit. Patient has an advance directive (not in EMR), copy requested.     Objective    Vitals:    24 1457   BP: 122/74   BP Location: Left arm   Patient Position: Sitting   Cuff Size: Adult   Pulse: 84   Resp: 14   SpO2: 98%   Weight: 55.6 kg (122 lb 9.6 oz)   Height: 167.6 cm (65.98\")   PainSc: 0-No pain     Estimated body mass index is 19.8 kg/m² as calculated from the following:    Height as of this encounter: 167.6 cm (65.98\").    Weight as of this encounter: 55.6 kg (122 lb 9.6 oz).    BMI is within normal parameters. No other follow-up for BMI required.      Does the patient have evidence of cognitive impairment? No          HEALTH RISK ASSESSMENT    Smoking Status:  Social History     Tobacco Use   Smoking Status Never   • Passive exposure: Never   Smokeless Tobacco Never     Alcohol Consumption:  Social History     Substance and Sexual Activity   Alcohol Use Not Currently     Fall Risk Screen:    ANDREZADI Fall Risk Assessment was completed, and patient is at MODERATE risk for falls. Assessment completed on:2024    Depression Screenin/22/2024     3:00 PM   PHQ-2/PHQ-9 Depression Screening   Little Interest or Pleasure in Doing Things 0-->not at all   Feeling Down, Depressed or Hopeless 0-->not at all   PHQ-9: Brief Depression Severity Measure Score 0       Health Habits and Functional and Cognitive Screenin/22/2024     2:59 PM   Functional & Cognitive Status   Do you have difficulty preparing food and eating? No   Do you have difficulty bathing " yourself, getting dressed or grooming yourself? No   Do you have difficulty using the toilet? No   Do you have difficulty moving around from place to place? No   Do you have trouble with steps or getting out of a bed or a chair? No   Current Diet Well Balanced Diet   Dental Exam Up to date   Eye Exam Up to date   Exercise (times per week) 5 times per week   Current Exercises Include Home Fitness Gym   Do you need help using the phone?  No   Are you deaf or do you have serious difficulty hearing?  No   Do you need help to go to places out of walking distance? No   Do you need help shopping? No   Do you need help preparing meals?  No   Do you need help with housework?  No   Do you need help with laundry? No   Do you need help taking your medications? No   Do you need help managing money? No   Do you ever drive or ride in a car without wearing a seat belt? No       Age-appropriate Screening Schedule:  Refer to the list below for future screening recommendations based on patient's age, sex and/or medical conditions. Orders for these recommended tests are listed in the plan section. The patient has been provided with a written plan.    Health Maintenance   Topic Date Due   • ZOSTER VACCINE (1 of 2) Never done   • INFLUENZA VACCINE  03/31/2024 (Originally 8/1/2023)   • COVID-19 Vaccine (8 - 2023-24 season) 04/02/2024 (Originally 9/1/2023)   • TDAP/TD VACCINES (1 - Tdap) 01/12/2025 (Originally 2/9/1969)   • LIPID PANEL  10/17/2024   • ANNUAL WELLNESS VISIT  01/22/2025   • DXA SCAN  03/01/2025   • COLORECTAL CANCER SCREENING  05/25/2025   • HEPATITIS C SCREENING  Completed   • Pneumococcal Vaccine 65+  Completed                  CMS Preventative Services Quick Reference  Risk Factors Identified During Encounter  Immunizations Discussed/Encouraged: Shingrix  Dental Screening Recommended  Vision Screening Recommended  The above risks/problems have been discussed with the patient.  Pertinent information has been shared with  the patient in the After Visit Summary.  An After Visit Summary and PPPS were made available to the patient.    Follow Up:   Next Medicare Wellness visit to be scheduled in 1 year.       Additional E&M Note during same encounter follows:  Patient has multiple medical problems which are significant and separately identifiable that require additional work above and beyond the Medicare Wellness Visit.      Chief Complaint  Medicare Wellness-subsequent and Ear Fullness (Right ear complaint)    Subjective        HPI  Fabiola Salmeron is also being seen today for osteoporosis, migraines, CHF, hyperlipidemia, and right hearing changes.  Patient has tried various medications for her migraines that are always along the right temporal region.  She has cervicalgia with radicular pain extending along right side.  She has been on topamax, propranolol, imitrex, sumatriptan all without benefit.  She has not tried ubrelvy or nurtec.  She has tried ibuprofen and excederin migraine and tylenol when she has acute onset of headache/migraine with no relief.  She is not established with neurology.  Aware she has osteoporosis.  Not interested in medication at this time.  Taking supplements.  She reports right ear fullness and hearing changes.  On claritin.  Has nose bleed regularly and glaucoma, not using nasal sprays.  Not established with ENT.  Followed by cardiology, Dr. Wolfe.  Blood pressure is stable and well-controlled today.    Review of Systems   Constitutional:  Negative for chills and fever.   HENT:  Positive for congestion, hearing loss, nosebleeds and postnasal drip. Negative for ear pain, rhinorrhea, sinus pressure and sore throat.    Eyes:  Negative for visual disturbance.   Respiratory:  Negative for cough, shortness of breath and wheezing.    Cardiovascular:  Negative for chest pain, palpitations and leg swelling.   Gastrointestinal:  Negative for abdominal pain, blood in stool, diarrhea, nausea and vomiting.   Endocrine:  "Negative for polyuria.   Genitourinary:  Negative for dysuria, flank pain, frequency and hematuria.   Musculoskeletal:  Negative for arthralgias, gait problem and myalgias.   Skin:  Negative for rash.   Neurological:  Negative for dizziness and confusion.   Hematological:  Does not bruise/bleed easily.   Psychiatric/Behavioral:  Negative for agitation, dysphoric mood, self-injury, sleep disturbance and suicidal ideas. The patient is not nervous/anxious.        Objective   Vital Signs:  /74 (BP Location: Left arm, Patient Position: Sitting, Cuff Size: Adult)   Pulse 84   Resp 14   Ht 167.6 cm (65.98\")   Wt 55.6 kg (122 lb 9.6 oz)   SpO2 98%   BMI 19.80 kg/m²     Physical Exam  Vitals reviewed.   Constitutional:       General: She is not in acute distress.     Appearance: Normal appearance. She is well-developed and normal weight. She is not ill-appearing or diaphoretic.   HENT:      Head: Normocephalic and atraumatic.      Right Ear: Tympanic membrane, ear canal and external ear normal. There is no impacted cerumen.      Left Ear: There is impacted cerumen.   Eyes:      Extraocular Movements: Extraocular movements intact.      Conjunctiva/sclera: Conjunctivae normal.   Cardiovascular:      Rate and Rhythm: Normal rate and regular rhythm.      Heart sounds: Normal heart sounds.   Pulmonary:      Effort: Pulmonary effort is normal.      Breath sounds: Normal breath sounds.   Musculoskeletal:         General: Normal range of motion.      Cervical back: Normal range of motion.      Right lower leg: No edema.      Left lower leg: No edema.   Skin:     General: Skin is warm.      Findings: No erythema or rash.   Neurological:      General: No focal deficit present.      Mental Status: She is alert.   Psychiatric:         Attention and Perception: Attention and perception normal. She is attentive.         Mood and Affect: Mood and affect normal.         Speech: Speech normal.         Behavior: Behavior normal. " Behavior is cooperative.         Thought Content: Thought content normal.         Cognition and Memory: Cognition and memory normal.         Judgment: Judgment normal.          Ear Cerumen Removal    Date/Time: 1/22/2024 3:37 PM    Performed by: Jessica Roach PA-C  Authorized by: Jessica Roach PA-C    Anesthesia:  Local Anesthetic: none  Location details: left ear  Patient tolerance: patient tolerated the procedure well with no immediate complications  Procedure type: irrigation   Sedation:  Patient sedated: no               Assessment and Plan   Diagnoses and all orders for this visit:    1. Medicare annual wellness visit, subsequent (Primary)    2. Migraine without status migrainosus, not intractable, unspecified migraine type  -     ubrogepant (Ubrelvy) 100 MG tablet; Take 1 tablet by mouth 1 (One) Time As Needed (migraine).  Dispense: 24 tablet; Refill: 0  Has tried various medications in the past with no benefit.  Topamax, propranolol, ibuprofen, tylenol, Excederin migraine, imitrex, sumatriptan.  Has not tired Ubrevly or Nurtec.  Taking Prednisone 5 mg daily and this works well to minimize migraine episodes.    3. Cervical spondylolysis  Suspect that this is contributing to migraines.    4. Age-related osteoporosis without current pathological fracture  Reviewed recent bone scan.  Repeat in 2 years.  Encouraged calcium and vitamin D supplementation.  Patient aware that prednisone can make this worse and is willing to take risk.    5. Impacted cerumen of left ear  -     Ear Cerumen Removal    6. Hyperlipidemia LDL goal <55  7. Chronic systolic congestive heart failure  Followed by Dr. Wolfe with Cardiology.         I spent 35 minutes caring for Fabiola on this date of service. This time includes time spent by me in the following activities:preparing for the visit, reviewing tests, obtaining and/or reviewing a separately obtained history, performing a medically appropriate examination and/or  evaluation , counseling and educating the patient/family/caregiver, ordering medications, tests, or procedures, referring and communicating with other health care professionals , documenting information in the medical record, independently interpreting results and communicating that information with the patient/family/caregiver, and care coordination  Follow Up   Return in about 1 year (around 1/22/2025) for Medicare Wellness.  Patient was given instructions and counseling regarding her condition or for health maintenance advice. Please see specific information pulled into the AVS if appropriate.

## 2024-01-23 ENCOUNTER — PRIOR AUTHORIZATION (OUTPATIENT)
Dept: FAMILY MEDICINE CLINIC | Facility: CLINIC | Age: 74
End: 2024-01-23
Payer: MEDICARE

## 2024-01-24 NOTE — TELEPHONE ENCOUNTER
Approved on January 23  PA Case: [id number], Status: Approved, Coverage Starts on: 1/1/2024 12:00:00 AM, Coverage Ends on: 12/31/2024 12:00:00 AM. Questions? Contact [phone].

## 2024-02-26 RX ORDER — CLOPIDOGREL BISULFATE 75 MG/1
75 TABLET ORAL DAILY
Qty: 90 TABLET | Refills: 3 | Status: SHIPPED | OUTPATIENT
Start: 2024-02-26

## 2024-04-22 ENCOUNTER — TELEPHONE (OUTPATIENT)
Dept: FAMILY MEDICINE CLINIC | Facility: CLINIC | Age: 74
End: 2024-04-22
Payer: MEDICARE

## 2024-04-22 DIAGNOSIS — J22 LOWER RESPIRATORY INFECTION: ICD-10-CM

## 2024-04-22 RX ORDER — AZITHROMYCIN 500 MG/1
500 TABLET, FILM COATED ORAL DAILY
Qty: 5 TABLET | Refills: 0 | OUTPATIENT
Start: 2024-04-22 | End: 2024-04-27

## 2024-04-22 NOTE — TELEPHONE ENCOUNTER
Rx Refill Note  Requested Prescriptions     Refused Prescriptions Disp Refills    azithromycin (ZITHROMAX) 500 MG tablet 5 tablet 0     Sig: Take 1 tablet by mouth Daily for 5 days.     Refused By: MILAGRO TINOCO     Reason for Refusal: Patient needs an appointment      Last office visit with prescribing clinician: 1/22/2024   Next office visit with prescribing clinician: 4/22/2024   Milagro Tinoco MA  04/22/24, 12:39 EDT

## 2024-04-22 NOTE — TELEPHONE ENCOUNTER
A user error has taken place: encounter opened in error, closed for administrative reasons.

## 2024-04-22 NOTE — TELEPHONE ENCOUNTER
Caller: Fam Salmeron    Relationship: Emergency Contact    Best call back number:     Requested Prescriptions:   Requested Prescriptions     Pending Prescriptions Disp Refills    azithromycin (ZITHROMAX) 500 MG tablet 5 tablet 0     Sig: Take 1 tablet by mouth Daily for 5 days.        Pharmacy where request should be sent: [facility] [id number] [address] - [phone]  - [phone]     Last office visit with prescribing clinician: 1/22/2024   Last telemedicine visit with prescribing clinician: Visit date not found   Next office visit with prescribing clinician: 1/27/2025     Additional details provided by patient: PATIENT HAS ONE LEFT AND HAS AN APPT WITH DR CORINNE ROJAS ON [facility] AND ASKING IF SHE CAN GET A COUPLE TO LAST HER UNTIL THEN    Does the patient have less than a 3 day supply:  [x] Yes  [] No      Kelsey Lira Rep   04/22/24 07:47 EDT

## 2024-04-24 ENCOUNTER — OFFICE VISIT (OUTPATIENT)
Dept: FAMILY MEDICINE CLINIC | Facility: CLINIC | Age: 74
End: 2024-04-24
Payer: MEDICARE

## 2024-04-24 VITALS
HEART RATE: 86 BPM | BODY MASS INDEX: 18.87 KG/M2 | SYSTOLIC BLOOD PRESSURE: 110 MMHG | OXYGEN SATURATION: 96 % | HEIGHT: 66 IN | DIASTOLIC BLOOD PRESSURE: 70 MMHG | WEIGHT: 117.4 LBS

## 2024-04-24 DIAGNOSIS — J18.9 PNEUMONIA OF LEFT LOWER LOBE DUE TO INFECTIOUS ORGANISM: Primary | ICD-10-CM

## 2024-04-24 PROCEDURE — 99213 OFFICE O/P EST LOW 20 MIN: CPT | Performed by: STUDENT IN AN ORGANIZED HEALTH CARE EDUCATION/TRAINING PROGRAM

## 2024-04-24 RX ORDER — BRIMONIDINE TARTRATE 2 MG/ML
SOLUTION/ DROPS OPHTHALMIC
COMMUNITY

## 2024-04-24 NOTE — PROGRESS NOTES
Chief Complaint   Patient presents with    Hospital Follow Up Visit       HPI:  Fabiola Salmeron is a 74 y.o. female with POAG, HFrEF, CAD, allergies, migraines who presents today for follow up.     Seen at [facility] on 4/18/24 and found to have LLL pneumonia. Prior to presentation she had developed deep productive cough, fatigue and low appetite. She was treated w Zpack and Prednisone, had almost immediate improvement in her symptoms. Cough essentially resolved 2 days ago. She did notice a deep bruise across the lateral left rib cage but appears to be healing now. Denies any current chest pain, SOB.   PE:  Vitals:    04/24/24 1410   BP: 110/70   Pulse: 86   SpO2: 96%      Body mass index is 18.95 kg/m².    Gen Appearance: NAD  HEENT: Normocephalic, EOMI  Heart: RRR, normal S1 and S2, no murmur  Lungs: CTA b/l, no wheezing, no crackles  MSK: Healing ecchymosis noted across left lower rib cage  Neuro: No focal deficits    XR CHEST 2 VW     Date of Exam: 4/18/2024 3:55 PM EDT     Indication: Persistent cough, chest tightness, SOB and purulent sputum over the past 5 days.     Comparison: Chest x-ray dated 5/20/2022     Findings:  Metallic wire traverses the right thorax. Focal infiltrate is seen within the left medial lung base. Right lung appears adequately aerated. No significant pleural effusion or pneumothorax. Cardiac silhouette is unremarkable. No acute osseous lesion is   seen.     IMPRESSION:  Impression:  Left lower lobe pneumonia. Recommend radiographic follow-up to ensure resolution.    Current Outpatient Medications   Medication Sig Dispense Refill    Acetaminophen (Tylenol) 325 MG capsule Take 650 mg by mouth 4 (Four) Times a Day.      aspirin (Aspirin Low Dose) 81 MG EC tablet Take 1 tablet by mouth Daily. 90 tablet 3    benzonatate (TESSALON) 100 MG capsule Take 1 capsule by mouth 3 (Three) Times a Day As Needed for Cough for up to 10 days. 30 capsule 0    bimatoprost (Lumigan) 0.01 % ophthalmic drops Administer 1  drop to both eyes Every Night.      bisoprolol (ZEBeta) 5 MG tablet Take 1 tablet by mouth Daily. 90 tablet 3    brimonidine (ALPHAGAN) 0.2 % ophthalmic solution INSTILL 1 DROP INTO AFFECTED EYE(S) BY OPHTHALMIC ROUTE EVERY 8 HOURS      brimonidine-timolol (COMBIGAN) 0.2-0.5 % ophthalmic solution Apply 1 drop to eye(s) as directed by provider.      cholecalciferol (VITAMIN D3) 1.25 MG (24780 UT) capsule Take 1 capsule by mouth 1 (One) Time Per Week. 12 capsule 1    clopidogrel (PLAVIX) 75 MG tablet TAKE 1 TABLET EVERY DAY 90 tablet 3    Cyanocobalamin (Vitamin B-12) 1000 MCG sublingual tablet Place 1 tablet under the tongue Daily. 90 each 3    ezetimibe (ZETIA) 10 MG tablet Take 1 tablet by mouth Daily. 90 tablet 3    fluorouracil (EFUDEX) 5 % cream fluorouracil 5 % topical cream   APPLY A SUFFICIENT AMOUNT TO COVER THE LESIONS IN THE AFFECTED AREA(S) BY TOPICAL ROUTE 2 TIMES PER DAY FOR 21 DAYS      folic acid (FOLVITE) 1 MG tablet TAKE 1 TABLET EVERY DAY 90 tablet 3    loratadine (Claritin) 10 MG tablet Take 1 tablet by mouth Daily. 90 tablet 1    methylPREDNISolone (MEDROL) 4 MG dose pack Take as directed on package instructions. 21 tablet 0    pantoprazole (PROTONIX) 40 MG EC tablet Take 1 tablet by mouth Daily. 90 tablet 1    Polyvinyl Alcohol-Povidone PF (HYPOTEARS) 1.4-0.6 % ophthalmic solution       predniSONE (DELTASONE) 5 MG tablet TAKE 1 TABLET EVERY DAY 90 tablet 3    rosuvastatin (CRESTOR) 5 MG tablet Take 1 tablet by mouth Every Night. 90 tablet 3    spironolactone (ALDACTONE) 25 MG tablet Take 1 tablet by mouth Daily. 90 tablet 3    timolol (TIMOPTIC) 0.5 % ophthalmic solution Administer 0.5 drops to both eyes 2 (two) times a day.      topiramate (TOPAMAX) 100 MG tablet TAKE 1 TABLET TWICE DAILY 180 tablet 3    ubrogepant (Ubrelvy) 100 MG tablet Take 1 tablet by mouth Daily As Needed (migraine). 24 tablet 0    valsartan (DIOVAN) 80 MG tablet Take 1 tablet by mouth Daily. 90 tablet 3     No current  facility-administered medications for this visit.        A/P:  Diagnoses and all orders for this visit:    1. Pneumonia of left lower lobe due to infectious organism (Primary)  -     XR Chest PA & Lateral; Future       Confirmed on XR from 4/18   Cough and symtpoms now mostly resolved after Azithromycin and Prednisone   Pt would be higher risk given advanced age which could warrant dual coverage w augmentin but given improvement in her symptoms will hold off for now. If she were to develop worsening cough or fatigue she wsa instructed to call/message and could start coverage at that time  Repeat CXR ordered for 6 weeks to ensure resolution  Monitor bruising at home, if worsening FU in office    Return if symptoms worsen or fail to improve.     Dictated Utilizing Dragon Dictation    Please note that portions of this note were completed with a voice recognition program.    Part of this note may be an electronic transcription/translation of spoken language to printed text using the Dragon Dictation System.

## 2024-05-16 ENCOUNTER — LAB (OUTPATIENT)
Dept: LAB | Facility: HOSPITAL | Age: 74
End: 2024-05-16
Payer: MEDICARE

## 2024-05-16 ENCOUNTER — OFFICE VISIT (OUTPATIENT)
Dept: ONCOLOGY | Facility: CLINIC | Age: 74
End: 2024-05-16
Payer: MEDICARE

## 2024-05-16 VITALS
TEMPERATURE: 97.3 F | DIASTOLIC BLOOD PRESSURE: 83 MMHG | OXYGEN SATURATION: 98 % | WEIGHT: 119 LBS | BODY MASS INDEX: 19.21 KG/M2 | SYSTOLIC BLOOD PRESSURE: 137 MMHG | HEART RATE: 83 BPM

## 2024-05-16 DIAGNOSIS — E78.5 HYPERLIPIDEMIA LDL GOAL <70: ICD-10-CM

## 2024-05-16 DIAGNOSIS — D53.9 MACROCYTIC ANEMIA: Primary | ICD-10-CM

## 2024-05-16 DIAGNOSIS — D53.9 MACROCYTIC ANEMIA: ICD-10-CM

## 2024-05-16 DIAGNOSIS — I25.119 CORONARY ARTERY DISEASE INVOLVING NATIVE CORONARY ARTERY OF NATIVE HEART WITH ANGINA PECTORIS: ICD-10-CM

## 2024-05-16 LAB
ALBUMIN SERPL-MCNC: 4.4 G/DL (ref 3.5–5.2)
ALBUMIN/GLOB SERPL: 1.5 G/DL
ALP SERPL-CCNC: 52 U/L (ref 39–117)
ALT SERPL W P-5'-P-CCNC: 15 U/L (ref 1–33)
ANION GAP SERPL CALCULATED.3IONS-SCNC: 13 MMOL/L (ref 5–15)
ARTICHOKE IGE QN: 76 MG/DL (ref 0–100)
AST SERPL-CCNC: 21 U/L (ref 1–32)
BASOPHILS # BLD AUTO: 0.02 10*3/MM3 (ref 0–0.2)
BASOPHILS NFR BLD AUTO: 0.3 % (ref 0–1.5)
BILIRUB SERPL-MCNC: 0.3 MG/DL (ref 0–1.2)
BUN SERPL-MCNC: 23 MG/DL (ref 8–23)
BUN/CREAT SERPL: 21.3 (ref 7–25)
CALCIUM SPEC-SCNC: 9.7 MG/DL (ref 8.6–10.5)
CHLORIDE SERPL-SCNC: 109 MMOL/L (ref 98–107)
CO2 SERPL-SCNC: 19 MMOL/L (ref 22–29)
CREAT SERPL-MCNC: 1.08 MG/DL (ref 0.57–1)
DEPRECATED RDW RBC AUTO: 58.7 FL (ref 37–54)
EGFRCR SERPLBLD CKD-EPI 2021: 53.3 ML/MIN/1.73
EOSINOPHIL # BLD AUTO: 0.06 10*3/MM3 (ref 0–0.4)
EOSINOPHIL NFR BLD AUTO: 1 % (ref 0.3–6.2)
ERYTHROCYTE [DISTWIDTH] IN BLOOD BY AUTOMATED COUNT: 12.8 % (ref 12.3–15.4)
FERRITIN SERPL-MCNC: 153.8 NG/ML (ref 13–150)
GLOBULIN UR ELPH-MCNC: 2.9 GM/DL
GLUCOSE SERPL-MCNC: 97 MG/DL (ref 65–99)
HCT VFR BLD AUTO: 36.5 % (ref 34–46.6)
HGB BLD-MCNC: 11.5 G/DL (ref 12–15.9)
IMM GRANULOCYTES # BLD AUTO: 0.02 10*3/MM3 (ref 0–0.05)
IMM GRANULOCYTES NFR BLD AUTO: 0.3 % (ref 0–0.5)
LYMPHOCYTES # BLD AUTO: 1.39 10*3/MM3 (ref 0.7–3.1)
LYMPHOCYTES NFR BLD AUTO: 24.3 % (ref 19.6–45.3)
MCH RBC QN AUTO: 38.7 PG (ref 26.6–33)
MCHC RBC AUTO-ENTMCNC: 31.5 G/DL (ref 31.5–35.7)
MCV RBC AUTO: 122.9 FL (ref 79–97)
MONOCYTES # BLD AUTO: 0.37 10*3/MM3 (ref 0.1–0.9)
MONOCYTES NFR BLD AUTO: 6.5 % (ref 5–12)
NEUTROPHILS NFR BLD AUTO: 3.87 10*3/MM3 (ref 1.7–7)
NEUTROPHILS NFR BLD AUTO: 67.6 % (ref 42.7–76)
PLATELET # BLD AUTO: 272 10*3/MM3 (ref 140–450)
PMV BLD AUTO: 9.2 FL (ref 6–12)
POTASSIUM SERPL-SCNC: 4.9 MMOL/L (ref 3.5–5.2)
PROT SERPL-MCNC: 7.3 G/DL (ref 6–8.5)
RBC # BLD AUTO: 2.97 10*6/MM3 (ref 3.77–5.28)
SODIUM SERPL-SCNC: 141 MMOL/L (ref 136–145)
WBC NRBC COR # BLD AUTO: 5.73 10*3/MM3 (ref 3.4–10.8)

## 2024-05-16 PROCEDURE — 99213 OFFICE O/P EST LOW 20 MIN: CPT | Performed by: INTERNAL MEDICINE

## 2024-05-16 PROCEDURE — 82728 ASSAY OF FERRITIN: CPT

## 2024-05-16 PROCEDURE — 1159F MED LIST DOCD IN RCRD: CPT | Performed by: INTERNAL MEDICINE

## 2024-05-16 PROCEDURE — 36415 COLL VENOUS BLD VENIPUNCTURE: CPT

## 2024-05-16 PROCEDURE — 1160F RVW MEDS BY RX/DR IN RCRD: CPT | Performed by: INTERNAL MEDICINE

## 2024-05-16 PROCEDURE — 83521 IG LIGHT CHAINS FREE EACH: CPT

## 2024-05-16 PROCEDURE — 80053 COMPREHEN METABOLIC PANEL: CPT

## 2024-05-16 PROCEDURE — 83721 ASSAY OF BLOOD LIPOPROTEIN: CPT

## 2024-05-16 PROCEDURE — 86334 IMMUNOFIX E-PHORESIS SERUM: CPT

## 2024-05-16 PROCEDURE — 1126F AMNT PAIN NOTED NONE PRSNT: CPT | Performed by: INTERNAL MEDICINE

## 2024-05-16 PROCEDURE — 82784 ASSAY IGA/IGD/IGG/IGM EACH: CPT

## 2024-05-16 PROCEDURE — 84165 PROTEIN E-PHORESIS SERUM: CPT

## 2024-05-16 PROCEDURE — 85025 COMPLETE CBC W/AUTO DIFF WBC: CPT

## 2024-05-16 NOTE — PROGRESS NOTES
Follow up     Date: 24    Patient Name: Fabiola Salmeron  MRN: [id number]  : 1950     Referring Physician: Dr. Jessica Roach    Chief Complaint: Iron deficiency anemia, progressive macrocytosis, MGUS follow up    History of Present Illness: Fabiola Salmeron is a pleasant 74 y.o. female who presents today for evaluation of iron deficiency anemia.    CBC from 2021 was normal.    She then presented with progressive dyspnea on exertion.  She initially attributed her symptoms to acid reflux as she was also having accompanying belching, however her symptoms did not resolve with a PPI trial which had previously helped.  She was seen by her primary care physician and had a CBC performed on 2022.  This showed a hemoglobin of 7.6 with a slightly elevated RDW at 16 and a normal white blood cell count and platelet count.  She was referred to the emergency department and was admitted to the hospital through 2022.  There she had additional blood work including an iron battery which was notable for an iron saturation of 5% and an elevated TIBC consistent with iron deficiency anemia.  She was started on intravenous iron and received a unit of PRBCs.  She was also started on steroids for an acute sciatica flare.  She received her first dose of Ferrlecit on  without incident.  However following her second infusion on  she developed flushing, became diaphoretic, tachycardic, and hypotensive with brief episode of syncope and RRT was called.  Her  who accompanies her reports that she received chest compressions.  Per nursing records, she received 1L NS bolus with transfer to ICU for further management.     Underwent egd and c-scope 22 which revealed no source of bleeding and video endoscopy capsule was performed which was negative    She was found to have an elevated troponin and a low ejection fraction at 36% within the first 24 hours of admission, with pattern concerning for  Takotsubo cardiomyopathy.  She underwent LHC on 5/23/22 which revealed stenosis to her LAD and RCA and underwent drug-eluting stents x2. Post-procedure, patient was found to have hemorrhage from her right radial artery and underwent radial artery repair and hematoma evacuation by Dr. Benavides of vascular surgery.     I reviewed her blood work prior to, during, and following admission.  She had severe anemia of unclear chronicity given that her previous CBC was greater than 1 year ago.  She has maintained her hemoglobin following hospital discharge.  B12 levels were normal.  Iron levels were consistent with iron deficiency. She is not a strict vegetarian and endorses eating red meat.  She has no prior history of gastro intestinal surgery.  No history of autoimmune disorders.    She was started on oral iron once daily which she is tolerating well with no GI side effects.  Her PPI was stopped by cardiology pharmacist.    No personal history of transfusions prior to her current presentation.  No prior history of anemia as a child or teen or during her reproductive years.  No history of hematologic disorders, malignancy, CVA, or VTE.    Family history is notable for aplastic anemia in her brother lung cancer in her father and a sister with breast cancer at age 59.    Does not want to proceed with bone marrow biopsy. Did a glaucoma surgery that she was minimal risk and now nearly blind and has always regretted doing that procedure. Worried about longterm pain after bone marrow biopsy.    Interval history:  Was treated for PNA in April. She is better now but still tired.   Taking folic acid and vitamin B12 daily.  Reports she is on aspirin and no longer on plavix after cardiologist stopped it but her pharmacy keeps filling.     Past Medical History:   Past Medical History:   Diagnosis Date    Candidiasis of vulva and vagina     CHF (congestive heart failure)     Coronary artery disease     Hyperlipidemia      Hypertension        Past Surgical History:   Past Surgical History:   Procedure Laterality Date    ARTERIOVENOUS FISTULA/SHUNT SURGERY Right 5/23/2022    Procedure: RADIAL ARTERY REPAIR WITH HEMATOMA EVACUATION AND WASHOUT RIGHT;  Surgeon: Jaron Benavides MD;  Location:  [facility];  Service: Vascular;  Laterality: Right;    CAPSULE ENDOSCOPY N/A 5/25/2022    Procedure: CAPSULE ENDOSCOPY ESOPHAGUS TO ILEUM DEPLOYED;  Surgeon: Michele Rogers MD;  Location:  [facility];  Service: Gastroenterology;  Laterality: N/A;    CARDIAC CATHETERIZATION N/A 5/23/2022    Procedure: LEFT HEART CATH;  Surgeon: Manjit Wolfe IV, MD;  Location:  [facility];  Service: Cardiovascular;  Laterality: N/A;    CARDIAC CATHETERIZATION N/A 5/23/2022    Procedure: Optical Coherent Tomography;  Surgeon: Manjit Wolfe IV, MD;  Location:  [facility];  Service: Cardiovascular;  Laterality: N/A;    CARDIAC CATHETERIZATION N/A 5/23/2022    Procedure: Stent MARIA ELENA coronary;  Surgeon: Manjit Wolfe IV, MD;  Location:  [facility];  Service: Cardiovascular;  Laterality: N/A;    COLONOSCOPY N/A 5/25/2022    Procedure: COLONOSCOPY;  Surgeon: Michele Rogers MD;  Location:  [facility];  Service: Gastroenterology;  Laterality: N/A;    CRANIAL NEUROSTIMULATOR INSERTION/REPLACEMENT Right     ENDOSCOPY N/A 5/25/2022    Procedure: ESOPHAGOGASTRODUODENOSCOPY;  Surgeon: Michele Rogers MD;  Location:  [facility];  Service: Gastroenterology;  Laterality: N/A;    EYE SURGERY      shunt placement by ophthalmology       Family History:   Family History   Problem Relation Age of Onset    Heart failure Mother     Lung cancer Father         smoker    Heart disease Sister     Breast cancer Sister     Aplastic anemia Brother     No Known Problems Maternal Grandmother     No Known Problems Maternal Grandfather     No Known Problems Paternal Grandmother     No  Known Problems Paternal Grandfather     Atrial fibrillation Son     Ovarian cancer Neg Hx     Colon cancer Neg Hx        Social History:   Social History     Socioeconomic History    Marital status:    Tobacco Use    Smoking status: Never     Passive exposure: Never    Smokeless tobacco: Never   Vaping Use    Vaping status: Never Used   Substance and Sexual Activity    Alcohol use: Not Currently    Drug use: Never    Sexual activity: Yes     Partners: Male     Birth control/protection: None, Vasectomy       Medications:     Current Outpatient Medications:     Acetaminophen (Tylenol) 325 MG capsule, Take 650 mg by mouth 4 (Four) Times a Day., Disp: , Rfl:     aspirin (Aspirin Low Dose) 81 MG EC tablet, Take 1 tablet by mouth Daily., Disp: 90 tablet, Rfl: 3    bimatoprost (Lumigan) 0.01 % ophthalmic drops, Administer 1 drop to both eyes Every Night., Disp: , Rfl:     bisoprolol (ZEBeta) 5 MG tablet, Take 1 tablet by mouth Daily., Disp: 90 tablet, Rfl: 3    brimonidine (ALPHAGAN) 0.2 % ophthalmic solution, INSTILL 1 DROP INTO AFFECTED EYE(S) BY OPHTHALMIC ROUTE EVERY 8 HOURS, Disp: , Rfl:     brimonidine-timolol (COMBIGAN) 0.2-0.5 % ophthalmic solution, Apply 1 drop to eye(s) as directed by provider., Disp: , Rfl:     cholecalciferol (VITAMIN D3) 1.25 MG (14764 UT) capsule, Take 1 capsule by mouth 1 (One) Time Per Week., Disp: 12 capsule, Rfl: 1    clopidogrel (PLAVIX) 75 MG tablet, TAKE 1 TABLET EVERY DAY, Disp: 90 tablet, Rfl: 3    Cyanocobalamin (Vitamin B-12) 1000 MCG sublingual tablet, Place 1 tablet under the tongue Daily., Disp: 90 each, Rfl: 3    ezetimibe (ZETIA) 10 MG tablet, Take 1 tablet by mouth Daily., Disp: 90 tablet, Rfl: 3    fluorouracil (EFUDEX) 5 % cream, fluorouracil 5 % topical cream  APPLY A SUFFICIENT AMOUNT TO COVER THE LESIONS IN THE AFFECTED AREA(S) BY TOPICAL ROUTE 2 TIMES PER DAY FOR 21 DAYS, Disp: , Rfl:     folic acid (FOLVITE) 1 MG tablet, TAKE 1 TABLET EVERY DAY, Disp: 90 tablet,  Rfl: 3    loratadine (Claritin) 10 MG tablet, Take 1 tablet by mouth Daily., Disp: 90 tablet, Rfl: 1    methylPREDNISolone (MEDROL) 4 MG dose pack, Take as directed on package instructions., Disp: 21 tablet, Rfl: 0    pantoprazole (PROTONIX) 40 MG EC tablet, Take 1 tablet by mouth Daily., Disp: 90 tablet, Rfl: 1    Polyvinyl Alcohol-Povidone PF (HYPOTEARS) 1.4-0.6 % ophthalmic solution, , Disp: , Rfl:     predniSONE (DELTASONE) 5 MG tablet, TAKE 1 TABLET EVERY DAY, Disp: 90 tablet, Rfl: 3    rosuvastatin (CRESTOR) 5 MG tablet, Take 1 tablet by mouth Every Night., Disp: 90 tablet, Rfl: 3    spironolactone (ALDACTONE) 25 MG tablet, Take 1 tablet by mouth Daily., Disp: 90 tablet, Rfl: 3    timolol (TIMOPTIC) 0.5 % ophthalmic solution, Administer 0.5 drops to both eyes 2 (two) times a day., Disp: , Rfl:     topiramate (TOPAMAX) 100 MG tablet, TAKE 1 TABLET TWICE DAILY, Disp: 180 tablet, Rfl: 3    ubrogepant (Ubrelvy) 100 MG tablet, Take 1 tablet by mouth Daily As Needed (migraine)., Disp: 24 tablet, Rfl: 0    valsartan (DIOVAN) 80 MG tablet, Take 1 tablet by mouth Daily., Disp: 90 tablet, Rfl: 3    Allergies:   Allergies   Allergen Reactions    Ferrlecit [Na Ferric Gluc Cplx In Sucrose] Anaphylaxis    Codeine Other (See Comments)    Metoprolol Other (See Comments)     Fatigue      Statins Myalgia       Objective     Vital Signs:   Vitals:    05/16/24 1425   BP: 137/83   Pulse: 83   Temp: 97.3 °F (36.3 °C)   TempSrc: Infrared   SpO2: 98%   Weight: 54 kg (119 lb)   PainSc: 0-No pain    Body mass index is 19.21 kg/m².   Pain Score    05/16/24 1425   PainSc: 0-No pain       Physical Exam:  General: No acute distress. Well appearing.  HEENT: Normocephalic, atraumatic. Sclera anicteric.   Neck: supple, no adenopathy.   Cardiovascular: regular rate and rhythm. No murmurs.   Respiratory: Normal rate. Clear to auscultation bilaterally  Abdomen: Soft, nontender, non distended with normoactive bowel sounds  Lymph: no cervical,  supraclavicular or axillary adenopathy  Neuro: Alert and oriented x 3. No focal deficits.   Ext: No edema.  Accurate as of 5/16/24      Laboratory/Imaging Reviewed:   Lab on 05/16/2024   Component Date Value Ref Range Status    Ferritin 05/16/2024 153.80 (H)  13.00 - 150.00 ng/mL Final    Glucose 05/16/2024 97  65 - 99 mg/dL Final    BUN 05/16/2024 23  8 - 23 mg/dL Final    Creatinine 05/16/2024 1.08 (H)  0.57 - 1.00 mg/dL Final    Sodium 05/16/2024 141  136 - 145 mmol/L Final    Potassium 05/16/2024 4.9  3.5 - 5.2 mmol/L Final    Slight hemolysis detected by analyzer. Result may be falsely elevated.    Chloride 05/16/2024 109 (H)  98 - 107 mmol/L Final    CO2 05/16/2024 19.0 (L)  22.0 - 29.0 mmol/L Final    Calcium 05/16/2024 9.7  8.6 - 10.5 mg/dL Final    Total Protein 05/16/2024 7.3  6.0 - 8.5 g/dL Final    Albumin 05/16/2024 4.4  3.5 - 5.2 g/dL Final    ALT (SGPT) 05/16/2024 15  1 - 33 U/L Final    AST (SGOT) 05/16/2024 21  1 - 32 U/L Final    Alkaline Phosphatase 05/16/2024 52  39 - 117 U/L Final    Total Bilirubin 05/16/2024 0.3  0.0 - 1.2 mg/dL Final    Globulin 05/16/2024 2.9  gm/dL Final    Calculated Result    A/G Ratio 05/16/2024 1.5  g/dL Final    BUN/Creatinine Ratio 05/16/2024 21.3  7.0 - 25.0 Final    Anion Gap 05/16/2024 13.0  5.0 - 15.0 mmol/L Final    eGFR 05/16/2024 53.3 (L)  >60.0 mL/min/1.73 Final    IgG 05/16/2024 752  586 - 1602 mg/dL Final    IgA 05/16/2024 313  64 - 422 mg/dL Final    IgM 05/16/2024 74  26 - 217 mg/dL Final    Total Protein 05/16/2024 6.9  6.0 - 8.5 g/dL Final    Albumin 05/16/2024 3.8  2.9 - 4.4 g/dL Final    Alpha-1-Globulin 05/16/2024 0.3  0.0 - 0.4 g/dL Final    Alpha-2-Globulin 05/16/2024 1.0  0.4 - 1.0 g/dL Final    Beta Globulin 05/16/2024 1.1  0.7 - 1.3 g/dL Final    Gamma Globulin 05/16/2024 0.7  0.4 - 1.8 g/dL Final    M-Nirmal 05/16/2024 Not Observed  Not Observed g/dL Final    Globulin 05/16/2024 3.1  2.2 - 3.9 g/dL Final    A/G Ratio 05/16/2024 1.3  0.7 - 1.7  Final    Immunofixation Reflex, Serum 05/16/2024 Comment   Final    No monoclonality detected.    Please note 05/16/2024 Comment   Final    Protein electrophoresis scan will follow via computer, mail, or   delivery.    Free Light Chain, Kappa 05/16/2024 19.4  3.3 - 19.4 mg/L Final    Free Lambda Light Chains 05/16/2024 13.0  5.7 - 26.3 mg/L Final    Kappa/Lambda Ratio 05/16/2024 1.49  0.26 - 1.65 Final    WBC 05/16/2024 5.73  3.40 - 10.80 10*3/mm3 Final    RBC 05/16/2024 2.97 (L)  3.77 - 5.28 10*6/mm3 Final    Hemoglobin 05/16/2024 11.5 (L)  12.0 - 15.9 g/dL Final    Hematocrit 05/16/2024 36.5  34.0 - 46.6 % Final    MCV 05/16/2024 122.9 (H)  79.0 - 97.0 fL Final    MCH 05/16/2024 38.7 (H)  26.6 - 33.0 pg Final    MCHC 05/16/2024 31.5  31.5 - 35.7 g/dL Final    RDW 05/16/2024 12.8  12.3 - 15.4 % Final    RDW-SD 05/16/2024 58.7 (H)  37.0 - 54.0 fl Final    MPV 05/16/2024 9.2  6.0 - 12.0 fL Final    Platelets 05/16/2024 272  140 - 450 10*3/mm3 Final    Neutrophil % 05/16/2024 67.6  42.7 - 76.0 % Final    Lymphocyte % 05/16/2024 24.3  19.6 - 45.3 % Final    Monocyte % 05/16/2024 6.5  5.0 - 12.0 % Final    Eosinophil % 05/16/2024 1.0  0.3 - 6.2 % Final    Basophil % 05/16/2024 0.3  0.0 - 1.5 % Final    Immature Grans % 05/16/2024 0.3  0.0 - 0.5 % Final    Neutrophils, Absolute 05/16/2024 3.87  1.70 - 7.00 10*3/mm3 Final    Lymphocytes, Absolute 05/16/2024 1.39  0.70 - 3.10 10*3/mm3 Final    Monocytes, Absolute 05/16/2024 0.37  0.10 - 0.90 10*3/mm3 Final    Eosinophils, Absolute 05/16/2024 0.06  0.00 - 0.40 10*3/mm3 Final    Basophils, Absolute 05/16/2024 0.02  0.00 - 0.20 10*3/mm3 Final    Immature Grans, Absolute 05/16/2024 0.02  0.00 - 0.05 10*3/mm3 Final    LDL Cholesterol  05/16/2024 76  0 - 100 mg/dL Final       eGFR      >60.0 mL/min/1.73 54.6 (L)   Folate      4.78 - 24.20 ng/mL 18.70   Ferritin      13.00 - 150.00 ng/mL 317.70 (H)   Vitamin B-12      211 - 946 pg/mL 472     Pathologist Interpretation        Normal total white blood cell count with differential compatible with the automated differential reported.  No abnormal/immature white blood cells noted. . . .     Component      Latest Ref Rng & Units 11/29/2022 1/31/2023   Folate      4.78 - 24.20 ng/mL >20.00    Vitamin B-12      211 - 946 pg/mL  1,372 (H)   TSH Baseline      0.270 - 4.200 uIU/mL  2.230   Free T4      0.93 - 1.70 ng/dL  0.64 (L)   Haptoglobin      30 - 200 mg/dL  164     Assessment / Plan      Assessment/Plan:   1.  Persistent macrocytosis  2.  H/O iron deficiency anemia, with anaphylactic reaction to Ferrlecit  2.  Progressive macrocytosis    -She has history of severe iron deficiency anemia of unclear etiology.  She has had an extensive negative work-up for a GI source of blood loss.  This was unrevealing.  Her history does not reveal any obvious alternative source of blood loss.  It is likely that she had a slow gastrointestinal bleed that was not identified on work-up during hospital admission. No convincing evidence of hemolysis.  She had an anaphylactic reaction to intravenous iron while admitted to the hospital and should no longer receive Ferrlecit.  We discussed that if intravenous iron is required, we could cautiously consider use of an alternative formulation, but she would require close monitoring.  Given that she has no clear history of malabsorption and her blood counts are stable she completed an oral iron trial with resolution of iron deficiency anemia. She is now off oral iron.   -Continues folic acid daily and also taking OTC B12 daily.  -Repeat CBC from 5/2024 shows persistent macrocytic anemia.  Overall, blood levels have not changed substantially over the last year.  Given the severity of macrocytosis and no specific etiology identified I have recommended bone marrow biopsy which she continues to decline.  She understands that our clinical concern is for an underlying bone marrow dyscrasia.  She prefers noninvasive  monitoring unless her blood counts worsen.  We will continue with serial blood count surveillance, and change frequency to every 4 months.    5.  FLC elevation  -Levels have normalized. Discontinue serial checks      Follow Up:   4 mo  CBC/CMP/LDH  Abeba Lopez MD   Hematology and Oncology

## 2024-05-17 LAB
ALBUMIN SERPL ELPH-MCNC: 3.8 G/DL (ref 2.9–4.4)
ALBUMIN/GLOB SERPL: 1.3 {RATIO} (ref 0.7–1.7)
ALPHA1 GLOB SERPL ELPH-MCNC: 0.3 G/DL (ref 0–0.4)
ALPHA2 GLOB SERPL ELPH-MCNC: 1 G/DL (ref 0.4–1)
B-GLOBULIN SERPL ELPH-MCNC: 1.1 G/DL (ref 0.7–1.3)
GAMMA GLOB SERPL ELPH-MCNC: 0.7 G/DL (ref 0.4–1.8)
GLOBULIN SER-MCNC: 3.1 G/DL (ref 2.2–3.9)
IGA SERPL-MCNC: 313 MG/DL (ref 64–422)
IGG SERPL-MCNC: 752 MG/DL (ref 586–1602)
IGM SERPL-MCNC: 74 MG/DL (ref 26–217)
INTERPRETATION SERPL IEP-IMP: NORMAL
KAPPA LC FREE SER-MCNC: 19.4 MG/L (ref 3.3–19.4)
KAPPA LC FREE/LAMBDA FREE SER: 1.49 {RATIO} (ref 0.26–1.65)
LABORATORY COMMENT REPORT: NORMAL
LAMBDA LC FREE SERPL-MCNC: 13 MG/L (ref 5.7–26.3)
M PROTEIN SERPL ELPH-MCNC: NORMAL G/DL
PROT SERPL-MCNC: 6.9 G/DL (ref 6–8.5)

## 2024-05-30 ENCOUNTER — HOSPITAL ENCOUNTER (OUTPATIENT)
Dept: GENERAL RADIOLOGY | Facility: HOSPITAL | Age: 74
Discharge: HOME | End: 2024-05-30
Admitting: STUDENT IN AN ORGANIZED HEALTH CARE EDUCATION/TRAINING PROGRAM
Payer: MEDICARE

## 2024-05-30 DIAGNOSIS — J18.9 PNEUMONIA OF LEFT LOWER LOBE DUE TO INFECTIOUS ORGANISM: ICD-10-CM

## 2024-05-30 PROCEDURE — 71046 X-RAY EXAM CHEST 2 VIEWS: CPT

## 2024-07-06 DIAGNOSIS — I50.22 CHRONIC SYSTOLIC CONGESTIVE HEART FAILURE: ICD-10-CM

## 2024-07-06 DIAGNOSIS — I25.119 CORONARY ARTERY DISEASE INVOLVING NATIVE CORONARY ARTERY OF NATIVE HEART WITH ANGINA PECTORIS: ICD-10-CM

## 2024-07-08 RX ORDER — BISOPROLOL FUMARATE 5 MG/1
5 TABLET, FILM COATED ORAL DAILY
Qty: 90 TABLET | Refills: 3 | Status: SHIPPED | OUTPATIENT
Start: 2024-07-08

## 2024-07-21 DIAGNOSIS — I50.22 CHRONIC SYSTOLIC CONGESTIVE HEART FAILURE: ICD-10-CM

## 2024-07-22 RX ORDER — SPIRONOLACTONE 25 MG/1
25 TABLET ORAL DAILY
Qty: 90 TABLET | Refills: 3 | Status: SHIPPED | OUTPATIENT
Start: 2024-07-22 | End: 2024-07-23 | Stop reason: SDUPTHER

## 2024-07-22 NOTE — TELEPHONE ENCOUNTER
Lab Results   Component Value Date    GLUCOSE 97 05/16/2024    CALCIUM 9.7 05/16/2024     05/16/2024    K 4.9 05/16/2024    CO2 19.0 (L) 05/16/2024     (H) 05/16/2024    BUN 23 05/16/2024    CREATININE 1.08 (H) 05/16/2024    EGFR 53.3 (L) 05/16/2024    BCR 21.3 05/16/2024    ANIONGAP 13.0 05/16/2024

## 2024-07-22 NOTE — PROGRESS NOTES
Cardiology Outpatient Visit      Identification: Fabiola Salmeron is a 74 y.o. female who resides in [place], [place].     Reason for visit:  Chronic systolic congestive heart failure  CAD      Subjective      Fabiola returns to the office today for follow-up of coronary artery disease with previous MI and heart failure with improved ejection fraction.  She presently feels well.  She denies angina or heart failure symptoms.  She is presently taking rosuvastatin 5 mg and ezetimibe is much as she can tolerate.  Her last LDL was 76.  She is interested in other options for getting her cholesterol reduced    Review of Systems   All other systems reviewed and are negative.      Allergies   Allergen Reactions    Ferrlecit [Na Ferric Gluc Cplx In Sucrose] Anaphylaxis    Codeine Other (See Comments)    Metoprolol Other (See Comments)     Fatigue      Statins Myalgia         Current Outpatient Medications   Medication Instructions    aspirin (ASPIRIN LOW DOSE) 81 mg, Oral, Daily    bimatoprost (Lumigan) 0.01 % ophthalmic drops 1 drop, Both Eyes, Nightly    bisoprolol (ZEBETA) 5 mg, Oral, Daily    brimonidine (ALPHAGAN) 0.2 % ophthalmic solution INSTILL 1 DROP INTO AFFECTED EYE(S) BY OPHTHALMIC ROUTE EVERY 8 HOURS    brimonidine-timolol (COMBIGAN) 0.2-0.5 % ophthalmic solution 1 drop, Ophthalmic    ezetimibe (ZETIA) 10 mg, Oral, Daily    fluorouracil (EFUDEX) 5 % cream fluorouracil 5 % topical cream   APPLY A SUFFICIENT AMOUNT TO COVER THE LESIONS IN THE AFFECTED AREA(S) BY TOPICAL ROUTE 2 TIMES PER DAY FOR 21 DAYS    folic acid (FOLVITE) 1 MG tablet TAKE 1 TABLET EVERY DAY    Polyvinyl Alcohol-Povidone PF (HYPOTEARS) 1.4-0.6 % ophthalmic solution     predniSONE (DELTASONE) 5 MG tablet TAKE 1 TABLET EVERY DAY    rosuvastatin (CRESTOR) 5 mg, Oral, Nightly    spironolactone (ALDACTONE) 25 mg, Oral, Daily    timolol (TIMOPTIC) 0.5 % ophthalmic solution 0.5 drops, Both Eyes, 2 times daily    topiramate (TOPAMAX) 100 MG tablet TAKE  "1 TABLET TWICE DAILY    Tylenol 650 mg, Oral, 4 Times Daily    ubrogepant (UBRELVY) 100 mg, Oral, Daily PRN    valsartan (DIOVAN) 80 mg, Oral, Daily    Vitamin B-12 1,000 mcg, Sublingual, Daily         Objective     /66 (BP Location: Left arm, Patient Position: Sitting, Cuff Size: Adult)   Pulse 63   Ht 167.6 cm (65.98\")   Wt 54.1 kg (119 lb 3.2 oz)   SpO2 99%   BMI 19.25 kg/m²       Constitutional:       Appearance: Healthy appearance.   Eyes:      General: No scleral icterus.  Neck:      Thyroid: No thyroid mass.      Vascular: No carotid bruit or JVD. JVD normal.   Pulmonary:      Effort: Pulmonary effort is normal.      Breath sounds: Normal breath sounds.   Cardiovascular:      Normal rate. Regular rhythm.      Murmurs: There is no murmur.      No gallop.    Edema:     Peripheral edema absent.   Skin:     General: Skin is warm. There is no cyanosis.   Neurological:      General: No focal deficit present.      Mental Status: Alert.   Psychiatric:         Attention and Perception: Attention normal.         Result Review  (reviewed with patient):            Lab Results   Component Value Date    GLUCOSE 97 05/16/2024    BUN 23 05/16/2024    CREATININE 1.08 (H) 05/16/2024    EGFR 53.3 (L) 05/16/2024    BCR 21.3 05/16/2024    K 4.9 05/16/2024    CO2 19.0 (L) 05/16/2024    CALCIUM 9.7 05/16/2024    PROTENTOTREF 6.9 05/16/2024    ALBUMIN 4.4 05/16/2024    ALBUMIN 3.8 05/16/2024    BILITOT 0.3 05/16/2024    AST 21 05/16/2024    ALT 15 05/16/2024     Lab Results   Component Value Date    WBC 5.73 05/16/2024    HGB 11.5 (L) 05/16/2024    HCT 36.5 05/16/2024    .9 (H) 05/16/2024     05/16/2024     Lab Results   Component Value Date    CHOL 201 (H) 07/06/2023    TRIG 106 07/06/2023    HDL 57 07/06/2023    LDL 76 05/16/2024     Lab Results   Component Value Date    HGBA1C 5.6 07/10/2023           Assessment     Diagnoses and all orders for this visit:    1. Heart failure with improved ejection " fraction (HFimpEF) (Primary)  Overview:  Echo (5/21/2022): LVEF 36%.  Systolic anterior motion of mitral valve present.  RVSP >  35 mmHg. The mid to distal and apical segments are akinetic.  Cardiac catheterization for NSTEMI (5/23/2022): Severe 2-vessel CAD (mid LAD occlusion, severe mid RCA stenosis).  PCI of proximal/mid LAD using 3 x 38 mm MARIA ELENA.  PCI of mid RCA using 4 x 28 mm MARIA ELENA.  LVEDP 25 mmHg.  Echo (11/17/2023): LVEF 60%. No significant valvular abnormalities.     Assessment & Plan:  Stage C HF with improved ejection fraction  Stable NYHA class II symptoms  Continue bisoprolol and valsartan    Orders:  -     spironolactone (ALDACTONE) 25 MG tablet; Take 1 tablet by mouth Daily.  Dispense: 90 tablet; Refill: 3  -     valsartan (DIOVAN) 80 MG tablet; Take 1 tablet by mouth Daily.  Dispense: 90 tablet; Refill: 3  -     bisoprolol (ZEBeta) 5 MG tablet; Take 1 tablet by mouth Daily.  Dispense: 90 tablet; Refill: 3    2. Coronary artery disease involving native coronary artery of native heart without angina pectoris  Overview:  Echo (5/21/2022): LVEF 36%.  Systolic anterior motion of mitral valve present.  RVSP >  35 mmHg. The mid to distal and apical segments are akinetic  Cardiac catheterization (5/23/2022): Severe 2-vessel CAD (mid LAD occlusion, severe mid RCA stenosis).  Successful OCT guided PCI of proximal/mid LAD using 3 x 38 mm MARIA ELENA.  Successful OCT guided PCI of mid RCA using 4 x 28 mm MARIA ELENA.  LVEDP 25 mmHg.    Assessment & Plan:  No angina  Continue aspirin, statin    Orders:  -     bisoprolol (ZEBeta) 5 MG tablet; Take 1 tablet by mouth Daily.  Dispense: 90 tablet; Refill: 3    3. Hyperlipidemia LDL goal <50  Overview:  High intensity statin therapy indicated given the presence of CAD    Assessment & Plan:  LDL above goal  Continue rosuvastatin and ezetimibe  Refer to [facility] pharmacy to assist in getting inclisiran or evolocumab    Orders:  -     rosuvastatin (CRESTOR) 5 MG tablet; Take 1  tablet by mouth Every Night.  Dispense: 90 tablet; Refill: 3  -     ezetimibe (ZETIA) 10 MG tablet; Take 1 tablet by mouth Daily.  Dispense: 90 tablet; Refill: 3          Plan   Continue present medical therapy  Refer to [facility] pharmacy for assistance in starting inclisiran      Follow-up   Return in about 1 year (around 7/23/2025).        David Wolfe MD, FACC, [facility]  7/23/2024

## 2024-07-23 ENCOUNTER — OFFICE VISIT (OUTPATIENT)
Dept: CARDIOLOGY | Facility: CLINIC | Age: 74
End: 2024-07-23
Payer: MEDICARE

## 2024-07-23 ENCOUNTER — SPECIALTY PHARMACY (OUTPATIENT)
Dept: CARDIOLOGY | Facility: CLINIC | Age: 74
End: 2024-07-23
Payer: MEDICARE

## 2024-07-23 VITALS
HEIGHT: 66 IN | WEIGHT: 119.2 LBS | OXYGEN SATURATION: 99 % | SYSTOLIC BLOOD PRESSURE: 120 MMHG | DIASTOLIC BLOOD PRESSURE: 66 MMHG | HEART RATE: 63 BPM | BODY MASS INDEX: 19.16 KG/M2

## 2024-07-23 DIAGNOSIS — I50.32 HEART FAILURE WITH IMPROVED EJECTION FRACTION (HFIMPEF): Primary | ICD-10-CM

## 2024-07-23 DIAGNOSIS — E78.5 HYPERLIPIDEMIA LDL GOAL <50: ICD-10-CM

## 2024-07-23 DIAGNOSIS — I25.10 CORONARY ARTERY DISEASE INVOLVING NATIVE CORONARY ARTERY OF NATIVE HEART WITHOUT ANGINA PECTORIS: ICD-10-CM

## 2024-07-23 PROCEDURE — 1159F MED LIST DOCD IN RCRD: CPT | Performed by: INTERNAL MEDICINE

## 2024-07-23 PROCEDURE — 99214 OFFICE O/P EST MOD 30 MIN: CPT | Performed by: INTERNAL MEDICINE

## 2024-07-23 PROCEDURE — G2211 COMPLEX E/M VISIT ADD ON: HCPCS | Performed by: INTERNAL MEDICINE

## 2024-07-23 PROCEDURE — 1160F RVW MEDS BY RX/DR IN RCRD: CPT | Performed by: INTERNAL MEDICINE

## 2024-07-23 RX ORDER — EZETIMIBE 10 MG/1
10 TABLET ORAL DAILY
Qty: 90 TABLET | Refills: 3 | Status: SHIPPED | OUTPATIENT
Start: 2024-07-23

## 2024-07-23 RX ORDER — BISOPROLOL FUMARATE 5 MG/1
5 TABLET, FILM COATED ORAL DAILY
Qty: 90 TABLET | Refills: 3 | Status: SHIPPED | OUTPATIENT
Start: 2024-07-23

## 2024-07-23 RX ORDER — VALSARTAN 80 MG/1
80 TABLET ORAL DAILY
Qty: 90 TABLET | Refills: 3 | Status: SHIPPED | OUTPATIENT
Start: 2024-07-23

## 2024-07-23 RX ORDER — SPIRONOLACTONE 25 MG/1
25 TABLET ORAL DAILY
Qty: 90 TABLET | Refills: 3 | Status: SHIPPED | OUTPATIENT
Start: 2024-07-23

## 2024-07-23 RX ORDER — ROSUVASTATIN CALCIUM 5 MG/1
5 TABLET, COATED ORAL NIGHTLY
Qty: 90 TABLET | Refills: 3 | Status: SHIPPED | OUTPATIENT
Start: 2024-07-23

## 2024-07-23 NOTE — ASSESSMENT & PLAN NOTE
Stage C HF with improved ejection fraction  Stable NYHA class II symptoms  Continue bisoprolol and valsartan

## 2024-07-23 NOTE — ASSESSMENT & PLAN NOTE
LDL above goal  Continue rosuvastatin and ezetimibe  Refer to [facility] pharmacy to assist in getting inclisiran or evolocumab

## 2024-07-24 NOTE — PROGRESS NOTES
Specialty Pharmacy Patient Management Program  Cardiology Initial Assessment     Fabiola Salmeron was referred by a Cardiology provider to the Cardiology Patient Management program offered by [facility] for Hyperlipidemia on 07/24/24.  An initial outreach was conducted, including assessment of therapy appropriateness and specialty medication education for Repatha. The patient was introduced to services offered by [facility], including: regular assessments, refill coordination, mail order delivery options, prior authorization maintenance, and financial assistance programs as applicable. The patient was also provided with contact information for the pharmacy team.     Insurance Coverage & Financial Support  [employer]     Relevant Past Medical History and Comorbidities  Relevant medical history and concomitant health conditions were discussed with the patient. The patient's chart has been reviewed for relevant past medical history and comorbid conditions and updated as necessary.  Past Medical History:   Diagnosis Date    Candidiasis of vulva and vagina     CHF (congestive heart failure)     Coronary artery disease     Hyperlipidemia     Hypertension      Social History     Socioeconomic History    Marital status:    Tobacco Use    Smoking status: Never     Passive exposure: Never    Smokeless tobacco: Never   Vaping Use    Vaping status: Never Used   Substance and Sexual Activity    Alcohol use: Not Currently    Drug use: Never    Sexual activity: Yes     Partners: Male     Birth control/protection: None, Vasectomy       Problem list reviewed by Hyun Virk, PharmD on 7/24/2024 at  2:08 PM    Allergies  Known allergies and reactions were discussed with the patient. The patient's chart has been reviewed for  allergy information and updated as necessary.   Allergies   Allergen Reactions    Ferrlecit [Na Ferric Gluc Cplx In Sucrose] Anaphylaxis    Codeine Other (See  Comments)    Metoprolol Other (See Comments)     Fatigue      Statins Myalgia       Allergies reviewed by Hyun Virk, PharmD on 7/24/2024 at  2:08 PM    Relevant Laboratory Values  Relevant laboratory values were discussed with the patient. The following specialty medication dose adjustment(s) are recommended: Add Repatha 140mg subcutaneously every 14 days    Lab Results   Component Value Date    GLUCOSE 97 05/16/2024    CALCIUM 9.7 05/16/2024     05/16/2024    K 4.9 05/16/2024    CO2 19.0 (L) 05/16/2024     (H) 05/16/2024    BUN 23 05/16/2024    CREATININE 1.08 (H) 05/16/2024    EGFRIFNONA 66 12/07/2021    BCR 21.3 05/16/2024    ANIONGAP 13.0 05/16/2024     Lab Results   Component Value Date    CHOL 201 (H) 07/06/2023    TRIG 106 07/06/2023    HDL 57 07/06/2023    LDL 76 05/16/2024         Current Medication List  This medication list has been reviewed with the patient and evaluated for any interactions or necessary modifications/recommendations, and updated to include all prescription medications, OTC medications, and supplements the patient is currently taking.  This list reflects what is contained in the patient's profile, which has also been marked as reviewed to communicate to other providers it is the most up to date version of the patient's current medication therapy.     Current Outpatient Medications:     Acetaminophen (Tylenol) 325 MG capsule, Take 650 mg by mouth 4 (Four) Times a Day., Disp: , Rfl:     aspirin (Aspirin Low Dose) 81 MG EC tablet, Take 1 tablet by mouth Daily., Disp: 90 tablet, Rfl: 3    bimatoprost (Lumigan) 0.01 % ophthalmic drops, Administer 1 drop to both eyes Every Night., Disp: , Rfl:     bisoprolol (ZEBeta) 5 MG tablet, Take 1 tablet by mouth Daily., Disp: 90 tablet, Rfl: 3    brimonidine (ALPHAGAN) 0.2 % ophthalmic solution, INSTILL 1 DROP INTO AFFECTED EYE(S) BY OPHTHALMIC ROUTE EVERY 8 HOURS, Disp: , Rfl:     brimonidine-timolol (COMBIGAN) 0.2-0.5 % ophthalmic  solution, Apply 1 drop to eye(s) as directed by provider., Disp: , Rfl:     Cyanocobalamin (Vitamin B-12) 1000 MCG sublingual tablet, Place 1 tablet under the tongue Daily., Disp: 90 each, Rfl: 3    Evolocumab (REPATHA) solution auto-injector SureClick injection, Inject 1 mL under the skin into the appropriate area as directed Every 14 (Fourteen) Days., Disp: 2 mL, Rfl: 11    ezetimibe (ZETIA) 10 MG tablet, Take 1 tablet by mouth Daily., Disp: 90 tablet, Rfl: 3    fluorouracil (EFUDEX) 5 % cream, fluorouracil 5 % topical cream  APPLY A SUFFICIENT AMOUNT TO COVER THE LESIONS IN THE AFFECTED AREA(S) BY TOPICAL ROUTE 2 TIMES PER DAY FOR 21 DAYS, Disp: , Rfl:     folic acid (FOLVITE) 1 MG tablet, TAKE 1 TABLET EVERY DAY, Disp: 90 tablet, Rfl: 3    Polyvinyl Alcohol-Povidone PF (HYPOTEARS) 1.4-0.6 % ophthalmic solution, , Disp: , Rfl:     predniSONE (DELTASONE) 5 MG tablet, TAKE 1 TABLET EVERY DAY, Disp: 90 tablet, Rfl: 3    rosuvastatin (CRESTOR) 5 MG tablet, Take 1 tablet by mouth Every Night., Disp: 90 tablet, Rfl: 3    spironolactone (ALDACTONE) 25 MG tablet, Take 1 tablet by mouth Daily., Disp: 90 tablet, Rfl: 3    timolol (TIMOPTIC) 0.5 % ophthalmic solution, Administer 0.5 drops to both eyes 2 (two) times a day., Disp: , Rfl:     topiramate (TOPAMAX) 100 MG tablet, TAKE 1 TABLET TWICE DAILY, Disp: 180 tablet, Rfl: 3    ubrogepant (Ubrelvy) 100 MG tablet, Take 1 tablet by mouth Daily As Needed (migraine)., Disp: 24 tablet, Rfl: 0    valsartan (DIOVAN) 80 MG tablet, Take 1 tablet by mouth Daily., Disp: 90 tablet, Rfl: 3    Medicines reviewed by Hyun Virk, PharmD on 7/24/2024 at  2:08 PM    Drug Interactions  None  Recommended Medications Assessment  Aspirin: Currently Taking   Statin: Currently Taking   ACEi/ARB: Currently Taking     Initial Education Provided for Specialty Medication  The patient has been provided with the following education and any applicable administration techniques (i.e. self-injection)  have been demonstrated for the therapies indicated. All questions and concerns have been addressed prior to the patient receiving the medication, and the patient has verbalized comprehension of the education and any materials provided. Additional patient education shall be provided and documented upon request by the patient, provider, or payer.    REPATHA® (evolocumab)  Medication Expectations   Why am I taking this medication? You are taking Repatha to lower your “bad” cholesterol (LDL-C). This medication can be used in adults with high blood cholesterol including primary hyperlipidemia and familial hypercholesterolemia.    What should I expect while on this medication? You should expect to see your cholesterol improve over time. Specifically, you should see your LDL-C decrease.    How does the medication work? Repatha works by blocking a protein called PCSK9 that contributes to high levels of bad cholesterol. It helps increase your liver's ability to remove bad cholesterol from your blood.     How long will I be on this medication for? The amount of time you will be on this medication will be determined by your doctor based on your cholesterol and/or your risk of having a cardiac event. You will most likely be on this medication or another cholesterol medication throughout your lifetime. Do not abruptly stop this medication without talking to your doctor first.    How do I take this medication? Take as directed on your prescription label. Repatha is injected under the skin (subcutaneously) of your stomach, thigh, or upper arm. This medication is usually given one or twice a month.   What are some possible side effects? The most common side effects of Repatha include redness, itching, swelling, or pain/tenderness at the injection site, symptoms of the common cold, flu or flu-like symptoms or back pain.    What happens if I miss a dose? If you miss a dose, take it as soon as you remember if it is within 7 days from  the usual day of administration then resume your original schedule. If it is beyond 7 days and you use the alexandria-2-week dose, skip the missed dose and resume your normal dosing schedule.If it is beyond 7 days and you use the once-monthly dose, inject the dose and start a new schedule based on that date.      Medication Safety   What are things I should warn my doctor immediately about? Talk to your doctor if you are pregnant, planning to become pregnant, or breastfeeding. Stop the medication and tell your doctor or seek emergency medical help if you notice any signs/symptoms of an allergic reaction (severe rash, redness, hives, severe itching, trouble breathing, or swelling of the face, lips, or tongue). If you have a rubber or latex allergy, you should not use the Repatha SureClick® Autoinjector pen or the prefilled syringe, please notify your doctor or pharmacist.   What are things that I should be cautious of? Be cautious of any side effects from this medication. Talk to your doctor if any new ones develop or aren't getting better.   What are some medications that can interact with this one? There are no known significant drug interactions with Repatha. Always tell your doctor or pharmacist immediately if you start taking any new medications, including over-the-counter medications, vitamins, and herbal supplements.      Medication Storage/Handling   How should I handle this medication? Do not shake or expose the pens, cartridges, or syringes to extreme heat or direct sunlight. Keep this medication out of reach of pets/children. Allow medication to warm at room temperature prior to administration.   How does this medication need to be stored? Store unused pens, cartridges, or syringes in the refrigerator in the original cartons to protect from light. If needed, Repatha may be kept at room temperature in the original carton for up to 30 days. Do not freeze.    How should I dispose of this medication? All the Repatha  devices are single-dose and should be discarded in a sharps container after use. If your doctor decides to stop this medication, take to your local police station for proper disposal. Some pharmacies also have take-back bins for medication drop-off.      Resources/Support   How can I remind myself to take this medication? You can download reminder apps to help you manage your refills. You may also set an alarm on your phone to remind you to take your dose.    Is financial support available?  [employer] can provide co-pay cards if you have commercial insurance or patient assistance if you have Medicare or no insurance.    Which vaccines are recommended for me? Talk to your doctor about these vaccines: Flu, Coronavirus (COVID-19), Pneumococcal (pneumonia), Tdap, Hepatitis B, Zoster (shingles)          Adherence and Self-Administration  Adherence related to the patient's specialty therapy was discussed with the patient. The Adherence segment of this outreach has been reviewed and updated.     Is there a concern with patient's ability to self administer the medication correctly and without issue?: No  Were any potential barriers to adherence identified during the initial assessment or patient education?: No  Are there any concerns regarding the patient's understanding of the importance of medication adherence?: No  Methods for Supporting Patient Adherence and/or Self-Administration: None needed    Open Medication Therapy Problems  No medication therapy recommendations to display    Goals of Therapy  Goals related to the patient's specialty therapy were discussed with the patient. The Patient Goals segment of this outreach has been reviewed and updated.   Goals Addressed Today        Specialty Pharmacy General Goal      LDL Goal < 50    Lab Results   Component Value Date    LDL 76 05/16/2024    LDL 84 10/17/2023     (H) 07/06/2023     (H) 01/31/2023     (H) 12/07/2021                   Reassessment Plan &  Follow-Up  1. Medication Therapy Changes: Begin Repatha 140mg subcutaneously every 14 days  2. Related Plans, Therapy Recommendations, or Therapy Problems to Be Addressed: None  3. Pharmacist to perform regular assessments no more than (6) months from the previous assessment.  4. Care Coordinator to set up future refill outreaches, coordinate prescription delivery, and escalate clinical questions to pharmacist.  5. Welcome information and patient satisfaction survey to be sent by specialty pharmacy team with patient's initial fill.    Attestation  Therapeutic appropriateness: Appropriate   I attest the patient was actively involved in and has agreed to the above plan of care. If the prescribed therapy is at any point deemed not appropriate based on the current or future assessments, a consultation will be initiated with the patient's specialty care provider to determine the best course of action. The revised plan of therapy will be documented along with any required assessments and/or additional patient education provided.     Hyun Virk, PharmD, BCPS  Clinical Specialty Pharmacist, Cardiology  7/24/2024  14:10 EDT

## 2024-08-16 ENCOUNTER — SPECIALTY PHARMACY (OUTPATIENT)
Dept: CARDIOLOGY | Facility: CLINIC | Age: 74
End: 2024-08-16
Payer: MEDICARE

## 2024-08-16 NOTE — PROGRESS NOTES
Specialty Pharmacy Refill Coordination Note     Fabiola is a 74 y.o. female contacted today regarding refills of Repatha 140 mg SC every 14 days specialty medication(s).    Shipping Monday for delivery on Tuesday, 8/20/24.    Specialty medication(s) and dose(s) confirmed: yes    Refill Questions      Flowsheet Row Most Recent Value   Changes to allergies? No   Changes to medications? No   New conditions or infections since last clinic visit No   Unplanned office visit, urgent care, ED, or hospital admission in the last 4 weeks  No   How does patient/caregiver feel medication is working? Good   Financial problems or insurance changes  No   Since the previous refill, were any specialty medication doses or scheduled injections missed or delayed?  No   Does this patient require a clinical escalation to a pharmacist? No            Delivery Questions      Flowsheet Row Most Recent Value   Delivery method [employer]   Delivery address verified with patient/caregiver? Yes   Delivery address Home   Number of medications in delivery 1   Medication(s) being filled and delivered Evolocumab   Doses left of specialty medications 0 pens last dose 8/15/24   Copay verified? Yes   Copay amount $132.03   Copay form of payment Credit/debit on file   Ship Date 8/19/24   Delivery Date 8/20/24   Signature Required No                   Follow-up: 28 day(s)     Esther Parham, Pharmacy Technician  Specialty Pharmacy Technician

## 2024-09-06 ENCOUNTER — SPECIALTY PHARMACY (OUTPATIENT)
Dept: CARDIOLOGY | Facility: CLINIC | Age: 74
End: 2024-09-06
Payer: MEDICARE

## 2024-09-06 NOTE — PROGRESS NOTES
Specialty Pharmacy Refill Coordination Note     Fabiola is a 74 y.o. female contacted today regarding refills of Repatha 140 mg SC every 14 days specialty medication(s).    Delivery set for Tuesday, 9/10/2024.    Specialty medication(s) and dose(s) confirmed: yes    Refill Questions      Flowsheet Row Most Recent Value   Changes to allergies? No   Changes to medications? No   New conditions or infections since last clinic visit No   Unplanned office visit, urgent care, ED, or hospital admission in the last 4 weeks  No   How does patient/caregiver feel medication is working? Very good   Financial problems or insurance changes  No   Since the previous refill, were any specialty medication doses or scheduled injections missed or delayed?  No   Does this patient require a clinical escalation to a pharmacist? No            Delivery Questions      Flowsheet Row Most Recent Value   Delivery method [employer]   Delivery address verified with patient/caregiver? Yes   Delivery address Home   Number of medications in delivery 1   Medication(s) being filled and delivered Evolocumab   Doses left of specialty medications 0 pen   Copay verified? Yes   Copay amount $132.03   Copay form of payment Credit/debit on file   Ship Date 9/9/24   Delivery Date 9/10/24   Signature Required No                   Follow-up: 28 day(s)     Esther Parham, Pharmacy Technician  Specialty Pharmacy Technician

## 2024-09-17 ENCOUNTER — OFFICE VISIT (OUTPATIENT)
Dept: ONCOLOGY | Facility: CLINIC | Age: 74
End: 2024-09-17
Payer: MEDICARE

## 2024-09-17 ENCOUNTER — LAB (OUTPATIENT)
Dept: LAB | Facility: HOSPITAL | Age: 74
End: 2024-09-17
Payer: MEDICARE

## 2024-09-17 VITALS
SYSTOLIC BLOOD PRESSURE: 131 MMHG | WEIGHT: 113 LBS | TEMPERATURE: 97.8 F | HEART RATE: 76 BPM | OXYGEN SATURATION: 99 % | HEIGHT: 66 IN | BODY MASS INDEX: 18.16 KG/M2 | DIASTOLIC BLOOD PRESSURE: 80 MMHG

## 2024-09-17 DIAGNOSIS — D53.9 MACROCYTIC ANEMIA: Primary | ICD-10-CM

## 2024-09-17 DIAGNOSIS — D53.9 MACROCYTIC ANEMIA: ICD-10-CM

## 2024-09-17 LAB
ALBUMIN SERPL-MCNC: 4.4 G/DL (ref 3.5–5.2)
ALBUMIN/GLOB SERPL: 1.7 G/DL
ALP SERPL-CCNC: 78 U/L (ref 39–117)
ALT SERPL W P-5'-P-CCNC: 23 U/L (ref 1–33)
ANION GAP SERPL CALCULATED.3IONS-SCNC: 14 MMOL/L (ref 5–15)
AST SERPL-CCNC: 28 U/L (ref 1–32)
BASOPHILS # BLD AUTO: 0.03 10*3/MM3 (ref 0–0.2)
BASOPHILS NFR BLD AUTO: 0.5 % (ref 0–1.5)
BILIRUB SERPL-MCNC: 0.3 MG/DL (ref 0–1.2)
BUN SERPL-MCNC: 21 MG/DL (ref 8–23)
BUN/CREAT SERPL: 22.1 (ref 7–25)
CALCIUM SPEC-SCNC: 9.1 MG/DL (ref 8.6–10.5)
CHLORIDE SERPL-SCNC: 116 MMOL/L (ref 98–107)
CO2 SERPL-SCNC: 12 MMOL/L (ref 22–29)
CREAT SERPL-MCNC: 0.95 MG/DL (ref 0.57–1)
DEPRECATED RDW RBC AUTO: 67.1 FL (ref 37–54)
EGFRCR SERPLBLD CKD-EPI 2021: 62.2 ML/MIN/1.73
EOSINOPHIL # BLD AUTO: 0.08 10*3/MM3 (ref 0–0.4)
EOSINOPHIL NFR BLD AUTO: 1.3 % (ref 0.3–6.2)
ERYTHROCYTE [DISTWIDTH] IN BLOOD BY AUTOMATED COUNT: 14.6 % (ref 12.3–15.4)
GLOBULIN UR ELPH-MCNC: 2.6 GM/DL
GLUCOSE SERPL-MCNC: 113 MG/DL (ref 65–99)
HCT VFR BLD AUTO: 38.4 % (ref 34–46.6)
HGB BLD-MCNC: 12.3 G/DL (ref 12–15.9)
IMM GRANULOCYTES # BLD AUTO: 0.02 10*3/MM3 (ref 0–0.05)
IMM GRANULOCYTES NFR BLD AUTO: 0.3 % (ref 0–0.5)
LDH SERPL-CCNC: 213 U/L (ref 135–214)
LYMPHOCYTES # BLD AUTO: 1.36 10*3/MM3 (ref 0.7–3.1)
LYMPHOCYTES NFR BLD AUTO: 22.9 % (ref 19.6–45.3)
MCH RBC QN AUTO: 38.9 PG (ref 26.6–33)
MCHC RBC AUTO-ENTMCNC: 32 G/DL (ref 31.5–35.7)
MCV RBC AUTO: 121.5 FL (ref 79–97)
MONOCYTES # BLD AUTO: 0.48 10*3/MM3 (ref 0.1–0.9)
MONOCYTES NFR BLD AUTO: 8.1 % (ref 5–12)
NEUTROPHILS NFR BLD AUTO: 3.96 10*3/MM3 (ref 1.7–7)
NEUTROPHILS NFR BLD AUTO: 66.9 % (ref 42.7–76)
PLATELET # BLD AUTO: 306 10*3/MM3 (ref 140–450)
PMV BLD AUTO: 8.8 FL (ref 6–12)
POTASSIUM SERPL-SCNC: 5 MMOL/L (ref 3.5–5.2)
PROT SERPL-MCNC: 7 G/DL (ref 6–8.5)
RBC # BLD AUTO: 3.16 10*6/MM3 (ref 3.77–5.28)
SODIUM SERPL-SCNC: 142 MMOL/L (ref 136–145)
WBC NRBC COR # BLD AUTO: 5.93 10*3/MM3 (ref 3.4–10.8)

## 2024-09-17 PROCEDURE — 36415 COLL VENOUS BLD VENIPUNCTURE: CPT

## 2024-09-17 PROCEDURE — 1126F AMNT PAIN NOTED NONE PRSNT: CPT | Performed by: INTERNAL MEDICINE

## 2024-09-17 PROCEDURE — 99213 OFFICE O/P EST LOW 20 MIN: CPT | Performed by: INTERNAL MEDICINE

## 2024-09-17 PROCEDURE — 85025 COMPLETE CBC W/AUTO DIFF WBC: CPT

## 2024-09-17 PROCEDURE — 80053 COMPREHEN METABOLIC PANEL: CPT

## 2024-09-17 PROCEDURE — 83615 LACTATE (LD) (LDH) ENZYME: CPT

## 2024-10-02 ENCOUNTER — LAB (OUTPATIENT)
Dept: LAB | Facility: HOSPITAL | Age: 74
End: 2024-10-02
Payer: MEDICARE

## 2024-10-02 ENCOUNTER — OFFICE VISIT (OUTPATIENT)
Dept: FAMILY MEDICINE CLINIC | Facility: CLINIC | Age: 74
End: 2024-10-02
Payer: MEDICARE

## 2024-10-02 VITALS
BODY MASS INDEX: 17.55 KG/M2 | WEIGHT: 109.2 LBS | SYSTOLIC BLOOD PRESSURE: 96 MMHG | DIASTOLIC BLOOD PRESSURE: 70 MMHG | HEART RATE: 78 BPM | OXYGEN SATURATION: 95 % | HEIGHT: 66 IN

## 2024-10-02 DIAGNOSIS — R53.83 OTHER FATIGUE: ICD-10-CM

## 2024-10-02 DIAGNOSIS — R10.13 EPIGASTRIC PAIN: ICD-10-CM

## 2024-10-02 DIAGNOSIS — R63.4 WEIGHT LOSS: Primary | ICD-10-CM

## 2024-10-02 DIAGNOSIS — R06.09 EXERTIONAL DYSPNEA: ICD-10-CM

## 2024-10-02 DIAGNOSIS — Z86.2 HISTORY OF IRON DEFICIENCY ANEMIA: ICD-10-CM

## 2024-10-02 DIAGNOSIS — R63.4 WEIGHT LOSS: ICD-10-CM

## 2024-10-02 DIAGNOSIS — R63.0 LACK OF APPETITE: ICD-10-CM

## 2024-10-02 DIAGNOSIS — I95.9 HYPOTENSION, UNSPECIFIED HYPOTENSION TYPE: ICD-10-CM

## 2024-10-02 LAB
ALBUMIN SERPL-MCNC: 4.2 G/DL (ref 3.5–5.2)
ALBUMIN/GLOB SERPL: 1.5 G/DL
ALP SERPL-CCNC: 89 U/L (ref 39–117)
ALT SERPL W P-5'-P-CCNC: 21 U/L (ref 1–33)
ANION GAP SERPL CALCULATED.3IONS-SCNC: 14 MMOL/L (ref 5–15)
AST SERPL-CCNC: 22 U/L (ref 1–32)
BACTERIA UR QL AUTO: ABNORMAL /HPF
BASOPHILS # BLD AUTO: 0.02 10*3/MM3 (ref 0–0.2)
BASOPHILS NFR BLD AUTO: 0.2 % (ref 0–1.5)
BILIRUB SERPL-MCNC: 0.3 MG/DL (ref 0–1.2)
BILIRUB UR QL STRIP: NEGATIVE
BUN SERPL-MCNC: 45 MG/DL (ref 8–23)
BUN/CREAT SERPL: 36.6 (ref 7–25)
CALCIUM SPEC-SCNC: 9.5 MG/DL (ref 8.6–10.5)
CHLORIDE SERPL-SCNC: 111 MMOL/L (ref 98–107)
CLARITY UR: CLEAR
CO2 SERPL-SCNC: 11 MMOL/L (ref 22–29)
COLOR UR: YELLOW
CREAT SERPL-MCNC: 1.23 MG/DL (ref 0.57–1)
DEPRECATED RDW RBC AUTO: 54 FL (ref 37–54)
EGFRCR SERPLBLD CKD-EPI 2021: 45.6 ML/MIN/1.73
EOSINOPHIL # BLD AUTO: 0.04 10*3/MM3 (ref 0–0.4)
EOSINOPHIL NFR BLD AUTO: 0.4 % (ref 0.3–6.2)
ERYTHROCYTE [DISTWIDTH] IN BLOOD BY AUTOMATED COUNT: 13.6 % (ref 12.3–15.4)
GLOBULIN UR ELPH-MCNC: 2.8 GM/DL
GLUCOSE SERPL-MCNC: 120 MG/DL (ref 65–99)
GLUCOSE UR STRIP-MCNC: NEGATIVE MG/DL
HCT VFR BLD AUTO: 37 % (ref 34–46.6)
HGB BLD-MCNC: 13 G/DL (ref 12–15.9)
HGB UR QL STRIP.AUTO: NEGATIVE
HOLD SPECIMEN: NORMAL
HYALINE CASTS UR QL AUTO: ABNORMAL /LPF
IMM GRANULOCYTES # BLD AUTO: 0.06 10*3/MM3 (ref 0–0.05)
IMM GRANULOCYTES NFR BLD AUTO: 0.6 % (ref 0–0.5)
IRON 24H UR-MRATE: 28 MCG/DL (ref 37–145)
IRON SATN MFR SERPL: 7 % (ref 20–50)
KETONES UR QL STRIP: NEGATIVE
LEUKOCYTE ESTERASE UR QL STRIP.AUTO: NEGATIVE
LYMPHOCYTES # BLD AUTO: 1.16 10*3/MM3 (ref 0.7–3.1)
LYMPHOCYTES NFR BLD AUTO: 12.2 % (ref 19.6–45.3)
MCH RBC QN AUTO: 38.2 PG (ref 26.6–33)
MCHC RBC AUTO-ENTMCNC: 35.1 G/DL (ref 31.5–35.7)
MCV RBC AUTO: 108.8 FL (ref 79–97)
MONOCYTES # BLD AUTO: 0.53 10*3/MM3 (ref 0.1–0.9)
MONOCYTES NFR BLD AUTO: 5.6 % (ref 5–12)
NEUTROPHILS NFR BLD AUTO: 7.71 10*3/MM3 (ref 1.7–7)
NEUTROPHILS NFR BLD AUTO: 81 % (ref 42.7–76)
NITRITE UR QL STRIP: NEGATIVE
NT-PROBNP SERPL-MCNC: 334 PG/ML (ref 0–1800)
PH UR STRIP.AUTO: 6 [PH] (ref 5–8)
PLATELET # BLD AUTO: 351 10*3/MM3 (ref 140–450)
PMV BLD AUTO: 9.2 FL (ref 6–12)
POTASSIUM SERPL-SCNC: 4.5 MMOL/L (ref 3.5–5.2)
PROT SERPL-MCNC: 7 G/DL (ref 6–8.5)
PROT UR QL STRIP: ABNORMAL
RBC # BLD AUTO: 3.4 10*6/MM3 (ref 3.77–5.28)
RBC # UR STRIP: ABNORMAL /HPF
REF LAB TEST METHOD: ABNORMAL
SODIUM SERPL-SCNC: 136 MMOL/L (ref 136–145)
SP GR UR STRIP: >1.03 (ref 1–1.03)
SQUAMOUS #/AREA URNS HPF: ABNORMAL /HPF
TIBC SERPL-MCNC: 378 MCG/DL (ref 298–536)
TRANSFERRIN SERPL-MCNC: 254 MG/DL (ref 200–360)
TSH SERPL DL<=0.05 MIU/L-ACNC: 2.89 UIU/ML (ref 0.27–4.2)
UROBILINOGEN UR QL STRIP: ABNORMAL
WBC # UR STRIP: ABNORMAL /HPF
WBC NRBC COR # BLD AUTO: 9.52 10*3/MM3 (ref 3.4–10.8)

## 2024-10-02 PROCEDURE — 84443 ASSAY THYROID STIM HORMONE: CPT

## 2024-10-02 PROCEDURE — 36415 COLL VENOUS BLD VENIPUNCTURE: CPT

## 2024-10-02 PROCEDURE — 83880 ASSAY OF NATRIURETIC PEPTIDE: CPT

## 2024-10-02 PROCEDURE — 1126F AMNT PAIN NOTED NONE PRSNT: CPT | Performed by: PHYSICIAN ASSISTANT

## 2024-10-02 PROCEDURE — 80053 COMPREHEN METABOLIC PANEL: CPT

## 2024-10-02 PROCEDURE — 85025 COMPLETE CBC W/AUTO DIFF WBC: CPT

## 2024-10-02 PROCEDURE — 83690 ASSAY OF LIPASE: CPT

## 2024-10-02 PROCEDURE — 84466 ASSAY OF TRANSFERRIN: CPT

## 2024-10-02 PROCEDURE — 1159F MED LIST DOCD IN RCRD: CPT | Performed by: PHYSICIAN ASSISTANT

## 2024-10-02 PROCEDURE — 83540 ASSAY OF IRON: CPT

## 2024-10-02 PROCEDURE — 99214 OFFICE O/P EST MOD 30 MIN: CPT | Performed by: PHYSICIAN ASSISTANT

## 2024-10-02 PROCEDURE — 81001 URINALYSIS AUTO W/SCOPE: CPT

## 2024-10-02 PROCEDURE — 1160F RVW MEDS BY RX/DR IN RCRD: CPT | Performed by: PHYSICIAN ASSISTANT

## 2024-10-02 NOTE — PROGRESS NOTES
Chief Complaint   Patient presents with    Shortness of Breath    Weight Loss    Oversleeping          Fabiola Salmeron is a 74 y.o. female who presents for Shortness of Breath, Weight Loss, and Oversleeping     Patient reports feeling unwell starting about a week ago.  Lack of appetite, epigastric discomfort, fatigue, and shortness of breath with exertion.  Denies cough.  No bowel changes other than less frequency which she associates with eating less.  Hydrating well with water.  She states she has lost 20 lbs in the last few weeks.  Can't hardly eat anything.   is present at appointment.  No history of smoking.  Followed by cardiology.  Started Repatha 2 months ago; otherwise no new medications.    Past Medical History:   Diagnosis Date    Candidiasis of vulva and vagina     CHF (congestive heart failure)     Coronary artery disease     Hyperlipidemia     Hypertension        Past Surgical History:   Procedure Laterality Date    ARTERIOVENOUS FISTULA/SHUNT SURGERY Right 05/23/2022    Procedure: RADIAL ARTERY REPAIR WITH HEMATOMA EVACUATION AND WASHOUT RIGHT;  Surgeon: Jaron Benavides MD;  Location:  [facility];  Service: Vascular;  Laterality: Right;    CAPSULE ENDOSCOPY N/A 05/25/2022    Procedure: CAPSULE ENDOSCOPY ESOPHAGUS TO ILEUM DEPLOYED;  Surgeon: Michele Rogers MD;  Location:  [facility];  Service: Gastroenterology;  Laterality: N/A;    CARDIAC CATHETERIZATION N/A 05/23/2022    Procedure: LEFT HEART CATH;  Surgeon: Manjit Wolfe IV, MD;  Location:  [facility];  Service: Cardiovascular;  Laterality: N/A;    CARDIAC CATHETERIZATION N/A 05/23/2022    Procedure: Optical Coherent Tomography;  Surgeon: Manjit Wolfe IV, MD;  Location:  [facility];  Service: Cardiovascular;  Laterality: N/A;    CARDIAC CATHETERIZATION N/A 05/23/2022    Procedure: Stent MARIA ELENA coronary;  Surgeon: Manjit Wolfe IV, MD;  Location:  [facility];  Service: Cardiovascular;  Laterality: N/A;    COLONOSCOPY N/A 05/25/2022    Procedure: COLONOSCOPY;  Surgeon: Michele Rogers MD;  Location:  [facility];  Service: Gastroenterology;  Laterality: N/A;    CRANIAL NEUROSTIMULATOR INSERTION/REPLACEMENT Right     ENDOSCOPY N/A 05/25/2022    Procedure: ESOPHAGOGASTRODUODENOSCOPY;  Surgeon: Michele Rogers MD;  Location:  [facility];  Service: Gastroenterology;  Laterality: N/A;    EYE SURGERY      shunt placement by ophthalmology       Family History   Problem Relation Age of Onset    Heart failure Mother     Lung cancer Father         smoker    Heart disease Sister     Breast cancer Sister     Aplastic anemia Brother     No Known Problems Maternal Grandmother     No Known Problems Maternal Grandfather     No Known Problems Paternal Grandmother     No Known Problems Paternal Grandfather     Atrial fibrillation Son     Ovarian cancer Neg Hx     Colon cancer Neg Hx        Social History     Socioeconomic History    Marital status:    Tobacco Use    Smoking status: Never     Passive exposure: Never    Smokeless tobacco: Never   Vaping Use    Vaping status: Never Used   Substance and Sexual Activity    Alcohol use: Not Currently    Drug use: Never    Sexual activity: Yes     Partners: Male     Birth control/protection: None, Vasectomy       Allergies   Allergen Reactions    Ferrlecit [Na Ferric Gluc Cplx In Sucrose] Anaphylaxis    Codeine Other (See Comments)    Metoprolol Other (See Comments)     Fatigue      Statins Myalgia       ROS    Review of Systems   Constitutional:  Positive for activity change, appetite change, fatigue and unexpected weight loss. Negative for chills and fever.   Eyes:  Negative for visual disturbance.   Respiratory:  Positive for shortness of breath. Negative for cough and wheezing.    Cardiovascular:  Negative for chest pain, palpitations and leg swelling.   Gastrointestinal:  Positive for abdominal pain.  "Negative for blood in stool, constipation, diarrhea, nausea and vomiting.   Genitourinary:  Negative for dysuria and flank pain.   Neurological:  Positive for dizziness, weakness, light-headedness and headache. Negative for syncope and speech difficulty.       Vitals:    10/02/24 1404   BP: 96/70   BP Location: Left arm   Patient Position: Sitting   Cuff Size: Small Adult   Pulse: 78   SpO2: 95%   Weight: 49.5 kg (109 lb 3.2 oz)   Height: 167.6 cm (65.98\")     Body mass index is 17.63 kg/m².    Current Outpatient Medications on File Prior to Visit   Medication Sig Dispense Refill    Acetaminophen (Tylenol) 325 MG capsule Take 650 mg by mouth 4 (Four) Times a Day.      aspirin (Aspirin Low Dose) 81 MG EC tablet Take 1 tablet by mouth Daily. 90 tablet 3    bimatoprost (Lumigan) 0.01 % ophthalmic drops Administer 1 drop to both eyes Every Night.      bisoprolol (ZEBeta) 5 MG tablet Take 1 tablet by mouth Daily. 90 tablet 3    brimonidine (ALPHAGAN) 0.2 % ophthalmic solution INSTILL 1 DROP INTO AFFECTED EYE(S) BY OPHTHALMIC ROUTE EVERY 8 HOURS      brimonidine-timolol (COMBIGAN) 0.2-0.5 % ophthalmic solution Apply 1 drop to eye(s) as directed by provider.      Cyanocobalamin (Vitamin B-12) 1000 MCG sublingual tablet Place 1 tablet under the tongue Daily. 90 each 3    ezetimibe (ZETIA) 10 MG tablet Take 1 tablet by mouth Daily. 90 tablet 3    fluorouracil (EFUDEX) 5 % cream fluorouracil 5 % topical cream   APPLY A SUFFICIENT AMOUNT TO COVER THE LESIONS IN THE AFFECTED AREA(S) BY TOPICAL ROUTE 2 TIMES PER DAY FOR 21 DAYS      folic acid (FOLVITE) 1 MG tablet TAKE 1 TABLET EVERY DAY 90 tablet 3    Polyvinyl Alcohol-Povidone PF (HYPOTEARS) 1.4-0.6 % ophthalmic solution       predniSONE (DELTASONE) 5 MG tablet TAKE 1 TABLET EVERY DAY 90 tablet 3    rosuvastatin (CRESTOR) 5 MG tablet Take 1 tablet by mouth Every Night. 90 tablet 3    spironolactone (ALDACTONE) 25 MG tablet Take 1 tablet by mouth Daily. 90 tablet 3    timolol " (TIMOPTIC) 0.5 % ophthalmic solution Administer 0.5 drops to both eyes 2 (two) times a day.      topiramate (TOPAMAX) 100 MG tablet TAKE 1 TABLET TWICE DAILY 180 tablet 3    ubrogepant (Ubrelvy) 100 MG tablet Take 1 tablet by mouth Daily As Needed (migraine). 24 tablet 0    valsartan (DIOVAN) 80 MG tablet Take 1 tablet by mouth Daily. 90 tablet 3    Evolocumab (REPATHA) solution auto-injector SureClick injection Inject 1 mL under the skin into the appropriate area as directed Every 14 (Fourteen) Days. (Patient not taking: Reported on 10/2/2024) 2 mL 11     No current facility-administered medications on file prior to visit.       Results for orders placed or performed in visit on 09/17/24   Lactate Dehydrogenase    Specimen: Blood   Result Value Ref Range     135 - 214 U/L   Comprehensive Metabolic Panel    Specimen: Blood   Result Value Ref Range    Glucose 113 (H) 65 - 99 mg/dL    BUN 21 8 - 23 mg/dL    Creatinine 0.95 0.57 - 1.00 mg/dL    Sodium 142 136 - 145 mmol/L    Potassium 5.0 3.5 - 5.2 mmol/L    Chloride 116 (H) 98 - 107 mmol/L    CO2 12.0 (L) 22.0 - 29.0 mmol/L    Calcium 9.1 8.6 - 10.5 mg/dL    Total Protein 7.0 6.0 - 8.5 g/dL    Albumin 4.4 3.5 - 5.2 g/dL    ALT (SGPT) 23 1 - 33 U/L    AST (SGOT) 28 1 - 32 U/L    Alkaline Phosphatase 78 39 - 117 U/L    Total Bilirubin 0.3 0.0 - 1.2 mg/dL    Globulin 2.6 gm/dL    A/G Ratio 1.7 g/dL    BUN/Creatinine Ratio 22.1 7.0 - 25.0    Anion Gap 14.0 5.0 - 15.0 mmol/L    eGFR 62.2 >60.0 mL/min/1.73   CBC Auto Differential    Specimen: Blood   Result Value Ref Range    WBC 5.93 3.40 - 10.80 10*3/mm3    RBC 3.16 (L) 3.77 - 5.28 10*6/mm3    Hemoglobin 12.3 12.0 - 15.9 g/dL    Hematocrit 38.4 34.0 - 46.6 %    .5 (H) 79.0 - 97.0 fL    MCH 38.9 (H) 26.6 - 33.0 pg    MCHC 32.0 31.5 - 35.7 g/dL    RDW 14.6 12.3 - 15.4 %    RDW-SD 67.1 (H) 37.0 - 54.0 fl    MPV 8.8 6.0 - 12.0 fL    Platelets 306 140 - 450 10*3/mm3    Neutrophil % 66.9 42.7 - 76.0 %     Lymphocyte % 22.9 19.6 - 45.3 %    Monocyte % 8.1 5.0 - 12.0 %    Eosinophil % 1.3 0.3 - 6.2 %    Basophil % 0.5 0.0 - 1.5 %    Immature Grans % 0.3 0.0 - 0.5 %    Neutrophils, Absolute 3.96 1.70 - 7.00 10*3/mm3    Lymphocytes, Absolute 1.36 0.70 - 3.10 10*3/mm3    Monocytes, Absolute 0.48 0.10 - 0.90 10*3/mm3    Eosinophils, Absolute 0.08 0.00 - 0.40 10*3/mm3    Basophils, Absolute 0.03 0.00 - 0.20 10*3/mm3    Immature Grans, Absolute 0.02 0.00 - 0.05 10*3/mm3       PE    Physical Exam  Vitals reviewed.   Constitutional:       General: She is not in acute distress.     Appearance: She is underweight. She is cachectic. She is not ill-appearing or diaphoretic.   HENT:      Head: Normocephalic and atraumatic.   Eyes:      Extraocular Movements: Extraocular movements intact.      Conjunctiva/sclera: Conjunctivae normal.   Cardiovascular:      Rate and Rhythm: Normal rate and regular rhythm.      Heart sounds: Normal heart sounds.   Pulmonary:      Effort: Pulmonary effort is normal.      Breath sounds: Normal breath sounds.   Abdominal:      General: Abdomen is flat.      Palpations: Abdomen is soft.      Tenderness: There is abdominal tenderness in the epigastric area. There is no guarding or rebound.   Musculoskeletal:         General: Normal range of motion.      Cervical back: Normal range of motion.      Right lower leg: No edema.      Left lower leg: No edema.   Skin:     General: Skin is warm.      Findings: No erythema or rash.   Neurological:      General: No focal deficit present.      Mental Status: She is alert.   Psychiatric:         Attention and Perception: She is attentive.         Mood and Affect: Mood normal.         Speech: Speech normal.         Behavior: Behavior normal. Behavior is cooperative.         Thought Content: Thought content normal.         Judgment: Judgment normal.          A/P    Diagnoses and all orders for this visit:    1. Weight loss (Primary)  -     CBC Auto Differential;  Future  -     Iron Profile; Future  -     Comprehensive Metabolic Panel; Future  -     TSH Rfx On Abnormal To Free T4; Future  -     Lipase; Future  -     Urinalysis With Culture If Indicated - Urine, Clean Catch; Future    2. Exertional dyspnea  -     CBC Auto Differential; Future  -     Iron Profile; Future  -     proBNP; Future    3. Epigastric pain  -     Comprehensive Metabolic Panel; Future  -     Lipase; Future    4. Other fatigue  -     CBC Auto Differential; Future  -     Iron Profile; Future  -     TSH Rfx On Abnormal To Free T4; Future  -     Urinalysis With Culture If Indicated - Urine, Clean Catch; Future    5. Lack of appetite  -     TSH Rfx On Abnormal To Free T4; Future  -     Urinalysis With Culture If Indicated - Urine, Clean Catch; Future    6. History of iron deficiency anemia  -     Iron Profile; Future    7. Hypotension, unspecified hypotension type  Repeat BP was 100/60.  Encouraged patient to monitor at home and call if she is getting low readings.  Established with cardiology.       New.  Symptoms started a week ago.  Has lost 10 lbs since July.  Will obtain CT chest/abdo/pelvis.  Will order labs.  6 minute walk, patient's oxygen maintained at 96%.  Low BP - encouraged patient to monitor at home.  Do not suspect that symptoms are related to Repatha but recommend patient contact cardiology.  Patient advised to go to ER if symptoms worsen or change.    Plan of care reviewed with patient at the conclusion of today's visit. Education was provided regarding diagnosis, management and any prescribed or recommended OTC medications.  Patient verbalizes understanding of and agreement with management plan.    Dictated Utilizing Dragon Dictation     Please note that portions of this note were completed with a voice recognition program.     Part of this note may be an electronic transcription/translation of spoken language to printed text using the Dragon Dictation System.    Return in about 1 week  (around 10/9/2024) for Recheck, weight loss/shortness of breath.     Jessica Roach PA-C

## 2024-10-03 ENCOUNTER — TELEPHONE (OUTPATIENT)
Dept: FAMILY MEDICINE CLINIC | Facility: CLINIC | Age: 74
End: 2024-10-03
Payer: MEDICARE

## 2024-10-03 LAB — LIPASE SERPL-CCNC: 1301 U/L (ref 13–60)

## 2024-10-03 NOTE — TELEPHONE ENCOUNTER
Spoke with patient regarding labs.  Her lipase is very high at 1300.  She denies nausea, vomiting, fever, chills.  Tolerating water and food.  Abdominal pain is manageable.  Advised her I recommend ER but she prefers to avoid this.  She has CT scheduled for tomorrow.  She is aware that I may not get the results back until Monday and that if her symptoms worsen at any time she should go to ER.

## 2024-10-04 ENCOUNTER — HOSPITAL ENCOUNTER (OUTPATIENT)
Facility: HOSPITAL | Age: 74
Discharge: HOME | End: 2024-10-04
Payer: MEDICARE

## 2024-10-04 ENCOUNTER — TELEPHONE (OUTPATIENT)
Dept: FAMILY MEDICINE CLINIC | Facility: CLINIC | Age: 74
End: 2024-10-04
Payer: MEDICARE

## 2024-10-04 ENCOUNTER — HOSPITAL ENCOUNTER (INPATIENT)
Facility: HOSPITAL | Age: 74
LOS: 2 days | Discharge: HOME | DRG: 438 | End: 2024-10-07
Attending: EMERGENCY MEDICINE | Admitting: STUDENT IN AN ORGANIZED HEALTH CARE EDUCATION/TRAINING PROGRAM
Payer: MEDICARE

## 2024-10-04 DIAGNOSIS — R06.09 EXERTIONAL DYSPNEA: ICD-10-CM

## 2024-10-04 DIAGNOSIS — E86.0 DEHYDRATION: ICD-10-CM

## 2024-10-04 DIAGNOSIS — N28.9 RENAL INSUFFICIENCY: ICD-10-CM

## 2024-10-04 DIAGNOSIS — R10.13 EPIGASTRIC PAIN: ICD-10-CM

## 2024-10-04 DIAGNOSIS — R63.4 WEIGHT LOSS: ICD-10-CM

## 2024-10-04 DIAGNOSIS — K85.90 ACUTE PANCREATITIS WITHOUT INFECTION OR NECROSIS, UNSPECIFIED PANCREATITIS TYPE: Primary | ICD-10-CM

## 2024-10-04 DIAGNOSIS — R63.0 LACK OF APPETITE: ICD-10-CM

## 2024-10-04 DIAGNOSIS — E87.20 METABOLIC ACIDOSIS: ICD-10-CM

## 2024-10-04 DIAGNOSIS — R11.2 NAUSEA AND VOMITING, UNSPECIFIED VOMITING TYPE: ICD-10-CM

## 2024-10-04 LAB
ALBUMIN SERPL-MCNC: 4 G/DL (ref 3.5–5.2)
ALBUMIN/GLOB SERPL: 1.5 G/DL
ALP SERPL-CCNC: 104 U/L (ref 39–117)
ALT SERPL W P-5'-P-CCNC: 28 U/L (ref 1–33)
ANION GAP SERPL CALCULATED.3IONS-SCNC: 15 MMOL/L (ref 5–15)
AST SERPL-CCNC: 43 U/L (ref 1–32)
ATMOSPHERIC PRESS: ABNORMAL MM[HG]
B-OH-BUTYR SERPL-SCNC: 0.1 MMOL/L (ref 0.02–0.27)
BACTERIA UR QL AUTO: NORMAL /HPF
BASE EXCESS BLDV CALC-SCNC: -11.4 MMOL/L (ref -2–2)
BASOPHILS # BLD AUTO: 0.02 10*3/MM3 (ref 0–0.2)
BASOPHILS NFR BLD AUTO: 0.1 % (ref 0–1.5)
BDY SITE: ABNORMAL
BILIRUB SERPL-MCNC: 0.3 MG/DL (ref 0–1.2)
BILIRUB UR QL STRIP: NEGATIVE
BODY TEMPERATURE: 37
BUN SERPL-MCNC: 65 MG/DL (ref 8–23)
BUN/CREAT SERPL: 44.8 (ref 7–25)
CALCIUM SPEC-SCNC: 9.8 MG/DL (ref 8.6–10.5)
CHLORIDE SERPL-SCNC: 105 MMOL/L (ref 98–107)
CHOLEST SERPL-MCNC: 72 MG/DL (ref 0–200)
CLARITY UR: CLEAR
CO2 BLDA-SCNC: 15.4 MMOL/L (ref 22–33)
CO2 SERPL-SCNC: 14 MMOL/L (ref 22–29)
COHGB MFR BLD: 0.7 %
COLOR UR: YELLOW
CREAT SERPL-MCNC: 1.45 MG/DL (ref 0.57–1)
D-LACTATE SERPL-SCNC: 1.5 MMOL/L (ref 0.5–2)
DEPRECATED RDW RBC AUTO: 63.4 FL (ref 37–54)
EGFRCR SERPLBLD CKD-EPI 2021: 37.5 ML/MIN/1.73
EOSINOPHIL # BLD AUTO: 0.05 10*3/MM3 (ref 0–0.4)
EOSINOPHIL NFR BLD AUTO: 0.3 % (ref 0.3–6.2)
EPAP: 0
ERYTHROCYTE [DISTWIDTH] IN BLOOD BY AUTOMATED COUNT: 14.7 % (ref 12.3–15.4)
GLOBULIN UR ELPH-MCNC: 2.6 GM/DL
GLUCOSE SERPL-MCNC: 103 MG/DL (ref 65–99)
GLUCOSE UR STRIP-MCNC: NEGATIVE MG/DL
HBA1C MFR BLD: 5.7 % (ref 4.8–5.6)
HCO3 BLDV-SCNC: 14.4 MMOL/L (ref 22–28)
HCT VFR BLD AUTO: 36.6 % (ref 34–46.6)
HDLC SERPL-MCNC: 55 MG/DL (ref 40–60)
HGB BLD-MCNC: 12.3 G/DL (ref 12–15.9)
HGB BLDA-MCNC: 13.3 G/DL (ref 14–18)
HGB UR QL STRIP.AUTO: NEGATIVE
HOLD SPECIMEN: NORMAL
HYALINE CASTS UR QL AUTO: NORMAL /LPF
IMM GRANULOCYTES # BLD AUTO: 0.11 10*3/MM3 (ref 0–0.05)
IMM GRANULOCYTES NFR BLD AUTO: 0.7 % (ref 0–0.5)
INHALED O2 CONCENTRATION: 21 %
IPAP: 0
KETONES UR QL STRIP: NEGATIVE
LDLC SERPL CALC-MCNC: 6 MG/DL (ref 0–100)
LDLC/HDLC SERPL: 0.19 {RATIO}
LEUKOCYTE ESTERASE UR QL STRIP.AUTO: NEGATIVE
LIPASE SERPL-CCNC: 438 U/L (ref 13–60)
LYMPHOCYTES # BLD AUTO: 1.11 10*3/MM3 (ref 0.7–3.1)
LYMPHOCYTES NFR BLD AUTO: 7.4 % (ref 19.6–45.3)
MCH RBC QN AUTO: 38.9 PG (ref 26.6–33)
MCHC RBC AUTO-ENTMCNC: 33.6 G/DL (ref 31.5–35.7)
MCV RBC AUTO: 115.8 FL (ref 79–97)
METHGB BLD QL: 0.1 %
MODALITY: ABNORMAL
MONOCYTES # BLD AUTO: 0.96 10*3/MM3 (ref 0.1–0.9)
MONOCYTES NFR BLD AUTO: 6.4 % (ref 5–12)
NEUTROPHILS NFR BLD AUTO: 12.71 10*3/MM3 (ref 1.7–7)
NEUTROPHILS NFR BLD AUTO: 85.1 % (ref 42.7–76)
NITRITE UR QL STRIP: NEGATIVE
NRBC BLD AUTO-RTO: 0 /100 WBC (ref 0–0.2)
OXYHGB MFR BLDV: 24 %
PAW @ PEAK INSP FLOW SETTING VENT: 0 CMH2O
PCO2 BLDV: 31.9 MM HG (ref 41–51)
PH BLDV: 7.26 PH UNITS (ref 7.31–7.41)
PH UR STRIP.AUTO: 6 [PH] (ref 5–8)
PLATELET # BLD AUTO: 335 10*3/MM3 (ref 140–450)
PMV BLD AUTO: 9.1 FL (ref 6–12)
PO2 BLDV: 16.7 MM HG (ref 27–53)
POTASSIUM SERPL-SCNC: 4.5 MMOL/L (ref 3.5–5.2)
PROT SERPL-MCNC: 6.6 G/DL (ref 6–8.5)
PROT UR QL STRIP: ABNORMAL
RBC # BLD AUTO: 3.16 10*6/MM3 (ref 3.77–5.28)
RBC # UR STRIP: NORMAL /HPF
REF LAB TEST METHOD: NORMAL
SODIUM SERPL-SCNC: 134 MMOL/L (ref 136–145)
SP GR UR STRIP: 1.04 (ref 1–1.03)
SQUAMOUS #/AREA URNS HPF: NORMAL /HPF
TOTAL RATE: 0 BREATHS/MINUTE
TRIGL SERPL-MCNC: 34 MG/DL (ref 0–150)
UROBILINOGEN UR QL STRIP: ABNORMAL
VLDLC SERPL-MCNC: 11 MG/DL (ref 5–40)
WBC # UR STRIP: NORMAL /HPF
WBC NRBC COR # BLD AUTO: 14.96 10*3/MM3 (ref 3.4–10.8)
WHOLE BLOOD HOLD COAG: NORMAL
WHOLE BLOOD HOLD SPECIMEN: NORMAL

## 2024-10-04 PROCEDURE — 99222 1ST HOSP IP/OBS MODERATE 55: CPT | Performed by: STUDENT IN AN ORGANIZED HEALTH CARE EDUCATION/TRAINING PROGRAM

## 2024-10-04 PROCEDURE — 83036 HEMOGLOBIN GLYCOSYLATED A1C: CPT | Performed by: STUDENT IN AN ORGANIZED HEALTH CARE EDUCATION/TRAINING PROGRAM

## 2024-10-04 PROCEDURE — 80053 COMPREHEN METABOLIC PANEL: CPT | Performed by: EMERGENCY MEDICINE

## 2024-10-04 PROCEDURE — 99285 EMERGENCY DEPT VISIT HI MDM: CPT

## 2024-10-04 PROCEDURE — 82077 ASSAY SPEC XCP UR&BREATH IA: CPT | Performed by: STUDENT IN AN ORGANIZED HEALTH CARE EDUCATION/TRAINING PROGRAM

## 2024-10-04 PROCEDURE — 80061 LIPID PANEL: CPT | Performed by: STUDENT IN AN ORGANIZED HEALTH CARE EDUCATION/TRAINING PROGRAM

## 2024-10-04 PROCEDURE — 85025 COMPLETE CBC W/AUTO DIFF WBC: CPT | Performed by: EMERGENCY MEDICINE

## 2024-10-04 PROCEDURE — 93005 ELECTROCARDIOGRAM TRACING: CPT

## 2024-10-04 PROCEDURE — 93005 ELECTROCARDIOGRAM TRACING: CPT | Performed by: EMERGENCY MEDICINE

## 2024-10-04 PROCEDURE — 71260 CT THORAX DX C+: CPT

## 2024-10-04 PROCEDURE — 83605 ASSAY OF LACTIC ACID: CPT | Performed by: STUDENT IN AN ORGANIZED HEALTH CARE EDUCATION/TRAINING PROGRAM

## 2024-10-04 PROCEDURE — 25510000001 IOPAMIDOL 61 % SOLUTION: Performed by: PHYSICIAN ASSISTANT

## 2024-10-04 PROCEDURE — 25810000003 SODIUM CHLORIDE 0.9 % SOLUTION: Performed by: EMERGENCY MEDICINE

## 2024-10-04 PROCEDURE — 82010 KETONE BODYS QUAN: CPT | Performed by: STUDENT IN AN ORGANIZED HEALTH CARE EDUCATION/TRAINING PROGRAM

## 2024-10-04 PROCEDURE — G0378 HOSPITAL OBSERVATION PER HR: HCPCS

## 2024-10-04 PROCEDURE — 84484 ASSAY OF TROPONIN QUANT: CPT | Performed by: EMERGENCY MEDICINE

## 2024-10-04 PROCEDURE — 81001 URINALYSIS AUTO W/SCOPE: CPT | Performed by: EMERGENCY MEDICINE

## 2024-10-04 PROCEDURE — 82805 BLOOD GASES W/O2 SATURATION: CPT

## 2024-10-04 PROCEDURE — 83690 ASSAY OF LIPASE: CPT | Performed by: EMERGENCY MEDICINE

## 2024-10-04 PROCEDURE — 84443 ASSAY THYROID STIM HORMONE: CPT | Performed by: STUDENT IN AN ORGANIZED HEALTH CARE EDUCATION/TRAINING PROGRAM

## 2024-10-04 PROCEDURE — 74178 CT ABD&PLV WO CNTR FLWD CNTR: CPT

## 2024-10-04 RX ORDER — POLYETHYLENE GLYCOL 3350 17 G/17G
17 POWDER, FOR SOLUTION ORAL DAILY PRN
Status: DISCONTINUED | OUTPATIENT
Start: 2024-10-04 | End: 2024-10-07 | Stop reason: HOSPADM

## 2024-10-04 RX ORDER — BISACODYL 10 MG
10 SUPPOSITORY, RECTAL RECTAL DAILY PRN
Status: DISCONTINUED | OUTPATIENT
Start: 2024-10-04 | End: 2024-10-07 | Stop reason: HOSPADM

## 2024-10-04 RX ORDER — SODIUM CHLORIDE 0.9 % (FLUSH) 0.9 %
10 SYRINGE (ML) INJECTION AS NEEDED
Status: DISCONTINUED | OUTPATIENT
Start: 2024-10-04 | End: 2024-10-07 | Stop reason: HOSPADM

## 2024-10-04 RX ORDER — BISOPROLOL FUMARATE 5 MG/1
5 TABLET, FILM COATED ORAL DAILY
Status: DISCONTINUED | OUTPATIENT
Start: 2024-10-05 | End: 2024-10-07 | Stop reason: HOSPADM

## 2024-10-04 RX ORDER — SODIUM CHLORIDE 0.9 % (FLUSH) 0.9 %
10 SYRINGE (ML) INJECTION EVERY 12 HOURS SCHEDULED
Status: DISCONTINUED | OUTPATIENT
Start: 2024-10-05 | End: 2024-10-07 | Stop reason: HOSPADM

## 2024-10-04 RX ORDER — SODIUM CHLORIDE, SODIUM LACTATE, POTASSIUM CHLORIDE, CALCIUM CHLORIDE 600; 310; 30; 20 MG/100ML; MG/100ML; MG/100ML; MG/100ML
100 INJECTION, SOLUTION INTRAVENOUS CONTINUOUS
Status: ACTIVE | OUTPATIENT
Start: 2024-10-05 | End: 2024-10-05

## 2024-10-04 RX ORDER — FOLIC ACID 1 MG/1
1000 TABLET ORAL DAILY
Status: DISCONTINUED | OUTPATIENT
Start: 2024-10-05 | End: 2024-10-07 | Stop reason: HOSPADM

## 2024-10-04 RX ORDER — AMOXICILLIN 250 MG
2 CAPSULE ORAL 2 TIMES DAILY PRN
Status: DISCONTINUED | OUTPATIENT
Start: 2024-10-04 | End: 2024-10-07 | Stop reason: HOSPADM

## 2024-10-04 RX ORDER — ASPIRIN 81 MG/1
81 TABLET ORAL DAILY
Status: DISCONTINUED | OUTPATIENT
Start: 2024-10-05 | End: 2024-10-07 | Stop reason: HOSPADM

## 2024-10-04 RX ORDER — VALSARTAN 80 MG/1
80 TABLET ORAL DAILY
Status: DISCONTINUED | OUTPATIENT
Start: 2024-10-05 | End: 2024-10-07 | Stop reason: HOSPADM

## 2024-10-04 RX ORDER — SPIRONOLACTONE 25 MG/1
25 TABLET ORAL DAILY
Status: DISCONTINUED | OUTPATIENT
Start: 2024-10-05 | End: 2024-10-07 | Stop reason: HOSPADM

## 2024-10-04 RX ORDER — BISACODYL 5 MG/1
5 TABLET, DELAYED RELEASE ORAL DAILY PRN
Status: DISCONTINUED | OUTPATIENT
Start: 2024-10-04 | End: 2024-10-07 | Stop reason: HOSPADM

## 2024-10-04 RX ORDER — IOPAMIDOL 612 MG/ML
100 INJECTION, SOLUTION INTRAVASCULAR
Status: COMPLETED | OUTPATIENT
Start: 2024-10-04 | End: 2024-10-04

## 2024-10-04 RX ADMIN — IOPAMIDOL 80 ML: 612 INJECTION, SOLUTION INTRAVENOUS at 14:04

## 2024-10-04 RX ADMIN — SODIUM CHLORIDE 1000 ML: 9 INJECTION, SOLUTION INTRAVENOUS at 18:47

## 2024-10-04 NOTE — TELEPHONE ENCOUNTER
Per Jessica: can you contact this patient. CT abdo/pelvis shows pancreatitis which usually needs to be managed in hospital with IV fluids and monitoring. I recommend she go to ER.     Called and informed pt she will head to the ER

## 2024-10-04 NOTE — ED PROVIDER NOTES
Subjective   History of Present Illness  Patient is a pleasant 74-year-old female presents to the emergency department with 1 to 2-week history of upper abdominal pain and poor appetite.  Patient and family state that she is lost approximately 15 pounds over this time.  Denies similar episodes in the past.        Review of Systems   All other systems reviewed and are negative.      Past Medical History:   Diagnosis Date    Candidiasis of vulva and vagina     CHF (congestive heart failure)     Coronary artery disease     Hyperlipidemia     Hypertension        Allergies   Allergen Reactions    Ferrlecit [Na Ferric Gluc Cplx In Sucrose] Anaphylaxis    Codeine Other (See Comments)    Metoprolol Other (See Comments)     Fatigue      Statins Myalgia       Past Surgical History:   Procedure Laterality Date    ARTERIOVENOUS FISTULA/SHUNT SURGERY Right 05/23/2022    Procedure: RADIAL ARTERY REPAIR WITH HEMATOMA EVACUATION AND WASHOUT RIGHT;  Surgeon: Jaron Benavides MD;  Location:  [facility];  Service: Vascular;  Laterality: Right;    CAPSULE ENDOSCOPY N/A 05/25/2022    Procedure: CAPSULE ENDOSCOPY ESOPHAGUS TO ILEUM DEPLOYED;  Surgeon: Michele Rogers MD;  Location:  [facility];  Service: Gastroenterology;  Laterality: N/A;    CARDIAC CATHETERIZATION N/A 05/23/2022    Procedure: LEFT HEART CATH;  Surgeon: Manjit Wolfe IV, MD;  Location:  [facility];  Service: Cardiovascular;  Laterality: N/A;    CARDIAC CATHETERIZATION N/A 05/23/2022    Procedure: Optical Coherent Tomography;  Surgeon: Manjit Wolfe IV, MD;  Location:  [facility];  Service: Cardiovascular;  Laterality: N/A;    CARDIAC CATHETERIZATION N/A 05/23/2022    Procedure: Stent MARIA ELENA coronary;  Surgeon: Manjit Wolfe IV, MD;  Location:  [facility];  Service: Cardiovascular;  Laterality: N/A;    COLONOSCOPY N/A 05/25/2022    Procedure: COLONOSCOPY;  Surgeon:  Michele Rogers MD;  Location:  [facility];  Service: Gastroenterology;  Laterality: N/A;    CRANIAL NEUROSTIMULATOR INSERTION/REPLACEMENT Right     ENDOSCOPY N/A 05/25/2022    Procedure: ESOPHAGOGASTRODUODENOSCOPY;  Surgeon: Michele Rogers MD;  Location:  [facility];  Service: Gastroenterology;  Laterality: N/A;    EYE SURGERY      shunt placement by ophthalmology       Family History   Problem Relation Age of Onset    Heart failure Mother     Lung cancer Father         smoker    Heart disease Sister     Breast cancer Sister     Aplastic anemia Brother     No Known Problems Maternal Grandmother     No Known Problems Maternal Grandfather     No Known Problems Paternal Grandmother     No Known Problems Paternal Grandfather     Atrial fibrillation Son     Ovarian cancer Neg Hx     Colon cancer Neg Hx        Social History     Socioeconomic History    Marital status:    Tobacco Use    Smoking status: Never     Passive exposure: Never    Smokeless tobacco: Never   Vaping Use    Vaping status: Never Used   Substance and Sexual Activity    Alcohol use: Not Currently    Drug use: Never    Sexual activity: Yes     Partners: Male     Birth control/protection: None, Vasectomy           Objective   Physical Exam  Vitals and nursing note reviewed.   Constitutional:       Appearance: Normal appearance.   HENT:      Head: Normocephalic and atraumatic.      Nose: Nose normal.      Mouth/Throat:      Mouth: Mucous membranes are moist. Mucous membranes are dry.   Eyes:      Extraocular Movements: Extraocular movements intact.      Pupils: Pupils are equal, round, and reactive to light.   Cardiovascular:      Rate and Rhythm: Normal rate and regular rhythm.      Pulses: Normal pulses.      Heart sounds: Normal heart sounds. No murmur heard.  Pulmonary:      Effort: Pulmonary effort is normal. No respiratory distress.      Breath sounds: Normal breath sounds. No wheezing or rhonchi.   Abdominal:      General: Bowel  sounds are normal. There is no distension.      Palpations: Abdomen is soft.      Tenderness: There is abdominal tenderness (Epigastric tenderness to palpation). There is no guarding or rebound.   Musculoskeletal:      Cervical back: Normal range of motion.   Skin:     Capillary Refill: Capillary refill takes less than 2 seconds.   Neurological:      General: No focal deficit present.      Mental Status: She is alert and oriented to person, place, and time.   Psychiatric:         Mood and Affect: Mood normal.         Behavior: Behavior normal.         Thought Content: Thought content normal.         Procedures           ED Course      Recent Results (from the past 24 hour(s))   ECG 12 Lead ED Triage Standing Order; Abdominal Pain (Upper)    Collection Time: 10/04/24  5:07 PM   Result Value Ref Range    QT Interval 352 ms    QTC Interval 435 ms   Comprehensive Metabolic Panel    Collection Time: 10/04/24  6:19 PM    Specimen: Blood   Result Value Ref Range    Glucose 103 (H) 65 - 99 mg/dL    BUN 65 (H) 8 - 23 mg/dL    Creatinine 1.45 (H) 0.57 - 1.00 mg/dL    Sodium 134 (L) 136 - 145 mmol/L    Potassium 4.5 3.5 - 5.2 mmol/L    Chloride 105 98 - 107 mmol/L    CO2 14.0 (L) 22.0 - 29.0 mmol/L    Calcium 9.8 8.6 - 10.5 mg/dL    Total Protein 6.6 6.0 - 8.5 g/dL    Albumin 4.0 3.5 - 5.2 g/dL    ALT (SGPT) 28 1 - 33 U/L    AST (SGOT) 43 (H) 1 - 32 U/L    Alkaline Phosphatase 104 39 - 117 U/L    Total Bilirubin 0.3 0.0 - 1.2 mg/dL    Globulin 2.6 gm/dL    A/G Ratio 1.5 g/dL    BUN/Creatinine Ratio 44.8 (H) 7.0 - 25.0    Anion Gap 15.0 5.0 - 15.0 mmol/L    eGFR 37.5 (L) >60.0 mL/min/1.73   Lipase    Collection Time: 10/04/24  6:19 PM    Specimen: Blood   Result Value Ref Range    Lipase 438 (H) 13 - 60 U/L   Lavender Top    Collection Time: 10/04/24  6:19 PM   Result Value Ref Range    Extra Tube hold for add-on    Gold Top - SST    Collection Time: 10/04/24  6:19 PM   Result Value Ref Range    Extra Tube Hold for add-ons.     Woo Top    Collection Time: 10/04/24  6:19 PM   Result Value Ref Range    Extra Tube Hold for add-ons.    Light Blue Top    Collection Time: 10/04/24  6:19 PM   Result Value Ref Range    Extra Tube Hold for add-ons.    CBC Auto Differential    Collection Time: 10/04/24  6:19 PM    Specimen: Blood   Result Value Ref Range    WBC 14.96 (H) 3.40 - 10.80 10*3/mm3    RBC 3.16 (L) 3.77 - 5.28 10*6/mm3    Hemoglobin 12.3 12.0 - 15.9 g/dL    Hematocrit 36.6 34.0 - 46.6 %    .8 (H) 79.0 - 97.0 fL    MCH 38.9 (H) 26.6 - 33.0 pg    MCHC 33.6 31.5 - 35.7 g/dL    RDW 14.7 12.3 - 15.4 %    RDW-SD 63.4 (H) 37.0 - 54.0 fl    MPV 9.1 6.0 - 12.0 fL    Platelets 335 140 - 450 10*3/mm3    Neutrophil % 85.1 (H) 42.7 - 76.0 %    Lymphocyte % 7.4 (L) 19.6 - 45.3 %    Monocyte % 6.4 5.0 - 12.0 %    Eosinophil % 0.3 0.3 - 6.2 %    Basophil % 0.1 0.0 - 1.5 %    Immature Grans % 0.7 (H) 0.0 - 0.5 %    Neutrophils, Absolute 12.71 (H) 1.70 - 7.00 10*3/mm3    Lymphocytes, Absolute 1.11 0.70 - 3.10 10*3/mm3    Monocytes, Absolute 0.96 (H) 0.10 - 0.90 10*3/mm3    Eosinophils, Absolute 0.05 0.00 - 0.40 10*3/mm3    Basophils, Absolute 0.02 0.00 - 0.20 10*3/mm3    Immature Grans, Absolute 0.11 (H) 0.00 - 0.05 10*3/mm3    nRBC 0.0 0.0 - 0.2 /100 WBC   Lipid Panel    Collection Time: 10/04/24  6:19 PM    Specimen: Blood   Result Value Ref Range    Total Cholesterol 72 0 - 200 mg/dL    Triglycerides 34 0 - 150 mg/dL    HDL Cholesterol 55 40 - 60 mg/dL    LDL Cholesterol  6 0 - 100 mg/dL    VLDL Cholesterol 11 5 - 40 mg/dL    LDL/HDL Ratio 0.19    Lactic Acid, Plasma    Collection Time: 10/04/24  6:19 PM    Specimen: Blood   Result Value Ref Range    Lactate 1.5 0.5 - 2.0 mmol/L   Beta Hydroxybutyrate Quantitative    Collection Time: 10/04/24  6:19 PM    Specimen: Blood   Result Value Ref Range    Beta-Hydroxybutyrate Quant 0.101 0.020 - 0.270 mmol/L   Hemoglobin A1c    Collection Time: 10/04/24  6:19 PM    Specimen: Blood   Result Value Ref  Range    Hemoglobin A1C 5.70 (H) 4.80 - 5.60 %   Blood Gas, Venous With Co-Ox    Collection Time: 10/04/24  6:27 PM    Specimen: Venous Blood   Result Value Ref Range    Site Nurse/Dr Draw     pH, Venous 7.264 (C) 7.310 - 7.410 pH Units    pCO2, Venous 31.9 (L) 41.0 - 51.0 mm Hg    pO2, Venous 16.7 (L) 27.0 - 53.0 mm Hg    HCO3, Venous 14.4 (L) 22.0 - 28.0 mmol/L    Base Excess, Venous -11.4 (L) -2.0 - 2.0 mmol/L    Hemoglobin, Blood Gas 13.3 (L) 14 - 18 g/dL    Oxyhemoglobin Venous 24.0 %    Methemoglobin Venous 0.1 %    Carboxyhemoglobin Venous 0.7 %    CO2 Content 15.4 (L) 22 - 33 mmol/L    Temperature 37.0     Barometric Pressure for Blood Gas      Modality Room Air     FIO2 21 %    Rate 0 Breaths/minute    PIP 0 cmH2O    IPAP 0     EPAP 0    Urinalysis With Microscopic If Indicated (No Culture) - Urine, Clean Catch    Collection Time: 10/04/24  9:09 PM    Specimen: Urine, Clean Catch   Result Value Ref Range    Color, UA Yellow Yellow, Straw    Appearance, UA Clear Clear    pH, UA 6.0 5.0 - 8.0    Specific Gravity, UA 1.037 (H) 1.001 - 1.030    Glucose, UA Negative Negative    Ketones, UA Negative Negative    Bilirubin, UA Negative Negative    Blood, UA Negative Negative    Protein, UA 30 mg/dL (1+) (A) Negative    Leuk Esterase, UA Negative Negative    Nitrite, UA Negative Negative    Urobilinogen, UA 0.2 E.U./dL 0.2 - 1.0 E.U./dL   Urinalysis, Microscopic Only - Urine, Clean Catch    Collection Time: 10/04/24  9:09 PM    Specimen: Urine, Clean Catch   Result Value Ref Range    RBC, UA 0-2 None Seen, 0-2 /HPF    WBC, UA 0-2 None Seen, 0-2 /HPF    Bacteria, UA None Seen None Seen, Trace /HPF    Squamous Epithelial Cells, UA 0-2 None Seen, 0-2 /HPF    Hyaline Casts, UA 0-6 0 - 6 /LPF    Methodology Automated Microscopy    Single High Sensitivity Troponin T    Collection Time: 10/04/24 11:27 PM    Specimen: Blood   Result Value Ref Range    HS Troponin T 19 (H) <14 ng/L   Green Top (Gel)    Collection Time:  10/04/24 11:27 PM   Result Value Ref Range    Extra Tube Hold for add-ons.    Ethanol    Collection Time: 10/04/24 11:27 PM    Specimen: Blood   Result Value Ref Range    Ethanol <10 0 - 10 mg/dL   TSH Rfx On Abnormal To Free T4    Collection Time: 10/04/24 11:27 PM    Specimen: Blood   Result Value Ref Range    TSH 1.600 0.270 - 4.200 uIU/mL   Comprehensive Metabolic Panel    Collection Time: 10/05/24  3:41 AM    Specimen: Blood   Result Value Ref Range    Glucose 79 65 - 99 mg/dL    BUN 45 (H) 8 - 23 mg/dL    Creatinine 1.02 (H) 0.57 - 1.00 mg/dL    Sodium 136 136 - 145 mmol/L    Potassium 3.7 3.5 - 5.2 mmol/L    Chloride 109 (H) 98 - 107 mmol/L    CO2 11.0 (L) 22.0 - 29.0 mmol/L    Calcium 8.9 8.6 - 10.5 mg/dL    Total Protein 6.4 6.0 - 8.5 g/dL    Albumin 3.2 (L) 3.5 - 5.2 g/dL    ALT (SGPT) 21 1 - 33 U/L    AST (SGOT) 30 1 - 32 U/L    Alkaline Phosphatase 87 39 - 117 U/L    Total Bilirubin 0.3 0.0 - 1.2 mg/dL    Globulin 3.2 gm/dL    A/G Ratio 1.0 g/dL    BUN/Creatinine Ratio 44.1 (H) 7.0 - 25.0    Anion Gap 16.0 (H) 5.0 - 15.0 mmol/L    eGFR 57.1 (L) >60.0 mL/min/1.73   CBC (No Diff)    Collection Time: 10/05/24  3:41 AM    Specimen: Blood   Result Value Ref Range    WBC 14.50 (H) 3.40 - 10.80 10*3/mm3    RBC 2.91 (L) 3.77 - 5.28 10*6/mm3    Hemoglobin 11.2 (L) 12.0 - 15.9 g/dL    Hematocrit 33.1 (L) 34.0 - 46.6 %    .7 (H) 79.0 - 97.0 fL    MCH 38.5 (H) 26.6 - 33.0 pg    MCHC 33.8 31.5 - 35.7 g/dL    RDW 14.4 12.3 - 15.4 %    RDW-SD 60.6 (H) 37.0 - 54.0 fl    MPV 9.1 6.0 - 12.0 fL    Platelets 302 140 - 450 10*3/mm3   ECG 12 Lead Tachycardia    Collection Time: 10/05/24  4:42 AM   Result Value Ref Range    QT Interval 328 ms    QTC Interval 418 ms     Note: In addition to lab results from this visit, the labs listed above may include labs taken at another facility or during a different encounter within the last 24 hours. Please correlate lab times with ED admission and discharge times for further  clarification of the services performed during this visit.    US Gallbladder   Final Result      1. Gallbladder and common bile duct are grossly unremarkable in appearance      2. Liver slightly heterogeneous in appearance. Please correlate with liver function test      Electronically Signed: Law Andres MD     10/5/2024 8:10 AM EDT     Workstation ID: [id number]        Vitals:    10/05/24 0705 10/05/24 0900 10/05/24 1122 10/05/24 1300   BP: 117/63  122/71    BP Location: Left arm  Left arm    Patient Position: Lying  Lying    Pulse: 92 80 87 89   Resp: 17  16    Temp: 99.1 °F (37.3 °C)  98.7 °F (37.1 °C)    TempSrc: Oral  Oral    SpO2: 96% 96% 97% 97%   Weight:       Height:         Medications   sodium chloride 0.9 % flush 10 mL (has no administration in time range)   lactated ringers infusion (100 mL/hr Intravenous New Bag 10/5/24 1204)   aspirin EC tablet 81 mg ( Oral Dose Auto Held 10/13/24 0900)   bisoprolol (ZEBeta) tablet 5 mg ( Oral Dose Auto Held 10/13/24 0900)   folic acid (FOLVITE) tablet 1,000 mcg (1,000 mcg Oral Given 10/5/24 0837)   spironolactone (ALDACTONE) tablet 25 mg ( Oral Dose Auto Held 10/13/24 0900)   valsartan (DIOVAN) tablet 80 mg ( Oral Dose Auto Held 10/13/24 0900)   sodium chloride 0.9 % flush 10 mL (10 mL Intravenous Given 10/5/24 0837)   sodium chloride 0.9 % flush 10 mL (has no administration in time range)   Potassium Replacement - Follow Nurse / BPA Driven Protocol (has no administration in time range)   Magnesium Standard Dose Replacement - Follow Nurse / BPA Driven Protocol (has no administration in time range)   Phosphorus Replacement - Follow Nurse / BPA Driven Protocol (has no administration in time range)   Calcium Replacement - Follow Nurse / BPA Driven Protocol (has no administration in time range)   sennosides-docusate (PERICOLACE) 8.6-50 MG per tablet 2 tablet (has no administration in time range)     And   polyethylene glycol (MIRALAX) packet 17 g (17 g Oral Not Given  10/5/24 1216)     And   bisacodyl (DULCOLAX) EC tablet 5 mg (has no administration in time range)     And   bisacodyl (DULCOLAX) suppository 10 mg (has no administration in time range)   rosuvastatin (CRESTOR) tablet 5 mg (has no administration in time range)   timolol (TIMOPTIC) 0.25 % ophthalmic solution 1 drop (has no administration in time range)   bimatoprost (LUMIGAN) 0.01 % ophthalmic drops 1 drop - PATIENT SUPPLIED (1 drop Both Eyes Not Given 10/5/24 1141)   sodium bicarbonate tablet 1,300 mg (has no administration in time range)   lactated ringers infusion (100 mL/hr Intravenous Currently Infusing 10/5/24 1211)   sodium chloride 0.9 % bolus 1,000 mL (0 mL Intravenous Stopped 10/4/24 2135)   acetaminophen (TYLENOL) tablet 650 mg (650 mg Oral Given 10/5/24 0626)     ECG/EMG Results (last 24 hours)       Procedure Component Value Units Date/Time    ECG 12 Lead ED Triage Standing Order; Abdominal Pain (Upper) [[id number]] Collected: 10/04/24 1707     Updated: 10/04/24 1707     QT Interval 352 ms      QTC Interval 435 ms     Narrative:      Test Reason : ED Triage Standing Order~  Blood Pressure :   */*   mmHG  Vent. Rate :  92 BPM     Atrial Rate :  92 BPM     P-R Int : 156 ms          QRS Dur :  80 ms      QT Int : 352 ms       P-R-T Axes :  85  81  60 degrees     QTc Int : 435 ms    Sinus rhythm with premature atrial complexes  Otherwise normal ECG  When compared with ECG of 23-MAY-2022 11:27,  premature atrial complexes are now present  T wave inversion no longer evident in Inferior leads  T wave inversion no longer evident in Anterolateral leads  QT has shortened    Referred By: EDMD           Confirmed By:           ECG 12 Lead Tachycardia   Final Result   Test Reason : Tachycardia   Blood Pressure :   */*   mmHG   Vent. Rate :  98 BPM     Atrial Rate :  98 BPM      P-R Int : 150 ms          QRS Dur :  80 ms       QT Int : 328 ms       P-R-T Axes :  76  66  12 degrees      QTc Int : 418 ms      Sinus rhythm  with premature supraventricular complexes and with occasional    premature ventricular complexes   ST & T wave abnormality, consider inferior ischemia   Abnormal ECG   When compared with ECG of 04-OCT-2024 17:07, (Unconfirmed)   premature ventricular complexes are now present   T wave inversion now evident in Inferior leads   Confirmed by DUNG MOREAU MD ([id number]) on 10/5/2024 10:21:03 AM      Referred By:            Confirmed By: DUNG MOREAU MD      ECG 12 Lead ED Triage Standing Order; Abdominal Pain (Upper)   Preliminary Result   Test Reason : ED Triage Standing Order~   Blood Pressure :   */*   mmHG   Vent. Rate :  92 BPM     Atrial Rate :  92 BPM      P-R Int : 156 ms          QRS Dur :  80 ms       QT Int : 352 ms       P-R-T Axes :  85  81  60 degrees      QTc Int : 435 ms      Sinus rhythm with premature atrial complexes   Otherwise normal ECG   When compared with ECG of 23-MAY-2022 11:27,   premature atrial complexes are now present   T wave inversion no longer evident in Inferior leads   T wave inversion no longer evident in Anterolateral leads   QT has shortened      Referred By: EDMD           Confirmed By:                                                  Medical Decision Making  Presentation and workup is consistent with acute pancreatitis.  Etiology unclear at this time.  Patient is feeling better following treatment.  Case discussed with internal medicine attending.  Patient admitted for further evaluation and management.  Patient and spouse are comfortable with this plan.    Problems Addressed:  Acute pancreatitis without infection or necrosis, unspecified pancreatitis type: complicated acute illness or injury  Dehydration: complicated acute illness or injury  Epigastric pain: complicated acute illness or injury  Metabolic acidosis: complicated acute illness or injury  Nausea and vomiting, unspecified vomiting type: complicated acute illness or injury  Renal insufficiency: complicated acute illness or  injury    Amount and/or Complexity of Data Reviewed  Labs: ordered. Decision-making details documented in ED Course.  ECG/medicine tests: ordered and independent interpretation performed. Decision-making details documented in ED Course.    Risk  Prescription drug management.  Decision regarding hospitalization.        Final diagnoses:   Acute pancreatitis without infection or necrosis, unspecified pancreatitis type   Epigastric pain   Nausea and vomiting, unspecified vomiting type   Dehydration   Metabolic acidosis   Renal insufficiency       ED Disposition  ED Disposition       ED Disposition   Decision to Admit    Condition   --    Comment   Level of Care: Telemetry [5]   Diagnosis: Pancreatitis [202663]   Admitting Physician: MAGALI BRYSON [[id number]]   Attending Physician: MAGALI BRYSON [[id number]]                 No follow-up provider specified.       Medication List      No changes were made to your prescriptions during this visit.            Teodoro Duran DO  10/07/24 1542

## 2024-10-05 ENCOUNTER — APPOINTMENT (OUTPATIENT)
Dept: ULTRASOUND IMAGING | Facility: HOSPITAL | Age: 74
DRG: 438 | End: 2024-10-05
Payer: MEDICARE

## 2024-10-05 PROBLEM — E43 SEVERE MALNUTRITION: Status: ACTIVE | Noted: 2024-10-05

## 2024-10-05 LAB
ALBUMIN SERPL-MCNC: 3.2 G/DL (ref 3.5–5.2)
ALBUMIN/GLOB SERPL: 1 G/DL
ALP SERPL-CCNC: 87 U/L (ref 39–117)
ALT SERPL W P-5'-P-CCNC: 21 U/L (ref 1–33)
ANION GAP SERPL CALCULATED.3IONS-SCNC: 16 MMOL/L (ref 5–15)
AST SERPL-CCNC: 30 U/L (ref 1–32)
BILIRUB SERPL-MCNC: 0.3 MG/DL (ref 0–1.2)
BUN SERPL-MCNC: 45 MG/DL (ref 8–23)
BUN/CREAT SERPL: 44.1 (ref 7–25)
CALCIUM SPEC-SCNC: 8.9 MG/DL (ref 8.6–10.5)
CHLORIDE SERPL-SCNC: 109 MMOL/L (ref 98–107)
CO2 SERPL-SCNC: 11 MMOL/L (ref 22–29)
CREAT SERPL-MCNC: 1.02 MG/DL (ref 0.57–1)
DEPRECATED RDW RBC AUTO: 60.6 FL (ref 37–54)
EGFRCR SERPLBLD CKD-EPI 2021: 57.1 ML/MIN/1.73
ERYTHROCYTE [DISTWIDTH] IN BLOOD BY AUTOMATED COUNT: 14.4 % (ref 12.3–15.4)
ETHANOL BLD-MCNC: <10 MG/DL (ref 0–10)
GLOBULIN UR ELPH-MCNC: 3.2 GM/DL
GLUCOSE SERPL-MCNC: 79 MG/DL (ref 65–99)
HCT VFR BLD AUTO: 33.1 % (ref 34–46.6)
HGB BLD-MCNC: 11.2 G/DL (ref 12–15.9)
MCH RBC QN AUTO: 38.5 PG (ref 26.6–33)
MCHC RBC AUTO-ENTMCNC: 33.8 G/DL (ref 31.5–35.7)
MCV RBC AUTO: 113.7 FL (ref 79–97)
PLATELET # BLD AUTO: 302 10*3/MM3 (ref 140–450)
PMV BLD AUTO: 9.1 FL (ref 6–12)
POTASSIUM SERPL-SCNC: 3.7 MMOL/L (ref 3.5–5.2)
PROT SERPL-MCNC: 6.4 G/DL (ref 6–8.5)
QT INTERVAL: 328 MS
QTC INTERVAL: 418 MS
RBC # BLD AUTO: 2.91 10*6/MM3 (ref 3.77–5.28)
SODIUM SERPL-SCNC: 136 MMOL/L (ref 136–145)
TROPONIN T SERPL HS-MCNC: 19 NG/L
TSH SERPL DL<=0.05 MIU/L-ACNC: 1.6 UIU/ML (ref 0.27–4.2)
WBC NRBC COR # BLD AUTO: 14.5 10*3/MM3 (ref 3.4–10.8)

## 2024-10-05 PROCEDURE — 93005 ELECTROCARDIOGRAM TRACING: CPT | Performed by: STUDENT IN AN ORGANIZED HEALTH CARE EDUCATION/TRAINING PROGRAM

## 2024-10-05 PROCEDURE — 80053 COMPREHEN METABOLIC PANEL: CPT | Performed by: STUDENT IN AN ORGANIZED HEALTH CARE EDUCATION/TRAINING PROGRAM

## 2024-10-05 PROCEDURE — 85027 COMPLETE CBC AUTOMATED: CPT | Performed by: STUDENT IN AN ORGANIZED HEALTH CARE EDUCATION/TRAINING PROGRAM

## 2024-10-05 PROCEDURE — 99223 1ST HOSP IP/OBS HIGH 75: CPT | Performed by: NURSE PRACTITIONER

## 2024-10-05 PROCEDURE — 25810000003 LACTATED RINGERS PER 1000 ML: Performed by: STUDENT IN AN ORGANIZED HEALTH CARE EDUCATION/TRAINING PROGRAM

## 2024-10-05 PROCEDURE — 99232 SBSQ HOSP IP/OBS MODERATE 35: CPT | Performed by: STUDENT IN AN ORGANIZED HEALTH CARE EDUCATION/TRAINING PROGRAM

## 2024-10-05 PROCEDURE — 76705 ECHO EXAM OF ABDOMEN: CPT

## 2024-10-05 PROCEDURE — 93010 ELECTROCARDIOGRAM REPORT: CPT | Performed by: STUDENT IN AN ORGANIZED HEALTH CARE EDUCATION/TRAINING PROGRAM

## 2024-10-05 RX ORDER — LATANOPROST 50 UG/ML
1 SOLUTION/ DROPS OPHTHALMIC NIGHTLY
Status: DISCONTINUED | OUTPATIENT
Start: 2024-10-05 | End: 2024-10-05

## 2024-10-05 RX ORDER — ROSUVASTATIN CALCIUM 10 MG/1
5 TABLET, COATED ORAL NIGHTLY
Status: DISCONTINUED | OUTPATIENT
Start: 2024-10-05 | End: 2024-10-07 | Stop reason: HOSPADM

## 2024-10-05 RX ORDER — ACETAMINOPHEN 325 MG/1
650 TABLET ORAL ONCE
Status: COMPLETED | OUTPATIENT
Start: 2024-10-05 | End: 2024-10-05

## 2024-10-05 RX ORDER — TIMOLOL MALEATE 2.5 MG/ML
1 SOLUTION/ DROPS OPHTHALMIC EVERY 12 HOURS SCHEDULED
Status: DISCONTINUED | OUTPATIENT
Start: 2024-10-06 | End: 2024-10-07 | Stop reason: HOSPADM

## 2024-10-05 RX ORDER — SODIUM CHLORIDE, SODIUM LACTATE, POTASSIUM CHLORIDE, CALCIUM CHLORIDE 600; 310; 30; 20 MG/100ML; MG/100ML; MG/100ML; MG/100ML
100 INJECTION, SOLUTION INTRAVENOUS CONTINUOUS
Status: ACTIVE | OUTPATIENT
Start: 2024-10-05 | End: 2024-10-06

## 2024-10-05 RX ORDER — SODIUM BICARBONATE 650 MG/1
1300 TABLET ORAL 4 TIMES DAILY
Status: DISCONTINUED | OUTPATIENT
Start: 2024-10-05 | End: 2024-10-07 | Stop reason: HOSPADM

## 2024-10-05 RX ORDER — BRIMONIDINE TARTRATE 2 MG/ML
1 SOLUTION/ DROPS OPHTHALMIC 3 TIMES DAILY
Status: DISCONTINUED | OUTPATIENT
Start: 2024-10-05 | End: 2024-10-05

## 2024-10-05 RX ORDER — TIMOLOL MALEATE 2.5 MG/ML
1 SOLUTION/ DROPS OPHTHALMIC DAILY
Status: DISCONTINUED | OUTPATIENT
Start: 2024-10-05 | End: 2024-10-05

## 2024-10-05 RX ADMIN — FOLIC ACID 1000 MCG: 1 TABLET ORAL at 08:37

## 2024-10-05 RX ADMIN — SODIUM BICARBONATE 1300 MG: 650 TABLET ORAL at 14:13

## 2024-10-05 RX ADMIN — SODIUM BICARBONATE 1300 MG: 650 TABLET ORAL at 21:06

## 2024-10-05 RX ADMIN — Medication 10 ML: at 08:37

## 2024-10-05 RX ADMIN — Medication 10 ML: at 01:14

## 2024-10-05 RX ADMIN — SODIUM CHLORIDE, POTASSIUM CHLORIDE, SODIUM LACTATE AND CALCIUM CHLORIDE 100 ML/HR: 600; 310; 30; 20 INJECTION, SOLUTION INTRAVENOUS at 01:13

## 2024-10-05 RX ADMIN — ACETAMINOPHEN 650 MG: 325 TABLET ORAL at 06:26

## 2024-10-05 RX ADMIN — ROSUVASTATIN 5 MG: 10 TABLET, FILM COATED ORAL at 21:06

## 2024-10-05 RX ADMIN — SODIUM CHLORIDE, POTASSIUM CHLORIDE, SODIUM LACTATE AND CALCIUM CHLORIDE 100 ML/HR: 600; 310; 30; 20 INJECTION, SOLUTION INTRAVENOUS at 12:04

## 2024-10-05 RX ADMIN — SODIUM BICARBONATE 1300 MG: 650 TABLET ORAL at 17:38

## 2024-10-05 NOTE — H&P
[facility]  HISTORY AND PHYSICAL    Patient Name: Fabiola Salmeron  : 1950  MRN: [id number]  Primary Care Physician: Jessica Roach PA-C  Date of admission: 10/4/2024      Subjective   Subjective     Chief Complaint:  Poor appetite    HPI:  Fabiola Salmeron is a 74 y.o. female with past medical history of hypertension, hyperlipidemia, CAD, heart failure with preserved ejection fraction.  She is being admitted for pancreatitis.  On discussion with patient it seems that her symptoms have been mainly poor appetite, early satiety, weight loss and nausea particularly with eating for the last 2 to 3 weeks.  She was started on Repatha about 2 months ago.  She previous had iron deficiency anemia and was following Dr. Lopez as well as macrocytosis, per last hematology note a bone marrow biopsy was discussed and patient declined at the time.  She was seen by her PCP for the symptoms 2 days ago and a CT scan showed pancreatitis she was told to go to the ED if not any better.  She does not report any pain but reports ongoing poor appetite so she came back.      Personal History     Past Medical History:   Diagnosis Date    Candidiasis of vulva and vagina     CHF (congestive heart failure)     Coronary artery disease     Hyperlipidemia     Hypertension            Past Surgical History:   Procedure Laterality Date    ARTERIOVENOUS FISTULA/SHUNT SURGERY Right 2022    Procedure: RADIAL ARTERY REPAIR WITH HEMATOMA EVACUATION AND WASHOUT RIGHT;  Surgeon: Jaron Benavides MD;  Location: [facility];  Service: Vascular;  Laterality: Right;    CAPSULE ENDOSCOPY N/A 2022    Procedure: CAPSULE ENDOSCOPY ESOPHAGUS TO ILEUM DEPLOYED;  Surgeon: Michele Rogers MD;  Location: [facility] ENDOSCOPY;  Service: Gastroenterology;  Laterality: N/A;    CARDIAC CATHETERIZATION N/A 2022    Procedure: LEFT HEART CATH;  Surgeon: Manjit Wolfe IV, MD;  Location:   [facility];  Service: Cardiovascular;  Laterality: N/A;    CARDIAC CATHETERIZATION N/A 05/23/2022    Procedure: Optical Coherent Tomography;  Surgeon: Manjit Wolfe IV, MD;  Location:  [facility];  Service: Cardiovascular;  Laterality: N/A;    CARDIAC CATHETERIZATION N/A 05/23/2022    Procedure: Stent MARIA ELENA coronary;  Surgeon: Manjit Wolfe IV, MD;  Location:  [facility];  Service: Cardiovascular;  Laterality: N/A;    COLONOSCOPY N/A 05/25/2022    Procedure: COLONOSCOPY;  Surgeon: Michele Rogers MD;  Location:  [facility] ENDOSCOPY;  Service: Gastroenterology;  Laterality: N/A;    CRANIAL NEUROSTIMULATOR INSERTION/REPLACEMENT Right     ENDOSCOPY N/A 05/25/2022    Procedure: ESOPHAGOGASTRODUODENOSCOPY;  Surgeon: Michele Rogers MD;  Location:  [facility] ENDOSCOPY;  Service: Gastroenterology;  Laterality: N/A;    EYE SURGERY      shunt placement by ophthalmology       Family History: family history includes Aplastic anemia in her brother; Atrial fibrillation in her son; Breast cancer in her sister; Heart disease in her sister; Heart failure in her mother; Lung cancer in her father; No Known Problems in her maternal grandfather, maternal grandmother, paternal grandfather, and paternal grandmother.     Social History:  reports that she has never smoked. She has never been exposed to tobacco smoke. She has never used smokeless tobacco. She reports that she does not currently use alcohol. She reports that she does not use drugs.  Social History     Social History Narrative    Not on file       Medications:  Available home medication information reviewed.  Acetaminophen, Evolocumab, Polyvinyl Alcohol-Povidone PF, Vitamin B-12, aspirin, bimatoprost, bisoprolol, brimonidine, brimonidine-timolol, ezetimibe, fluorouracil, folic acid, predniSONE, rosuvastatin, spironolactone, timolol, topiramate, and valsartan    Allergies   Allergen Reactions    Ferrlecit [Na  Ferric Gluc Cplx In Sucrose] Anaphylaxis    Codeine Other (See Comments)    Metoprolol Other (See Comments)     Fatigue      Statins Myalgia       Objective   Objective     Vital Signs:   Temp:  [97.5 °F (36.4 °C)-97.6 °F (36.4 °C)] 97.6 °F (36.4 °C)  Heart Rate:  [] 97  Resp:  [16] 16  BP: ()/(52-90) 124/69       Physical Exam   Awake alert oriented x 3  Cachectic appearing  Mucous membranes dry  Heart regular rate and rhythm  Lungs clear to auscultation bilaterally  Abdomen soft, mild tenderness to palpation in the right upper quadrant and epigastrium without rebound guarding or rigidity  No peripheral edema    Result Review:  I have personally reviewed the results from the time of this admission to 10/4/2024 23:30 EDT and agree with these findings:  [x]  Laboratory list / accordion  []  Microbiology  [x]  Radiology  [x]  EKG/Telemetry   []  Cardiology/Vascular   []  Pathology  [x]  Old records  []  Other:  Most notable findings include: See assessment and plan      LAB RESULTS:      Lab 10/04/24  1819 10/02/24  1446   WBC 14.96* 9.52   HEMOGLOBIN 12.3 13.0   HEMATOCRIT 36.6 37.0   PLATELETS 335 351   NEUTROS ABS 12.71* 7.71*   IMMATURE GRANS (ABS) 0.11* 0.06*   LYMPHS ABS 1.11 1.16   MONOS ABS 0.96* 0.53   EOS ABS 0.05 0.04   .8* 108.8*   LACTATE 1.5  --          Lab 10/04/24  1819 10/02/24  1446   SODIUM 134* 136   POTASSIUM 4.5 4.5   CHLORIDE 105 111*   CO2 14.0* 11.0*   ANION GAP 15.0 14.0   BUN 65* 45*   CREATININE 1.45* 1.23*   EGFR 37.5* 45.6*   GLUCOSE 103* 120*   CALCIUM 9.8 9.5   HEMOGLOBIN A1C 5.70*  --    TSH  --  2.890         Lab 10/04/24  1819 10/02/24  1446   TOTAL PROTEIN 6.6 7.0   ALBUMIN 4.0 4.2   GLOBULIN 2.6 2.8   ALT (SGPT) 28 21   AST (SGOT) 43* 22   BILIRUBIN 0.3 0.3   ALK PHOS 104 89   LIPASE 438* 1,301*         Lab 10/02/24  1446   PROBNP 334.0         Lab 10/04/24  1819   CHOLESTEROL 72   LDL CHOL 6   HDL CHOL 55   TRIGLYCERIDES 34         Lab 10/02/24  1446   IRON 28*    IRON SATURATION (TSAT) 7*   TIBC 378   TRANSFERRIN 254         Lab 10/04/24  1827   FIO2 21   CARBOXYHEMOGLOBIN (VENOUS) 0.7     UA          10/2/2024    15:48 10/4/2024    21:09   Urinalysis   Squamous Epithelial Cells, UA 0-2  0-2    Specific Gravity, UA >1.030  1.037    Ketones, UA Negative  Negative    Blood, UA Negative  Negative    Leukocytes, UA Negative  Negative    Nitrite, UA Negative  Negative    RBC, UA 0-2  0-2    WBC, UA 0-2  0-2    Bacteria, UA Trace  None Seen        Microbiology Results (last 10 days)       ** No results found for the last 240 hours. **            CT Abdomen Pelvis With & Without Contrast    Result Date: 10/4/2024  CT CHEST W CONTRAST DIAGNOSTIC, CT ABDOMEN PELVIS W WO CONTRAST Date of Exam: 10/4/2024 1:53 PM EDT Indication: weight loss, shortness of breath. Comparison: 5/21/2022 chest abdomen pelvis CT scans. Technique: Initial unenhanced spiral scan was performed through the abdomen and pelvis, with subsequent axial CT images obtained of the chest abdomen and pelvis after the uneventful intravenous administration of 80 mL Isovue-300.  Reconstructed coronal and sagittal images were also obtained. Automated exposure control and iterative construction methods were used. Findings: History from the patient's chart indicates recent loss of appetite epigastric discomfort, fatigue and exertional dyspnea. Prior exam reports from 2022 describe questionable gastritis and mild proctocolitis. No other evidence of acute chest and abdominal or pelvic disease. CT SCAN OF THE CHEST WITH IV CONTRAST: Right-sided neurostimulator lead is seen along the posterior right chest. There are bilateral breast prostheses. No axillary or lower cervical lymphadenopathy is seen. No mediastinal or hilar adenopathy, pericardial  or pleural effusion is seen. This study shows fairly good contrast opacification of the pulmonary arteries as well as good contrast opacification of the thoracic aorta and there is no  evidence of aneurysm, dissection or pulmonary embolic disease. There are some calcified right hilar lymph nodes. Airways appear to be normally patent. Lungs appear clear of active disease. Minimal pleural-based right apical and basilar lung scarring is stable. Bony structures appear to be intact.     Impression: No evidence of active chest disease. CT SCAN OF THE ABDOMEN PELVIS WITH IV CONTRAST: As on previous scan, pancreatic parenchyma shows significant fatty replacement. Today's exam also appears to show mild new pancreatic fat stranding, suggestive of pancreatitis, with focal edema of the pancreatic tail and milder changes elsewhere. No pseudocyst is appreciated. There is no evidence of ascites or free air. No new abnormalities are seen of the liver, spleen, adrenal glands, or kidneys for a non-IV contrast enhanced exam. Gallbladder is significantly distended, but typical for fasting state, with no visible inflammation or cholelithiasis. There appears to be  mild secondary inflammation of the duodenum, possibly of the gastric antrum. No abnormally dilated bowel loops are seen. There are a few shotty periaortic lymph nodes, but practically unchanged from the prior exam. Regarding the lower abdomen/pelvis, there is pancolonic diverticulosis without evidence of diverticulitis. Bowel loops are normal in caliber and otherwise normal in appearance. Bladder is completely decompressed. Uterus is near premenopausal in size but appears stable from the prior exam. Ovaries are difficult to delineate, but apparently are not enlarged. Review of the vascular structures shows normal contrast opacification of the portal, splenic and superior mesenteric veins. No aortoiliac dilatation is seen. Review of the bony structures shows advanced degenerative disc disease at L1-2 L4-5, L5-S1, but no acute bony abnormality. Delayed venous phase images show no evidence of obstructive uropathy. Impression: 1. Diffuse peripancreatic fat  stranding, consistent with pancreatitis. No evidence of pseudocyst, phlegmon or other complication. Probable mild secondary inflammation of the gastric antrum and duodenum. 2. No other evidence of acute intra-abdominal intrapelvic disease elsewhere. Pancolonic diverticulosis is noted, but with no CT scan evidence of acute diverticulitis. Electronically Signed: Willis Mccoy MD  10/4/2024 2:45 PM EDT  Workstation ID: [id number]    CT Chest With Contrast Diagnostic    Result Date: 10/4/2024  CT CHEST W CONTRAST DIAGNOSTIC, CT ABDOMEN PELVIS W WO CONTRAST Date of Exam: 10/4/2024 1:53 PM EDT Indication: weight loss, shortness of breath. Comparison: 5/21/2022 chest abdomen pelvis CT scans. Technique: Initial unenhanced spiral scan was performed through the abdomen and pelvis, with subsequent axial CT images obtained of the chest abdomen and pelvis after the uneventful intravenous administration of 80 mL Isovue-300.  Reconstructed coronal and sagittal images were also obtained. Automated exposure control and iterative construction methods were used. Findings: History from the patient's chart indicates recent loss of appetite epigastric discomfort, fatigue and exertional dyspnea. Prior exam reports from 2022 describe questionable gastritis and mild proctocolitis. No other evidence of acute chest and abdominal or pelvic disease. CT SCAN OF THE CHEST WITH IV CONTRAST: Right-sided neurostimulator lead is seen along the posterior right chest. There are bilateral breast prostheses. No axillary or lower cervical lymphadenopathy is seen. No mediastinal or hilar adenopathy, pericardial  or pleural effusion is seen. This study shows fairly good contrast opacification of the pulmonary arteries as well as good contrast opacification of the thoracic aorta and there is no evidence of aneurysm, dissection or pulmonary embolic disease. There are some calcified right hilar lymph nodes. Airways appear to be normally patent. Lungs appear clear  of active disease. Minimal pleural-based right apical and basilar lung scarring is stable. Bony structures appear to be intact.     Impression: No evidence of active chest disease. CT SCAN OF THE ABDOMEN PELVIS WITH IV CONTRAST: As on previous scan, pancreatic parenchyma shows significant fatty replacement. Today's exam also appears to show mild new pancreatic fat stranding, suggestive of pancreatitis, with focal edema of the pancreatic tail and milder changes elsewhere. No pseudocyst is appreciated. There is no evidence of ascites or free air. No new abnormalities are seen of the liver, spleen, adrenal glands, or kidneys for a non-IV contrast enhanced exam. Gallbladder is significantly distended, but typical for fasting state, with no visible inflammation or cholelithiasis. There appears to be  mild secondary inflammation of the duodenum, possibly of the gastric antrum. No abnormally dilated bowel loops are seen. There are a few shotty periaortic lymph nodes, but practically unchanged from the prior exam. Regarding the lower abdomen/pelvis, there is pancolonic diverticulosis without evidence of diverticulitis. Bowel loops are normal in caliber and otherwise normal in appearance. Bladder is completely decompressed. Uterus is near premenopausal in size but appears stable from the prior exam. Ovaries are difficult to delineate, but apparently are not enlarged. Review of the vascular structures shows normal contrast opacification of the portal, splenic and superior mesenteric veins. No aortoiliac dilatation is seen. Review of the bony structures shows advanced degenerative disc disease at L1-2 L4-5, L5-S1, but no acute bony abnormality. Delayed venous phase images show no evidence of obstructive uropathy. Impression: 1. Diffuse peripancreatic fat stranding, consistent with pancreatitis. No evidence of pseudocyst, phlegmon or other complication. Probable mild secondary inflammation of the gastric antrum and duodenum. 2.  No other evidence of acute intra-abdominal intrapelvic disease elsewhere. Pancolonic diverticulosis is noted, but with no CT scan evidence of acute diverticulitis. Electronically Signed: Willis Mccoy MD  10/4/2024 2:45 PM EDT  Workstation ID: [id number]     Results for orders placed during the hospital encounter of 11/17/23    Adult Transthoracic Echo Complete W/ Cont if Necessary Per Protocol    Interpretation Summary    Left ventricular systolic function is normal. Estimated left ventricular EF = 60%    Left ventricular diastolic function was normal.    Estimated right ventricular systolic pressure from tricuspid regurgitation is normal (<35 mmHg).    The cardiac valves are anatomically and functionally normal.      Assessment & Plan   Assessment & Plan       Pancreatitis    Acute pancreatitis  Early satiety and weight loss  Progressive macrocytosis  -No clear etiology for current episode, does not drink, lipid level normal.  Has spinal cord stimulator otherwise would get MRCP.  -IVF, supportive care  -Dietary and GI consult, suspicious for potential malignancy    Acidemia, metabolic without anion gap  MILDRED  -Persistent metabolic acidosis with low venous pH.  She is not having any vomiting or diarrhea so unsure where this is coming from as acidosis has preceded acute episode. Does use tylenol very frequently so could be from that. Will continue to monitor BMP  -LR infusion for now, add bicarb if worsening    CHF  -stable at this time, hold meds for now 2/2 MILDRED, hypovolemia    VTE Prophylaxis:  Mechanical VTE prophylaxis orders are present.          CODE STATUS:    Code Status and Medical Interventions: CPR (Attempt to Resuscitate); Full Support   Ordered at: 10/04/24 6517     Code Status (Patient has no pulse and is not breathing):    CPR (Attempt to Resuscitate)     Medical Interventions (Patient has pulse or is breathing):    Full Support       Expected Discharge   Expected discharge date/ time has not been  documented.     Kurt Angulo MD  10/04/24

## 2024-10-05 NOTE — CONSULTS
"          Clinical Nutrition Assessment     Patient Name: Fabiola Salmeron  YOB: 1950  MRN: [id number]  Date of Encounter: 10/05/24 12:58 EDT  Admission date: 10/4/2024  Reason for Visit: BMI, Consult, Unintentional weight loss, Reduced oral intake    Assessment   Nutrition Assessment   Admission Diagnosis:  Pancreatitis [K85.90]    Problem List:    Pancreatitis      PMH:   She  has a past medical history of Candidiasis of vulva and vagina, CHF (congestive heart failure), Coronary artery disease, Hyperlipidemia, and Hypertension.    PSH:  She  has a past surgical history that includes Cranial Neurostimulator Insertion/Replacement (Right); Eye surgery; arteriovenous fistula/shunt surgery (Right, 05/23/2022); Cardiac catheterization (N/A, 05/23/2022); Cardiac catheterization (N/A, 05/23/2022); Cardiac catheterization (N/A, 05/23/2022); Colonoscopy (N/A, 05/25/2022); Esophagogastroduodenoscopy (N/A, 05/25/2022); and Capsule Endoscopy (N/A, 05/25/2022).    Applicable Nutrition History:   CHF    Anthropometrics     Height: Height: 167.6 cm (66\")  Last Filed Weight: Weight: 47.6 kg (105 lb) (10/04/24 1700)  Method: Weight Method: Stated  BMI: BMI (Calculated): 17    UBW:     Weight       Weight (kg) Weight (lbs) Weight Method Visit Report   10/17/2023 56.246 kg  124 lb   --    11/17/2023 56.246 kg  124 lb      1/12/2024 55.248 kg  121 lb 12.8 oz   --    1/17/2024 56.7 kg  125 lb   --    1/22/2024 55.611 kg  122 lb 9.6 oz   --    4/18/2024 53.978 kg  119 lb  Stated     4/24/2024 53.252 kg  117 lb 6.4 oz   --    5/16/2024 53.978 kg  119 lb   --    7/23/2024 54.069 kg  119 lb 3.2 oz   --    9/17/2024 51.256 kg  113 lb   --    10/2/2024 49.533 kg  109 lb 3.2 oz   --    10/4/2024 47.628 kg  105 lb  Stated       Weight change: weight loss of 8 lbs (7%) over 1 month(s)    Significant?  Yes    Nutrition Focused Physical Exam    Date:  10/05/24        Patient meets criteria for malnutrition diagnosis, see MSA note. "     Subjective   Reported/Observed/Food/Nutrition Related History:   10/05/24  Patient and  at bedside reported poor appetite and PO intake starting several weeks ago. Patient denied any n/v. Endorsed her BMs have been at baseline during her illness. Said she just started finding it difficult to breathe and eat. Prior to illness she would eat 3 meals, no snacks and now she is unable to eat a full meal.  has been buying ONS with 17g protein for patient to drink since she has been unable to eat anything solid. She has been drinking these BID and started working towards TID until hospitalization. Agreeable to ONS while here. Declined any difficulties chewing or swallowing. NKFA.    Current Nutrition Prescription   PO: Diet: Liquid; Clear Liquid; Fluid Consistency: Thin (IDDSI 0)  Oral Nutrition Supplement: n/a  Intake: Insufficient data    Assessment & Plan   Nutrition Diagnosis   Date:  10/05/24  Updated:  Problem Malnutrition, acute severe (most likely acute on chronic)   Etiology pancreatitis   Signs/Symptoms </=50% of EEN x >/=5d, significant weight loss of >5% x 1mo, severe muscle wasting, and moderate subcutaneous fat loss   Status: New    Goal:   Nutrition to support treatment and Establish PO, Advance diet as medically feasible/appropriate    Nutrition Intervention      Follow treatment progress, Care plan reviewed, Interview for preferences, Encourage intake, Supplement provided    Patient meets malnutrition criteria, see MSA for full assessment.  Ordering boost breeze TID while patient on CLD  Once diet advanced, recommend transitioning to boost plus thea BID  Recommend advancing PO diet as medically feasible  Encourage increasing PO intake as able    Monitoring/Evaluation:   Per protocol, PO intake, Supplement intake, Pertinent labs, Weight, GI status, Symptoms, POC/GOC    Naima Armstrong MS,RD,LD  Time Spent: 30min

## 2024-10-05 NOTE — PROGRESS NOTES
[facility] Medicine Services  PROGRESS NOTE    Patient Name: Fabiola Salmeron  : 1950  MRN: [id number]    Date of Admission: 10/4/2024  Primary Care Physician: Jessica Roahc PA-C    Subjective   Subjective     CC:  Follow-up poor appetite, pancreatitis    HPI:  Reports her abdominal pain has improved significantly.  No nausea or vomiting.  No chest pain or shortness of breath.  No dysuria.  Had a bowel movement today.  Patient's  and son are at bedside and patient okay with them being updated.    Objective   Objective     Vital Signs:   Temp:  [97.5 °F (36.4 °C)-100.1 °F (37.8 °C)] 99.1 °F (37.3 °C)  Heart Rate:  [] 92  Resp:  [16-18] 17  BP: ()/(52-90) 117/63     Physical Exam:  Constitutional: No acute distress, awake, alert  HENT: NCAT, mucous membranes moist  Respiratory: Clear to auscultation bilaterally, respiratory effort normal   Cardiovascular: RRR, no murmurs  Gastrointestinal: Normoactive bowel sounds, soft, nontender, nondistended  Musculoskeletal: No bilateral ankle edema  Psychiatric: Appropriate affect, cooperative  Neurologic: Alert, oriented, symmetric facies, moves all extremities well, speech clear    Results Reviewed:  LAB RESULTS:      Lab 10/05/24  0341 10/04/24  2327 10/04/24  1819 10/02/24  1446   WBC 14.50*  --  14.96* 9.52   HEMOGLOBIN 11.2*  --  12.3 13.0   HEMATOCRIT 33.1*  --  36.6 37.0   PLATELETS 302  --  335 351   NEUTROS ABS  --   --  12.71* 7.71*   IMMATURE GRANS (ABS)  --   --  0.11* 0.06*   LYMPHS ABS  --   --  1.11 1.16   MONOS ABS  --   --  0.96* 0.53   EOS ABS  --   --  0.05 0.04   .7*  --  115.8* 108.8*   LACTATE  --   --  1.5  --    HSTROP T  --  19*  --   --          Lab 10/05/24  0341 10/04/24  2327 10/04/24  1819 10/02/24  1446   SODIUM 136  --  134* 136   POTASSIUM 3.7  --  4.5 4.5   CHLORIDE 109*  --  105 111*   CO2 11.0*  --  14.0* 11.0*   ANION GAP 16.0*  --  15.0 14.0   BUN 45*  --  65* 45*   CREATININE  1.02*  --  1.45* 1.23*   EGFR 57.1*  --  37.5* 45.6*   GLUCOSE 79  --  103* 120*   CALCIUM 8.9  --  9.8 9.5   HEMOGLOBIN A1C  --   --  5.70*  --    TSH  --  1.600  --  2.890         Lab 10/05/24  0341 10/04/24  1819 10/02/24  1446   TOTAL PROTEIN 6.4 6.6 7.0   ALBUMIN 3.2* 4.0 4.2   GLOBULIN 3.2 2.6 2.8   ALT (SGPT) 21 28 21   AST (SGOT) 30 43* 22   BILIRUBIN 0.3 0.3 0.3   ALK PHOS 87 104 89   LIPASE  --  438* 1,301*         Lab 10/04/24  2327 10/02/24  1446   PROBNP  --  334.0   HSTROP T 19*  --          Lab 10/04/24  1819   CHOLESTEROL 72   LDL CHOL 6   HDL CHOL 55   TRIGLYCERIDES 34         Lab 10/02/24  1446   IRON 28*   IRON SATURATION (TSAT) 7*   TIBC 378   TRANSFERRIN 254         Lab 10/04/24  1827   FIO2 21   CARBOXYHEMOGLOBIN (VENOUS) 0.7     Brief Urine Lab Results  (Last result in the past 365 days)        Color   Clarity   Blood   Leuk Est   Nitrite   Protein   CREAT   Urine HCG        10/04/24 2109 Yellow   Clear   Negative   Negative   Negative   30 mg/dL (1+)                   Microbiology Results Abnormal       None            CT Abdomen Pelvis With & Without Contrast    Result Date: 10/4/2024  CT CHEST W CONTRAST DIAGNOSTIC, CT ABDOMEN PELVIS W WO CONTRAST Date of Exam: 10/4/2024 1:53 PM EDT Indication: weight loss, shortness of breath. Comparison: 5/21/2022 chest abdomen pelvis CT scans. Technique: Initial unenhanced spiral scan was performed through the abdomen and pelvis, with subsequent axial CT images obtained of the chest abdomen and pelvis after the uneventful intravenous administration of 80 mL Isovue-300.  Reconstructed coronal and sagittal images were also obtained. Automated exposure control and iterative construction methods were used. Findings: History from the patient's chart indicates recent loss of appetite epigastric discomfort, fatigue and exertional dyspnea. Prior exam reports from 2022 describe questionable gastritis and mild proctocolitis. No other evidence of acute chest and  abdominal or pelvic disease. CT SCAN OF THE CHEST WITH IV CONTRAST: Right-sided neurostimulator lead is seen along the posterior right chest. There are bilateral breast prostheses. No axillary or lower cervical lymphadenopathy is seen. No mediastinal or hilar adenopathy, pericardial  or pleural effusion is seen. This study shows fairly good contrast opacification of the pulmonary arteries as well as good contrast opacification of the thoracic aorta and there is no evidence of aneurysm, dissection or pulmonary embolic disease. There are some calcified right hilar lymph nodes. Airways appear to be normally patent. Lungs appear clear of active disease. Minimal pleural-based right apical and basilar lung scarring is stable. Bony structures appear to be intact.     Impression: No evidence of active chest disease. CT SCAN OF THE ABDOMEN PELVIS WITH IV CONTRAST: As on previous scan, pancreatic parenchyma shows significant fatty replacement. Today's exam also appears to show mild new pancreatic fat stranding, suggestive of pancreatitis, with focal edema of the pancreatic tail and milder changes elsewhere. No pseudocyst is appreciated. There is no evidence of ascites or free air. No new abnormalities are seen of the liver, spleen, adrenal glands, or kidneys for a non-IV contrast enhanced exam. Gallbladder is significantly distended, but typical for fasting state, with no visible inflammation or cholelithiasis. There appears to be  mild secondary inflammation of the duodenum, possibly of the gastric antrum. No abnormally dilated bowel loops are seen. There are a few shotty periaortic lymph nodes, but practically unchanged from the prior exam. Regarding the lower abdomen/pelvis, there is pancolonic diverticulosis without evidence of diverticulitis. Bowel loops are normal in caliber and otherwise normal in appearance. Bladder is completely decompressed. Uterus is near premenopausal in size but appears stable from the prior  exam. Ovaries are difficult to delineate, but apparently are not enlarged. Review of the vascular structures shows normal contrast opacification of the portal, splenic and superior mesenteric veins. No aortoiliac dilatation is seen. Review of the bony structures shows advanced degenerative disc disease at L1-2 L4-5, L5-S1, but no acute bony abnormality. Delayed venous phase images show no evidence of obstructive uropathy. Impression: 1. Diffuse peripancreatic fat stranding, consistent with pancreatitis. No evidence of pseudocyst, phlegmon or other complication. Probable mild secondary inflammation of the gastric antrum and duodenum. 2. No other evidence of acute intra-abdominal intrapelvic disease elsewhere. Pancolonic diverticulosis is noted, but with no CT scan evidence of acute diverticulitis. Electronically Signed: Willis Mccoy MD  10/4/2024 2:45 PM EDT  Workstation ID: [id number]    CT Chest With Contrast Diagnostic    Result Date: 10/4/2024  CT CHEST W CONTRAST DIAGNOSTIC, CT ABDOMEN PELVIS W WO CONTRAST Date of Exam: 10/4/2024 1:53 PM EDT Indication: weight loss, shortness of breath. Comparison: 5/21/2022 chest abdomen pelvis CT scans. Technique: Initial unenhanced spiral scan was performed through the abdomen and pelvis, with subsequent axial CT images obtained of the chest abdomen and pelvis after the uneventful intravenous administration of 80 mL Isovue-300.  Reconstructed coronal and sagittal images were also obtained. Automated exposure control and iterative construction methods were used. Findings: History from the patient's chart indicates recent loss of appetite epigastric discomfort, fatigue and exertional dyspnea. Prior exam reports from 2022 describe questionable gastritis and mild proctocolitis. No other evidence of acute chest and abdominal or pelvic disease. CT SCAN OF THE CHEST WITH IV CONTRAST: Right-sided neurostimulator lead is seen along the posterior right chest. There are bilateral breast  prostheses. No axillary or lower cervical lymphadenopathy is seen. No mediastinal or hilar adenopathy, pericardial  or pleural effusion is seen. This study shows fairly good contrast opacification of the pulmonary arteries as well as good contrast opacification of the thoracic aorta and there is no evidence of aneurysm, dissection or pulmonary embolic disease. There are some calcified right hilar lymph nodes. Airways appear to be normally patent. Lungs appear clear of active disease. Minimal pleural-based right apical and basilar lung scarring is stable. Bony structures appear to be intact.     Impression: No evidence of active chest disease. CT SCAN OF THE ABDOMEN PELVIS WITH IV CONTRAST: As on previous scan, pancreatic parenchyma shows significant fatty replacement. Today's exam also appears to show mild new pancreatic fat stranding, suggestive of pancreatitis, with focal edema of the pancreatic tail and milder changes elsewhere. No pseudocyst is appreciated. There is no evidence of ascites or free air. No new abnormalities are seen of the liver, spleen, adrenal glands, or kidneys for a non-IV contrast enhanced exam. Gallbladder is significantly distended, but typical for fasting state, with no visible inflammation or cholelithiasis. There appears to be  mild secondary inflammation of the duodenum, possibly of the gastric antrum. No abnormally dilated bowel loops are seen. There are a few shotty periaortic lymph nodes, but practically unchanged from the prior exam. Regarding the lower abdomen/pelvis, there is pancolonic diverticulosis without evidence of diverticulitis. Bowel loops are normal in caliber and otherwise normal in appearance. Bladder is completely decompressed. Uterus is near premenopausal in size but appears stable from the prior exam. Ovaries are difficult to delineate, but apparently are not enlarged. Review of the vascular structures shows normal contrast opacification of the portal, splenic and  superior mesenteric veins. No aortoiliac dilatation is seen. Review of the bony structures shows advanced degenerative disc disease at L1-2 L4-5, L5-S1, but no acute bony abnormality. Delayed venous phase images show no evidence of obstructive uropathy. Impression: 1. Diffuse peripancreatic fat stranding, consistent with pancreatitis. No evidence of pseudocyst, phlegmon or other complication. Probable mild secondary inflammation of the gastric antrum and duodenum. 2. No other evidence of acute intra-abdominal intrapelvic disease elsewhere. Pancolonic diverticulosis is noted, but with no CT scan evidence of acute diverticulitis. Electronically Signed: Willis Mccoy MD  10/4/2024 2:45 PM EDT  Workstation ID: [id number]     Results for orders placed during the hospital encounter of 11/17/23    Adult Transthoracic Echo Complete W/ Cont if Necessary Per Protocol    Interpretation Summary    Left ventricular systolic function is normal. Estimated left ventricular EF = 60%    Left ventricular diastolic function was normal.    Estimated right ventricular systolic pressure from tricuspid regurgitation is normal (<35 mmHg).    The cardiac valves are anatomically and functionally normal.      Current medications:  Scheduled Meds:[Held by provider] aspirin, 81 mg, Oral, Daily  [Held by provider] bisoprolol, 5 mg, Oral, Daily  folic acid, 1,000 mcg, Oral, Daily  sodium chloride, 10 mL, Intravenous, Q12H  [Held by provider] spironolactone, 25 mg, Oral, Daily  [Held by provider] valsartan, 80 mg, Oral, Daily      Continuous Infusions:lactated ringers, 100 mL/hr, Last Rate: 100 mL/hr (10/05/24 0113)      PRN Meds:.  senna-docusate sodium **AND** polyethylene glycol **AND** bisacodyl **AND** bisacodyl    Calcium Replacement - Follow Nurse / BPA Driven Protocol    Magnesium Standard Dose Replacement - Follow Nurse / BPA Driven Protocol    Phosphorus Replacement - Follow Nurse / BPA Driven Protocol    Potassium Replacement - Follow Nurse /  BPA Driven Protocol    sodium chloride    sodium chloride    Assessment & Plan   Assessment & Plan     Active Hospital Problems    Diagnosis  POA    **Pancreatitis [K85.90]  Yes      Resolved Hospital Problems   No resolved problems to display.        Brief Hospital Course to date:  Fabiola Salmeron is a 74 y.o. female with history of HTN, HLD, CAD, HFpEF, microcytic anemia, outpatient diagnosis of pancreatitis 2 days prior to presentation, who presented for evaluation of poor appetite, early satiety, weight loss, nausea for the past 2 to 3 weeks prior to presentation.    This patient's problems and plans were partially entered by my partner and updated as appropriate by me 10/05/24.    Acute pancreatitis  Early satiety and weight loss  Progressive macrocytosis  -No clear etiology for current episode, does not drink, lipid level normal.  Has spinal cord stimulator otherwise would get MRCP.  -Right upper quadrant ultrasound unrevealing  -IVF, supportive care  -Clear liquid diet today, will advance as tolerated to low-fat diet  -GI consulted     Acidemia, metabolic without anion gap  MILDRED  -Persistent metabolic acidosis with low venous pH.  She is not having any vomiting or diarrhea so unsure where this is coming from as acidosis has preceded acute episode.  -Bicarb QID for now and if not improving, then bicarb drip     CHF  -stable at this time, hold meds for now 2/2 MILDRED, hypovolemia    Glaucoma -Continue home eyedrops      Expected Discharge Location and Transportation: home  Expected Discharge   Expected discharge date/ time has not been documented.     VTE Prophylaxis:  Mechanical VTE prophylaxis orders are present.         AM-PAC 6 Clicks Score (PT): 24 (10/04/24 5362)    CODE STATUS:   Code Status and Medical Interventions: CPR (Attempt to Resuscitate); Full Support   Ordered at: 10/04/24 4776     Code Status (Patient has no pulse and is not breathing):    CPR (Attempt to Resuscitate)     Medical Interventions  (Patient has pulse or is breathing):    Full Support       Marylin Lowry MD  10/05/24

## 2024-10-05 NOTE — CONSULTS
[facility]:  Inpatient Gastroenterology Consult      Inpatient Gastroenterology Consult  Consult performed by: Blayne Kay APRN  Consult ordered by: Kurt Angulo MD  Reason for consult: wt loss, early satiety, pancreatitis      Referring Provider: No ref. provider found    PCP: Jessica Roach PA-C    Chief Complaint: abdominal pain      History of present illness:    Fabiola Salmeron is a 74 y.o. female who is admitted with decreased appetitie and abdominal pain. GI consult received for concerns of abdominal pain, wt loss, and early satiety with pancreatitis.     Patient notes that she was at her usual state of health until 2 weeks ago when she developed acute onset of epigastric and left upper quadrant abdominal pain with associated nausea and early satiety.  Patient notes some mild weight loss associated with onset.  Also reports issues with dyspnea and pain with deep inspiration.  Does not report any chest pain or fever/chills.     Patient had CT scan earlier in onset that was concerning for pancreatitis and was prompted to go to ER for further evaluation of symptoms not improved by her PCP.  In terms of risk factors for pancreatitis, patient denies any alcohol or tobacco use, no recent trauma, no family history or autoimmune history, no ERCP or biliary surgery, no scorpion stings, no stones seen on imaging; does have some history of hyperlipidemia and recent changes in medication.     CT shows findings concerning for pancreatitis.  LFTs are nonobstructive. Past medical, surgical, social, and family histories are reviewed for accuracy.  No documented alleviating or exacerbating factors.  Does not endorse pain at time of exam.    Allergies:  Ferrlecit [na ferric gluc cplx in sucrose], Codeine, Metoprolol, and Statins    Scheduled Meds:  Pharmacy Consult, , Does not apply, Daily  [Held by provider] aspirin, 81 mg, Oral, Daily  bimatoprost, 1 drop, Both Eyes, BID  [Held by  provider] bisoprolol, 5 mg, Oral, Daily  folic acid, 1,000 mcg, Oral, Daily  rosuvastatin, 5 mg, Oral, Nightly  sodium bicarbonate, 1,300 mg, Oral, 4x Daily  sodium chloride, 10 mL, Intravenous, Q12H  [Held by provider] spironolactone, 25 mg, Oral, Daily  timolol, 1 drop, Both Eyes, Daily  [Held by provider] valsartan, 80 mg, Oral, Daily         Infusions:  lactated ringers, 100 mL/hr, Last Rate: 100 mL/hr (10/05/24 1211)        PRN Meds:    senna-docusate sodium **AND** polyethylene glycol **AND** bisacodyl **AND** bisacodyl    Calcium Replacement - Follow Nurse / BPA Driven Protocol    Magnesium Standard Dose Replacement - Follow Nurse / BPA Driven Protocol    Phosphorus Replacement - Follow Nurse / BPA Driven Protocol    Potassium Replacement - Follow Nurse / BPA Driven Protocol    sodium chloride    sodium chloride    Home Meds:  Medications Prior to Admission   Medication Sig Dispense Refill Last Dose    aspirin (Aspirin Low Dose) 81 MG EC tablet Take 1 tablet by mouth Daily. 90 tablet 3 10/4/2024    bimatoprost (Lumigan) 0.01 % ophthalmic drops Administer 1 drop to both eyes Every Night.   10/3/2024    bisoprolol (ZEBeta) 5 MG tablet Take 1 tablet by mouth Daily. 90 tablet 3 10/3/2024    brimonidine-timolol (COMBIGAN) 0.2-0.5 % ophthalmic solution Apply 1 drop to eye(s) as directed by provider.   10/3/2024    Cyanocobalamin (Vitamin B-12) 1000 MCG sublingual tablet Place 1 tablet under the tongue Daily. 90 each 3 10/4/2024    ezetimibe (ZETIA) 10 MG tablet Take 1 tablet by mouth Daily. 90 tablet 3 10/3/2024    folic acid (FOLVITE) 1 MG tablet TAKE 1 TABLET EVERY DAY 90 tablet 3 10/4/2024    rosuvastatin (CRESTOR) 5 MG tablet Take 1 tablet by mouth Every Night. 90 tablet 3 10/3/2024    valsartan (DIOVAN) 80 MG tablet Take 1 tablet by mouth Daily. 90 tablet 3 10/3/2024    Acetaminophen (Tylenol) 325 MG capsule Take 650 mg by mouth 4 (Four) Times a Day.   Unknown    brimonidine (ALPHAGAN) 0.2 % ophthalmic  solution INSTILL 1 DROP INTO AFFECTED EYE(S) BY OPHTHALMIC ROUTE EVERY 8 HOURS   Unknown    Evolocumab (REPATHA) solution auto-injector SureClick injection Inject 1 mL under the skin into the appropriate area as directed Every 14 (Fourteen) Days. (Patient not taking: Reported on 10/2/2024) 2 mL 11 Unknown    fluorouracil (EFUDEX) 5 % cream fluorouracil 5 % topical cream   APPLY A SUFFICIENT AMOUNT TO COVER THE LESIONS IN THE AFFECTED AREA(S) BY TOPICAL ROUTE 2 TIMES PER DAY FOR 21 DAYS   Unknown    Polyvinyl Alcohol-Povidone PF (HYPOTEARS) 1.4-0.6 % ophthalmic solution        spironolactone (ALDACTONE) 25 MG tablet Take 1 tablet by mouth Daily. 90 tablet 3 Unknown       ROS: Review of Systems   Constitutional:  Positive for activity change, appetite change and unexpected weight change. Negative for chills, diaphoresis, fatigue and fever.   HENT:  Negative for sore throat, trouble swallowing and voice change.    Eyes: Negative.    Respiratory:  Positive for shortness of breath. Negative for apnea, cough, choking, chest tightness, wheezing and stridor.    Cardiovascular:  Negative for chest pain, palpitations and leg swelling.   Gastrointestinal:  Positive for abdominal pain and nausea. Negative for abdominal distention, anal bleeding, blood in stool, constipation, diarrhea, rectal pain and vomiting.   Endocrine: Negative.    Musculoskeletal: Negative.    Skin: Negative.    Allergic/Immunologic: Negative.    Neurological: Negative.    Hematological: Negative.    Psychiatric/Behavioral: Negative.     All other systems reviewed and are negative.      PAST MED HX:  Past Medical History:   Diagnosis Date    Candidiasis of vulva and vagina     CHF (congestive heart failure)     Coronary artery disease     Hyperlipidemia     Hypertension        PAST SURG HX:  Past Surgical History:   Procedure Laterality Date    ARTERIOVENOUS FISTULA/SHUNT SURGERY Right 05/23/2022    Procedure: RADIAL ARTERY REPAIR WITH HEMATOMA EVACUATION  AND WASHOUT RIGHT;  Surgeon: Jaron Benavides MD;  Location:  [facility];  Service: Vascular;  Laterality: Right;    CAPSULE ENDOSCOPY N/A 05/25/2022    Procedure: CAPSULE ENDOSCOPY ESOPHAGUS TO ILEUM DEPLOYED;  Surgeon: Michele Rogers MD;  Location:  [facility];  Service: Gastroenterology;  Laterality: N/A;    CARDIAC CATHETERIZATION N/A 05/23/2022    Procedure: LEFT HEART CATH;  Surgeon: Manjit Wolfe IV, MD;  Location:  [facility];  Service: Cardiovascular;  Laterality: N/A;    CARDIAC CATHETERIZATION N/A 05/23/2022    Procedure: Optical Coherent Tomography;  Surgeon: Manjit Wolfe IV, MD;  Location:  [facility];  Service: Cardiovascular;  Laterality: N/A;    CARDIAC CATHETERIZATION N/A 05/23/2022    Procedure: Stent MARIA ELENA coronary;  Surgeon: Manjit Wolfe IV, MD;  Location:  [facility];  Service: Cardiovascular;  Laterality: N/A;    COLONOSCOPY N/A 05/25/2022    Procedure: COLONOSCOPY;  Surgeon: Michele Rogers MD;  Location:  [facility];  Service: Gastroenterology;  Laterality: N/A;    CRANIAL NEUROSTIMULATOR INSERTION/REPLACEMENT Right     ENDOSCOPY N/A 05/25/2022    Procedure: ESOPHAGOGASTRODUODENOSCOPY;  Surgeon: Michele Rogers MD;  Location:  [facility];  Service: Gastroenterology;  Laterality: N/A;    EYE SURGERY      shunt placement by ophthalmology       FAM HX:  Family History   Problem Relation Age of Onset    Heart failure Mother     Lung cancer Father         smoker    Heart disease Sister     Breast cancer Sister     Aplastic anemia Brother     No Known Problems Maternal Grandmother     No Known Problems Maternal Grandfather     No Known Problems Paternal Grandmother     No Known Problems Paternal Grandfather     Atrial fibrillation Son     Ovarian cancer Neg Hx     Colon cancer Neg Hx        SOC HX:  Social History     Socioeconomic History    Marital status:    Tobacco Use     "Smoking status: Never     Passive exposure: Never    Smokeless tobacco: Never   Vaping Use    Vaping status: Never Used   Substance and Sexual Activity    Alcohol use: Not Currently    Drug use: Never    Sexual activity: Yes     Partners: Male     Birth control/protection: None, Vasectomy       PHYSICAL EXAM  /69 (BP Location: Left arm, Patient Position: Lying)   Pulse 88   Temp 97.7 °F (36.5 °C) (Oral)   Resp 16   Ht 167.6 cm (66\")   Wt 47.6 kg (105 lb)   LMP  (LMP Unknown)   SpO2 97%   BMI 16.95 kg/m²   Wt Readings from Last 3 Encounters:   10/04/24 47.6 kg (105 lb)   10/02/24 49.5 kg (109 lb 3.2 oz)   09/17/24 51.3 kg (113 lb)   ,body mass index is 16.95 kg/m².  Physical Exam  Vitals and nursing note reviewed.   Constitutional:       General: She is not in acute distress.     Appearance: Normal appearance. She is normal weight. She is not ill-appearing or toxic-appearing.   HENT:      Head: Normocephalic and atraumatic.   Eyes:      General: No scleral icterus.     Extraocular Movements: Extraocular movements intact.      Conjunctiva/sclera: Conjunctivae normal.      Pupils: Pupils are equal, round, and reactive to light.   Cardiovascular:      Rate and Rhythm: Normal rate and regular rhythm.      Pulses: Normal pulses.      Heart sounds: Normal heart sounds.   Pulmonary:      Effort: Pulmonary effort is normal. No respiratory distress.      Breath sounds: Normal breath sounds.   Abdominal:      General: Abdomen is flat. Bowel sounds are normal. There is no distension.      Palpations: Abdomen is soft. There is no mass.      Tenderness: There is abdominal tenderness. There is no guarding or rebound.      Hernia: No hernia is present.   Genitourinary:     Comments: defer  Musculoskeletal:         General: Normal range of motion.      Right lower leg: No edema.      Left lower leg: No edema.   Skin:     General: Skin is warm and dry.      Capillary Refill: Capillary refill takes less than 2 seconds. " "     Coloration: Skin is not jaundiced or pale.   Neurological:      General: No focal deficit present.      Mental Status: She is alert and oriented to person, place, and time.   Psychiatric:         Mood and Affect: Mood normal.         Behavior: Behavior normal.         Thought Content: Thought content normal.         Judgment: Judgment normal.         Results Review:   I reviewed the patient's new clinical results.  I reviewed the patient's new imaging results and agree with the interpretation.  I reviewed the patient's other test results and agree with the interpretation  I personally viewed and interpreted the patient's EKG/Telemetry data    Lab Results   Component Value Date    WBC 14.50 (H) 10/05/2024    HGB 11.2 (L) 10/05/2024    HGB 12.3 10/04/2024    HGB 13.0 10/02/2024    HCT 33.1 (L) 10/05/2024    .7 (H) 10/05/2024     10/05/2024       No results found for: \"INR\"    Lab Results   Component Value Date    GLUCOSE 79 10/05/2024    BUN 45 (H) 10/05/2024    CREATININE 1.02 (H) 10/05/2024    EGFRIFNONA 66 12/07/2021    BCR 44.1 (H) 10/05/2024     10/05/2024    K 3.7 10/05/2024    CO2 11.0 (L) 10/05/2024    CALCIUM 8.9 10/05/2024    PROTENTOTREF 6.9 05/16/2024    ALBUMIN 3.2 (L) 10/05/2024    ALKPHOS 87 10/05/2024    BILITOT 0.3 10/05/2024    ALT 21 10/05/2024    AST 30 10/05/2024       US Gallbladder    Result Date: 10/5/2024  US GALLBLADDER Date of Exam: 10/5/2024 7:38 AM EDT Indication: bile duct. Pancreatitis. Comparison: CT 10/4/2024 and prior Technique: Grayscale and color Doppler ultrasound evaluation of the right upper quadrant was performed. FINDINGS: Liver: Liver is heterogeneous in appearance. No intraparotid biliary ductal dilation noted. Hepatic and portal vein appear patent centrally. Biliary System: Gallbladder is unremarkable without evidence of pericholecystic fluid, wall thickening or stones. Negative sonographic Armas's sign. Common bile duct is within normal limits " measurin mm Right Kidney: The right kidney is grossly unremarkable without evidence of hydronephrosis. Pancreas: Visualized portions of the pancreas are grossly unremarkable. Other: No evidence of right upper quadrant ascites.     1. Gallbladder and common bile duct are grossly unremarkable in appearance 2. Liver slightly heterogeneous in appearance. Please correlate with liver function test Electronically Signed: Law Andres MD  10/5/2024 8:10 AM EDT  Workstation ID: [id number]    CT Abdomen Pelvis With & Without Contrast    Result Date: 10/4/2024  CT CHEST W CONTRAST DIAGNOSTIC, CT ABDOMEN PELVIS W WO CONTRAST Date of Exam: 10/4/2024 1:53 PM EDT Indication: weight loss, shortness of breath. Comparison: 2022 chest abdomen pelvis CT scans. Technique: Initial unenhanced spiral scan was performed through the abdomen and pelvis, with subsequent axial CT images obtained of the chest abdomen and pelvis after the uneventful intravenous administration of 80 mL Isovue-300.  Reconstructed coronal and sagittal images were also obtained. Automated exposure control and iterative construction methods were used. Findings: History from the patient's chart indicates recent loss of appetite epigastric discomfort, fatigue and exertional dyspnea. Prior exam reports from  describe questionable gastritis and mild proctocolitis. No other evidence of acute chest and abdominal or pelvic disease. CT SCAN OF THE CHEST WITH IV CONTRAST: Right-sided neurostimulator lead is seen along the posterior right chest. There are bilateral breast prostheses. No axillary or lower cervical lymphadenopathy is seen. No mediastinal or hilar adenopathy, pericardial  or pleural effusion is seen. This study shows fairly good contrast opacification of the pulmonary arteries as well as good contrast opacification of the thoracic aorta and there is no evidence of aneurysm, dissection or pulmonary embolic disease. There are some calcified right  hilar lymph nodes. Airways appear to be normally patent. Lungs appear clear of active disease. Minimal pleural-based right apical and basilar lung scarring is stable. Bony structures appear to be intact.     No evidence of active chest disease. CT SCAN OF THE ABDOMEN PELVIS WITH IV CONTRAST: As on previous scan, pancreatic parenchyma shows significant fatty replacement. Today's exam also appears to show mild new pancreatic fat stranding, suggestive of pancreatitis, with focal edema of the pancreatic tail and milder changes elsewhere. No pseudocyst is appreciated. There is no evidence of ascites or free air. No new abnormalities are seen of the liver, spleen, adrenal glands, or kidneys for a non-IV contrast enhanced exam. Gallbladder is significantly distended, but typical for fasting state, with no visible inflammation or cholelithiasis. There appears to be  mild secondary inflammation of the duodenum, possibly of the gastric antrum. No abnormally dilated bowel loops are seen. There are a few shotty periaortic lymph nodes, but practically unchanged from the prior exam. Regarding the lower abdomen/pelvis, there is pancolonic diverticulosis without evidence of diverticulitis. Bowel loops are normal in caliber and otherwise normal in appearance. Bladder is completely decompressed. Uterus is near premenopausal in size but appears stable from the prior exam. Ovaries are difficult to delineate, but apparently are not enlarged. Review of the vascular structures shows normal contrast opacification of the portal, splenic and superior mesenteric veins. No aortoiliac dilatation is seen. Review of the bony structures shows advanced degenerative disc disease at L1-2 L4-5, L5-S1, but no acute bony abnormality. Delayed venous phase images show no evidence of obstructive uropathy. Impression: 1. Diffuse peripancreatic fat stranding, consistent with pancreatitis. No evidence of pseudocyst, phlegmon or other complication. Probable  mild secondary inflammation of the gastric antrum and duodenum. 2. No other evidence of acute intra-abdominal intrapelvic disease elsewhere. Pancolonic diverticulosis is noted, but with no CT scan evidence of acute diverticulitis. Electronically Signed: Willis Mccoy MD  10/4/2024 2:45 PM EDT  Workstation ID: [id number]    CT Chest With Contrast Diagnostic    Result Date: 10/4/2024  CT CHEST W CONTRAST DIAGNOSTIC, CT ABDOMEN PELVIS W WO CONTRAST Date of Exam: 10/4/2024 1:53 PM EDT Indication: weight loss, shortness of breath. Comparison: 5/21/2022 chest abdomen pelvis CT scans. Technique: Initial unenhanced spiral scan was performed through the abdomen and pelvis, with subsequent axial CT images obtained of the chest abdomen and pelvis after the uneventful intravenous administration of 80 mL Isovue-300.  Reconstructed coronal and sagittal images were also obtained. Automated exposure control and iterative construction methods were used. Findings: History from the patient's chart indicates recent loss of appetite epigastric discomfort, fatigue and exertional dyspnea. Prior exam reports from 2022 describe questionable gastritis and mild proctocolitis. No other evidence of acute chest and abdominal or pelvic disease. CT SCAN OF THE CHEST WITH IV CONTRAST: Right-sided neurostimulator lead is seen along the posterior right chest. There are bilateral breast prostheses. No axillary or lower cervical lymphadenopathy is seen. No mediastinal or hilar adenopathy, pericardial  or pleural effusion is seen. This study shows fairly good contrast opacification of the pulmonary arteries as well as good contrast opacification of the thoracic aorta and there is no evidence of aneurysm, dissection or pulmonary embolic disease. There are some calcified right hilar lymph nodes. Airways appear to be normally patent. Lungs appear clear of active disease. Minimal pleural-based right apical and basilar lung scarring is stable. Bony structures  appear to be intact.     No evidence of active chest disease. CT SCAN OF THE ABDOMEN PELVIS WITH IV CONTRAST: As on previous scan, pancreatic parenchyma shows significant fatty replacement. Today's exam also appears to show mild new pancreatic fat stranding, suggestive of pancreatitis, with focal edema of the pancreatic tail and milder changes elsewhere. No pseudocyst is appreciated. There is no evidence of ascites or free air. No new abnormalities are seen of the liver, spleen, adrenal glands, or kidneys for a non-IV contrast enhanced exam. Gallbladder is significantly distended, but typical for fasting state, with no visible inflammation or cholelithiasis. There appears to be  mild secondary inflammation of the duodenum, possibly of the gastric antrum. No abnormally dilated bowel loops are seen. There are a few shotty periaortic lymph nodes, but practically unchanged from the prior exam. Regarding the lower abdomen/pelvis, there is pancolonic diverticulosis without evidence of diverticulitis. Bowel loops are normal in caliber and otherwise normal in appearance. Bladder is completely decompressed. Uterus is near premenopausal in size but appears stable from the prior exam. Ovaries are difficult to delineate, but apparently are not enlarged. Review of the vascular structures shows normal contrast opacification of the portal, splenic and superior mesenteric veins. No aortoiliac dilatation is seen. Review of the bony structures shows advanced degenerative disc disease at L1-2 L4-5, L5-S1, but no acute bony abnormality. Delayed venous phase images show no evidence of obstructive uropathy. Impression: 1. Diffuse peripancreatic fat stranding, consistent with pancreatitis. No evidence of pseudocyst, phlegmon or other complication. Probable mild secondary inflammation of the gastric antrum and duodenum. 2. No other evidence of acute intra-abdominal intrapelvic disease elsewhere. Pancolonic diverticulosis is noted, but with  no CT scan evidence of acute diverticulitis. Electronically Signed: Willis Mccoy MD  10/4/2024 2:45 PM EDT  Workstation ID: [id number]    ASSESSMENTS/PLANS  1.  Acute idiopathic interstitial pancreatitis, cannot rule out drug-induced pancreatitis  2.  Abdominal pain, related to above  3.  Nausea and early satiety, related  4.  CHF    Fabiola Salmeron is a 74 y.o. female presents to hospital with worsening abdominal pain.  Imaging shows evidence of mild uncomplicated pancreatitis.  Patient with minimal risk factors for pancreatitis though she did recently start Repatha approximately 6 weeks ago per her report and symptoms began after starting Repatha.  Literature reviewed and shows case studies linking Repatha to pancreatitis though the literature is sparse; direct link to pancreatitis cannot be made at present time given limited literature though remains within the differential; further, Ezetimibe is also associated with DIP. Will check Igg4 for completeness.      >>> Okay for clear liquid diet; may advance to full liquids for dinner-if tolerating, may try soft foods in AM and further advance as tolerated.  >>> Continue antiemetics and pain control measures   >>> Avoid any further exposure to Repatha at present.   >>> Encourage use of incentive spirometry   >>> encourage patient to ambulate  >>> will need to f/u with GI  >>> will need to f/u with Cards    I discussed the patient's findings and my recommendations with patient, family, and consulting provider    KAREEM Gordon  10/05/24  16:18 EDT

## 2024-10-05 NOTE — ED NOTES
Fabiola HOPKINS [facility] (formerly [facility])    Nursing Report ED to Floor:  Mental status: A & O x 4   Ambulatory status: Stand By assist   Oxygen Therapy:  RA  Cardiac Rhythm: Sinus Rhythm with PACs  Admitted from: ED/ Home  Safety Concerns:  Fall Risk   Social Issues: None  ED Room #:  23    ED Nurse Phone Extension - [phone] or may call 1210.      HPI:   Chief Complaint   Patient presents with    Pancreatitis       Past Medical History:  Past Medical History:   Diagnosis Date    Candidiasis of vulva and vagina     CHF (congestive heart failure)     Coronary artery disease     Hyperlipidemia     Hypertension         Past Surgical History:  Past Surgical History:   Procedure Laterality Date    ARTERIOVENOUS FISTULA/SHUNT SURGERY Right 05/23/2022    Procedure: RADIAL ARTERY REPAIR WITH HEMATOMA EVACUATION AND WASHOUT RIGHT;  Surgeon: Jaron Benavides MD;  Location:  [facility];  Service: Vascular;  Laterality: Right;    CAPSULE ENDOSCOPY N/A 05/25/2022    Procedure: CAPSULE ENDOSCOPY ESOPHAGUS TO ILEUM DEPLOYED;  Surgeon: Michele Rogers MD;  Location:  [facility];  Service: Gastroenterology;  Laterality: N/A;    CARDIAC CATHETERIZATION N/A 05/23/2022    Procedure: LEFT HEART CATH;  Surgeon: Manjit Wolfe IV, MD;  Location:  [facility];  Service: Cardiovascular;  Laterality: N/A;    CARDIAC CATHETERIZATION N/A 05/23/2022    Procedure: Optical Coherent Tomography;  Surgeon: Manjit Wolfe IV, MD;  Location:  [facility];  Service: Cardiovascular;  Laterality: N/A;    CARDIAC CATHETERIZATION N/A 05/23/2022    Procedure: Stent MARIA ELENA coronary;  Surgeon: Manjit Wolfe IV, MD;  Location:  [facility];  Service: Cardiovascular;  Laterality: N/A;    COLONOSCOPY N/A 05/25/2022    Procedure: COLONOSCOPY;  Surgeon: Michele Rogers MD;  Location:  [facility];  Service: Gastroenterology;  Laterality: N/A;    CRANIAL NEUROSTIMULATOR INSERTION/REPLACEMENT  Right     ENDOSCOPY N/A 05/25/2022    Procedure: ESOPHAGOGASTRODUODENOSCOPY;  Surgeon: Michele Rogers MD;  Location: [facility];  Service: Gastroenterology;  Laterality: N/A;    EYE SURGERY      shunt placement by ophthalmology        Admitting Doctor:   Kurt Angulo MD    Consulting Provider(s):  Consults       No orders found from 9/5/2024 to 10/5/2024.             Admitting Diagnosis:   The primary encounter diagnosis was Acute pancreatitis without infection or necrosis, unspecified pancreatitis type. Diagnoses of Epigastric pain, Nausea and vomiting, unspecified vomiting type, Dehydration, Metabolic acidosis, and Renal insufficiency were also pertinent to this visit.    Most Recent Vitals:   Vitals:    10/04/24 1929 10/04/24 2029 10/04/24 2114 10/04/24 2129   BP: 114/73 133/90 132/87 127/71   BP Location:       Patient Position:       Pulse: 80 84 89 89   Resp:       Temp:       TempSrc:       SpO2:    97%   Weight:       Height:           Active LDAs/IV Access:   Lines, Drains & Airways       Active LDAs       Name Placement date Placement time Site Days    Peripheral IV 10/04/24 1823 Right Antecubital 10/04/24  1823  Antecubital  less than 1                    Labs (abnormal labs have a star):   Labs Reviewed   COMPREHENSIVE METABOLIC PANEL - Abnormal; Notable for the following components:       Result Value    Glucose 103 (*)     BUN 65 (*)     Creatinine 1.45 (*)     Sodium 134 (*)     CO2 14.0 (*)     AST (SGOT) 43 (*)     BUN/Creatinine Ratio 44.8 (*)     eGFR 37.5 (*)     All other components within normal limits    Narrative:     GFR Normal >60  Chronic Kidney Disease <60  Kidney Failure <15    The GFR formula is only valid for adults with stable renal function between ages 18 and 70.   LIPASE - Abnormal; Notable for the following components:    Lipase 438 (*)     All other components within normal limits   URINALYSIS W/ MICROSCOPIC IF INDICATED (NO CULTURE) - Abnormal; Notable for the  following components:    Specific Gravity, UA 1.037 (*)     Protein, UA 30 mg/dL (1+) (*)     All other components within normal limits   CBC WITH AUTO DIFFERENTIAL - Abnormal; Notable for the following components:    WBC 14.96 (*)     RBC 3.16 (*)     .8 (*)     MCH 38.9 (*)     RDW-SD 63.4 (*)     Neutrophil % 85.1 (*)     Lymphocyte % 7.4 (*)     Immature Grans % 0.7 (*)     Neutrophils, Absolute 12.71 (*)     Monocytes, Absolute 0.96 (*)     Immature Grans, Absolute 0.11 (*)     All other components within normal limits   BLOOD GAS, VENOUS W/CO-OXIMETRY - Abnormal; Notable for the following components:    pH, Venous 7.264 (*)     pCO2, Venous 31.9 (*)     pO2, Venous 16.7 (*)     HCO3, Venous 14.4 (*)     Base Excess, Venous -11.4 (*)     Hemoglobin, Blood Gas 13.3 (*)     CO2 Content 15.4 (*)     All other components within normal limits   HEMOGLOBIN A1C - Abnormal; Notable for the following components:    Hemoglobin A1C 5.70 (*)     All other components within normal limits    Narrative:     Hemoglobin A1C Ranges:    Increased Risk for Diabetes  5.7% to 6.4%  Diabetes                     >= 6.5%  Diabetic Goal                < 7.0%   LACTIC ACID, PLASMA - Normal   BETA HYDROXYBUTYRATE QUANTITATIVE - Normal    Narrative:     In the assessment of possible diabetic ketoacidosis, the test should be interpreted along with other clinical and laboratory findings.  A level greater than 1 mmol/L should require further evaluation and levels of more than 3 mmol/L require immediate medical review.   BLOOD GAS, VENOUS   LIPID PANEL    Narrative:     Cholesterol Reference Ranges  (U.S. Department of Health and Human Services ATP III Classifications)    Desirable          <200 mg/dL  Borderline High    200-239 mg/dL  High Risk          >240 mg/dL      Triglyceride Reference Ranges  (U.S. Department of Health and Human Services ATP III Classifications)    Normal           <150 mg/dL  Borderline High  150-199  mg/dL  High             200-499 mg/dL  Very High        >500 mg/dL    HDL Reference Ranges  (U.S. Department of Health and Human Services ATP III Classifications)    Low     <40 mg/dl (major risk factor for CHD)  High    >60 mg/dl ('negative' risk factor for CHD)        LDL Reference Ranges  (U.S. Department of Health and Human Services ATP III Classifications)    Optimal          <100 mg/dL  Near Optimal     100-129 mg/dL  Borderline High  130-159 mg/dL  High             160-189 mg/dL  Very High        >189 mg/dL   URINALYSIS, MICROSCOPIC ONLY   RAINBOW DRAW    Narrative:     The following orders were created for panel order [place] Draw.  Procedure                               Abnormality         Status                     ---------                               -----------         ------                     Green Top (Gel)[[id number]]                                                             Lavender Top[[id number]]                                     Final result               Gold Top - SST[[id number]]                                   Final result               Gray Top[[id number]]                                         Final result               Light Blue Top[[id number]]                                   Final result                 Please view results for these tests on the individual orders.   SINGLE HS TROPONIN T   ETHANOL   CBC AND DIFFERENTIAL    Narrative:     The following orders were created for panel order CBC & Differential.  Procedure                               Abnormality         Status                     ---------                               -----------         ------                     CBC Auto Differential[[id number]]        Abnormal            Final result               Scan Slide[[id number]]                                                                    Please view results for these tests on the individual orders.   LAVENDER TOP   GOLD TOP - SST   GRAY TOP   LIGHT BLUE TOP   GREEN  TOP       Meds Given in ED:   Medications   sodium chloride 0.9 % flush 10 mL (has no administration in time range)   sodium chloride 0.9 % bolus 1,000 mL (0 mL Intravenous Stopped 10/4/24 2135)           Last NIH score:                                                          Dysphagia screening results:  Patient Factors Component (Dysphagia:Stroke or Rule-out)  Best Eye Response: 4-->(E4) spontaneous (10/04/24 2030)  Best Motor Response: 6-->(M6) obeys commands (10/04/24 2030)  Best Verbal Response: 5-->(V5) oriented (10/04/24 2030)  [place]ai Coma Scale Score: 15 (10/04/24 2030)     [place] Coma Scale:  No data recorded     CIWA:        Restraint Type:            Isolation Status:  No active isolations

## 2024-10-05 NOTE — PLAN OF CARE
Goal Outcome Evaluation:         AOx4  VSS on RA  Sinus tach on the monitor with PACs.  She states she is unsteady on her feet due to something being wrong with her right ear.  NPO for gallbladder US.  No complaints of pain or nausea.  Fall and skin precautions in place.

## 2024-10-05 NOTE — PROGRESS NOTES
Malnutrition Severity Assessment    Patient Name:  Fabiola Salmeron  YOB: 1950  MRN: [id number]  Admit Date:  10/4/2024    Patient meets criteria for : Severe Malnutrition    Comments:  Patient meets acute illness related severe malnutrition (most likely acute on chronic) with indicators for severe decrease in energy intake, severe weight loss, severe muscle wasting, and moderate fat loss.    Malnutrition Severity Assessment  Malnutrition Type: Acute Disease or Injury - Related Malnutrition  Malnutrition Type (Last 8 Hours)       Malnutrition Severity Assessment       Row Name 10/05/24 1255       Malnutrition Severity Assessment    Malnutrition Type Acute Disease or Injury - Related Malnutrition      Row Name 10/05/24 1255       Insufficient Energy Intake     Insufficient Energy Intake Findings Severe    Insufficient Energy Intake  < or equal to 50% of est. energy requirement for > or equal to 5d)      Row Name 10/05/24 1255       Unintentional Weight Loss     Unintentional Weight Loss Findings Severe    Unintentional Weight Loss  Weight loss greater than 5% in one month      Row Name 10/05/24 1255       Muscle Loss    Loss of Muscle Mass Findings Severe    Religious Region Severe - deep hollowing/scooping, lack of muscle to touch, facial bones well defined    Clavicle Bone Region Severe - protruding prominent bone    Acromion Bone Region Severe - squared shoulders, bones, and acromion process protrusion prominent    Dorsal Hand Region Severe - prominent depression    Patellar Region Severe - prominent bone, square looking, very little muscle definition    Anterior Thigh Region Severe - line/depression along thigh, obviously thin    Posterior Calf Region Severe - thin with very little definition/firmness      Row Name 10/05/24 1255       Fat Loss    Subcutaneous Fat Loss Findings Moderate    Orbital Region  Moderate -  somewhat hollowness, slightly dark circles    Upper Arm Region Moderate - some fat tissue,  not ample      Row Name 10/05/24 1255       Declining Functional Status    Declining Functional Status Findings N/A      Row Name 10/05/24 1255       Criteria Met (Must meet criteria for severity in at least 2 of these categories: M Wasting, Fat Loss, Fluid, Secondary Signs, Wt. Status, Intake)    Patient meets criteria for  Severe Malnutrition                    Electronically signed by:  Naima Armstrong MS,RD,LD  10/05/24 12:57 EDT

## 2024-10-06 LAB
ALBUMIN SERPL-MCNC: 2.9 G/DL (ref 3.5–5.2)
ALBUMIN/GLOB SERPL: 0.9 G/DL
ALP SERPL-CCNC: 79 U/L (ref 39–117)
ALT SERPL W P-5'-P-CCNC: 16 U/L (ref 1–33)
ANION GAP SERPL CALCULATED.3IONS-SCNC: 8 MMOL/L (ref 5–15)
AST SERPL-CCNC: 26 U/L (ref 1–32)
BILIRUB SERPL-MCNC: 0.4 MG/DL (ref 0–1.2)
BUN SERPL-MCNC: 13 MG/DL (ref 8–23)
BUN/CREAT SERPL: 20.3 (ref 7–25)
CALCIUM SPEC-SCNC: 8.4 MG/DL (ref 8.6–10.5)
CHLORIDE SERPL-SCNC: 107 MMOL/L (ref 98–107)
CO2 SERPL-SCNC: 19 MMOL/L (ref 22–29)
CREAT SERPL-MCNC: 0.64 MG/DL (ref 0.57–1)
DEPRECATED RDW RBC AUTO: 56 FL (ref 37–54)
EGFRCR SERPLBLD CKD-EPI 2021: 91.7 ML/MIN/1.73
ERYTHROCYTE [DISTWIDTH] IN BLOOD BY AUTOMATED COUNT: 13.6 % (ref 12.3–15.4)
GLOBULIN UR ELPH-MCNC: 3.1 GM/DL
GLUCOSE SERPL-MCNC: 106 MG/DL (ref 65–99)
HCT VFR BLD AUTO: 29 % (ref 34–46.6)
HGB BLD-MCNC: 10.2 G/DL (ref 12–15.9)
MCH RBC QN AUTO: 38.8 PG (ref 26.6–33)
MCHC RBC AUTO-ENTMCNC: 35.2 G/DL (ref 31.5–35.7)
MCV RBC AUTO: 110.3 FL (ref 79–97)
PLATELET # BLD AUTO: 274 10*3/MM3 (ref 140–450)
PMV BLD AUTO: 9 FL (ref 6–12)
POTASSIUM SERPL-SCNC: 3.6 MMOL/L (ref 3.5–5.2)
POTASSIUM SERPL-SCNC: 4.9 MMOL/L (ref 3.5–5.2)
PROT SERPL-MCNC: 6 G/DL (ref 6–8.5)
QT INTERVAL: 352 MS
QTC INTERVAL: 435 MS
RBC # BLD AUTO: 2.63 10*6/MM3 (ref 3.77–5.28)
SODIUM SERPL-SCNC: 134 MMOL/L (ref 136–145)
WBC NRBC COR # BLD AUTO: 12.44 10*3/MM3 (ref 3.4–10.8)

## 2024-10-06 PROCEDURE — 99232 SBSQ HOSP IP/OBS MODERATE 35: CPT | Performed by: NURSE PRACTITIONER

## 2024-10-06 PROCEDURE — 99232 SBSQ HOSP IP/OBS MODERATE 35: CPT | Performed by: STUDENT IN AN ORGANIZED HEALTH CARE EDUCATION/TRAINING PROGRAM

## 2024-10-06 PROCEDURE — 82787 IGG 1 2 3 OR 4 EACH: CPT | Performed by: NURSE PRACTITIONER

## 2024-10-06 PROCEDURE — 25810000003 LACTATED RINGERS PER 1000 ML: Performed by: STUDENT IN AN ORGANIZED HEALTH CARE EDUCATION/TRAINING PROGRAM

## 2024-10-06 PROCEDURE — 85027 COMPLETE CBC AUTOMATED: CPT | Performed by: STUDENT IN AN ORGANIZED HEALTH CARE EDUCATION/TRAINING PROGRAM

## 2024-10-06 PROCEDURE — 84132 ASSAY OF SERUM POTASSIUM: CPT | Performed by: STUDENT IN AN ORGANIZED HEALTH CARE EDUCATION/TRAINING PROGRAM

## 2024-10-06 PROCEDURE — 80053 COMPREHEN METABOLIC PANEL: CPT | Performed by: STUDENT IN AN ORGANIZED HEALTH CARE EDUCATION/TRAINING PROGRAM

## 2024-10-06 RX ORDER — POTASSIUM CHLORIDE 1500 MG/1
40 TABLET, EXTENDED RELEASE ORAL EVERY 4 HOURS
Status: COMPLETED | OUTPATIENT
Start: 2024-10-06 | End: 2024-10-06

## 2024-10-06 RX ORDER — POTASSIUM CHLORIDE 1500 MG/1
40 TABLET, EXTENDED RELEASE ORAL EVERY 4 HOURS
Status: CANCELLED | OUTPATIENT
Start: 2024-10-06 | End: 2024-10-06

## 2024-10-06 RX ADMIN — TIMOLOL MALEATE 1 DROP: 2.5 SOLUTION/ DROPS OPHTHALMIC at 21:51

## 2024-10-06 RX ADMIN — BISOPROLOL FUMARATE 5 MG: 5 TABLET ORAL at 09:23

## 2024-10-06 RX ADMIN — POTASSIUM CHLORIDE 40 MEQ: 1500 TABLET, EXTENDED RELEASE ORAL at 09:24

## 2024-10-06 RX ADMIN — POTASSIUM CHLORIDE 40 MEQ: 1500 TABLET, EXTENDED RELEASE ORAL at 11:57

## 2024-10-06 RX ADMIN — SODIUM CHLORIDE, POTASSIUM CHLORIDE, SODIUM LACTATE AND CALCIUM CHLORIDE 100 ML/HR: 600; 310; 30; 20 INJECTION, SOLUTION INTRAVENOUS at 04:33

## 2024-10-06 RX ADMIN — Medication 10 ML: at 21:51

## 2024-10-06 RX ADMIN — SODIUM BICARBONATE 1300 MG: 650 TABLET ORAL at 11:57

## 2024-10-06 RX ADMIN — ROSUVASTATIN 5 MG: 10 TABLET, FILM COATED ORAL at 21:50

## 2024-10-06 RX ADMIN — FOLIC ACID 1000 MCG: 1 TABLET ORAL at 09:24

## 2024-10-06 RX ADMIN — Medication 10 ML: at 09:24

## 2024-10-06 RX ADMIN — SPIRONOLACTONE 25 MG: 25 TABLET, FILM COATED ORAL at 09:24

## 2024-10-06 RX ADMIN — SODIUM BICARBONATE 1300 MG: 650 TABLET ORAL at 17:43

## 2024-10-06 RX ADMIN — ASPIRIN 81 MG: 81 TABLET, COATED ORAL at 09:24

## 2024-10-06 RX ADMIN — SODIUM BICARBONATE 1300 MG: 650 TABLET ORAL at 09:23

## 2024-10-06 RX ADMIN — SODIUM BICARBONATE 1300 MG: 650 TABLET ORAL at 21:50

## 2024-10-06 RX ADMIN — TIMOLOL MALEATE 1 DROP: 2.5 SOLUTION/ DROPS OPHTHALMIC at 09:24

## 2024-10-06 NOTE — PLAN OF CARE
Goal Outcome Evaluation:           Progress: improving  Outcome Evaluation: Patient has had a decent shift, awake for most of today. VSS, and patient has been alert and oriented times four. No complaints of pain today.  at bedside. Nursing staff will continue to monitor and assess the patient.

## 2024-10-06 NOTE — PROGRESS NOTES
"  GI Daily Progress Note  Subjective:    Chief Complaint: Follow-up pancreatitis    Patient resting bed in no acute distress.  Notes she is feeling better today.  Tolerating diet.  No worsening pain.  No new or worsening GI complaints.  Ambulated last night.  Passing flatus and BMs.     Objective:    /67 (BP Location: Left arm, Patient Position: Sitting)   Pulse 100   Temp 98.9 °F (37.2 °C) (Oral)   Resp 16   Ht 167.6 cm (66\")   Wt 47.6 kg (105 lb)   LMP  (LMP Unknown)   SpO2 97%   BMI 16.95 kg/m²     Physical Exam  Vitals and nursing note reviewed.   Constitutional:       General: She is not in acute distress.     Appearance: Normal appearance. She is normal weight. She is not ill-appearing or toxic-appearing.   Cardiovascular:      Rate and Rhythm: Normal rate and regular rhythm.      Pulses: Normal pulses.      Heart sounds: Normal heart sounds.   Pulmonary:      Effort: Pulmonary effort is normal. No respiratory distress.      Breath sounds: Normal breath sounds.   Abdominal:      General: Abdomen is flat. Bowel sounds are normal. There is no distension.      Palpations: Abdomen is soft. There is no mass.      Tenderness: There is no abdominal tenderness. There is no guarding or rebound.      Hernia: No hernia is present.   Skin:     General: Skin is warm and dry.      Capillary Refill: Capillary refill takes less than 2 seconds.      Coloration: Skin is not jaundiced or pale.   Neurological:      General: No focal deficit present.      Mental Status: She is alert and oriented to person, place, and time.   Psychiatric:         Mood and Affect: Mood normal.         Behavior: Behavior normal.         Thought Content: Thought content normal.         Judgment: Judgment normal.     Lab  I have personally reviewed most recent cardiac tracings, lab results, and radiology images and interpretations and agree with findings.    Lab Results   Component Value Date    WBC 12.44 (H) 10/06/2024    HGB 10.2 (L) " 10/06/2024    HGB 11.2 (L) 10/05/2024    HGB 12.3 10/04/2024    .3 (H) 10/06/2024     10/06/2024       Lab Results   Component Value Date    GLUCOSE 106 (H) 10/06/2024    BUN 13 10/06/2024    CREATININE 0.64 10/06/2024    EGFRIFNONA 66 12/07/2021    BCR 20.3 10/06/2024     (L) 10/06/2024    K 3.6 10/06/2024    CO2 19.0 (L) 10/06/2024    CALCIUM 8.4 (L) 10/06/2024    PROTENTOTREF 6.9 05/16/2024    ALBUMIN 2.9 (L) 10/06/2024    ALKPHOS 79 10/06/2024    BILITOT 0.4 10/06/2024    ALT 16 10/06/2024    AST 26 10/06/2024     Assessment:      Pancreatitis    Severe malnutrition    1.  Acute idiopathic interstitial pancreatitis, cannot rule out drug-induced pancreatitis  2.  Abdominal pain, related to above  3.  Nausea and early satiety, related  4.  CHF    Plan:  Patient doing well today.  Symptoms improving.    In terms of etiology of pancreatitis, this remains indeterminate at present time.  Differential remains idiopathic versus drug-induced in setting of Repatha.  Going forward, patient instructed to abstain from any further use of Repatha.  If recurrent episodes of pancreatitis, neck step would be to pursue cholecystectomy as most common cause of pancreatitis is cholelithiasis and/or lithogenic bile.  At present, as patient is improving, will opt for close outpatient follow-up and monitoring going forward.    >>> May advance diet as tolerated-maintain low-fat qualifier  >>> Continue antiemetics and pain control measures  >>> Would avoid any further use of Repatha  >>> Encourage use of incentive spirometry  >>> Encourage ambulation    Patient will need to follow-up with her PCP, cardiology, and gastroenterology at discharge.  As symptoms are improving, anticipate discharge home in a.m.    KAREEM Gordon  10/06/24  15:37 EDT

## 2024-10-06 NOTE — PLAN OF CARE
Problem: Adult Inpatient Plan of Care  Goal: Plan of Care Review  Outcome: Progressing  Flowsheets (Taken 10/6/2024 7979)  Outcome Evaluation: VSS. RA. Clear Diet. Sinus Tach with PACs. No complaints of nausea, vomiting or pain. LR infusing per protocol. Fall and skin precautions in place. No other concerns at this time.   Goal Outcome Evaluation:              Outcome Evaluation: VSS. RA. Patient A & O x4. Clear Diet. Sinus Tach with PACs. No complaints of nausea, vomiting or pain. LR infusing per protocol. Fall and skin precautions in place. No other concerns at this time.

## 2024-10-06 NOTE — PROGRESS NOTES
[facility] Medicine Services  PROGRESS NOTE    Patient Name: Fabiola Salmeron  : 1950  MRN: [id number]    Date of Admission: 10/4/2024  Primary Care Physician: Jessica Roach PA-C    Subjective   Subjective     CC:  Follow-up poor appetite, pancreatitis    HPI:  HR elevated to 144 overnight, EKG showed heart rate 98, PAC, PVC. Denied abdominal pain. Tolerated CLD. No N/V. Had a BM today.  Patient's  at bedside    Objective   Objective     Vital Signs:   Temp:  [97.7 °F (36.5 °C)-99.1 °F (37.3 °C)] 98.3 °F (36.8 °C)  Heart Rate:  [] 104  Resp:  [16-21] 16  BP: (115-140)/(69-97) 140/96     Physical Exam:  Constitutional: No acute distress, awake, alert, sitting up  HENT: NCAT, mucous membranes moist  Respiratory: Clear to auscultation bilaterally, respiratory effort normal   Cardiovascular: RRR, no murmurs  Gastrointestinal: Normoactive bowel sounds, soft, nontender, nondistended  Musculoskeletal: No bilateral ankle edema  Psychiatric: Appropriate affect, cooperative  Neurologic: Alert, oriented, symmetric facies, moves all extremities well, speech clear    Results Reviewed:  LAB RESULTS:      Lab 10/06/24  0349 10/05/24  0341 10/04/24  2327 10/04/24  1819 10/02/24  1446   WBC 12.44* 14.50*  --  14.96* 9.52   HEMOGLOBIN 10.2* 11.2*  --  12.3 13.0   HEMATOCRIT 29.0* 33.1*  --  36.6 37.0   PLATELETS 274 302  --  335 351   NEUTROS ABS  --   --   --  12.71* 7.71*   IMMATURE GRANS (ABS)  --   --   --  0.11* 0.06*   LYMPHS ABS  --   --   --  1.11 1.16   MONOS ABS  --   --   --  0.96* 0.53   EOS ABS  --   --   --  0.05 0.04   .3* 113.7*  --  115.8* 108.8*   LACTATE  --   --   --  1.5  --    HSTROP T  --   --  19*  --   --          Lab 10/06/24  0349 10/05/24  0341 10/04/24  2327 10/04/24  1819 10/02/24  1446   SODIUM 134* 136  --  134* 136   POTASSIUM 3.6 3.7  --  4.5 4.5   CHLORIDE 107 109*  --  105 111*   CO2 19.0* 11.0*  --  14.0* 11.0*   ANION GAP 8.0 16.0*  --   15.0 14.0   BUN 13 45*  --  65* 45*   CREATININE 0.64 1.02*  --  1.45* 1.23*   EGFR 91.7 57.1*  --  37.5* 45.6*   GLUCOSE 106* 79  --  103* 120*   CALCIUM 8.4* 8.9  --  9.8 9.5   HEMOGLOBIN A1C  --   --   --  5.70*  --    TSH  --   --  1.600  --  2.890         Lab 10/06/24  0349 10/05/24  0341 10/04/24  1819 10/02/24  1446   TOTAL PROTEIN 6.0 6.4 6.6 7.0   ALBUMIN 2.9* 3.2* 4.0 4.2   GLOBULIN 3.1 3.2 2.6 2.8   ALT (SGPT) 16 21 28 21   AST (SGOT) 26 30 43* 22   BILIRUBIN 0.4 0.3 0.3 0.3   ALK PHOS 79 87 104 89   LIPASE  --   --  438* 1,301*         Lab 10/04/24  2327 10/02/24  1446   PROBNP  --  334.0   HSTROP T 19*  --          Lab 10/04/24  1819   CHOLESTEROL 72   LDL CHOL 6   HDL CHOL 55   TRIGLYCERIDES 34         Lab 10/02/24  1446   IRON 28*   IRON SATURATION (TSAT) 7*   TIBC 378   TRANSFERRIN 254         Lab 10/04/24  1827   FIO2 21   CARBOXYHEMOGLOBIN (VENOUS) 0.7     Brief Urine Lab Results  (Last result in the past 365 days)        Color   Clarity   Blood   Leuk Est   Nitrite   Protein   CREAT   Urine HCG        10/04/24 2109 Yellow   Clear   Negative   Negative   Negative   30 mg/dL (1+)                   Microbiology Results Abnormal       None            US Gallbladder    Result Date: 10/5/2024  US GALLBLADDER Date of Exam: 10/5/2024 7:38 AM EDT Indication: bile duct. Pancreatitis. Comparison: CT 10/4/2024 and prior Technique: Grayscale and color Doppler ultrasound evaluation of the right upper quadrant was performed. FINDINGS: Liver: Liver is heterogeneous in appearance. No intraparotid biliary ductal dilation noted. Hepatic and portal vein appear patent centrally. Biliary System: Gallbladder is unremarkable without evidence of pericholecystic fluid, wall thickening or stones. Negative sonographic Armas's sign. Common bile duct is within normal limits measurin mm Right Kidney: The right kidney is grossly unremarkable without evidence of hydronephrosis. Pancreas: Visualized portions of the pancreas  are grossly unremarkable. Other: No evidence of right upper quadrant ascites.     Impression: 1. Gallbladder and common bile duct are grossly unremarkable in appearance 2. Liver slightly heterogeneous in appearance. Please correlate with liver function test Electronically Signed: Law Andres MD  10/5/2024 8:10 AM EDT  Workstation ID: [id number]    CT Abdomen Pelvis With & Without Contrast    Result Date: 10/4/2024  CT CHEST W CONTRAST DIAGNOSTIC, CT ABDOMEN PELVIS W WO CONTRAST Date of Exam: 10/4/2024 1:53 PM EDT Indication: weight loss, shortness of breath. Comparison: 5/21/2022 chest abdomen pelvis CT scans. Technique: Initial unenhanced spiral scan was performed through the abdomen and pelvis, with subsequent axial CT images obtained of the chest abdomen and pelvis after the uneventful intravenous administration of 80 mL Isovue-300.  Reconstructed coronal and sagittal images were also obtained. Automated exposure control and iterative construction methods were used. Findings: History from the patient's chart indicates recent loss of appetite epigastric discomfort, fatigue and exertional dyspnea. Prior exam reports from 2022 describe questionable gastritis and mild proctocolitis. No other evidence of acute chest and abdominal or pelvic disease. CT SCAN OF THE CHEST WITH IV CONTRAST: Right-sided neurostimulator lead is seen along the posterior right chest. There are bilateral breast prostheses. No axillary or lower cervical lymphadenopathy is seen. No mediastinal or hilar adenopathy, pericardial  or pleural effusion is seen. This study shows fairly good contrast opacification of the pulmonary arteries as well as good contrast opacification of the thoracic aorta and there is no evidence of aneurysm, dissection or pulmonary embolic disease. There are some calcified right hilar lymph nodes. Airways appear to be normally patent. Lungs appear clear of active disease. Minimal pleural-based right apical and basilar  lung scarring is stable. Bony structures appear to be intact.     Impression: No evidence of active chest disease. CT SCAN OF THE ABDOMEN PELVIS WITH IV CONTRAST: As on previous scan, pancreatic parenchyma shows significant fatty replacement. Today's exam also appears to show mild new pancreatic fat stranding, suggestive of pancreatitis, with focal edema of the pancreatic tail and milder changes elsewhere. No pseudocyst is appreciated. There is no evidence of ascites or free air. No new abnormalities are seen of the liver, spleen, adrenal glands, or kidneys for a non-IV contrast enhanced exam. Gallbladder is significantly distended, but typical for fasting state, with no visible inflammation or cholelithiasis. There appears to be  mild secondary inflammation of the duodenum, possibly of the gastric antrum. No abnormally dilated bowel loops are seen. There are a few shotty periaortic lymph nodes, but practically unchanged from the prior exam. Regarding the lower abdomen/pelvis, there is pancolonic diverticulosis without evidence of diverticulitis. Bowel loops are normal in caliber and otherwise normal in appearance. Bladder is completely decompressed. Uterus is near premenopausal in size but appears stable from the prior exam. Ovaries are difficult to delineate, but apparently are not enlarged. Review of the vascular structures shows normal contrast opacification of the portal, splenic and superior mesenteric veins. No aortoiliac dilatation is seen. Review of the bony structures shows advanced degenerative disc disease at L1-2 L4-5, L5-S1, but no acute bony abnormality. Delayed venous phase images show no evidence of obstructive uropathy. Impression: 1. Diffuse peripancreatic fat stranding, consistent with pancreatitis. No evidence of pseudocyst, phlegmon or other complication. Probable mild secondary inflammation of the gastric antrum and duodenum. 2. No other evidence of acute intra-abdominal intrapelvic disease  elsewhere. Pancolonic diverticulosis is noted, but with no CT scan evidence of acute diverticulitis. Electronically Signed: Willis Mccoy MD  10/4/2024 2:45 PM EDT  Workstation ID: [id number]    CT Chest With Contrast Diagnostic    Result Date: 10/4/2024  CT CHEST W CONTRAST DIAGNOSTIC, CT ABDOMEN PELVIS W WO CONTRAST Date of Exam: 10/4/2024 1:53 PM EDT Indication: weight loss, shortness of breath. Comparison: 5/21/2022 chest abdomen pelvis CT scans. Technique: Initial unenhanced spiral scan was performed through the abdomen and pelvis, with subsequent axial CT images obtained of the chest abdomen and pelvis after the uneventful intravenous administration of 80 mL Isovue-300.  Reconstructed coronal and sagittal images were also obtained. Automated exposure control and iterative construction methods were used. Findings: History from the patient's chart indicates recent loss of appetite epigastric discomfort, fatigue and exertional dyspnea. Prior exam reports from 2022 describe questionable gastritis and mild proctocolitis. No other evidence of acute chest and abdominal or pelvic disease. CT SCAN OF THE CHEST WITH IV CONTRAST: Right-sided neurostimulator lead is seen along the posterior right chest. There are bilateral breast prostheses. No axillary or lower cervical lymphadenopathy is seen. No mediastinal or hilar adenopathy, pericardial  or pleural effusion is seen. This study shows fairly good contrast opacification of the pulmonary arteries as well as good contrast opacification of the thoracic aorta and there is no evidence of aneurysm, dissection or pulmonary embolic disease. There are some calcified right hilar lymph nodes. Airways appear to be normally patent. Lungs appear clear of active disease. Minimal pleural-based right apical and basilar lung scarring is stable. Bony structures appear to be intact.     Impression: No evidence of active chest disease. CT SCAN OF THE ABDOMEN PELVIS WITH IV CONTRAST: As on  previous scan, pancreatic parenchyma shows significant fatty replacement. Today's exam also appears to show mild new pancreatic fat stranding, suggestive of pancreatitis, with focal edema of the pancreatic tail and milder changes elsewhere. No pseudocyst is appreciated. There is no evidence of ascites or free air. No new abnormalities are seen of the liver, spleen, adrenal glands, or kidneys for a non-IV contrast enhanced exam. Gallbladder is significantly distended, but typical for fasting state, with no visible inflammation or cholelithiasis. There appears to be  mild secondary inflammation of the duodenum, possibly of the gastric antrum. No abnormally dilated bowel loops are seen. There are a few shotty periaortic lymph nodes, but practically unchanged from the prior exam. Regarding the lower abdomen/pelvis, there is pancolonic diverticulosis without evidence of diverticulitis. Bowel loops are normal in caliber and otherwise normal in appearance. Bladder is completely decompressed. Uterus is near premenopausal in size but appears stable from the prior exam. Ovaries are difficult to delineate, but apparently are not enlarged. Review of the vascular structures shows normal contrast opacification of the portal, splenic and superior mesenteric veins. No aortoiliac dilatation is seen. Review of the bony structures shows advanced degenerative disc disease at L1-2 L4-5, L5-S1, but no acute bony abnormality. Delayed venous phase images show no evidence of obstructive uropathy. Impression: 1. Diffuse peripancreatic fat stranding, consistent with pancreatitis. No evidence of pseudocyst, phlegmon or other complication. Probable mild secondary inflammation of the gastric antrum and duodenum. 2. No other evidence of acute intra-abdominal intrapelvic disease elsewhere. Pancolonic diverticulosis is noted, but with no CT scan evidence of acute diverticulitis. Electronically Signed: Willis Mccoy MD  10/4/2024 2:45 PM EDT   Workstation ID: [id number]     Results for orders placed during the hospital encounter of 11/17/23    Adult Transthoracic Echo Complete W/ Cont if Necessary Per Protocol    Interpretation Summary    Left ventricular systolic function is normal. Estimated left ventricular EF = 60%    Left ventricular diastolic function was normal.    Estimated right ventricular systolic pressure from tricuspid regurgitation is normal (<35 mmHg).    The cardiac valves are anatomically and functionally normal.      Current medications:  Scheduled Meds:Pharmacy Consult, , Does not apply, Daily  [Held by provider] aspirin, 81 mg, Oral, Daily  bimatoprost, 1 drop, Both Eyes, Nightly  [Held by provider] bisoprolol, 5 mg, Oral, Daily  folic acid, 1,000 mcg, Oral, Daily  potassium chloride ER, 40 mEq, Oral, Q4H  rosuvastatin, 5 mg, Oral, Nightly  sodium bicarbonate, 1,300 mg, Oral, 4x Daily  sodium chloride, 10 mL, Intravenous, Q12H  [Held by provider] spironolactone, 25 mg, Oral, Daily  timolol, 1 drop, Both Eyes, Q12H  [Held by provider] valsartan, 80 mg, Oral, Daily      Continuous Infusions:lactated ringers, 100 mL/hr, Last Rate: 100 mL/hr (10/06/24 0433)      PRN Meds:.  senna-docusate sodium **AND** polyethylene glycol **AND** bisacodyl **AND** bisacodyl    Calcium Replacement - Follow Nurse / BPA Driven Protocol    Magnesium Standard Dose Replacement - Follow Nurse / BPA Driven Protocol    Phosphorus Replacement - Follow Nurse / BPA Driven Protocol    Potassium Replacement - Follow Nurse / BPA Driven Protocol    sodium chloride    sodium chloride    Assessment & Plan   Assessment & Plan     Active Hospital Problems    Diagnosis  POA    **Pancreatitis [K85.90]  Yes    Severe malnutrition [E43]  Yes      Resolved Hospital Problems   No resolved problems to display.        Brief Hospital Course to date:  Fabiola Salmeron is a 74 y.o. female with history of HTN, HLD, CAD, HFpEF, microcytic anemia, outpatient diagnosis of pancreatitis 2 days  prior to presentation, who presented for evaluation of poor appetite, early satiety, weight loss, nausea for the past 2 to 3 weeks prior to presentation.    This patient's problems and plans were partially entered by my partner and updated as appropriate by me 10/06/24.    Acute pancreatitis  Early satiety and weight loss  Progressive macrocytosis  -No clear etiology for current episode, does not drink, TG level normal, Ca okay.  Has spinal cord stimulator otherwise would get MRCP.  Med fill history unrevealing.  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  She is not having any vomiting or diarrhea so unsure where this is coming from as acidosis has preceded acute episode.  -Bicarb QID for now; improving, hope to wean, a.m. labs  -MILDRED resolved     HFpEF  -stable at this time    Glaucoma -Continue home eyedrops      Expected Discharge Location and Transportation: home  Expected Discharge   Expected discharge date/ time has not been documented.     VTE Prophylaxis:  Mechanical VTE prophylaxis orders are present.         AM-PAC 6 Clicks Score (PT): 21 (10/05/24 2000)    CODE STATUS:   Code Status and Medical Interventions: CPR (Attempt to Resuscitate); Full Support   Ordered at: 10/04/24 7002     Code Status (Patient has no pulse and is not breathing):    CPR (Attempt to Resuscitate)     Medical Interventions (Patient has pulse or is breathing):    Full Support       Marylin Lowry MD  10/06/24

## 2024-10-07 ENCOUNTER — READMISSION MANAGEMENT (OUTPATIENT)
Dept: CALL CENTER | Facility: HOSPITAL | Age: 74
End: 2024-10-07
Payer: MEDICARE

## 2024-10-07 VITALS
WEIGHT: 105 LBS | OXYGEN SATURATION: 96 % | RESPIRATION RATE: 17 BRPM | TEMPERATURE: 98.6 F | HEIGHT: 66 IN | HEART RATE: 91 BPM | DIASTOLIC BLOOD PRESSURE: 81 MMHG | SYSTOLIC BLOOD PRESSURE: 120 MMHG | BODY MASS INDEX: 16.88 KG/M2

## 2024-10-07 PROBLEM — K85.90 PANCREATITIS: Status: RESOLVED | Noted: 2024-10-04 | Resolved: 2024-10-07

## 2024-10-07 LAB
ALBUMIN SERPL-MCNC: 2.9 G/DL (ref 3.5–5.2)
ALBUMIN/GLOB SERPL: 1.3 G/DL
ALP SERPL-CCNC: 78 U/L (ref 39–117)
ALT SERPL W P-5'-P-CCNC: 23 U/L (ref 1–33)
ANION GAP SERPL CALCULATED.3IONS-SCNC: 7 MMOL/L (ref 5–15)
AST SERPL-CCNC: 43 U/L (ref 1–32)
BILIRUB SERPL-MCNC: 0.4 MG/DL (ref 0–1.2)
BUN SERPL-MCNC: 5 MG/DL (ref 8–23)
BUN/CREAT SERPL: 7.9 (ref 7–25)
CALCIUM SPEC-SCNC: 8.4 MG/DL (ref 8.6–10.5)
CHLORIDE SERPL-SCNC: 105 MMOL/L (ref 98–107)
CO2 SERPL-SCNC: 24 MMOL/L (ref 22–29)
CREAT SERPL-MCNC: 0.63 MG/DL (ref 0.57–1)
EGFRCR SERPLBLD CKD-EPI 2021: 92.1 ML/MIN/1.73
GLOBULIN UR ELPH-MCNC: 2.3 GM/DL
GLUCOSE SERPL-MCNC: 108 MG/DL (ref 65–99)
IGG4 SER-MCNC: 11 MG/DL (ref 2–96)
POTASSIUM SERPL-SCNC: 4.3 MMOL/L (ref 3.5–5.2)
PROT SERPL-MCNC: 5.2 G/DL (ref 6–8.5)
SODIUM SERPL-SCNC: 136 MMOL/L (ref 136–145)

## 2024-10-07 PROCEDURE — 99239 HOSP IP/OBS DSCHRG MGMT >30: CPT | Performed by: STUDENT IN AN ORGANIZED HEALTH CARE EDUCATION/TRAINING PROGRAM

## 2024-10-07 PROCEDURE — 80053 COMPREHEN METABOLIC PANEL: CPT | Performed by: STUDENT IN AN ORGANIZED HEALTH CARE EDUCATION/TRAINING PROGRAM

## 2024-10-07 RX ADMIN — SODIUM BICARBONATE 1300 MG: 650 TABLET ORAL at 08:02

## 2024-10-07 RX ADMIN — SPIRONOLACTONE 25 MG: 25 TABLET, FILM COATED ORAL at 08:03

## 2024-10-07 RX ADMIN — Medication 10 ML: at 08:03

## 2024-10-07 RX ADMIN — BISOPROLOL FUMARATE 5 MG: 5 TABLET ORAL at 08:02

## 2024-10-07 RX ADMIN — TIMOLOL MALEATE 1 DROP: 2.5 SOLUTION/ DROPS OPHTHALMIC at 08:03

## 2024-10-07 RX ADMIN — FOLIC ACID 1000 MCG: 1 TABLET ORAL at 08:03

## 2024-10-07 RX ADMIN — ASPIRIN 81 MG: 81 TABLET, COATED ORAL at 08:03

## 2024-10-07 NOTE — DISCHARGE SUMMARY
[facility] Medicine Services  DISCHARGE SUMMARY    Patient Name: Fabiola Salmeron  : 1950  MRN: [id number]    Date of Admission: 10/4/2024  6:40 PM  Date of Discharge:  10/7/2024  Primary Care Physician: Jessica Roach PA-C    Consults       Date and Time Order Name Status Description    10/4/2024 11:24 PM Inpatient Gastroenterology Consult Completed             Hospital Course     Presenting Problem: Pancreatitis    Active Hospital Problems    Diagnosis  POA    Severe malnutrition [E43]  Yes      Resolved Hospital Problems    Diagnosis Date Resolved POA    **Pancreatitis [K85.90] 10/07/2024 Yes          Hospital Course:  Fabiola Salmeron is a 74 y.o. female with past med history significant for diastolic heart failure, microcytic anemia, CAD, glaucoma, hypertension, was admitted for pancreatitis.  GI was consulted.  Workup did not definitively identify etiology, but there is a possibility her Repatha could be contributing and was recommended to discontinue Repatha.  She was started on a low-fat diet and tolerated without difficulty.  Patient determined to be stable for discharge with outpatient follow-up.  If she develops recurrent episodes of pancreatitis, she would likely need to pursue cholecystectomy.      Acute idiopathic pancreatitis with possible drug-induced pancreatitis  Non-Anion Gap metabolic acidosis  -Treated with bowel rest, analgesics and IV fluid with resolution  -Recommended to discontinue Repatha  -Follow-up with GI and PCP  -If she develops recurrent episodes, will need to consider cholecystectomy    Discharge Follow Up Recommendations for outpatient labs/diagnostics:  Follow-up with PCP in 1 week  Follow-up with gastroenterology within 1 month    Day of Discharge     HPI:   Patient states she feels better today.  Denies nausea or abdominal pain.  States she is tolerating her diet and wishes to go home.      Vital Signs:   Temp:  [98.6 °F (37 °C)-99.3 °F (37.4  °C)] 98.6 °F (37 °C)  Heart Rate:  [] 91  Resp:  [16-17] 17  BP: (101-126)/(71-86) 120/81      Physical Exam:  General appearance: alert, awake, oriented, no acute distress   Cardiovascular: RRR, no murmurs or rubs, radial pulse full 2/4 BL   Respiratory: CTAB, no crackles or wheezes   Abdomen: soft, non-tender, bowel sounds normoactive    Neuro/CNS: alert and oriented x3, normal speech    Pertinent  and/or Most Recent Results     LAB RESULTS:      Lab 10/06/24  0349 10/05/24  0341 10/04/24  1819 10/02/24  1446   WBC 12.44* 14.50* 14.96* 9.52   HEMOGLOBIN 10.2* 11.2* 12.3 13.0   HEMATOCRIT 29.0* 33.1* 36.6 37.0   PLATELETS 274 302 335 351   NEUTROS ABS  --   --  12.71* 7.71*   IMMATURE GRANS (ABS)  --   --  0.11* 0.06*   LYMPHS ABS  --   --  1.11 1.16   MONOS ABS  --   --  0.96* 0.53   EOS ABS  --   --  0.05 0.04   .3* 113.7* 115.8* 108.8*   LACTATE  --   --  1.5  --          Lab 10/07/24  0441 10/06/24  1731 10/06/24  0349 10/05/24  0341 10/04/24  2327 10/04/24  1819 10/02/24  1446   SODIUM 136  --  134* 136  --  134* 136   POTASSIUM 4.3 4.9 3.6 3.7  --  4.5 4.5   CHLORIDE 105  --  107 109*  --  105 111*   CO2 24.0  --  19.0* 11.0*  --  14.0* 11.0*   ANION GAP 7.0  --  8.0 16.0*  --  15.0 14.0   BUN 5*  --  13 45*  --  65* 45*   CREATININE 0.63  --  0.64 1.02*  --  1.45* 1.23*   EGFR 92.1  --  91.7 57.1*  --  37.5* 45.6*   GLUCOSE 108*  --  106* 79  --  103* 120*   CALCIUM 8.4*  --  8.4* 8.9  --  9.8 9.5   HEMOGLOBIN A1C  --   --   --   --   --  5.70*  --    TSH  --   --   --   --  1.600  --  2.890         Lab 10/07/24  0441 10/06/24  0349 10/05/24  0341 10/04/24  1819 10/02/24  1446   TOTAL PROTEIN 5.2* 6.0 6.4 6.6 7.0   ALBUMIN 2.9* 2.9* 3.2* 4.0 4.2   GLOBULIN 2.3 3.1 3.2 2.6 2.8   ALT (SGPT) 23 16 21 28 21   AST (SGOT) 43* 26 30 43* 22   BILIRUBIN 0.4 0.4 0.3 0.3 0.3   ALK PHOS 78 79 87 104 89   LIPASE  --   --   --  438* 1,301*         Lab 10/04/24  2327 10/02/24  1446   PROBNP  --  334.0   HSTROP  T 19*  --          Lab 10/04/24  1819   CHOLESTEROL 72   LDL CHOL 6   HDL CHOL 55   TRIGLYCERIDES 34         Lab 10/02/24  1446   IRON 28*   IRON SATURATION (TSAT) 7*   TIBC 378   TRANSFERRIN 254         Lab 10/04/24  1827   FIO2 21   CARBOXYHEMOGLOBIN (VENOUS) 0.7     Brief Urine Lab Results  (Last result in the past 365 days)        Color   Clarity   Blood   Leuk Est   Nitrite   Protein   CREAT   Urine HCG        10/04/24 2109 Yellow   Clear   Negative   Negative   Negative   30 mg/dL (1+)                 Microbiology Results (last 10 days)       ** No results found for the last 240 hours. **            US Gallbladder    Result Date: 10/5/2024  US GALLBLADDER Date of Exam: 10/5/2024 7:38 AM EDT Indication: bile duct. Pancreatitis. Comparison: CT 10/4/2024 and prior Technique: Grayscale and color Doppler ultrasound evaluation of the right upper quadrant was performed. FINDINGS: Liver: Liver is heterogeneous in appearance. No intraparotid biliary ductal dilation noted. Hepatic and portal vein appear patent centrally. Biliary System: Gallbladder is unremarkable without evidence of pericholecystic fluid, wall thickening or stones. Negative sonographic Armas's sign. Common bile duct is within normal limits measurin mm Right Kidney: The right kidney is grossly unremarkable without evidence of hydronephrosis. Pancreas: Visualized portions of the pancreas are grossly unremarkable. Other: No evidence of right upper quadrant ascites.     1. Gallbladder and common bile duct are grossly unremarkable in appearance 2. Liver slightly heterogeneous in appearance. Please correlate with liver function test Electronically Signed: Law Andres MD  10/5/2024 8:10 AM EDT  Workstation ID: [id number]    CT Abdomen Pelvis With & Without Contrast    Result Date: 10/4/2024  CT CHEST W CONTRAST DIAGNOSTIC, CT ABDOMEN PELVIS W WO CONTRAST Date of Exam: 10/4/2024 1:53 PM EDT Indication: weight loss, shortness of breath. Comparison:  5/21/2022 chest abdomen pelvis CT scans. Technique: Initial unenhanced spiral scan was performed through the abdomen and pelvis, with subsequent axial CT images obtained of the chest abdomen and pelvis after the uneventful intravenous administration of 80 mL Isovue-300.  Reconstructed coronal and sagittal images were also obtained. Automated exposure control and iterative construction methods were used. Findings: History from the patient's chart indicates recent loss of appetite epigastric discomfort, fatigue and exertional dyspnea. Prior exam reports from 2022 describe questionable gastritis and mild proctocolitis. No other evidence of acute chest and abdominal or pelvic disease. CT SCAN OF THE CHEST WITH IV CONTRAST: Right-sided neurostimulator lead is seen along the posterior right chest. There are bilateral breast prostheses. No axillary or lower cervical lymphadenopathy is seen. No mediastinal or hilar adenopathy, pericardial  or pleural effusion is seen. This study shows fairly good contrast opacification of the pulmonary arteries as well as good contrast opacification of the thoracic aorta and there is no evidence of aneurysm, dissection or pulmonary embolic disease. There are some calcified right hilar lymph nodes. Airways appear to be normally patent. Lungs appear clear of active disease. Minimal pleural-based right apical and basilar lung scarring is stable. Bony structures appear to be intact.     No evidence of active chest disease. CT SCAN OF THE ABDOMEN PELVIS WITH IV CONTRAST: As on previous scan, pancreatic parenchyma shows significant fatty replacement. Today's exam also appears to show mild new pancreatic fat stranding, suggestive of pancreatitis, with focal edema of the pancreatic tail and milder changes elsewhere. No pseudocyst is appreciated. There is no evidence of ascites or free air. No new abnormalities are seen of the liver, spleen, adrenal glands, or kidneys for a non-IV contrast enhanced  exam. Gallbladder is significantly distended, but typical for fasting state, with no visible inflammation or cholelithiasis. There appears to be  mild secondary inflammation of the duodenum, possibly of the gastric antrum. No abnormally dilated bowel loops are seen. There are a few shotty periaortic lymph nodes, but practically unchanged from the prior exam. Regarding the lower abdomen/pelvis, there is pancolonic diverticulosis without evidence of diverticulitis. Bowel loops are normal in caliber and otherwise normal in appearance. Bladder is completely decompressed. Uterus is near premenopausal in size but appears stable from the prior exam. Ovaries are difficult to delineate, but apparently are not enlarged. Review of the vascular structures shows normal contrast opacification of the portal, splenic and superior mesenteric veins. No aortoiliac dilatation is seen. Review of the bony structures shows advanced degenerative disc disease at L1-2 L4-5, L5-S1, but no acute bony abnormality. Delayed venous phase images show no evidence of obstructive uropathy. Impression: 1. Diffuse peripancreatic fat stranding, consistent with pancreatitis. No evidence of pseudocyst, phlegmon or other complication. Probable mild secondary inflammation of the gastric antrum and duodenum. 2. No other evidence of acute intra-abdominal intrapelvic disease elsewhere. Pancolonic diverticulosis is noted, but with no CT scan evidence of acute diverticulitis. Electronically Signed: Willis Mccoy MD  10/4/2024 2:45 PM EDT  Workstation ID: [id number]    CT Chest With Contrast Diagnostic    Result Date: 10/4/2024  CT CHEST W CONTRAST DIAGNOSTIC, CT ABDOMEN PELVIS W WO CONTRAST Date of Exam: 10/4/2024 1:53 PM EDT Indication: weight loss, shortness of breath. Comparison: 5/21/2022 chest abdomen pelvis CT scans. Technique: Initial unenhanced spiral scan was performed through the abdomen and pelvis, with subsequent axial CT images obtained of the chest  abdomen and pelvis after the uneventful intravenous administration of 80 mL Isovue-300.  Reconstructed coronal and sagittal images were also obtained. Automated exposure control and iterative construction methods were used. Findings: History from the patient's chart indicates recent loss of appetite epigastric discomfort, fatigue and exertional dyspnea. Prior exam reports from 2022 describe questionable gastritis and mild proctocolitis. No other evidence of acute chest and abdominal or pelvic disease. CT SCAN OF THE CHEST WITH IV CONTRAST: Right-sided neurostimulator lead is seen along the posterior right chest. There are bilateral breast prostheses. No axillary or lower cervical lymphadenopathy is seen. No mediastinal or hilar adenopathy, pericardial  or pleural effusion is seen. This study shows fairly good contrast opacification of the pulmonary arteries as well as good contrast opacification of the thoracic aorta and there is no evidence of aneurysm, dissection or pulmonary embolic disease. There are some calcified right hilar lymph nodes. Airways appear to be normally patent. Lungs appear clear of active disease. Minimal pleural-based right apical and basilar lung scarring is stable. Bony structures appear to be intact.     No evidence of active chest disease. CT SCAN OF THE ABDOMEN PELVIS WITH IV CONTRAST: As on previous scan, pancreatic parenchyma shows significant fatty replacement. Today's exam also appears to show mild new pancreatic fat stranding, suggestive of pancreatitis, with focal edema of the pancreatic tail and milder changes elsewhere. No pseudocyst is appreciated. There is no evidence of ascites or free air. No new abnormalities are seen of the liver, spleen, adrenal glands, or kidneys for a non-IV contrast enhanced exam. Gallbladder is significantly distended, but typical for fasting state, with no visible inflammation or cholelithiasis. There appears to be  mild secondary inflammation of the  duodenum, possibly of the gastric antrum. No abnormally dilated bowel loops are seen. There are a few shotty periaortic lymph nodes, but practically unchanged from the prior exam. Regarding the lower abdomen/pelvis, there is pancolonic diverticulosis without evidence of diverticulitis. Bowel loops are normal in caliber and otherwise normal in appearance. Bladder is completely decompressed. Uterus is near premenopausal in size but appears stable from the prior exam. Ovaries are difficult to delineate, but apparently are not enlarged. Review of the vascular structures shows normal contrast opacification of the portal, splenic and superior mesenteric veins. No aortoiliac dilatation is seen. Review of the bony structures shows advanced degenerative disc disease at L1-2 L4-5, L5-S1, but no acute bony abnormality. Delayed venous phase images show no evidence of obstructive uropathy. Impression: 1. Diffuse peripancreatic fat stranding, consistent with pancreatitis. No evidence of pseudocyst, phlegmon or other complication. Probable mild secondary inflammation of the gastric antrum and duodenum. 2. No other evidence of acute intra-abdominal intrapelvic disease elsewhere. Pancolonic diverticulosis is noted, but with no CT scan evidence of acute diverticulitis. Electronically Signed: Willis Mccoy MD  10/4/2024 2:45 PM EDT  Workstation ID: [id number]     Results for orders placed in visit on 10/12/16    SCANNED VASCULAR STUDIES      Results for orders placed in visit on 10/12/16    SCANNED VASCULAR STUDIES      Results for orders placed during the hospital encounter of 11/17/23    Adult Transthoracic Echo Complete W/ Cont if Necessary Per Protocol    Interpretation Summary    Left ventricular systolic function is normal. Estimated left ventricular EF = 60%    Left ventricular diastolic function was normal.    Estimated right ventricular systolic pressure from tricuspid regurgitation is normal (<35 mmHg).    The cardiac valves are  anatomically and functionally normal.      Plan for Follow-up of Pending Labs/Results:   Pending Labs       Order Current Status    IgG 4 In process          Discharge Details        Discharge Medications        Continue These Medications        Instructions Start Date   aspirin 81 MG EC tablet  Commonly known as: Aspirin Low Dose   81 mg, Oral, Daily      bisoprolol 5 MG tablet  Commonly known as: ZEBeta   5 mg, Oral, Daily      brimonidine 0.2 % ophthalmic solution  Commonly known as: ALPHAGAN   INSTILL 1 DROP INTO AFFECTED EYE(S) BY OPHTHALMIC ROUTE EVERY 8 HOURS      brimonidine-timolol 0.2-0.5 % ophthalmic solution  Commonly known as: COMBIGAN   1 drop, Ophthalmic      ezetimibe 10 MG tablet  Commonly known as: ZETIA   10 mg, Oral, Daily      fluorouracil 5 % cream  Commonly known as: EFUDEX   fluorouracil 5 % topical cream   APPLY A SUFFICIENT AMOUNT TO COVER THE LESIONS IN THE AFFECTED AREA(S) BY TOPICAL ROUTE 2 TIMES PER DAY FOR 21 DAYS      folic acid 1 MG tablet  Commonly known as: FOLVITE   TAKE 1 TABLET EVERY DAY      Lumigan 0.01 % ophthalmic drops  Generic drug: bimatoprost   1 drop, Both Eyes, Nightly      Polyvinyl Alcohol-Povidone PF 1.4-0.6 % ophthalmic solution  Commonly known as: ARTIFICIAL TEARS       rosuvastatin 5 MG tablet  Commonly known as: CRESTOR   5 mg, Oral, Nightly      spironolactone 25 MG tablet  Commonly known as: ALDACTONE   25 mg, Oral, Daily      Tylenol 325 MG capsule  Generic drug: Acetaminophen   650 mg, Oral, 4 Times Daily      valsartan 80 MG tablet  Commonly known as: DIOVAN   80 mg, Oral, Daily      Vitamin B-12 1000 MCG sublingual tablet   1,000 mcg, Sublingual, Daily             Stop These Medications      Repatha SureClick solution auto-injector SureClick injection  Generic drug: Evolocumab              Allergies   Allergen Reactions    Ferrlecit [Na Ferric Gluc Cplx In Sucrose] Anaphylaxis    Codeine Other (See Comments)    Metoprolol Other (See Comments)     Fatigue       Statins Myalgia         Discharge Disposition:  Home or Self Care    Diet:  Hospital:  Diet Order   Procedures    Diet: Regular/House, Gastrointestinal; Fat-Restricted; Texture: Soft to Chew (NDD 3); Soft to Chew: Whole Meat; Fluid Consistency: Thin (IDDSI 0)       Diet Instructions       Diet: Gastrointestinal Diets; Fat-Restricted; Thin (IDDSI 0)      Discharge Diet: Gastrointestinal Diets    Gastrointestinal Diet: Fat-Restricted    Fluid Consistency: Thin (IDDSI 0)             Activity:  Activity Instructions       Activity as Tolerated              Restrictions or Other Recommendations:         CODE STATUS:    Code Status and Medical Interventions: CPR (Attempt to Resuscitate); Full Support   Ordered at: 10/04/24 2326     Code Status (Patient has no pulse and is not breathing):    CPR (Attempt to Resuscitate)     Medical Interventions (Patient has pulse or is breathing):    Full Support       Future Appointments   Date Time Provider Department Center   10/9/2024  3:15 PM Jessica Roach PA-C [facility]   11/13/2024 11:30 AM Subha Blue PA-C [facility]   1/17/2025  3:00 PM Letty Pelaez APRN [facility]   1/27/2025  3:00 PM Jessica Roach PA-C [facility]   5/14/2025  2:30 PM Abeba Lopez MD [facility]   7/29/2025  1:00 PM Ana Pereyra APRN [facility]       Additional Instructions for the Follow-ups that You Need to Schedule       Discharge Follow-up with PCP   As directed       Currently Documented PCP:    Jessica Roach PA-C    PCP Phone Number:    [phone]     Follow Up Details: 1 week        Discharge Follow-up with Specialty: Gastroenterology; 1 Month   As directed      Specialty: Gastroenterology   Follow Up: 1 Month                      Solo Blue DO  10/07/24      Time Spent on Discharge:  I spent  36  minutes on this discharge activity which included: face-to-face encounter with the patient, reviewing the data in  the system, coordination of the care with the nursing staff as well as consultants, documentation, and entering orders.

## 2024-10-07 NOTE — OUTREACH NOTE
Prep Survey      Flowsheet Row Responses   [facility] patient discharged from? [place]   Is LACE score < 7 ? No   Eligibility [facility]   Date of Admission 10/04/24   Date of Discharge 10/07/24   Discharge Disposition Home or Self Care   Discharge diagnosis Pancreatitis   Does the patient have one of the following disease processes/diagnoses(primary or secondary)? Other   Does the patient have Home health ordered? No   Is there a DME ordered? No   Prep survey completed? Yes            Rhea ARTHUR - Registered Nurse

## 2024-10-07 NOTE — PLAN OF CARE
Goal Outcome Evaluation:           Progress: improving  Outcome Evaluation: Patient is being discharged at this time. Peripheral IV has been removed. All of the patient's belongings have been gathered to be sent home with the patient. Discharge paperwork has been printed off and reviewed with the patient. Patient's  will be driving the patient home from the hospital. VSS, and patient is alert and oriented times four.

## 2024-10-07 NOTE — CASE MANAGEMENT/SOCIAL WORK
Continued Stay Note   [facility]     Patient Name: Fabiola Salmeron  MRN: [id number]  Today's Date: 10/7/2024    Admit Date: 10/4/2024    Plan: Home   Discharge Plan       Row Name 10/07/24 1029       Plan    Plan Home    Patient/Family in Agreement with Plan yes    Plan Comments  spoke with Mrs. Salmeron, at the bedside. Pt states she is being discharged home today. She states her  will transport her. No needs voiced or identified.    Final Discharge Disposition Code 01 - home or self-care                   Discharge Codes    No documentation.                 Expected Discharge Date and Time       Expected Discharge Date Expected Discharge Time    Oct 7, 2024               Ines Alejandre RN

## 2024-10-08 ENCOUNTER — TRANSITIONAL CARE MANAGEMENT TELEPHONE ENCOUNTER (OUTPATIENT)
Dept: CALL CENTER | Facility: HOSPITAL | Age: 74
End: 2024-10-08
Payer: MEDICARE

## 2024-10-08 NOTE — OUTREACH NOTE
Call Center TCM Note      Flowsheet Row Responses   [facility] patient discharged from? [place]   Does the patient have one of the following disease processes/diagnoses(primary or secondary)? Other   TCM attempt successful? Yes   Call start time 0838   Call end time 0848   Discharge diagnosis Pancreatitis   Person spoke with today (if not patient) and relationship patient   Medication alerts for this patient Patient has understanding of stopped meds (Repatha)   Meds reviewed with patient/caregiver? Yes   Is the patient having any side effects they believe may be caused by any medication additions or changes? No   Does the patient have all medications ordered at discharge? Yes   Is the patient taking all medications as directed (includes completed medication regime)? Yes   Comments Patient has a hospital followup scheduled tomorrow on 10/9/2024 with Jessica Roach.   Does the patient have an appointment with their PCP within 7-14 days of discharge? Yes   Psychosocial issues? No   Did the patient receive a copy of their discharge instructions? Yes   Nursing interventions Reviewed instructions with patient   What is the patient's perception of their health status since discharge? Improving   Is the patient/caregiver able to teach back signs and symptoms related to disease process for when to call PCP? Yes   Is the patient/caregiver able to teach back signs and symptoms related to disease process for when to call 911? Yes   Is the patient/caregiver able to teach back the hierarchy of who to call/visit for symptoms/problems? PCP, Specialist, Home health nurse, Urgent Care, ED, 911 Yes   If the patient is a current smoker, are they able to teach back resources for cessation? Not a smoker   TCM call completed? Yes   Wrap up additional comments Doing well. Feeling better with no questions or needs.   Call end time 0848   Would this patient benefit from a Referral to [facility] Social Work? No   Is the patient  interested in additional calls from an ambulatory ? No            Tayo Thayer RN    10/8/2024, 08:48 EDT

## 2024-10-09 ENCOUNTER — OFFICE VISIT (OUTPATIENT)
Dept: FAMILY MEDICINE CLINIC | Facility: CLINIC | Age: 74
End: 2024-10-09
Payer: MEDICARE

## 2024-10-09 VITALS
HEIGHT: 66 IN | DIASTOLIC BLOOD PRESSURE: 60 MMHG | SYSTOLIC BLOOD PRESSURE: 92 MMHG | HEART RATE: 60 BPM | WEIGHT: 111.4 LBS | BODY MASS INDEX: 17.9 KG/M2

## 2024-10-09 DIAGNOSIS — M54.2 CERVICALGIA: ICD-10-CM

## 2024-10-09 DIAGNOSIS — E43 SEVERE MALNUTRITION: ICD-10-CM

## 2024-10-09 DIAGNOSIS — R51.9 CHRONIC INTRACTABLE HEADACHE, UNSPECIFIED HEADACHE TYPE: ICD-10-CM

## 2024-10-09 DIAGNOSIS — G89.29 CHRONIC INTRACTABLE HEADACHE, UNSPECIFIED HEADACHE TYPE: ICD-10-CM

## 2024-10-09 DIAGNOSIS — I95.9 HYPOTENSION, UNSPECIFIED HYPOTENSION TYPE: ICD-10-CM

## 2024-10-09 DIAGNOSIS — Z09 HOSPITAL DISCHARGE FOLLOW-UP: Primary | ICD-10-CM

## 2024-10-09 DIAGNOSIS — K85.00 IDIOPATHIC ACUTE PANCREATITIS, UNSPECIFIED COMPLICATION STATUS: ICD-10-CM

## 2024-10-09 DIAGNOSIS — M54.81 BILATERAL OCCIPITAL NEURALGIA: ICD-10-CM

## 2024-10-09 PROCEDURE — 1126F AMNT PAIN NOTED NONE PRSNT: CPT | Performed by: PHYSICIAN ASSISTANT

## 2024-10-09 PROCEDURE — 1111F DSCHRG MED/CURRENT MED MERGE: CPT | Performed by: PHYSICIAN ASSISTANT

## 2024-10-09 PROCEDURE — 1160F RVW MEDS BY RX/DR IN RCRD: CPT | Performed by: PHYSICIAN ASSISTANT

## 2024-10-09 PROCEDURE — 1159F MED LIST DOCD IN RCRD: CPT | Performed by: PHYSICIAN ASSISTANT

## 2024-10-09 PROCEDURE — 99495 TRANSJ CARE MGMT MOD F2F 14D: CPT | Performed by: PHYSICIAN ASSISTANT

## 2024-10-09 RX ORDER — VALSARTAN 40 MG/1
40 TABLET ORAL DAILY
COMMUNITY

## 2024-10-09 NOTE — PROGRESS NOTES
Transitional Care Follow Up Visit  Subjective     Fabiola Salmeron is a 74 y.o. female who presents for a transitional care management visit.    Within 48 business hours after discharge our office contacted her via telephone to coordinate her care and needs.      I reviewed and discussed the details of that call along with the discharge summary, hospital problems, inpatient lab results, inpatient diagnostic studies, and consultation reports with Fabiola.     Current outpatient and discharge medications have been reconciled for the patient.        5/26/2022     5:59 PM 10/7/2024     6:42 PM   Date of TCM Phone Call   [facility]   Date of Admission 5/20/2022 10/4/2024   Date of Discharge 5/26/2022 10/7/2024   Discharge Disposition Home-Health Care [facility] or Self Care     Risk for Readmission (LACE) Score: 8 (10/7/2024  6:00 AM)      History of Present Illness  Hospital Course:  Fabiola Salmeron is a 74 y.o. female with past med history significant for diastolic heart failure, microcytic anemia, CAD, glaucoma, hypertension, was admitted for pancreatitis.  GI was consulted.  Workup did not definitively identify etiology, but there is a possibility her Repatha could be contributing and was recommended to discontinue Repatha.  She was started on a low-fat diet and tolerated without difficulty.  Patient determined to be stable for discharge with outpatient follow-up.  If she develops recurrent episodes of pancreatitis, she would likely need to pursue cholecystectomy     Acute idiopathic pancreatitis with possible drug-induced pancreatitis  Non-Anion Gap metabolic acidosis  -Treated with bowel rest, analgesics and IV fluid with resolution  -Recommended to discontinue Repatha  -Follow-up with GI and PCP  -If she develops recurrent episodes, will need to consider cholecystectomy     Patient presents today for routine hospital follow-up.  Patient was recently hospitalized at [facility] from 10/4 to  10/7 for acute pancreatitis.  Cause is unknown but she has been recommended to avoid Repatha in case this was responsible.  If she has another episode then she may need a cholecystectomy.  She was treated with IV hydration, pain control and bowel rest.  She was doing better overall.  She reports her appetite has improved and she is eating more.  Her  is present at appointment.    Patient has no headaches today but she does experience these frequently and intensely.  She had a headache prior to developing pancreatitis and wonders if the pain may be related to the cause.  She has seen various neurologists and tried various medications all without benefit.  Has occipital nerve stimulator that is no longer working, unable to get MRI.    Her blood pressure is low today.  She has some lightheadedness at times.  Taking all of her medications.  Followed by cardiology.     Duration of Hospital Stay:  10/04 to 10/07     The following portions of the patient's history were reviewed and updated as appropriate: allergies, current medications, past family history, past medical history, past social history, past surgical history, and problem list.    Current outpatient and discharge medications have been reconciled for the patient.  Reviewed by: Jessica Roach PA-C      Review of Systems   Constitutional:  Negative for appetite change, chills, fatigue and fever.   Gastrointestinal:  Negative for abdominal pain, diarrhea, nausea and vomiting.   Musculoskeletal:  Positive for myalgias and neck pain.   Neurological:  Positive for light-headedness and headache. Negative for dizziness.       Current Outpatient Medications on File Prior to Visit   Medication Sig Dispense Refill    Acetaminophen (Tylenol) 325 MG capsule Take 650 mg by mouth 4 (Four) Times a Day.      aspirin (Aspirin Low Dose) 81 MG EC tablet Take 1 tablet by mouth Daily. 90 tablet 3    bimatoprost (Lumigan) 0.01 % ophthalmic drops Administer 1 drop to  both eyes Every Night.      bisoprolol (ZEBeta) 5 MG tablet Take 1 tablet by mouth Daily. 90 tablet 3    brimonidine (ALPHAGAN) 0.2 % ophthalmic solution INSTILL 1 DROP INTO AFFECTED EYE(S) BY OPHTHALMIC ROUTE EVERY 8 HOURS      brimonidine-timolol (COMBIGAN) 0.2-0.5 % ophthalmic solution Apply 1 drop to eye(s) as directed by provider.      Cyanocobalamin (Vitamin B-12) 1000 MCG sublingual tablet Place 1 tablet under the tongue Daily. 90 each 3    ezetimibe (ZETIA) 10 MG tablet Take 1 tablet by mouth Daily. 90 tablet 3    fluorouracil (EFUDEX) 5 % cream fluorouracil 5 % topical cream   APPLY A SUFFICIENT AMOUNT TO COVER THE LESIONS IN THE AFFECTED AREA(S) BY TOPICAL ROUTE 2 TIMES PER DAY FOR 21 DAYS      folic acid (FOLVITE) 1 MG tablet TAKE 1 TABLET EVERY DAY 90 tablet 3    Polyvinyl Alcohol-Povidone PF (HYPOTEARS) 1.4-0.6 % ophthalmic solution       rosuvastatin (CRESTOR) 5 MG tablet Take 1 tablet by mouth Every Night. 90 tablet 3    spironolactone (ALDACTONE) 25 MG tablet Take 1 tablet by mouth Daily. 90 tablet 3    valsartan (DIOVAN) 40 MG tablet Take 1 tablet by mouth Daily.       No current facility-administered medications on file prior to visit.       Results for orders placed or performed during the hospital encounter of 10/04/24   Comprehensive Metabolic Panel    Specimen: Blood   Result Value Ref Range    Glucose 103 (H) 65 - 99 mg/dL    BUN 65 (H) 8 - 23 mg/dL    Creatinine 1.45 (H) 0.57 - 1.00 mg/dL    Sodium 134 (L) 136 - 145 mmol/L    Potassium 4.5 3.5 - 5.2 mmol/L    Chloride 105 98 - 107 mmol/L    CO2 14.0 (L) 22.0 - 29.0 mmol/L    Calcium 9.8 8.6 - 10.5 mg/dL    Total Protein 6.6 6.0 - 8.5 g/dL    Albumin 4.0 3.5 - 5.2 g/dL    ALT (SGPT) 28 1 - 33 U/L    AST (SGOT) 43 (H) 1 - 32 U/L    Alkaline Phosphatase 104 39 - 117 U/L    Total Bilirubin 0.3 0.0 - 1.2 mg/dL    Globulin 2.6 gm/dL    A/G Ratio 1.5 g/dL    BUN/Creatinine Ratio 44.8 (H) 7.0 - 25.0    Anion Gap 15.0 5.0 - 15.0 mmol/L    eGFR 37.5  (L) >60.0 mL/min/1.73   Lipase    Specimen: Blood   Result Value Ref Range    Lipase 438 (H) 13 - 60 U/L   Single High Sensitivity Troponin T    Specimen: Blood   Result Value Ref Range    HS Troponin T 19 (H) <14 ng/L   Urinalysis With Microscopic If Indicated (No Culture) - Urine, Clean Catch    Specimen: Urine, Clean Catch   Result Value Ref Range    Color, UA Yellow Yellow, Straw    Appearance, UA Clear Clear    pH, UA 6.0 5.0 - 8.0    Specific Gravity, UA 1.037 (H) 1.001 - 1.030    Glucose, UA Negative Negative    Ketones, UA Negative Negative    Bilirubin, UA Negative Negative    Blood, UA Negative Negative    Protein, UA 30 mg/dL (1+) (A) Negative    Leuk Esterase, UA Negative Negative    Nitrite, UA Negative Negative    Urobilinogen, UA 0.2 E.U./dL 0.2 - 1.0 E.U./dL   CBC Auto Differential    Specimen: Blood   Result Value Ref Range    WBC 14.96 (H) 3.40 - 10.80 10*3/mm3    RBC 3.16 (L) 3.77 - 5.28 10*6/mm3    Hemoglobin 12.3 12.0 - 15.9 g/dL    Hematocrit 36.6 34.0 - 46.6 %    .8 (H) 79.0 - 97.0 fL    MCH 38.9 (H) 26.6 - 33.0 pg    MCHC 33.6 31.5 - 35.7 g/dL    RDW 14.7 12.3 - 15.4 %    RDW-SD 63.4 (H) 37.0 - 54.0 fl    MPV 9.1 6.0 - 12.0 fL    Platelets 335 140 - 450 10*3/mm3    Neutrophil % 85.1 (H) 42.7 - 76.0 %    Lymphocyte % 7.4 (L) 19.6 - 45.3 %    Monocyte % 6.4 5.0 - 12.0 %    Eosinophil % 0.3 0.3 - 6.2 %    Basophil % 0.1 0.0 - 1.5 %    Immature Grans % 0.7 (H) 0.0 - 0.5 %    Neutrophils, Absolute 12.71 (H) 1.70 - 7.00 10*3/mm3    Lymphocytes, Absolute 1.11 0.70 - 3.10 10*3/mm3    Monocytes, Absolute 0.96 (H) 0.10 - 0.90 10*3/mm3    Eosinophils, Absolute 0.05 0.00 - 0.40 10*3/mm3    Basophils, Absolute 0.02 0.00 - 0.20 10*3/mm3    Immature Grans, Absolute 0.11 (H) 0.00 - 0.05 10*3/mm3    nRBC 0.0 0.0 - 0.2 /100 WBC   Blood Gas, Venous With Co-Ox    Specimen: Venous Blood   Result Value Ref Range    Site Nurse/Dr Draw     pH, Venous 7.264 (C) 7.310 - 7.410 pH Units    pCO2, Venous 31.9 (L)  41.0 - 51.0 mm Hg    pO2, Venous 16.7 (L) 27.0 - 53.0 mm Hg    HCO3, Venous 14.4 (L) 22.0 - 28.0 mmol/L    Base Excess, Venous -11.4 (L) -2.0 - 2.0 mmol/L    Hemoglobin, Blood Gas 13.3 (L) 14 - 18 g/dL    Oxyhemoglobin Venous 24.0 %    Methemoglobin Venous 0.1 %    Carboxyhemoglobin Venous 0.7 %    CO2 Content 15.4 (L) 22 - 33 mmol/L    Temperature 37.0     Barometric Pressure for Blood Gas      Modality Room Air     FIO2 21 %    Rate 0 Breaths/minute    PIP 0 cmH2O    IPAP 0     EPAP 0    Lipid Panel    Specimen: Blood   Result Value Ref Range    Total Cholesterol 72 0 - 200 mg/dL    Triglycerides 34 0 - 150 mg/dL    HDL Cholesterol 55 40 - 60 mg/dL    LDL Cholesterol  6 0 - 100 mg/dL    VLDL Cholesterol 11 5 - 40 mg/dL    LDL/HDL Ratio 0.19    Ethanol    Specimen: Blood   Result Value Ref Range    Ethanol <10 0 - 10 mg/dL   Lactic Acid, Plasma    Specimen: Blood   Result Value Ref Range    Lactate 1.5 0.5 - 2.0 mmol/L   Beta Hydroxybutyrate Quantitative    Specimen: Blood   Result Value Ref Range    Beta-Hydroxybutyrate Quant 0.101 0.020 - 0.270 mmol/L   Hemoglobin A1c    Specimen: Blood   Result Value Ref Range    Hemoglobin A1C 5.70 (H) 4.80 - 5.60 %   Urinalysis, Microscopic Only - Urine, Clean Catch    Specimen: Urine, Clean Catch   Result Value Ref Range    RBC, UA 0-2 None Seen, 0-2 /HPF    WBC, UA 0-2 None Seen, 0-2 /HPF    Bacteria, UA None Seen None Seen, Trace /HPF    Squamous Epithelial Cells, UA 0-2 None Seen, 0-2 /HPF    Hyaline Casts, UA 0-6 0 - 6 /LPF    Methodology Automated Microscopy    Comprehensive Metabolic Panel    Specimen: Blood   Result Value Ref Range    Glucose 79 65 - 99 mg/dL    BUN 45 (H) 8 - 23 mg/dL    Creatinine 1.02 (H) 0.57 - 1.00 mg/dL    Sodium 136 136 - 145 mmol/L    Potassium 3.7 3.5 - 5.2 mmol/L    Chloride 109 (H) 98 - 107 mmol/L    CO2 11.0 (L) 22.0 - 29.0 mmol/L    Calcium 8.9 8.6 - 10.5 mg/dL    Total Protein 6.4 6.0 - 8.5 g/dL    Albumin 3.2 (L) 3.5 - 5.2 g/dL    ALT  (SGPT) 21 1 - 33 U/L    AST (SGOT) 30 1 - 32 U/L    Alkaline Phosphatase 87 39 - 117 U/L    Total Bilirubin 0.3 0.0 - 1.2 mg/dL    Globulin 3.2 gm/dL    A/G Ratio 1.0 g/dL    BUN/Creatinine Ratio 44.1 (H) 7.0 - 25.0    Anion Gap 16.0 (H) 5.0 - 15.0 mmol/L    eGFR 57.1 (L) >60.0 mL/min/1.73   CBC (No Diff)    Specimen: Blood   Result Value Ref Range    WBC 14.50 (H) 3.40 - 10.80 10*3/mm3    RBC 2.91 (L) 3.77 - 5.28 10*6/mm3    Hemoglobin 11.2 (L) 12.0 - 15.9 g/dL    Hematocrit 33.1 (L) 34.0 - 46.6 %    .7 (H) 79.0 - 97.0 fL    MCH 38.5 (H) 26.6 - 33.0 pg    MCHC 33.8 31.5 - 35.7 g/dL    RDW 14.4 12.3 - 15.4 %    RDW-SD 60.6 (H) 37.0 - 54.0 fl    MPV 9.1 6.0 - 12.0 fL    Platelets 302 140 - 450 10*3/mm3   TSH Rfx On Abnormal To Free T4    Specimen: Blood   Result Value Ref Range    TSH 1.600 0.270 - 4.200 uIU/mL   IgG 4    Specimen: Blood   Result Value Ref Range    IgG Subclass 4 11 2 - 96 mg/dL   CBC (No Diff)    Specimen: Blood   Result Value Ref Range    WBC 12.44 (H) 3.40 - 10.80 10*3/mm3    RBC 2.63 (L) 3.77 - 5.28 10*6/mm3    Hemoglobin 10.2 (L) 12.0 - 15.9 g/dL    Hematocrit 29.0 (L) 34.0 - 46.6 %    .3 (H) 79.0 - 97.0 fL    MCH 38.8 (H) 26.6 - 33.0 pg    MCHC 35.2 31.5 - 35.7 g/dL    RDW 13.6 12.3 - 15.4 %    RDW-SD 56.0 (H) 37.0 - 54.0 fl    MPV 9.0 6.0 - 12.0 fL    Platelets 274 140 - 450 10*3/mm3   Comprehensive Metabolic Panel    Specimen: Blood   Result Value Ref Range    Glucose 106 (H) 65 - 99 mg/dL    BUN 13 8 - 23 mg/dL    Creatinine 0.64 0.57 - 1.00 mg/dL    Sodium 134 (L) 136 - 145 mmol/L    Potassium 3.6 3.5 - 5.2 mmol/L    Chloride 107 98 - 107 mmol/L    CO2 19.0 (L) 22.0 - 29.0 mmol/L    Calcium 8.4 (L) 8.6 - 10.5 mg/dL    Total Protein 6.0 6.0 - 8.5 g/dL    Albumin 2.9 (L) 3.5 - 5.2 g/dL    ALT (SGPT) 16 1 - 33 U/L    AST (SGOT) 26 1 - 32 U/L    Alkaline Phosphatase 79 39 - 117 U/L    Total Bilirubin 0.4 0.0 - 1.2 mg/dL    Globulin 3.1 gm/dL    A/G Ratio 0.9 g/dL    BUN/Creatinine  "Ratio 20.3 7.0 - 25.0    Anion Gap 8.0 5.0 - 15.0 mmol/L    eGFR 91.7 >60.0 mL/min/1.73   Potassium    Specimen: Blood   Result Value Ref Range    Potassium 4.9 3.5 - 5.2 mmol/L   Comprehensive Metabolic Panel    Specimen: Blood   Result Value Ref Range    Glucose 108 (H) 65 - 99 mg/dL    BUN 5 (L) 8 - 23 mg/dL    Creatinine 0.63 0.57 - 1.00 mg/dL    Sodium 136 136 - 145 mmol/L    Potassium 4.3 3.5 - 5.2 mmol/L    Chloride 105 98 - 107 mmol/L    CO2 24.0 22.0 - 29.0 mmol/L    Calcium 8.4 (L) 8.6 - 10.5 mg/dL    Total Protein 5.2 (L) 6.0 - 8.5 g/dL    Albumin 2.9 (L) 3.5 - 5.2 g/dL    ALT (SGPT) 23 1 - 33 U/L    AST (SGOT) 43 (H) 1 - 32 U/L    Alkaline Phosphatase 78 39 - 117 U/L    Total Bilirubin 0.4 0.0 - 1.2 mg/dL    Globulin 2.3 gm/dL    A/G Ratio 1.3 g/dL    BUN/Creatinine Ratio 7.9 7.0 - 25.0    Anion Gap 7.0 5.0 - 15.0 mmol/L    eGFR 92.1 >60.0 mL/min/1.73   ECG 12 Lead ED Triage Standing Order; Abdominal Pain (Upper)   Result Value Ref Range    QT Interval 352 ms    QTC Interval 435 ms   ECG 12 Lead Tachycardia   Result Value Ref Range    QT Interval 328 ms    QTC Interval 418 ms   Green Top (Gel)   Result Value Ref Range    Extra Tube Hold for add-ons.    Lavender Top   Result Value Ref Range    Extra Tube hold for add-on    Gold Top - SST   Result Value Ref Range    Extra Tube Hold for add-ons.    Gray Top   Result Value Ref Range    Extra Tube Hold for add-ons.    Light Blue Top   Result Value Ref Range    Extra Tube Hold for add-ons.        Visit Vitals  BP 92/60 (BP Location: Left arm, Patient Position: Sitting, Cuff Size: Adult)   Pulse 60   Ht 167.6 cm (65.98\")   Wt 50.5 kg (111 lb 6.4 oz)   LMP  (LMP Unknown)   BMI 17.99 kg/m²     Body mass index is 17.99 kg/m².    Objective   Physical Exam  Vitals reviewed.   Constitutional:       General: She is not in acute distress.     Appearance: Normal appearance. She is well-developed and underweight. She is not ill-appearing or diaphoretic.   HENT:      " Head: Normocephalic and atraumatic.   Eyes:      Extraocular Movements: Extraocular movements intact.      Conjunctiva/sclera: Conjunctivae normal.   Pulmonary:      Effort: No respiratory distress.   Musculoskeletal:         General: Normal range of motion.      Cervical back: Normal range of motion.   Neurological:      General: No focal deficit present.      Mental Status: She is alert.   Psychiatric:         Attention and Perception: She is attentive.         Mood and Affect: Mood normal.         Speech: Speech normal.         Behavior: Behavior normal. Behavior is cooperative.         Thought Content: Thought content normal.         Judgment: Judgment normal.         Assessment & Plan   Diagnoses and all orders for this visit:    1. Hospital discharge follow-up (Primary)    2. Idiopathic acute pancreatitis, unspecified complication status  Symptoms improved overall.  Appetite is returned and she is able to eat.    3. Severe malnutrition  Encouraged patient to eat 3 meals a day and focus on protein at each meal.    4. Hypotension, unspecified hypotension type  Repeat BP is 100/60 with some reported lightheadedness.  Will reduce her valsartan from 80 mg to 40 mg.  Continue all other prescription BP meds.  Call if BP is still low or if it becomes high.  Encouraged to hydrate well with water.    5. Cervicalgia  -     CT Cervical Spine Without Contrast; Future  -     Ambulatory Referral to Pain Management    6. Bilateral occipital neuralgia  -     Ambulatory Referral to Pain Management    7. Chronic intractable headache, unspecified headache type  -     Ambulatory Referral to Pain Management  Ongoing intense and frequent headaches.  Has failed multiple medications including botox injections.  Has been to neurology in the past with no benefit.  Had occipital stimulator placed, no longer working.  Unable to get MRI.  Has taken daily prednisone in the past and feels this is the only thing that helps.  No headaches  today.  Will start referral to pain management for further evaluation and recommendations.  Will update CT cervical spine since last imaging was in 2022.           Dictated Utilizing Dragon Dictation     Please note that portions of this note were completed with a voice recognition program.     Part of this note may be an electronic transcription/translation of spoken language to printed text using the Dragon Dictation System.

## 2024-10-10 ENCOUNTER — SPECIALTY PHARMACY (OUTPATIENT)
Dept: CARDIOLOGY | Facility: CLINIC | Age: 74
End: 2024-10-10
Payer: MEDICARE

## 2024-10-16 ENCOUNTER — READMISSION MANAGEMENT (OUTPATIENT)
Dept: CALL CENTER | Facility: HOSPITAL | Age: 74
End: 2024-10-16
Payer: MEDICARE

## 2024-10-16 NOTE — OUTREACH NOTE
Medical Week 2 Survey      Flowsheet Row Responses   [facility] patient discharged from? [place]   Does the patient have one of the following disease processes/diagnoses(primary or secondary)? Other   Week 2 attempt successful? Yes   Call start time 1920   Discharge diagnosis Pancreatitis   Call end time 1921   Person spoke with today (if not patient) and relationship patient   Meds reviewed with patient/caregiver? Yes   Is the patient having any side effects they believe may be caused by any medication additions or changes? No   Does the patient have all medications ordered at discharge? Yes   Is the patient taking all medications as directed (includes completed medication regime)? Yes   Does the patient have a primary care provider?  Yes   Does the patient have an appointment with their PCP within 7 days of discharge? Yes   Has the patient kept scheduled appointments due by today? Yes   Psychosocial issues? No   Did the patient receive a copy of their discharge instructions? Yes   Nursing interventions Reviewed instructions with patient   What is the patient's perception of their health status since discharge? Improving   Is the patient/caregiver able to teach back signs and symptoms related to disease process for when to call PCP? Yes   Is the patient/caregiver able to teach back signs and symptoms related to disease process for when to call 911? Yes   Is the patient/caregiver able to teach back the hierarchy of who to call/visit for symptoms/problems? PCP, Specialist, Home health nurse, Urgent Care, ED, 911 Yes   If the patient is a current smoker, are they able to teach back resources for cessation? Not a smoker   Week 2 Call Completed? Yes   Graduated Yes   Did the patient feel the follow up calls were helpful during their recovery period? Yes   Was the number of calls appropriate? Yes   Does the patient have an Advance Directive or Living Will? No   Is the patient/caregiver familiar with Advance Care  Planning? No   Would the patient like more information on Advance Care Planning? No   Is the patient interested in additional calls from an ambulatory ? No   Would this patient benefit from a Referral to [facility] Social Work? No   Wrap up additional comments Doing well. Feeling better with no questions or needs.   Call end time 1921            ANAT LI - Registered Nurse   acuteness of illness

## 2024-10-22 ENCOUNTER — HOSPITAL ENCOUNTER (OUTPATIENT)
Facility: HOSPITAL | Age: 74
Discharge: HOME | End: 2024-10-22
Admitting: PHYSICIAN ASSISTANT
Payer: MEDICARE

## 2024-10-22 DIAGNOSIS — M54.2 CERVICALGIA: ICD-10-CM

## 2024-10-22 PROCEDURE — 72125 CT NECK SPINE W/O DYE: CPT

## 2024-10-23 ENCOUNTER — OFFICE VISIT (OUTPATIENT)
Dept: PAIN MEDICINE | Facility: CLINIC | Age: 74
End: 2024-10-23
Payer: MEDICARE

## 2024-10-23 VITALS — WEIGHT: 115 LBS | BODY MASS INDEX: 18.48 KG/M2 | HEIGHT: 66 IN

## 2024-10-23 DIAGNOSIS — T85.192A FAILURE OF SPINAL CORD STIMULATOR, INITIAL ENCOUNTER: Primary | ICD-10-CM

## 2024-10-23 DIAGNOSIS — M54.81 OCCIPITAL NEURALGIA OF RIGHT SIDE: ICD-10-CM

## 2024-10-23 NOTE — PROGRESS NOTES
Referring Physician: Jessica Roach PA-C  [address] [place] [address]    Primary Physician: Jessica Roach PA-C    CHIEF COMPLAINT or REASON FOR VISIT: Headache (New patient)      Initial history of present illness on 10/23/2024:  Ms. Fabiola Salmeron is 74 y.o. female who presents as a new patient referral for evaluation and treatment of chronic right-sided occipital pain.  Patient has a longstanding history of occipital neuralgia/occipital headaches.  She identifies a focal point of tenderness at the right side of her head posterior to the ear.  In  she underwent a Medtronic peripheral nerve stimulator implant with  at [facility].  She does recall some benefit from this device after placement however she did continue to have pain.  The battery  on the stimulator many years ago.  She has had significant pain at the site of the stimulator implant in the back of her head.  She will be lying at night or rotate and feel a pull and have new onset focal point of swelling and pain.  These episodes happen a couple times per year and are typically well resolved with steroid medicine.  Denies any facial pain, pain on the top of the head, neck pain.  Denies any association with lateral rotation.  Her IPG is in her right flank and tunneled up to the occiput.    Interval history:    Interventions:      Objective Pain Scoring:   BRIEF PAIN INVENTORY:  Total score:   Pain Score    10/23/24 1327   PainSc: 0-No pain      PHQ-2: 0  PHQ-9:    Opioid Risk Tool:         Review of Systems:   ROS negative except as otherwise noted     Past Medical History:   Past Medical History:   Diagnosis Date    Candidiasis of vulva and vagina     CHF (congestive heart failure)     Coronary artery disease     Headache     Hyperlipidemia     Hypertension          Past Surgical History:   Past Surgical History:   Procedure Laterality Date    ARTERIOVENOUS FISTULA/SHUNT SURGERY Right 2022     Procedure: RADIAL ARTERY REPAIR WITH HEMATOMA EVACUATION AND WASHOUT RIGHT;  Surgeon: Jaron Benavides MD;  Location:  [facility];  Service: Vascular;  Laterality: Right;    CAPSULE ENDOSCOPY N/A 05/25/2022    Procedure: CAPSULE ENDOSCOPY ESOPHAGUS TO ILEUM DEPLOYED;  Surgeon: Michele Rogers MD;  Location:  [facility];  Service: Gastroenterology;  Laterality: N/A;    CARDIAC CATHETERIZATION N/A 05/23/2022    Procedure: LEFT HEART CATH;  Surgeon: Manjit Wolfe IV, MD;  Location:  [facility];  Service: Cardiovascular;  Laterality: N/A;    CARDIAC CATHETERIZATION N/A 05/23/2022    Procedure: Optical Coherent Tomography;  Surgeon: Manjit Wolfe IV, MD;  Location:  [facility];  Service: Cardiovascular;  Laterality: N/A;    CARDIAC CATHETERIZATION N/A 05/23/2022    Procedure: Stent MARIA ELENA coronary;  Surgeon: Manjit Wolfe IV, MD;  Location:  [facility];  Service: Cardiovascular;  Laterality: N/A;    COLONOSCOPY N/A 05/25/2022    Procedure: COLONOSCOPY;  Surgeon: Michele Rogers MD;  Location:  [facility];  Service: Gastroenterology;  Laterality: N/A;    CRANIAL NEUROSTIMULATOR INSERTION/REPLACEMENT Right     ENDOSCOPY N/A 05/25/2022    Procedure: ESOPHAGOGASTRODUODENOSCOPY;  Surgeon: Michele Rogers MD;  Location:  [facility];  Service: Gastroenterology;  Laterality: N/A;    EYE SURGERY      shunt placement by ophthalmology    TRIGGER POINT INJECTION           Family History   Family History   Problem Relation Age of Onset    Heart failure Mother     Lung cancer Father         smoker    Heart disease Sister     Breast cancer Sister     Aplastic anemia Brother     No Known Problems Maternal Grandmother     No Known Problems Maternal Grandfather     No Known Problems Paternal Grandmother     No Known Problems Paternal Grandfather     Atrial fibrillation Son     Ovarian cancer Neg Hx     Colon cancer Neg Hx           Social History   Social History     Socioeconomic History    Marital status:    Tobacco Use    Smoking status: Never     Passive exposure: Never    Smokeless tobacco: Never   Vaping Use    Vaping status: Never Used   Substance and Sexual Activity    Alcohol use: Not Currently    Drug use: Never    Sexual activity: Yes     Partners: Male     Birth control/protection: None, Vasectomy        Medications:     Current Outpatient Medications:     Acetaminophen (Tylenol) 325 MG capsule, Take 650 mg by mouth 4 (Four) Times a Day., Disp: , Rfl:     aspirin (Aspirin Low Dose) 81 MG EC tablet, Take 1 tablet by mouth Daily., Disp: 90 tablet, Rfl: 3    bimatoprost (Lumigan) 0.01 % ophthalmic drops, Administer 1 drop to both eyes Every Night., Disp: , Rfl:     bisoprolol (ZEBeta) 5 MG tablet, Take 1 tablet by mouth Daily., Disp: 90 tablet, Rfl: 3    brimonidine (ALPHAGAN) 0.2 % ophthalmic solution, INSTILL 1 DROP INTO AFFECTED EYE(S) BY OPHTHALMIC ROUTE EVERY 8 HOURS, Disp: , Rfl:     brimonidine-timolol (COMBIGAN) 0.2-0.5 % ophthalmic solution, Apply 1 drop to eye(s) as directed by provider., Disp: , Rfl:     Cyanocobalamin (Vitamin B-12) 1000 MCG sublingual tablet, Place 1 tablet under the tongue Daily., Disp: 90 each, Rfl: 3    ezetimibe (ZETIA) 10 MG tablet, Take 1 tablet by mouth Daily., Disp: 90 tablet, Rfl: 3    fluorouracil (EFUDEX) 5 % cream, fluorouracil 5 % topical cream  APPLY A SUFFICIENT AMOUNT TO COVER THE LESIONS IN THE AFFECTED AREA(S) BY TOPICAL ROUTE 2 TIMES PER DAY FOR 21 DAYS, Disp: , Rfl:     folic acid (FOLVITE) 1 MG tablet, TAKE 1 TABLET EVERY DAY, Disp: 90 tablet, Rfl: 3    Polyvinyl Alcohol-Povidone PF (HYPOTEARS) 1.4-0.6 % ophthalmic solution, , Disp: , Rfl:     rosuvastatin (CRESTOR) 5 MG tablet, Take 1 tablet by mouth Every Night., Disp: 90 tablet, Rfl: 3    spironolactone (ALDACTONE) 25 MG tablet, Take 1 tablet by mouth Daily., Disp: 90 tablet, Rfl: 3    valsartan (DIOVAN) 40 MG tablet,  "Take 1 tablet by mouth Daily., Disp: , Rfl:         Physical Exam:     Vitals:    10/23/24 1327   Weight: 52.2 kg (115 lb)   Height: 167.6 cm (66\")   PainSc: 0-No pain        General: Alert and oriented, No acute distress.   HEENT: Normocephalic, atraumatic.   Cardiovascular: No gross edema  Respiratory: Respirations are non-labored    Cervical Spine:   No masses or atrophy  Range of motion - Flexion normal. Extension normal. Lateral rotation normal.   Palpation -TTP right occiput  Spurling's - negative     Thoracic Spine:   Inspection: There is a tunneled catheter on the right thoracic back  Paraspinal muscle palpation: nontender  Spinous process palpation: nontender    IPG site right buttock    Motor Exam:    Strength: Rate on 1-5 scale Right Left    C5-Elbow flexion, Deltoid 5/5  5/5    C6-Wrist extension 5/5  5/5    C7- Elbow / finger extension 5/5  5/5    C8- Finger flexion 5/5  5/5    T1- [employer] hand 5/5  5/5    Strength: Rate on 1-5 scale Right Left    L1/2- hip flexion 5/5  5/5    L3- knee extension 5/5  5/5    L4- ankle dorsiflexion 5/5  5/5    L5- great toe extension 5/5  5/5    S1- ankle plantarflexion 5/5  5/5    Sensory Exam: Full and equal sensation to light touch throughout.  No allodynia       Neurologic: Cranial Nerves II-XII are grossly intact.      Psychiatric: Cooperative.   Gait: Normal   Assistive Devices: None    Imaging Studies:   No results found for this or any previous visit.        Independent review of radiographic imaging: CT scan available for my review demonstrating 4 contact lead in the right occiput as well as what is either a 4 contact lead or adapter posterior to the spinous process at approximately C5/C6.    Impression & Plan:       10/23/2024: Fabiola Salmeron is a 74 y.o. female with past medical history significant for migraines, HLD, occipital neuralgia, who presents to the pain clinic for evaluation and treatment of chronic right-sided occipital pain.  She does have " an occipital nerve stimulator.  Upon my review this appears to be a an older style Medtronic IPG which is quite large tunneled up to the neck where there is an adapter for the smaller occipital lead.  I discussed potential options with the patient including local steroid injection at the site of pain, removal of the entire system, IPG swap.  Believe trialing a newer system battery may provide additional benefit beyond what she had before.  If no benefit from updated system can consider attempting removal of the entire device and lead to restore her ability to get an MRI.  Patient would like to proceed with IPG exchange.    1. Failure of spinal cord stimulator, initial encounter    2. Occipital neuralgia of right side        PLAN:  1. Medication Recommendations: Will send Bactroban, chlorhexidine for decolonization    2. Physical Therapy: Continue HEP    3. Psychological: defer    4. Complementary and alternative (CAM) Therapies:     5. Labs/Diagnostic studies: None indicated     6. Imaging: CT scan interpreted as above.    7. Interventions: Schedule occipital nerve stimulator IPG exchange ([id number])    8. Referrals: None indicated     9. Records: n/a    10. Lifestyle goals:    Follow-up 14 days after revision      [facility]  Kevin Garcia MD      I personally spent in excess of 60 minutes reviewing the chart, discussion with the patient, review of images and documentation of plan.    Quality Metrics:

## 2024-10-28 RX ORDER — FOLIC ACID 1 MG/1
TABLET ORAL
Qty: 90 TABLET | Refills: 3 | OUTPATIENT
Start: 2024-10-28

## 2024-11-13 ENCOUNTER — OFFICE VISIT (OUTPATIENT)
Dept: GASTROENTEROLOGY | Facility: CLINIC | Age: 74
End: 2024-11-13
Payer: MEDICARE

## 2024-11-13 ENCOUNTER — TELEPHONE (OUTPATIENT)
Dept: PAIN MEDICINE | Facility: CLINIC | Age: 74
End: 2024-11-13
Payer: MEDICARE

## 2024-11-13 VITALS
DIASTOLIC BLOOD PRESSURE: 70 MMHG | OXYGEN SATURATION: 98 % | HEART RATE: 72 BPM | TEMPERATURE: 97.3 F | HEIGHT: 66 IN | BODY MASS INDEX: 18.32 KG/M2 | WEIGHT: 114 LBS | SYSTOLIC BLOOD PRESSURE: 130 MMHG

## 2024-11-13 DIAGNOSIS — K85.00 IDIOPATHIC ACUTE PANCREATITIS, UNSPECIFIED COMPLICATION STATUS: Primary | ICD-10-CM

## 2024-11-13 DIAGNOSIS — R63.0 DECREASED APPETITE: ICD-10-CM

## 2024-11-13 NOTE — TELEPHONE ENCOUNTER
"Patient called to cancel 1/7/2025 procedure with Dr Garcia. States she wishes to \"wait a little longer\" before making a decision, and that she is now feeling better.  "

## 2024-11-13 NOTE — PROGRESS NOTES
"     Follow Up      Patient Name: Fabiola Salmeron  : 1950   MRN: [id number]     Chief Complaint:    Chief Complaint   Patient presents with    Hospital Follow Up Visit     \"I feel better\"       History of Present Illness: Fabiola Salmeron is a 74 y.o. female, PMH includes HF, CAD, HTN, glaucoma, who is here today for hospital follow up.     Pt was admitted to [facility] 10/4 - 10/7 for evaluation of pancreatitis. CT A/P w/wo contrast at that time revealed diffuse peripancreatic fat stranding consistent with pancreatitis. No evidence of pseudocyst, phlegmon or other complication. Probable mild secondary inflammation of the gastric antrum and duodenum. No other acute findings in the abdomen or pelvis. Gallbladder US revealed normal appearing gallbladder and CBD, liver heterogeneous in appearance. IgG 4, EtOH within normal limits. Total cholesterol 72, TG 34, HDL 55, LDL 6. Lipase 438, total bili 0.3, AST 43, ALT 28, alk phos 104.     Since discharge, pt states that she is doing well. She has gained 9lb of weight and states that appetite has improved. She has discontinued Repatha since admission. Patient denies associated fever, chills, abdominal pain, indigestion, nausea, vomiting, diarrhea, constipation, hematemesis, dysphagia, hematochezia, melena, bloating, weight loss or gain, dysuria, jaundice or bruising.    EGD / CSY  with Dr. Rogers. Normal esophagusl. 2cm hiatal hernia. Moderate gastritis in gastric body and fundus. Localized moderate mucosal changes characterized by scarring (post ulcer) were found in the prepyloric region of the stomach. Normal duodenum. Bx negative for H Pylori, celiac disease, intestinal metaplasia or dysplasia. Excellent bowel prep conditions. Normal appearing colon. Diverticulosis in sigmoid / descending / transverse colon. Internal hemorrhoids.     Subjective      Review of Systems:   Review of Systems   Constitutional:  Negative for appetite change, chills, diaphoresis, fatigue, " fever, unexpected weight gain and unexpected weight loss.   HENT:  Negative for drooling, facial swelling, mouth sores, rhinorrhea, sore throat, tinnitus, trouble swallowing and voice change.    Eyes: Negative.    Respiratory:  Negative for cough, chest tightness and shortness of breath.    Cardiovascular:  Negative for chest pain.   Gastrointestinal:  Negative for abdominal pain, blood in stool, constipation, diarrhea, nausea, vomiting, GERD and indigestion.   Genitourinary:  Negative for dysuria, flank pain, hematuria and pelvic pain.   Musculoskeletal:  Negative for arthralgias and myalgias.   Skin:  Negative for color change, pallor and rash.   Neurological:  Negative for dizziness, tremors, syncope, weakness and numbness.   Psychiatric/Behavioral:  Negative for hallucinations and sleep disturbance. The patient is not nervous/anxious.    All other systems reviewed and are negative.      Medications:     Current Outpatient Medications:     Acetaminophen (Tylenol) 325 MG capsule, Take 650 mg by mouth 4 (Four) Times a Day., Disp: , Rfl:     aspirin (Aspirin Low Dose) 81 MG EC tablet, Take 1 tablet by mouth Daily., Disp: 90 tablet, Rfl: 3    bimatoprost (Lumigan) 0.01 % ophthalmic drops, Administer 1 drop to both eyes Every Night., Disp: , Rfl:     bisoprolol (ZEBeta) 5 MG tablet, Take 1 tablet by mouth Daily., Disp: 90 tablet, Rfl: 3    brimonidine (ALPHAGAN) 0.2 % ophthalmic solution, INSTILL 1 DROP INTO AFFECTED EYE(S) BY OPHTHALMIC ROUTE EVERY 8 HOURS, Disp: , Rfl:     brimonidine-timolol (COMBIGAN) 0.2-0.5 % ophthalmic solution, Apply 1 drop to eye(s) as directed by provider., Disp: , Rfl:     Cyanocobalamin (Vitamin B-12) 1000 MCG sublingual tablet, Place 1 tablet under the tongue Daily., Disp: 90 each, Rfl: 3    ezetimibe (ZETIA) 10 MG tablet, Take 1 tablet by mouth Daily., Disp: 90 tablet, Rfl: 3    fluorouracil (EFUDEX) 5 % cream, fluorouracil 5 % topical cream  APPLY A SUFFICIENT AMOUNT TO COVER THE  LESIONS IN THE AFFECTED AREA(S) BY TOPICAL ROUTE 2 TIMES PER DAY FOR 21 DAYS, Disp: , Rfl:     folic acid (FOLVITE) 1 MG tablet, TAKE 1 TABLET EVERY DAY, Disp: 90 tablet, Rfl: 3    Polyvinyl Alcohol-Povidone PF (HYPOTEARS) 1.4-0.6 % ophthalmic solution, , Disp: , Rfl:     rosuvastatin (CRESTOR) 5 MG tablet, Take 1 tablet by mouth Every Night., Disp: 90 tablet, Rfl: 3    spironolactone (ALDACTONE) 25 MG tablet, Take 1 tablet by mouth Daily., Disp: 90 tablet, Rfl: 3    valsartan (DIOVAN) 40 MG tablet, Take 1 tablet by mouth Daily., Disp: , Rfl:     Allergies:   Allergies   Allergen Reactions    Ferrlecit [Na Ferric Gluc Cplx In Sucrose] Anaphylaxis    Codeine Other (See Comments)    Metoprolol Other (See Comments)     Fatigue      Statins Myalgia       Social History:   Social History     Socioeconomic History    Marital status:    Tobacco Use    Smoking status: Never     Passive exposure: Never    Smokeless tobacco: Never   Vaping Use    Vaping status: Never Used   Substance and Sexual Activity    Alcohol use: Not Currently    Drug use: Never    Sexual activity: Yes     Partners: Male     Birth control/protection: None, Vasectomy        Surgical History:   Past Surgical History:   Procedure Laterality Date    ARTERIOVENOUS FISTULA/SHUNT SURGERY Right 05/23/2022    Procedure: RADIAL ARTERY REPAIR WITH HEMATOMA EVACUATION AND WASHOUT RIGHT;  Surgeon: Jaron Benavides MD;  Location: [facility];  Service: Vascular;  Laterality: Right;    CAPSULE ENDOSCOPY N/A 05/25/2022    Procedure: CAPSULE ENDOSCOPY ESOPHAGUS TO ILEUM DEPLOYED;  Surgeon: Michele Rogers MD;  Location:  [facility];  Service: Gastroenterology;  Laterality: N/A;    CARDIAC CATHETERIZATION N/A 05/23/2022    Procedure: LEFT HEART CATH;  Surgeon: Manjit Wolfe IV, MD;  Location:  [facility];  Service: Cardiovascular;  Laterality: N/A;    CARDIAC CATHETERIZATION N/A 05/23/2022    Procedure: Optical Coherent  "Tomography;  Surgeon: Manjit Wolfe IV, MD;  Location:  [facility];  Service: Cardiovascular;  Laterality: N/A;    CARDIAC CATHETERIZATION N/A 05/23/2022    Procedure: Stent MARIA ELENA coronary;  Surgeon: Manjit Wolfe IV, MD;  Location:  [facility];  Service: Cardiovascular;  Laterality: N/A;    COLONOSCOPY N/A 05/25/2022    Procedure: COLONOSCOPY;  Surgeon: Michele Rogers MD;  Location:  [facility];  Service: Gastroenterology;  Laterality: N/A;    CRANIAL NEUROSTIMULATOR INSERTION/REPLACEMENT Right     ENDOSCOPY N/A 05/25/2022    Procedure: ESOPHAGOGASTRODUODENOSCOPY;  Surgeon: Michele Rogers MD;  Location:  [facility];  Service: Gastroenterology;  Laterality: N/A;    EYE SURGERY      shunt placement by ophthalmology    TRIGGER POINT INJECTION          Medical History:   Past Medical History:   Diagnosis Date    Candidiasis of vulva and vagina     CHF (congestive heart failure)     Coronary artery disease     Headache     Hyperlipidemia     Hypertension         Objective     Physical Exam:  Vital Signs:   Vitals:    11/13/24 1100   BP: 130/70   BP Location: Right arm   Patient Position: Sitting   Cuff Size: Adult   Pulse: 72   Temp: 97.3 °F (36.3 °C)   TempSrc: Temporal   SpO2: 98%   Weight: 51.7 kg (114 lb)   Height: 167.6 cm (66\")     Body mass index is 18.4 kg/m².     Physical Exam  Vitals and nursing note reviewed.   Constitutional:       Appearance: Normal appearance. She is normal weight. She is not ill-appearing or diaphoretic.      Comments: Pleasantly conversant. BMI 18.40.    HENT:      Head: Normocephalic and atraumatic.      Right Ear: External ear normal.      Left Ear: External ear normal.      Nose: Nose normal.      Mouth/Throat:      Mouth: Mucous membranes are moist.      Pharynx: Oropharynx is clear.   Eyes:      Conjunctiva/sclera: Conjunctivae normal.      Pupils: Pupils are equal, round, and reactive to light.   Neck:      " Thyroid: No thyromegaly.   Cardiovascular:      Rate and Rhythm: Normal rate and regular rhythm.      Pulses: Normal pulses.      Heart sounds: Normal heart sounds.   Pulmonary:      Effort: Pulmonary effort is normal.      Breath sounds: Normal breath sounds.   Abdominal:      General: Abdomen is flat. Bowel sounds are normal. There is no distension.      Tenderness: There is no abdominal tenderness.   Musculoskeletal:         General: Normal range of motion.      Cervical back: Normal range of motion and neck supple.      Right lower leg: No edema.      Left lower leg: No edema.   Skin:     General: Skin is warm and dry.   Neurological:      General: No focal deficit present.      Mental Status: She is oriented to person, place, and time.   Psychiatric:         Mood and Affect: Mood normal.         Assessment / Plan      Assessment/Plan:   There are no diagnoses linked to this encounter.     Acute idiopathic pancreatitis, likely secondary to Repatha  Decreased appetite, resolved   - continue to avoid use of Repatha moving forward   - previous office notes, hospital records, labs, imaging, endoscopy and pathology reports reviewed with patient    - follow up in clinic PRN, or after completion of above studies   - call clinic at any time for questions or new / worsened sx    Follow Up:   Return if symptoms worsen or fail to improve.    Plan of care reviewed with the patient at the conclusion of today's visit.  Education was provided regarding diagnosis, management, and any prescribed or recommended OTC medications.  Patient verbalized understanding of and agreement with management plan.     NOTE TO PATIENT: The 21st Century Cures Act makes medical notes like these available to patients in the interest of transparency. However, be advised this is a medical document. It is intended as peer to peer communication. It is written in medical language and may contain abbreviations or verbiage that are unfamiliar. It may  appear blunt or direct. Medical documents are intended to carry relevant information, facts as evident, and the clinical opinion of the practitioner.     Time Statement:   Discussed plan of care in detail with patient today. Patient verbally understands and agrees. I have spent 30 minutes reviewing available diagnostics, obtaining history, examining the patient, developing a treatment plan, and educating the patient on disease process and plan of care.     Subha Blue PA-C   [facility]

## 2024-12-02 RX ORDER — TOPIRAMATE 100 MG/1
100 TABLET, FILM COATED ORAL 2 TIMES DAILY
Qty: 180 TABLET | Refills: 1 | Status: SHIPPED | OUTPATIENT
Start: 2024-12-02

## 2024-12-16 RX ORDER — CLOPIDOGREL BISULFATE 75 MG/1
75 TABLET ORAL DAILY
Qty: 90 TABLET | Refills: 3 | OUTPATIENT
Start: 2024-12-16

## 2025-01-17 ENCOUNTER — OFFICE VISIT (OUTPATIENT)
Dept: ONCOLOGY | Facility: CLINIC | Age: 75
End: 2025-01-17
Payer: MEDICARE

## 2025-01-17 ENCOUNTER — LAB (OUTPATIENT)
Dept: LAB | Facility: HOSPITAL | Age: 75
End: 2025-01-17
Payer: MEDICARE

## 2025-01-17 VITALS
HEIGHT: 66 IN | OXYGEN SATURATION: 97 % | TEMPERATURE: 97.7 F | HEART RATE: 79 BPM | RESPIRATION RATE: 16 BRPM | BODY MASS INDEX: 18.32 KG/M2 | SYSTOLIC BLOOD PRESSURE: 158 MMHG | DIASTOLIC BLOOD PRESSURE: 75 MMHG | WEIGHT: 114 LBS

## 2025-01-17 DIAGNOSIS — D64.9 SYMPTOMATIC ANEMIA: Primary | ICD-10-CM

## 2025-01-17 DIAGNOSIS — D53.9 MACROCYTIC ANEMIA: ICD-10-CM

## 2025-01-17 LAB
BASOPHILS # BLD AUTO: 0.02 10*3/MM3 (ref 0–0.2)
BASOPHILS NFR BLD AUTO: 0.3 % (ref 0–1.5)
DEPRECATED RDW RBC AUTO: 58 FL (ref 37–54)
EOSINOPHIL # BLD AUTO: 0.13 10*3/MM3 (ref 0–0.4)
EOSINOPHIL NFR BLD AUTO: 2 % (ref 0.3–6.2)
ERYTHROCYTE [DISTWIDTH] IN BLOOD BY AUTOMATED COUNT: 13.8 % (ref 12.3–15.4)
HCT VFR BLD AUTO: 35.4 % (ref 34–46.6)
HGB BLD-MCNC: 11 G/DL (ref 12–15.9)
IMM GRANULOCYTES # BLD AUTO: 0.04 10*3/MM3 (ref 0–0.05)
IMM GRANULOCYTES NFR BLD AUTO: 0.6 % (ref 0–0.5)
LYMPHOCYTES # BLD AUTO: 1.6 10*3/MM3 (ref 0.7–3.1)
LYMPHOCYTES NFR BLD AUTO: 24.2 % (ref 19.6–45.3)
MCH RBC QN AUTO: 35 PG (ref 26.6–33)
MCHC RBC AUTO-ENTMCNC: 31.1 G/DL (ref 31.5–35.7)
MCV RBC AUTO: 112.7 FL (ref 79–97)
MONOCYTES # BLD AUTO: 0.66 10*3/MM3 (ref 0.1–0.9)
MONOCYTES NFR BLD AUTO: 10 % (ref 5–12)
NEUTROPHILS NFR BLD AUTO: 4.17 10*3/MM3 (ref 1.7–7)
NEUTROPHILS NFR BLD AUTO: 62.9 % (ref 42.7–76)
PLATELET # BLD AUTO: 377 10*3/MM3 (ref 140–450)
PMV BLD AUTO: 9 FL (ref 6–12)
RBC # BLD AUTO: 3.14 10*6/MM3 (ref 3.77–5.28)
WBC NRBC COR # BLD AUTO: 6.62 10*3/MM3 (ref 3.4–10.8)

## 2025-01-17 PROCEDURE — 99213 OFFICE O/P EST LOW 20 MIN: CPT | Performed by: NURSE PRACTITIONER

## 2025-01-17 PROCEDURE — 85025 COMPLETE CBC W/AUTO DIFF WBC: CPT

## 2025-01-17 PROCEDURE — 36415 COLL VENOUS BLD VENIPUNCTURE: CPT

## 2025-01-17 PROCEDURE — 1126F AMNT PAIN NOTED NONE PRSNT: CPT | Performed by: NURSE PRACTITIONER

## 2025-01-17 NOTE — PROGRESS NOTES
Follow up     Date: 25    Patient Name: Fabiola Salmeron  MRN: [id number]  : 1950     Referring Physician: Dr. Jessica Roach    Chief Complaint: Iron deficiency anemia, progressive macrocytosis    History of Present Illness: Fabiola Salmeron is a pleasant 74 y.o. female who presents today for evaluation of anemia.    CBC from 2021 was normal.    She then presented with progressive dyspnea on exertion.  She initially attributed her symptoms to acid reflux as she was also having accompanying belching, however her symptoms did not resolve with a PPI trial which had previously helped.  She was seen by her primary care physician and had a CBC performed on 2022.  This showed a hemoglobin of 7.6 with a slightly elevated RDW at 16 and a normal white blood cell count and platelet count.  She was referred to the emergency department and was admitted to the hospital through 2022.  There she had additional blood work including an iron battery which was notable for an iron saturation of 5% and an elevated TIBC consistent with iron deficiency anemia.  She was started on intravenous iron and received a unit of PRBCs.  She was also started on steroids for an acute sciatica flare.  She received her first dose of Ferrlecit on  without incident.  However following her second infusion on  she developed flushing, became diaphoretic, tachycardic, and hypotensive with brief episode of syncope and RRT was called.  Her  who accompanies her reports that she received chest compressions.  Per nursing records, she received 1L NS bolus with transfer to ICU for further management.     Underwent egd and c-scope 22 which revealed no source of bleeding and video endoscopy capsule was performed which was negative    She was found to have an elevated troponin and a low ejection fraction at 36% within the first 24 hours of admission, with pattern concerning for Takotsubo cardiomyopathy.  She underwent  "[facility] on 5/23/22 which revealed stenosis to her LAD and RCA and underwent drug-eluting stents x2. Post-procedure, patient was found to have hemorrhage from her right radial artery and underwent radial artery repair and hematoma evacuation by Dr. Benavides of vascular surgery.     I reviewed her blood work prior to, during, and following admission.  She had severe anemia of unclear chronicity given that her previous CBC was greater than 1 year ago.  She has maintained her hemoglobin following hospital discharge.  B12 levels were normal.  Iron levels were consistent with iron deficiency. She is not a strict vegetarian and endorses eating red meat.  She has no prior history of gastro intestinal surgery.  No history of autoimmune disorders.    She was started on oral iron once daily which she is tolerating well with no GI side effects.  Her PPI was stopped by cardiology pharmacist.    No personal history of transfusions prior to her current presentation.  No prior history of anemia as a child or teen or during her reproductive years.  No history of hematologic disorders, malignancy, CVA, or VTE.    Family history is notable for aplastic anemia in her brother lung cancer in her father and a sister with breast cancer at age 59.    Does not want to proceed with bone marrow biopsy. Did a glaucoma surgery that she was minimal risk and now nearly blind and has always regretted doing that procedure. Worried about longterm pain after bone marrow biopsy.    Interval history:  Fabiola reports that she had a \"head cold\" several weeks ago but has recovered from that otherwise no frequent infections or illnesses since we saw her last.  Her appetite seems to be improving and she has picked up a few pounds although she is still very thin.  Taking folic acid and vitamin B12 daily.  Continues aspirin    Past Medical History:   Past Medical History:   Diagnosis Date    Candidiasis of vulva and vagina     CHF (congestive heart failure)     " Coronary artery disease     Headache     Hyperlipidemia     Hypertension        Past Surgical History:   Past Surgical History:   Procedure Laterality Date    ARTERIOVENOUS FISTULA/SHUNT SURGERY Right 05/23/2022    Procedure: RADIAL ARTERY REPAIR WITH HEMATOMA EVACUATION AND WASHOUT RIGHT;  Surgeon: Jaron Benavides MD;  Location:  [facility];  Service: Vascular;  Laterality: Right;    CAPSULE ENDOSCOPY N/A 05/25/2022    Procedure: CAPSULE ENDOSCOPY ESOPHAGUS TO ILEUM DEPLOYED;  Surgeon: Michele Rogers MD;  Location:  [facility];  Service: Gastroenterology;  Laterality: N/A;    CARDIAC CATHETERIZATION N/A 05/23/2022    Procedure: LEFT HEART CATH;  Surgeon: Manjit Wolfe IV, MD;  Location:  [facility];  Service: Cardiovascular;  Laterality: N/A;    CARDIAC CATHETERIZATION N/A 05/23/2022    Procedure: Optical Coherent Tomography;  Surgeon: Manjit Wolfe IV, MD;  Location:  [facility];  Service: Cardiovascular;  Laterality: N/A;    CARDIAC CATHETERIZATION N/A 05/23/2022    Procedure: Stent MARIA ELENA coronary;  Surgeon: Manjit Wolfe IV, MD;  Location:  [facility];  Service: Cardiovascular;  Laterality: N/A;    COLONOSCOPY N/A 05/25/2022    Procedure: COLONOSCOPY;  Surgeon: Michele Rogers MD;  Location:  [facility];  Service: Gastroenterology;  Laterality: N/A;    CRANIAL NEUROSTIMULATOR INSERTION/REPLACEMENT Right     ENDOSCOPY N/A 05/25/2022    Procedure: ESOPHAGOGASTRODUODENOSCOPY;  Surgeon: Michele Rogers MD;  Location:  [facility];  Service: Gastroenterology;  Laterality: N/A;    EYE SURGERY      shunt placement by ophthalmology    TRIGGER POINT INJECTION         Family History:   Family History   Problem Relation Age of Onset    Heart failure Mother     Lung cancer Father         smoker    Heart disease Sister     Breast cancer Sister     Aplastic anemia Brother     No Known Problems Maternal  Grandmother     No Known Problems Maternal Grandfather     No Known Problems Paternal Grandmother     No Known Problems Paternal Grandfather     Atrial fibrillation Son     Ovarian cancer Neg Hx     Colon cancer Neg Hx        Social History:   Social History     Socioeconomic History    Marital status:    Tobacco Use    Smoking status: Never     Passive exposure: Never    Smokeless tobacco: Never   Vaping Use    Vaping status: Never Used   Substance and Sexual Activity    Alcohol use: Not Currently    Drug use: Never    Sexual activity: Yes     Partners: Male     Birth control/protection: None, Vasectomy       Medications:     Current Outpatient Medications:     Acetaminophen (Tylenol) 325 MG capsule, Take 650 mg by mouth 4 (Four) Times a Day., Disp: , Rfl:     aspirin (Aspirin Low Dose) 81 MG EC tablet, Take 1 tablet by mouth Daily., Disp: 90 tablet, Rfl: 3    bimatoprost (Lumigan) 0.01 % ophthalmic drops, Administer 1 drop to both eyes Every Night., Disp: , Rfl:     bisoprolol (ZEBeta) 5 MG tablet, Take 1 tablet by mouth Daily., Disp: 90 tablet, Rfl: 3    brimonidine (ALPHAGAN) 0.2 % ophthalmic solution, INSTILL 1 DROP INTO AFFECTED EYE(S) BY OPHTHALMIC ROUTE EVERY 8 HOURS, Disp: , Rfl:     brimonidine-timolol (COMBIGAN) 0.2-0.5 % ophthalmic solution, Apply 1 drop to eye(s) as directed by provider., Disp: , Rfl:     Cyanocobalamin (Vitamin B-12) 1000 MCG sublingual tablet, Place 1 tablet under the tongue Daily., Disp: 90 each, Rfl: 3    ezetimibe (ZETIA) 10 MG tablet, Take 1 tablet by mouth Daily., Disp: 90 tablet, Rfl: 3    fluorouracil (EFUDEX) 5 % cream, fluorouracil 5 % topical cream  APPLY A SUFFICIENT AMOUNT TO COVER THE LESIONS IN THE AFFECTED AREA(S) BY TOPICAL ROUTE 2 TIMES PER DAY FOR 21 DAYS, Disp: , Rfl:     folic acid (FOLVITE) 1 MG tablet, TAKE 1 TABLET EVERY DAY, Disp: 90 tablet, Rfl: 3    Polyvinyl Alcohol-Povidone PF (HYPOTEARS) 1.4-0.6 % ophthalmic solution, , Disp: , Rfl:     rosuvastatin  "(CRESTOR) 5 MG tablet, Take 1 tablet by mouth Every Night., Disp: 90 tablet, Rfl: 3    spironolactone (ALDACTONE) 25 MG tablet, Take 1 tablet by mouth Daily., Disp: 90 tablet, Rfl: 3    topiramate (Topamax) 100 MG tablet, Take 1 tablet by mouth 2 (Two) Times a Day., Disp: 180 tablet, Rfl: 1    valsartan (DIOVAN) 40 MG tablet, Take 1 tablet by mouth Daily., Disp: , Rfl:     Allergies:   Allergies   Allergen Reactions    Ferrlecit [Na Ferric Gluc Cplx In Sucrose] Anaphylaxis    Codeine Other (See Comments)    Metoprolol Other (See Comments)     Fatigue      Statins Myalgia       Objective     Vital Signs:   Vitals:    01/17/25 1455   BP: 158/75  Comment: LUE   Pulse: 79   Resp: 16   Temp: 97.7 °F (36.5 °C)   TempSrc: Temporal   SpO2: 97%  Comment: RA   Weight: 51.7 kg (114 lb)   Height: 167.6 cm (66\")   PainSc: 0-No pain    Body mass index is 18.4 kg/m².   Pain Score    01/17/25 1455   PainSc: 0-No pain       Physical Exam:  General: No acute distress.  Thin but well appearing.  HEENT: Normocephalic, atraumatic. Sclera anicteric.   Neck: supple, no adenopathy.   Cardiovascular: regular rate and rhythm. No murmurs.   Respiratory: Normal rate. Clear to auscultation bilaterally  Abdomen: Soft, nontender, non distended   Lymph: no cervical, supraclavicular or axillary adenopathy  Neuro: Alert and oriented x 3. No focal deficits.   Ext: No edema.  Accurate as of 01/17/2025      Laboratory/Imaging Reviewed:   Lab on 01/17/2025   Component Date Value Ref Range Status    WBC 01/17/2025 6.62  3.40 - 10.80 10*3/mm3 Final    RBC 01/17/2025 3.14 (L)  3.77 - 5.28 10*6/mm3 Final    Hemoglobin 01/17/2025 11.0 (L)  12.0 - 15.9 g/dL Final    Hematocrit 01/17/2025 35.4  34.0 - 46.6 % Final    MCV 01/17/2025 112.7 (H)  79.0 - 97.0 fL Final    MCH 01/17/2025 35.0 (H)  26.6 - 33.0 pg Final    MCHC 01/17/2025 31.1 (L)  31.5 - 35.7 g/dL Final    RDW 01/17/2025 13.8  12.3 - 15.4 % Final    RDW-SD 01/17/2025 58.0 (H)  37.0 - 54.0 fl Final    " MPV 01/17/2025 9.0  6.0 - 12.0 fL Final    Platelets 01/17/2025 377  140 - 450 10*3/mm3 Final    Neutrophil % 01/17/2025 62.9  42.7 - 76.0 % Final    Lymphocyte % 01/17/2025 24.2  19.6 - 45.3 % Final    Monocyte % 01/17/2025 10.0  5.0 - 12.0 % Final    Eosinophil % 01/17/2025 2.0  0.3 - 6.2 % Final    Basophil % 01/17/2025 0.3  0.0 - 1.5 % Final    Immature Grans % 01/17/2025 0.6 (H)  0.0 - 0.5 % Final    Neutrophils, Absolute 01/17/2025 4.17  1.70 - 7.00 10*3/mm3 Final    Lymphocytes, Absolute 01/17/2025 1.60  0.70 - 3.10 10*3/mm3 Final    Monocytes, Absolute 01/17/2025 0.66  0.10 - 0.90 10*3/mm3 Final    Eosinophils, Absolute 01/17/2025 0.13  0.00 - 0.40 10*3/mm3 Final    Basophils, Absolute 01/17/2025 0.02  0.00 - 0.20 10*3/mm3 Final    Immature Grans, Absolute 01/17/2025 0.04  0.00 - 0.05 10*3/mm3 Final       eGFR      >60.0 mL/min/1.73 54.6 (L)   Folate      4.78 - 24.20 ng/mL 18.70   Ferritin      13.00 - 150.00 ng/mL 317.70 (H)   Vitamin B-12      211 - 946 pg/mL 472     Pathologist Interpretation       Normal total white blood cell count with differential compatible with the automated differential reported.  No abnormal/immature white blood cells noted. . . .     Component      Latest Ref Rng & Units 11/29/2022 1/31/2023   Folate      4.78 - 24.20 ng/mL >20.00    Vitamin B-12      211 - 946 pg/mL  1,372 (H)   TSH Baseline      0.270 - 4.200 uIU/mL  2.230   Free T4      0.93 - 1.70 ng/dL  0.64 (L)   Haptoglobin      30 - 200 mg/dL  164     Assessment / Plan      Assessment/Plan:   1.  Persistent macrocytosis  2.  H/O iron deficiency anemia, with anaphylactic reaction to Ferrlecit  2.  Progressive macrocytosis    -She has history of severe iron deficiency anemia of unclear etiology.  She has had an extensive negative work-up for a GI source of blood loss.  This was unrevealing.  Her history does not reveal any obvious alternative source of blood loss.  It is likely that she had a slow gastrointestinal bleed  that was not identified on work-up during previous hospital admission. No convincing evidence of hemolysis.  She had an anaphylactic reaction to intravenous iron while admitted to the hospital and should no longer receive Ferrlecit.  We discussed that if intravenous iron is required, we could cautiously consider use of an alternative formulation, but she would require close monitoring.  Given that she has no clear history of malabsorption and her blood counts are stable she completed an oral iron trial with resolution of iron deficiency anemia. She continues off oral iron.   -Continue folic acid and OTC B12 daily.  -Repeat CBC from 01/17/2025 continues to show persistent macrocytic anemia.  Overall it is stable compared to prior CBC, of note her WBC is now normal at 6.62 with ANC 1.60, hemoglobin is stable at 11.0 with hematocrit improved to 35.4%, MCV continues to be elevated at 112.7, platelet count normal at 377,000.  We once again discussed the possibility of bone marrow biopsy due to the severity of her macrocytosis and no specific etiology thus far identified as to the cause of her anemia.  She still declines bone marrow biopsy at this time.  We will see her back as previously scheduled in 4 months for follow-up with CBC on return.    5.  FLC elevation  -Levels have normalized. Discontinue serial checks    Follow Up:   4 mo MD CBC      01/17/2025  KAREEM Jauregui    Hematology and Oncology   I spent 21 minutes caring for Fabiola on this date of service. This time includes time spent by me in the following activities: preparing for the visit, reviewing tests, performing a medically appropriate examination and/or evaluation, ordering medications, tests, or procedures, documenting information in the medical record, and independently interpreting results and communicating that information with the patient/family/caregiver.

## 2025-01-27 ENCOUNTER — OFFICE VISIT (OUTPATIENT)
Dept: FAMILY MEDICINE CLINIC | Facility: CLINIC | Age: 75
End: 2025-01-27
Payer: MEDICARE

## 2025-01-27 VITALS
TEMPERATURE: 98.2 F | OXYGEN SATURATION: 96 % | DIASTOLIC BLOOD PRESSURE: 68 MMHG | SYSTOLIC BLOOD PRESSURE: 126 MMHG | HEIGHT: 66 IN | HEART RATE: 76 BPM | WEIGHT: 115 LBS | BODY MASS INDEX: 18.48 KG/M2

## 2025-01-27 DIAGNOSIS — M54.81 BILATERAL OCCIPITAL NEURALGIA: ICD-10-CM

## 2025-01-27 DIAGNOSIS — E78.5 HYPERLIPIDEMIA LDL GOAL <50: ICD-10-CM

## 2025-01-27 DIAGNOSIS — Z00.00 ANNUAL WELLNESS VISIT: Primary | ICD-10-CM

## 2025-01-27 DIAGNOSIS — E55.9 VITAMIN D DEFICIENCY: ICD-10-CM

## 2025-01-27 PROCEDURE — 1160F RVW MEDS BY RX/DR IN RCRD: CPT

## 2025-01-27 PROCEDURE — G0439 PPPS, SUBSEQ VISIT: HCPCS

## 2025-01-27 PROCEDURE — 1159F MED LIST DOCD IN RCRD: CPT

## 2025-01-27 PROCEDURE — 1126F AMNT PAIN NOTED NONE PRSNT: CPT

## 2025-01-27 RX ORDER — PREDNISONE 5 MG/1
5 TABLET ORAL DAILY
Qty: 30 TABLET | Refills: 0 | Status: SHIPPED | OUTPATIENT
Start: 2025-01-27

## 2025-01-27 RX ORDER — CLOPIDOGREL BISULFATE 75 MG/1
TABLET ORAL
COMMUNITY
Start: 2024-10-02

## 2025-01-27 RX ORDER — VALSARTAN 80 MG/1
TABLET ORAL
COMMUNITY
Start: 2024-12-23

## 2025-01-27 NOTE — PROGRESS NOTES
Subjective   The ABCs of the Annual Wellness Visit  Medicare Wellness Visit      Fabiola Salmeron is a 74 y.o. patient who presents for a Medicare Wellness Visit. past medical history significant for diastolic heart failure, microcytic anemia, CAD, glaucoma, hypertension, was admitted for pancreatitis. Her     The following portions of the patient's history were reviewed and   updated as appropriate: allergies, current medications, past family history, past medical history, past social history, past surgical history, and problem list.    Compared to one year ago, the patient's physical   health is the same.  Compared to one year ago, the patient's mental   health is the same.    Recent Hospitalizations:  This patient has had a [facility] admission record on file within the last 365 days.  Current Medical Providers:  Patient Care Team:  Jessica Roach PA-C as PCP - General (Physician Assistant)  Minnie Sellers MD as Consulting Physician (Ophthalmology)  Abeba Lopez MD as Consulting Physician (Hematology and Oncology)  Manjit Wolfe IV, MD as Consulting Physician (Interventional Cardiology)  Ana Pereyra APRN as Nurse Practitioner (Interventional Cardiology)      Outpatient Medications Prior to Visit   Medication Sig Dispense Refill    Acetaminophen (Tylenol) 325 MG capsule Take 650 mg by mouth 4 (Four) Times a Day.      aspirin (Aspirin Low Dose) 81 MG EC tablet Take 1 tablet by mouth Daily. 90 tablet 3    bimatoprost (Lumigan) 0.01 % ophthalmic drops Administer 1 drop to both eyes Every Night.      bisoprolol (ZEBeta) 5 MG tablet Take 1 tablet by mouth Daily. 90 tablet 3    brimonidine (ALPHAGAN) 0.2 % ophthalmic solution INSTILL 1 DROP INTO AFFECTED EYE(S) BY OPHTHALMIC ROUTE EVERY 8 HOURS      brimonidine-timolol (COMBIGAN) 0.2-0.5 % ophthalmic solution Apply 1 drop to eye(s) as directed by provider.      clopidogrel (PLAVIX) 75 MG tablet       Cyanocobalamin (Vitamin  B-12) 1000 MCG sublingual tablet Place 1 tablet under the tongue Daily. 90 each 3    ezetimibe (ZETIA) 10 MG tablet Take 1 tablet by mouth Daily. 90 tablet 3    fluorouracil (EFUDEX) 5 % cream fluorouracil 5 % topical cream   APPLY A SUFFICIENT AMOUNT TO COVER THE LESIONS IN THE AFFECTED AREA(S) BY TOPICAL ROUTE 2 TIMES PER DAY FOR 21 DAYS      folic acid (FOLVITE) 1 MG tablet TAKE 1 TABLET EVERY DAY 90 tablet 3    Polyvinyl Alcohol-Povidone PF (HYPOTEARS) 1.4-0.6 % ophthalmic solution       rosuvastatin (CRESTOR) 5 MG tablet Take 1 tablet by mouth Every Night. 90 tablet 3    spironolactone (ALDACTONE) 25 MG tablet Take 1 tablet by mouth Daily. 90 tablet 3    topiramate (Topamax) 100 MG tablet Take 1 tablet by mouth 2 (Two) Times a Day. 180 tablet 1    valsartan (DIOVAN) 40 MG tablet Take 1 tablet by mouth Daily.      valsartan (DIOVAN) 80 MG tablet        No facility-administered medications prior to visit.     No opioid medication identified on active medication list. I have reviewed chart for other potential  high risk medication/s and harmful drug interactions in the elderly.      Aspirin is on active medication list. Aspirin use is indicated based on review of current medical condition/s. Pros and cons of this therapy have been discussed today. Benefits of this medication outweigh potential harm.  Patient has been encouraged to continue taking this medication.  .      Patient Active Problem List   Diagnosis    Migraines    Low back pain    Sciatica    Hyperlipidemia LDL goal <50    Bilateral occipital neuralgia    Vitamin D deficiency    Primary open angle glaucoma (POAG) of left eye, severe stage    Primary open angle glaucoma (POAG) of right eye, mild stage    Age-related incipient cataract of right eye    Cervical spondylolysis    Hyperopia of both eyes with regular astigmatism    Intractable chronic migraine without aura    Myopia of left eye    Other secondary cataract, left eye    Symptomatic anemia     "Heart failure with improved ejection fraction (HFimpEF)    Coronary artery disease involving native coronary artery of native heart without angina pectoris    Age-related osteoporosis without current pathological fracture    Seasonal allergies    Severe malnutrition     Advance Care Planning Advance Directive is not on file.  ACP discussion was held with the patient during this visit. Patient has an advance directive (not in EMR), copy requested.            Objective   Vitals:    25 1503   BP: 126/68   BP Location: Right arm   Patient Position: Sitting   Cuff Size: Adult   Pulse: 76   Temp: 98.2 °F (36.8 °C)   TempSrc: Oral   SpO2: 96%   Weight: 52.2 kg (115 lb)   Height: 167.6 cm (66\")   PainSc: 0-No pain       Estimated body mass index is 18.56 kg/m² as calculated from the following:    Height as of this encounter: 167.6 cm (66\").    Weight as of this encounter: 52.2 kg (115 lb).    BMI is within normal parameters. No other follow-up for BMI required.      Gait and Balance Evaluation:  Normal       Does the patient have evidence of cognitive impairment? No                                                                                                Health  Risk Assessment    Smoking Status:  Social History     Tobacco Use   Smoking Status Never    Passive exposure: Never   Smokeless Tobacco Never     Alcohol Consumption:  Social History     Substance and Sexual Activity   Alcohol Use Not Currently       Fall Risk Screen  STEADI Fall Risk Assessment was completed, and patient is at LOW risk for falls.Assessment completed on:2025    Depression Screening   Little interest or pleasure in doing things? Not at all   Feeling down, depressed, or hopeless? Not at all   PHQ-2 Total Score 0      Health Habits and Functional and Cognitive Screenin/20/2025     8:38 AM   Functional & Cognitive Status   Do you have difficulty preparing food and eating? No    Do you have difficulty bathing yourself, getting " dressed or grooming yourself? No    Do you have difficulty using the toilet? No    Do you have difficulty moving around from place to place? No    Do you have trouble with steps or getting out of a bed or a chair? No    Current Diet Well Balanced Diet    Dental Exam Up to date    Eye Exam Up to date    Exercise (times per week) 5 times per week    Current Exercises Include Stationary Bicycling/Spin Class    Do you need help using the phone?  No    Are you deaf or do you have serious difficulty hearing?  No    Do you need help to go to places out of walking distance? No    Do you need help shopping? No    Do you need help preparing meals?  No    Do you need help with housework?  No    Do you need help with laundry? No    Do you need help taking your medications? No    Do you need help managing money? No    Do you ever drive or ride in a car without wearing a seat belt? No    Have you felt unusual stress, anger or loneliness in the last month? No    Who do you live with? Spouse    If you need help, do you have trouble finding someone available to you? No    Have you been bothered in the last four weeks by sexual problems? No    Do you have difficulty concentrating, remembering or making decisions? No        Patient-reported           Age-appropriate Screening Schedule:  Refer to the list below for future screening recommendations based on patient's age, sex and/or medical conditions. Orders for these recommended tests are listed in the plan section. The patient has been provided with a written plan.    Health Maintenance List  Health Maintenance   Topic Date Due    TDAP/TD VACCINES (1 - Tdap) Never done    ZOSTER VACCINE (1 of 2) Never done    RSV Vaccine - Adults (1 - 1-dose 75+ series) Never done    INFLUENZA VACCINE  Never done    COVID-19 Vaccine (8 - 2024-25 season) 09/01/2024    DXA SCAN  03/01/2025    COLORECTAL CANCER SCREENING  05/25/2025    LIPID PANEL  10/04/2025    ANNUAL WELLNESS VISIT  01/27/2026     HEPATITIS C SCREENING  Completed    Pneumococcal Vaccine 65+  Completed    MAMMOGRAM  Discontinued                                                                                                                                                CMS Preventative Services Quick Reference  Risk Factors Identified During Encounter  Glaucoma or Family History of Glaucoma:  Follows with Ophthalmology,Dr. Sellers   Immunizations Discussed/Encouraged: Tdap, Influenza, Shingrix, COVID19, and RSV (Respiratory Syncytial Virus)  Dental Screening Recommended  Vision Screening Recommended    The above risks/problems have been discussed with the patient.  Pertinent information has been shared with the patient in the After Visit Summary.  An After Visit Summary and PPPS were made available to the patient.    Follow Up:   Next Medicare Wellness visit to be scheduled in 1 year.         Additional E&M Note during same encounter follows:  Patient has additional, significant, and separately identifiable condition(s)/problem(s) that require work above and beyond the Medicare Wellness Visit     Chief Complaint  Medicare Wellness-subsequent and Med Refill (prednisone)    Subjective   HPI  Fabiola is also being seen today for additional medical problem/s.    Patient has a past medical history of chronic cervicalgia and bilateral occipital neuralgia that she follows with her primary care provider for.  Patient states she seen multiple pain management groups and neurologist.  She states nothing has ever helped with her symptoms other than prednisone 5 mg once daily.  She states that her primary care provider provides this to her.  She states that she is coming up on a refill.  Patient denies any side effects with prednisone.  She states that in previous years she has been on higher doses, but feels the 5 mg once daily helps to control her symptoms well and has been the only thing to do this.  She states she is feeling well today and not having any  "symptoms currently.    Review of Systems   Musculoskeletal:  Positive for neck pain. Negative for gait problem.   Neurological:  Negative for syncope and light-headedness.              Objective   Vital Signs:  /68 (BP Location: Right arm, Patient Position: Sitting, Cuff Size: Adult)   Pulse 76   Temp 98.2 °F (36.8 °C) (Oral)   Ht 167.6 cm (66\")   Wt 52.2 kg (115 lb)   SpO2 96%   BMI 18.56 kg/m²   Physical Exam  Vitals reviewed.   Constitutional:       General: She is not in acute distress.     Appearance: Normal appearance. She is not toxic-appearing.   HENT:      Head: Normocephalic and atraumatic.      Right Ear: Tympanic membrane and ear canal normal.      Left Ear: Tympanic membrane and ear canal normal.      Nose: Nose normal.      Mouth/Throat:      Mouth: Mucous membranes are moist.   Eyes:      Extraocular Movements: Extraocular movements intact.      Pupils: Pupils are equal, round, and reactive to light.   Cardiovascular:      Rate and Rhythm: Normal rate and regular rhythm.      Pulses: Normal pulses.      Heart sounds: Normal heart sounds.   Pulmonary:      Effort: Pulmonary effort is normal.      Breath sounds: Normal breath sounds.   Skin:     General: Skin is warm.   Neurological:      General: No focal deficit present.      Mental Status: She is alert and oriented to person, place, and time.   Psychiatric:         Mood and Affect: Mood normal.               Assessment and Plan   Additional age appropriate preventative wellness advice topics were discussed during today's preventative wellness exam(some topics already addressed during AWV portion of the note above):   Nutrition: Discussed nutrition plan with patient. Information shared in after visit summary. Goal is for a well balanced diet to enhance overall health.           Annual wellness visit  Patient was counseled on recommended vaccinations including zoster vaccine, Tdap, COVID-19 vaccine, RSV vaccine.  She declined vaccines " today.    Counseled on staying up-to-date on her preventative cancer screenings.  UTD on Colonoscopy. Repeat was recommended in May of 2025. She will discuss with PCP.   DXA Scan eligible- recommended. She will discuss with PCP.   Counseled on recommended vaccinations- declines today.   Screening lab work for chronic conditions placed. Patient  to have done when fasting.     Orders:    CBC (No Diff); Future    Lipid Panel; Future    Hemoglobin A1c; Future    Comprehensive Metabolic Panel; Future    Urinalysis With Microscopic If Indicated (No Culture) - Urine, Clean Catch; Future    TSH Rfx On Abnormal To Free T4; Future    Bilateral occipital neuralgia    Prednisone had been discontinued from chart, but confirmed with her PCP that she still remained on this for bilateral occipital neuralgia.   Will refill prednisone 5 mg once daily for 1 month.  Patient to get future refills from her primary care provider.    Patient to return to clinic for reevaluation of her symptoms or new or worsening symptoms with her PCP.          Orders:    predniSONE (DELTASONE) 5 MG tablet; Take 1 tablet by mouth Daily.    Hyperlipidemia LDL goal <50     Continue on current regimen.        Vitamin D deficiency    Orders:    Vitamin D,25-Hydroxy; Future            Follow Up   Return in 3 months (on 4/27/2025) for Recheck.  Patient was given instructions and counseling regarding her condition or for health maintenance advice. Please see specific information pulled into the AVS if appropriate.

## 2025-01-27 NOTE — ASSESSMENT & PLAN NOTE
Prednisone had been discontinued from chart, but confirmed with her PCP that she still remained on this for bilateral occipital neuralgia.   Will refill prednisone 5 mg once daily for 1 month.  Patient to get future refills from her primary care provider.    Patient to return to clinic for reevaluation of her symptoms or new or worsening symptoms with her PCP.          Orders:    predniSONE (DELTASONE) 5 MG tablet; Take 1 tablet by mouth Daily.

## 2025-03-04 DIAGNOSIS — M54.81 BILATERAL OCCIPITAL NEURALGIA: ICD-10-CM

## 2025-03-05 RX ORDER — PREDNISONE 5 MG/1
5 TABLET ORAL DAILY
Qty: 90 TABLET | Refills: 0 | Status: SHIPPED | OUTPATIENT
Start: 2025-03-05

## 2025-03-05 NOTE — TELEPHONE ENCOUNTER
Rx Refill Note  Requested Prescriptions     Pending Prescriptions Disp Refills    predniSONE (DELTASONE) 5 MG tablet 30 tablet 0     Sig: Take 1 tablet by mouth Daily.      Last office visit with prescribing clinician: 1/27/2025   Last telemedicine visit with prescribing clinician: Visit date not found   Next office visit with prescribing clinician: Visit date not found                         Would you like a call back once the refill request has been completed: [] Yes [] No    If the office needs to give you a call back, can they leave a voicemail: [] Yes [] No    Staci Anton MA  03/05/25, 08:38 EST

## 2025-05-12 DIAGNOSIS — E78.5 HYPERLIPIDEMIA LDL GOAL <50: ICD-10-CM

## 2025-05-12 RX ORDER — EZETIMIBE 10 MG/1
10 TABLET ORAL DAILY
Qty: 90 TABLET | Refills: 3 | Status: SHIPPED | OUTPATIENT
Start: 2025-05-12

## 2025-05-14 ENCOUNTER — LAB (OUTPATIENT)
Dept: LAB | Facility: HOSPITAL | Age: 75
End: 2025-05-14
Payer: MEDICARE

## 2025-05-14 ENCOUNTER — OFFICE VISIT (OUTPATIENT)
Dept: ONCOLOGY | Facility: CLINIC | Age: 75
End: 2025-05-14
Payer: MEDICARE

## 2025-05-14 VITALS
WEIGHT: 119 LBS | DIASTOLIC BLOOD PRESSURE: 76 MMHG | OXYGEN SATURATION: 98 % | BODY MASS INDEX: 19.13 KG/M2 | HEART RATE: 78 BPM | HEIGHT: 66 IN | TEMPERATURE: 97.2 F | SYSTOLIC BLOOD PRESSURE: 123 MMHG

## 2025-05-14 DIAGNOSIS — D64.9 SYMPTOMATIC ANEMIA: ICD-10-CM

## 2025-05-14 DIAGNOSIS — D53.9 MACROCYTIC ANEMIA: Primary | ICD-10-CM

## 2025-05-14 LAB
BASOPHILS # BLD AUTO: 0.03 10*3/MM3 (ref 0–0.2)
BASOPHILS NFR BLD AUTO: 0.9 % (ref 0–1.5)
DEPRECATED RDW RBC AUTO: 67.4 FL (ref 37–54)
EOSINOPHIL # BLD AUTO: 0.03 10*3/MM3 (ref 0–0.4)
EOSINOPHIL NFR BLD AUTO: 0.9 % (ref 0.3–6.2)
ERYTHROCYTE [DISTWIDTH] IN BLOOD BY AUTOMATED COUNT: 15.4 % (ref 12.3–15.4)
HCT VFR BLD AUTO: 35.7 % (ref 34–46.6)
HGB BLD-MCNC: 11.4 G/DL (ref 12–15.9)
IMM GRANULOCYTES # BLD AUTO: 0 10*3/MM3 (ref 0–0.05)
IMM GRANULOCYTES NFR BLD AUTO: 0 % (ref 0–0.5)
LYMPHOCYTES # BLD AUTO: 1.13 10*3/MM3 (ref 0.7–3.1)
LYMPHOCYTES NFR BLD AUTO: 34.7 % (ref 19.6–45.3)
MCH RBC QN AUTO: 37.4 PG (ref 26.6–33)
MCHC RBC AUTO-ENTMCNC: 31.9 G/DL (ref 31.5–35.7)
MCV RBC AUTO: 117 FL (ref 79–97)
MONOCYTES # BLD AUTO: 0.37 10*3/MM3 (ref 0.1–0.9)
MONOCYTES NFR BLD AUTO: 11.3 % (ref 5–12)
NEUTROPHILS NFR BLD AUTO: 1.7 10*3/MM3 (ref 1.7–7)
NEUTROPHILS NFR BLD AUTO: 52.2 % (ref 42.7–76)
PLATELET # BLD AUTO: 234 10*3/MM3 (ref 140–450)
PMV BLD AUTO: 8.9 FL (ref 6–12)
RBC # BLD AUTO: 3.05 10*6/MM3 (ref 3.77–5.28)
WBC NRBC COR # BLD AUTO: 3.26 10*3/MM3 (ref 3.4–10.8)

## 2025-05-14 PROCEDURE — 36415 COLL VENOUS BLD VENIPUNCTURE: CPT

## 2025-05-14 PROCEDURE — 85025 COMPLETE CBC W/AUTO DIFF WBC: CPT

## 2025-05-14 NOTE — PROGRESS NOTES
Follow up     Date: 25    Patient Name: Fabiola Salmeron  MRN: [id number]  : 1950     Referring Physician: Dr. Jessica Roach    Chief Complaint: Iron deficiency anemia, progressive macrocytosis, MGUS follow up    History of Present Illness: Fabiola Salmeron is a pleasant 75 y.o. female who presents today for evaluation of iron deficiency anemia.    CBC from 2021 was normal.    She then presented with progressive dyspnea on exertion.  She initially attributed her symptoms to acid reflux as she was also having accompanying belching, however her symptoms did not resolve with a PPI trial which had previously helped.  She was seen by her primary care physician and had a CBC performed on 2022.  This showed a hemoglobin of 7.6 with a slightly elevated RDW at 16 and a normal white blood cell count and platelet count.  She was referred to the emergency department and was admitted to the hospital through 2022.  There she had additional blood work including an iron battery which was notable for an iron saturation of 5% and an elevated TIBC consistent with iron deficiency anemia.  She was started on intravenous iron and received a unit of PRBCs.  She was also started on steroids for an acute sciatica flare.  She received her first dose of Ferrlecit on  without incident.  However following her second infusion on  she developed flushing, became diaphoretic, tachycardic, and hypotensive with brief episode of syncope and RRT was called.  Her  who accompanies her reports that she received chest compressions.  Per nursing records, she received 1L NS bolus with transfer to ICU for further management.     Underwent egd and c-scope 22 which revealed no source of bleeding and video endoscopy capsule was performed which was negative    She was found to have an elevated troponin and a low ejection fraction at 36% within the first 24 hours of admission, with pattern concerning for  Takotsubo cardiomyopathy.  She underwent LHC on 5/23/22 which revealed stenosis to her LAD and RCA and underwent drug-eluting stents x2. Post-procedure, patient was found to have hemorrhage from her right radial artery and underwent radial artery repair and hematoma evacuation by Dr. Benavides of vascular surgery.     I reviewed her blood work prior to, during, and following admission.  She had severe anemia of unclear chronicity given that her previous CBC was greater than 1 year ago.  She has maintained her hemoglobin following hospital discharge.  B12 levels were normal.  Iron levels were consistent with iron deficiency. She is not a strict vegetarian and endorses eating red meat.  She has no prior history of gastro intestinal surgery.  No history of autoimmune disorders.    She was started on oral iron once daily which she is tolerating well with no GI side effects.  Her PPI was stopped by cardiology pharmacist.    No personal history of transfusions prior to her current presentation.  No prior history of anemia as a child or teen or during her reproductive years.  No history of hematologic disorders, malignancy, CVA, or VTE.    Family history is notable for aplastic anemia in her brother lung cancer in her father and a sister with breast cancer at age 59.    Does not want to proceed with bone marrow biopsy. Did a glaucoma surgery that she was minimal risk and now nearly blind and has always regretted doing that procedure. Worried about longterm pain after bone marrow biopsy.    Interval history:  The patient denies fever, chills, substantial unintentional weight loss or drenching night sweats.      Past Medical History:   Past Medical History:   Diagnosis Date    Candidiasis of vulva and vagina     CHF (congestive heart failure)     Coronary artery disease     Headache     Hyperlipidemia     Hypertension        Past Surgical History:   Past Surgical History:   Procedure Laterality Date    ARTERIOVENOUS  FISTULA/SHUNT SURGERY Right 05/23/2022    Procedure: RADIAL ARTERY REPAIR WITH HEMATOMA EVACUATION AND WASHOUT RIGHT;  Surgeon: Jaron Benavides MD;  Location:  [facility];  Service: Vascular;  Laterality: Right;    CAPSULE ENDOSCOPY N/A 05/25/2022    Procedure: CAPSULE ENDOSCOPY ESOPHAGUS TO ILEUM DEPLOYED;  Surgeon: Michele Rogers MD;  Location:  [facility];  Service: Gastroenterology;  Laterality: N/A;    CARDIAC CATHETERIZATION N/A 05/23/2022    Procedure: LEFT HEART CATH;  Surgeon: Manjit Wolfe IV, MD;  Location:  [facility];  Service: Cardiovascular;  Laterality: N/A;    CARDIAC CATHETERIZATION N/A 05/23/2022    Procedure: Optical Coherent Tomography;  Surgeon: Manjit Wolfe IV, MD;  Location:  [facility];  Service: Cardiovascular;  Laterality: N/A;    CARDIAC CATHETERIZATION N/A 05/23/2022    Procedure: Stent MARIA ELENA coronary;  Surgeon: Manjit Wolfe IV, MD;  Location:  [facility];  Service: Cardiovascular;  Laterality: N/A;    COLONOSCOPY N/A 05/25/2022    Procedure: COLONOSCOPY;  Surgeon: Michele Rogers MD;  Location:  [facility];  Service: Gastroenterology;  Laterality: N/A;    CRANIAL NEUROSTIMULATOR INSERTION/REPLACEMENT Right     ENDOSCOPY N/A 05/25/2022    Procedure: ESOPHAGOGASTRODUODENOSCOPY;  Surgeon: Mcihele Rogers MD;  Location:  [facility];  Service: Gastroenterology;  Laterality: N/A;    EYE SURGERY      shunt placement by ophthalmology    TRIGGER POINT INJECTION         Family History:   Family History   Problem Relation Age of Onset    Heart failure Mother     Lung cancer Father         smoker    Heart disease Sister     Breast cancer Sister     Aplastic anemia Brother     No Known Problems Maternal Grandmother     No Known Problems Maternal Grandfather     No Known Problems Paternal Grandmother     No Known Problems Paternal Grandfather     Atrial fibrillation Son     Ovarian  cancer Neg Hx     Colon cancer Neg Hx        Social History:   Social History     Socioeconomic History    Marital status:    Tobacco Use    Smoking status: Never     Passive exposure: Never    Smokeless tobacco: Never   Vaping Use    Vaping status: Never Used   Substance and Sexual Activity    Alcohol use: Not Currently    Drug use: Never    Sexual activity: Yes     Partners: Male     Birth control/protection: None, Vasectomy       Medications:     Current Outpatient Medications:     Acetaminophen (Tylenol) 325 MG capsule, Take 650 mg by mouth 4 (Four) Times a Day., Disp: , Rfl:     aspirin (Aspirin Low Dose) 81 MG EC tablet, Take 1 tablet by mouth Daily., Disp: 90 tablet, Rfl: 3    bimatoprost (Lumigan) 0.01 % ophthalmic drops, Administer 1 drop to both eyes Every Night., Disp: , Rfl:     bisoprolol (ZEBeta) 5 MG tablet, Take 1 tablet by mouth Daily., Disp: 90 tablet, Rfl: 3    brimonidine (ALPHAGAN) 0.2 % ophthalmic solution, INSTILL 1 DROP INTO AFFECTED EYE(S) BY OPHTHALMIC ROUTE EVERY 8 HOURS, Disp: , Rfl:     brimonidine-timolol (COMBIGAN) 0.2-0.5 % ophthalmic solution, Apply 1 drop to eye(s) as directed by provider., Disp: , Rfl:     clopidogrel (PLAVIX) 75 MG tablet, , Disp: , Rfl:     Cyanocobalamin (Vitamin B-12) 1000 MCG sublingual tablet, Place 1 tablet under the tongue Daily., Disp: 90 each, Rfl: 3    ezetimibe (ZETIA) 10 MG tablet, TAKE 1 TABLET EVERY DAY, Disp: 90 tablet, Rfl: 3    fluorouracil (EFUDEX) 5 % cream, fluorouracil 5 % topical cream  APPLY A SUFFICIENT AMOUNT TO COVER THE LESIONS IN THE AFFECTED AREA(S) BY TOPICAL ROUTE 2 TIMES PER DAY FOR 21 DAYS, Disp: , Rfl:     folic acid (FOLVITE) 1 MG tablet, TAKE 1 TABLET EVERY DAY, Disp: 90 tablet, Rfl: 3    Polyvinyl Alcohol-Povidone PF (HYPOTEARS) 1.4-0.6 % ophthalmic solution, , Disp: , Rfl:     predniSONE (DELTASONE) 5 MG tablet, Take 1 tablet by mouth Daily., Disp: 90 tablet, Rfl: 0    rosuvastatin (CRESTOR) 5 MG tablet, Take 1 tablet  "by mouth Every Night., Disp: 90 tablet, Rfl: 3    spironolactone (ALDACTONE) 25 MG tablet, Take 1 tablet by mouth Daily., Disp: 90 tablet, Rfl: 3    topiramate (Topamax) 100 MG tablet, Take 1 tablet by mouth 2 (Two) Times a Day., Disp: 180 tablet, Rfl: 1    valsartan (DIOVAN) 40 MG tablet, Take 1 tablet by mouth Daily., Disp: , Rfl:     valsartan (DIOVAN) 80 MG tablet, , Disp: , Rfl:     Allergies:   Allergies   Allergen Reactions    Ferrlecit [Na Ferric Gluc Cplx In Sucrose] Anaphylaxis    Codeine Other (See Comments)    Metoprolol Other (See Comments)     Fatigue      Statins Myalgia       Objective     Vital Signs:   Vitals:    05/14/25 1422   BP: 123/76   Pulse: 78   Temp: 97.2 °F (36.2 °C)   TempSrc: Infrared   SpO2: 98%   Weight: 54 kg (119 lb)   Height: 167.6 cm (65.98\")   PainSc: 0-No pain    Body mass index is 19.22 kg/m².   Pain Score    05/14/25 1422   PainSc: 0-No pain       Physical Exam:  General: No acute distress. Well appearing.  HEENT: Normocephalic, atraumatic. Sclera anicteric.   Neck: supple, no adenopathy.   Cardiovascular: regular rate and rhythm. No murmurs.   Respiratory: Normal rate. Clear to auscultation bilaterally  Abdomen: Soft, nontender, non distended with normoactive bowel sounds  Lymph: no cervical, supraclavicular or axillary adenopathy  Neuro: Alert and oriented x 3. No focal deficits.   Ext: No edema.  Accurate as of 9/17/24      Laboratory/Imaging Reviewed:   Lab on 05/14/2025   Component Date Value Ref Range Status    WBC 05/14/2025 3.26 (L)  3.40 - 10.80 10*3/mm3 Final    RBC 05/14/2025 3.05 (L)  3.77 - 5.28 10*6/mm3 Final    Hemoglobin 05/14/2025 11.4 (L)  12.0 - 15.9 g/dL Final    Hematocrit 05/14/2025 35.7  34.0 - 46.6 % Final    MCV 05/14/2025 117.0 (H)  79.0 - 97.0 fL Final    MCH 05/14/2025 37.4 (H)  26.6 - 33.0 pg Final    MCHC 05/14/2025 31.9  31.5 - 35.7 g/dL Final    RDW 05/14/2025 15.4  12.3 - 15.4 % Final    RDW-SD 05/14/2025 67.4 (H)  37.0 - 54.0 fl Final    MPV " 05/14/2025 8.9  6.0 - 12.0 fL Final    Platelets 05/14/2025 234  140 - 450 10*3/mm3 Final    Neutrophil % 05/14/2025 52.2  42.7 - 76.0 % Final    Lymphocyte % 05/14/2025 34.7  19.6 - 45.3 % Final    Monocyte % 05/14/2025 11.3  5.0 - 12.0 % Final    Eosinophil % 05/14/2025 0.9  0.3 - 6.2 % Final    Basophil % 05/14/2025 0.9  0.0 - 1.5 % Final    Immature Grans % 05/14/2025 0.0  0.0 - 0.5 % Final    Neutrophils, Absolute 05/14/2025 1.70  1.70 - 7.00 10*3/mm3 Final    Lymphocytes, Absolute 05/14/2025 1.13  0.70 - 3.10 10*3/mm3 Final    Monocytes, Absolute 05/14/2025 0.37  0.10 - 0.90 10*3/mm3 Final    Eosinophils, Absolute 05/14/2025 0.03  0.00 - 0.40 10*3/mm3 Final    Basophils, Absolute 05/14/2025 0.03  0.00 - 0.20 10*3/mm3 Final    Immature Grans, Absolute 05/14/2025 0.00  0.00 - 0.05 10*3/mm3 Final       eGFR      >60.0 mL/min/1.73 54.6 (L)   Folate      4.78 - 24.20 ng/mL 18.70   Ferritin      13.00 - 150.00 ng/mL 317.70 (H)   Vitamin B-12      211 - 946 pg/mL 472     Pathologist Interpretation       Normal total white blood cell count with differential compatible with the automated differential reported.  No abnormal/immature white blood cells noted. . . .     Component      Latest Ref Rng & Units 11/29/2022 1/31/2023   Folate      4.78 - 24.20 ng/mL >20.00    Vitamin B-12      211 - 946 pg/mL  1,372 (H)   TSH Baseline      0.270 - 4.200 uIU/mL  2.230   Free T4      0.93 - 1.70 ng/dL  0.64 (L)   Haptoglobin      30 - 200 mg/dL  164     Assessment / Plan      Assessment/Plan:   1.  Persistent macrocytosis  2.  H/O iron deficiency anemia, with anaphylactic reaction to Ferrlecit  2.  Progressive macrocytosis    -She has history of severe iron deficiency anemia of unclear etiology.  She has had an extensive negative work-up for a GI source of blood loss.  This was unrevealing.  Her history does not reveal any obvious alternative source of blood loss.  It is likely that she had a slow gastrointestinal bleed that was  not identified on work-up during hospital admission. No convincing evidence of hemolysis.  She had an anaphylactic reaction to intravenous iron while admitted to the hospital and should no longer receive Ferrlecit.  We discussed that if intravenous iron is required, we could cautiously consider use of an alternative formulation, but she would require close monitoring.  Given that she has no clear history of malabsorption and her blood counts are stable she completed an oral iron trial with resolution of iron deficiency anemia. She is now off oral iron.   -Continues folic acid daily and also taking OTC B12 daily.  -Repeat CBC from 9/2024 shows persistent macrocytic anemia.  Overall, blood levels have not changed substantially over the last year.  Given the severity of macrocytosis and no specific etiology identified I have recommended bone marrow biopsy which she continues to decline.  She understands that our clinical concern is for an underlying bone marrow dyscrasia.  We discussed all of the above again today. She prefers noninvasive monitoring unless her blood counts worsen.  We will continue with serial blood count surveillance, every 4 months.  -CBC stable today. Not interested in aggressive intervention/biopsy    Follow Up:   4 mo NP   Orders Placed This Encounter   Procedures    Comprehensive Metabolic Panel    Lactate Dehydrogenase    Vitamin B12    Folate    Reticulocytes    TSH    Ferritin    CBC & Differential      8 mo MD CBC/CMP/LDH    Abeba Lopez MD   Hematology and Oncology

## 2025-05-15 NOTE — TELEPHONE ENCOUNTER
RX sent [id number] #90 w/3 RF  
How Many Mls Were Removed From The 40 Mg/Ml (5ml) Vial When Preparing The Injectable Solution?: 0
Bill For Wasted Drug (Kenalog)?: no
Ndc# For Kenalog Only: 5012547892
Validate Note Data When Using Inventory: Yes
Administered By (Optional): Jose Enrique GARAY
Treatment Number (Optional): 1
Medical Necessity Clause: This procedure was medically necessary because the lesions that were treated were:
Kenalog Preparation: Kenalog
Which Kenalog Vial Was Used?: Kenalog 10 mg/ml (5 ml vial)
Consent: The risks of atrophy were reviewed with the patient.
Lot # For Kenalog (Optional): [id number]
Detail Level: Detailed
Concentration Of Kenalog Solution Injected (Mg/Ml): 10.0
Show Inventory Tab: Hide
Kenalog Type Of Vial: Multiple Dose
Expiration Date For Kenalog (Optional): aug 2027

## 2025-05-18 DIAGNOSIS — M54.81 BILATERAL OCCIPITAL NEURALGIA: ICD-10-CM

## 2025-05-19 DIAGNOSIS — I50.32 HEART FAILURE WITH IMPROVED EJECTION FRACTION (HFIMPEF): ICD-10-CM

## 2025-05-19 RX ORDER — SPIRONOLACTONE 25 MG/1
25 TABLET ORAL DAILY
Qty: 90 TABLET | Refills: 3 | Status: SHIPPED | OUTPATIENT
Start: 2025-05-19

## 2025-05-19 RX ORDER — VALSARTAN 80 MG/1
80 TABLET ORAL DAILY
Qty: 90 TABLET | Refills: 0 | Status: SHIPPED | OUTPATIENT
Start: 2025-05-19

## 2025-05-19 NOTE — TELEPHONE ENCOUNTER
Lab Results   Component Value Date    GLUCOSE 108 (H) 10/07/2024    CALCIUM 8.4 (L) 10/07/2024     10/07/2024    K 4.3 10/07/2024    CO2 24.0 10/07/2024     10/07/2024    BUN 5 (L) 10/07/2024    CREATININE 0.63 10/07/2024    EGFR 92.1 10/07/2024    BCR 7.9 10/07/2024    ANIONGAP 7.0 10/07/2024

## 2025-05-27 RX ORDER — TOPIRAMATE 100 MG/1
100 TABLET, FILM COATED ORAL 2 TIMES DAILY
Qty: 180 TABLET | Refills: 1 | Status: SHIPPED | OUTPATIENT
Start: 2025-05-27

## 2025-05-27 NOTE — TELEPHONE ENCOUNTER
Rx Refill Note  Requested Prescriptions     Pending Prescriptions Disp Refills    topiramate (TOPAMAX) 100 MG tablet [Pharmacy Med Name: TOPIRAMATE 100 MG TABLET] 180 tablet 1     Sig: TAKE 1 TABLET BY MOUTH 2 TIMES A DAY      Last office visit with prescribing clinician: 10/9/2024   Last telemedicine visit with prescribing clinician: Visit date not found   Next office visit with prescribing clinician: Visit date not found                         Would you like a call back once the refill request has been completed: [] Yes [] No    If the office needs to give you a call back, can they leave a voicemail: [] Yes [] No    Roma Monae MA  05/27/25, 09:26 EDT

## 2025-05-29 RX ORDER — TOPIRAMATE 100 MG/1
100 TABLET, FILM COATED ORAL 2 TIMES DAILY
Qty: 180 TABLET | Refills: 1 | Status: CANCELLED | OUTPATIENT
Start: 2025-05-29

## 2025-05-29 RX ORDER — FOLIC ACID 1 MG/1
1000 TABLET ORAL DAILY
Qty: 90 TABLET | Refills: 3 | Status: SHIPPED | OUTPATIENT
Start: 2025-05-29

## 2025-05-29 NOTE — TELEPHONE ENCOUNTER
Caller: [facility] - [place], [place] - [address] - [phone] [facility] [phone] FX    Relationship: Pharmacy    Best call back number: [phone]     Requested Prescriptions:   Requested Prescriptions     Pending Prescriptions Disp Refills    topiramate (TOPAMAX) 100 MG tablet 180 tablet 1     Sig: Take 1 tablet by mouth 2 (Two) Times a Day.        Pharmacy where request should be sent: [facility] - [place], [place] - [address] - [phone]  - [phone] FX     Last office visit with prescribing clinician: 10/9/2024   Last telemedicine visit with prescribing clinician: Visit date not found   Next office visit with prescribing clinician: Visit date not found     Additional details provided by patient:     Does the patient have less than a 3 day supply:  [] Yes  [x] No    Would you like a call back once the refill request has been completed: [] Yes [x] No    If the office needs to give you a call back, can they leave a voicemail: [] Yes [x] No    Kelsey Domínguez Rep   05/29/25 08:26 EDT

## 2025-05-29 NOTE — TELEPHONE ENCOUNTER
Upcoming Appts  With Oncology (Letty Pelaez, APRN)  09/16/2025 at 2:00 PM  Last Office Visit - This Dept  5/14/2025 Abeba Lopez MD  Last refill 1/4/24 90 tablets 3 refills

## 2025-06-05 DIAGNOSIS — M54.81 BILATERAL OCCIPITAL NEURALGIA: ICD-10-CM

## 2025-06-05 RX ORDER — PREDNISONE 5 MG/1
5 TABLET ORAL DAILY
Qty: 90 TABLET | Refills: 0 | Status: CANCELLED | OUTPATIENT
Start: 2025-06-05

## 2025-06-12 DIAGNOSIS — M54.81 BILATERAL OCCIPITAL NEURALGIA: ICD-10-CM

## 2025-06-12 RX ORDER — PREDNISONE 5 MG/1
5 TABLET ORAL DAILY
Qty: 90 TABLET | Refills: 0 | Status: SHIPPED | OUTPATIENT
Start: 2025-06-12 | End: 2025-06-12

## 2025-06-12 RX ORDER — PREDNISONE 5 MG/1
5 TABLET ORAL DAILY
Qty: 90 TABLET | Refills: 3 | OUTPATIENT
Start: 2025-06-12

## 2025-06-12 RX ORDER — PREDNISONE 5 MG/1
5 TABLET ORAL DAILY
Qty: 90 TABLET | Refills: 0 | Status: SHIPPED | OUTPATIENT
Start: 2025-06-12

## 2025-06-12 NOTE — TELEPHONE ENCOUNTER
Rx Refill Note  Requested Prescriptions     Pending Prescriptions Disp Refills    predniSONE (DELTASONE) 5 MG tablet 90 tablet 0     Sig: Take 1 tablet by mouth Daily.      Last office visit with prescribing clinician: 1/27/2025   Last telemedicine visit with prescribing clinician: Visit date not found   Next office visit with prescribing clinician: Visit date not found       Leena Mota MA  06/12/25, 13:00 EDT

## 2025-06-13 RX ORDER — PREDNISONE 5 MG/1
5 TABLET ORAL DAILY
Qty: 90 TABLET | Refills: 0 | OUTPATIENT
Start: 2025-06-13

## 2025-07-05 DIAGNOSIS — I25.10 CORONARY ARTERY DISEASE INVOLVING NATIVE CORONARY ARTERY OF NATIVE HEART WITHOUT ANGINA PECTORIS: ICD-10-CM

## 2025-07-05 DIAGNOSIS — E78.5 HYPERLIPIDEMIA LDL GOAL <50: ICD-10-CM

## 2025-07-05 DIAGNOSIS — I50.32 HEART FAILURE WITH IMPROVED EJECTION FRACTION (HFIMPEF): ICD-10-CM

## 2025-07-07 RX ORDER — ROSUVASTATIN CALCIUM 5 MG/1
5 TABLET, COATED ORAL NIGHTLY
Qty: 90 TABLET | Refills: 3 | Status: SHIPPED | OUTPATIENT
Start: 2025-07-07

## 2025-07-07 RX ORDER — BISOPROLOL FUMARATE 5 MG/1
5 TABLET, FILM COATED ORAL DAILY
Qty: 90 TABLET | Refills: 3 | Status: SHIPPED | OUTPATIENT
Start: 2025-07-07

## 2025-07-07 NOTE — TELEPHONE ENCOUNTER
Lab Results   Component Value Date    CHOL 72 10/04/2024    TRIG 34 10/04/2024    HDL 55 10/04/2024    LDL 6 10/04/2024     Lab Results   Component Value Date    GLUCOSE 108 (H) 10/07/2024    BUN 5 (L) 10/07/2024    CREATININE 0.63 10/07/2024     10/07/2024    K 4.3 10/07/2024     10/07/2024    CALCIUM 8.4 (L) 10/07/2024    PROTEINTOT 5.2 (L) 10/07/2024    ALBUMIN 2.9 (L) 10/07/2024    ALT 23 10/07/2024    AST 43 (H) 10/07/2024    ALKPHOS 78 10/07/2024    BILITOT 0.4 10/07/2024    GLOB 2.3 10/07/2024    AGRATIO 1.3 10/07/2024    BCR 7.9 10/07/2024    ANIONGAP 7.0 10/07/2024    EGFR 92.1 10/07/2024

## 2025-07-09 ENCOUNTER — SPECIALTY PHARMACY (OUTPATIENT)
Facility: HOSPITAL | Age: 75
End: 2025-07-09
Payer: MEDICARE

## 2025-07-09 NOTE — PROGRESS NOTES
Specialty Pharmacy Patient Management Program  One-Time Clinical Outreach     Fabiola Salmeron is a 75 y.o. female seen by a Cardiology provider for Hyperlipidemia and enrolled in the Cardiology Patient Management program offered by [facility].      I contacted Ms. Salmeron as she had previously stopped Repatha due to pancreatitis. She has a follow-up with cardiology this month and I wanted to follow-up to see if she was interested in discussing restarting Repatha at that time. She does not wish to take Repatha again and has been disenrolled from the Specialty Pharmacy Management Program.     Hyun Virk, PharmD, [facility]  Clinical Specialty Pharmacist, Cardiology  7/9/2025  09:54 EDT

## 2025-07-21 NOTE — PROGRESS NOTES
Cardiology Outpatient Visit      Identification: Fabiola Salmeron is a 75 y.o. female who resides in [place], [place]    Reason for visit:  No chief complaint on file.      Subjective      Patient is a 75-year-old female who returns today for follow-up for coronary artery disease and cardiac risk factors.  Since her last visit she has not had any hospitalizations or new medical diagnoses.  She denies any exertional chest pain or shortness of breath.  She has tried multiple statins and could not tolerate.  She is currently on low-dose Crestor at 5 mg and that is all she has been able to tolerate.  At her last visit she was started on Repatha but she ended up having problems with it as well so she stopped it.  She is on Crestor at the 5 mg daily and is also taking low-dose Zetia.  She has not had recent lipid panel since the addition of Zetia.    Review of Systems   Constitutional: Negative for malaise/fatigue.   Eyes:  Negative for vision loss in left eye and vision loss in right eye.   Cardiovascular:  Negative for chest pain, dyspnea on exertion, near-syncope, orthopnea, palpitations, paroxysmal nocturnal dyspnea and syncope.   Musculoskeletal:  Negative for myalgias.   Neurological:  Negative for brief paralysis, excessive daytime sleepiness, focal weakness, numbness, paresthesias and weakness.   All other systems reviewed and are negative.      Allergies   Allergen Reactions    Ferrlecit [Na Ferric Gluc Cplx In Sucrose] Anaphylaxis    Codeine Other (See Comments)    Metoprolol Other (See Comments)     Fatigue      Statins Myalgia         Current Outpatient Medications   Medication Instructions    aspirin (ASPIRIN LOW DOSE) 81 mg, Oral, Daily    bimatoprost (Lumigan) 0.01 % ophthalmic drops 1 drop, Nightly    bisoprolol (ZEBETA) 5 mg, Oral, Daily    brimonidine (ALPHAGAN) 0.2 % ophthalmic solution INSTILL 1 DROP INTO AFFECTED EYE(S) BY OPHTHALMIC ROUTE EVERY 8 HOURS    brimonidine-timolol (COMBIGAN) 0.2-0.5 %  "ophthalmic solution 1 drop    ezetimibe (ZETIA) 10 mg, Oral, Daily    fluorouracil (EFUDEX) 5 % cream fluorouracil 5 % topical cream   APPLY A SUFFICIENT AMOUNT TO COVER THE LESIONS IN THE AFFECTED AREA(S) BY TOPICAL ROUTE 2 TIMES PER DAY FOR 21 DAYS    folic acid (FOLVITE) 1,000 mcg, Oral, Daily    Polyvinyl Alcohol-Povidone PF (HYPOTEARS) 1.4-0.6 % ophthalmic solution     predniSONE (DELTASONE) 5 mg, Oral, Daily    rosuvastatin (CRESTOR) 5 mg, Nightly    spironolactone (ALDACTONE) 25 mg, Oral, Daily    topiramate (TOPAMAX) 100 mg, Oral, 2 Times Daily    Tylenol 650 mg, 4 Times Daily    valsartan (DIOVAN) 80 mg, Oral, Daily    Vitamin B-12 1,000 mcg, Sublingual, Daily         Objective     /70 (BP Location: Right arm, Patient Position: Sitting, Cuff Size: Adult)   Pulse 71   Ht 167.6 cm (66\")   Wt 53.8 kg (118 lb 9.6 oz)   SpO2 98%   BMI 19.14 kg/m²       Constitutional:       Appearance: Healthy appearance. Well-developed.   Eyes:      General: Lids are normal. No scleral icterus.     Conjunctiva/sclera: Conjunctivae normal.   HENT:      Head: Normocephalic and atraumatic.   Neck:      Thyroid: No thyromegaly.      Vascular: No carotid bruit or JVD.   Pulmonary:      Effort: Pulmonary effort is normal.      Breath sounds: Normal breath sounds. No wheezing. No rhonchi. No rales.   Cardiovascular:      Normal rate. Regular rhythm.      Murmurs: There is no murmur.      No gallop.  No rub.   Pulses:     Intact distal pulses.   Edema:     Peripheral edema absent.   Abdominal:      General: There is no distension.      Palpations: Abdomen is soft. There is no abdominal mass.   Musculoskeletal:      Cervical back: Normal range of motion. Skin:     General: Skin is warm and dry.      Findings: No rash.   Neurological:      General: No focal deficit present.      Mental Status: Alert and oriented to person, place, and time.      Gait: Gait is intact.   Psychiatric:         Attention and Perception: Attention " normal.         Mood and Affect: Mood normal.         Behavior: Behavior normal.         Result Review  (reviewed with patient):            Lab Results   Component Value Date    GLUCOSE 108 (H) 10/07/2024    BUN 5 (L) 10/07/2024    CREATININE 0.63 10/07/2024     10/07/2024    K 4.3 10/07/2024     10/07/2024    CALCIUM 8.4 (L) 10/07/2024    PROTEINTOT 5.2 (L) 10/07/2024    ALBUMIN 2.9 (L) 10/07/2024    ALT 23 10/07/2024    AST 43 (H) 10/07/2024    ALKPHOS 78 10/07/2024    BILITOT 0.4 10/07/2024    GLOB 2.3 10/07/2024    AGRATIO 1.3 10/07/2024    BCR 7.9 10/07/2024    ANIONGAP 7.0 10/07/2024    EGFR 92.1 10/07/2024     Lab Results   Component Value Date    WBC 3.26 (L) 05/14/2025    HGB 11.4 (L) 05/14/2025    HCT 35.7 05/14/2025    .0 (H) 05/14/2025     05/14/2025     Lab Results   Component Value Date    CHOL 72 10/04/2024    TRIG 34 10/04/2024    HDL 55 10/04/2024    LDL 6 10/04/2024     Lab Results   Component Value Date    HGBA1C 5.70 (H) 10/04/2024             Assessment     Diagnoses and all orders for this visit:    1. Coronary artery disease involving native coronary artery of native heart without angina pectoris (Primary)  Overview:  Echo (5/21/2022): LVEF 36%.  Systolic anterior motion of mitral valve present.  RVSP >  35 mmHg. The mid to distal and apical segments are akinetic  Cardiac catheterization (5/23/2022): Severe 2-vessel CAD (mid LAD occlusion, severe mid RCA stenosis).  Successful OCT guided PCI of proximal/mid LAD using 3 x 38 mm MARIA ELENA.  Successful OCT guided PCI of mid RCA using 4 x 28 mm MARIA ELENA.  LVEDP 25 mmHg.    Assessment & Plan:  No angina  Continue aspirin 81 mg daily      2. Heart failure with improved ejection fraction (HFimpEF)  Overview:  Echo (5/21/2022): LVEF 36%.  Systolic anterior motion of mitral valve present.  RVSP >  35 mmHg. The mid to distal and apical segments are akinetic.  Cardiac catheterization for NSTEMI (5/23/2022): Severe 2-vessel CAD (mid LAD  occlusion, severe mid RCA stenosis).  PCI of proximal/mid LAD using 3 x 38 mm MARIA ELENA.  PCI of mid RCA using 4 x 28 mm MARIA ELENA.  LVEDP 25 mmHg.  Echo (11/17/2023): LVEF 60%. No significant valvular abnormalities.     Assessment & Plan:  Continue bisoprolol 5 mg daily  Continue spironolactone 25 mg daily  Continue valsartan 80 mg daily      3. Hyperlipidemia LDL goal <50  Overview:  High intensity statin therapy indicated given the presence of CAD    Assessment & Plan:  Continue Crestor 5 mg daily  Continue Zetia 10 mg daily  Obtain CMP and lipid profile    Orders:  -     Lipid Panel; Future  -     Comprehensive Metabolic Panel; Future    4. Hyperlipidemia LDL goal <70  Overview:  High intensity statin therapy indicated given the presence of CAD    Assessment & Plan:  Continue Crestor 5 mg daily  Continue Zetia 10 mg daily  Obtain CMP and lipid profile            Plan   No angina or heart failure symptoms  Obtain CMP and lipid profile  Continue current medications      Follow-up   Return in about 1 year (around 7/29/2026), or if symptoms worsen or fail to improve, for Follow-up with Dr. Wolfe next visit.        Ana Pereyra, APRN  7/29/2025

## 2025-07-29 ENCOUNTER — OFFICE VISIT (OUTPATIENT)
Dept: CARDIOLOGY | Facility: CLINIC | Age: 75
End: 2025-07-29
Payer: MEDICARE

## 2025-07-29 ENCOUNTER — LAB (OUTPATIENT)
Dept: LAB | Facility: HOSPITAL | Age: 75
End: 2025-07-29
Payer: MEDICARE

## 2025-07-29 VITALS
WEIGHT: 118.6 LBS | DIASTOLIC BLOOD PRESSURE: 70 MMHG | BODY MASS INDEX: 19.06 KG/M2 | HEART RATE: 71 BPM | OXYGEN SATURATION: 98 % | SYSTOLIC BLOOD PRESSURE: 132 MMHG | HEIGHT: 66 IN

## 2025-07-29 DIAGNOSIS — E78.5 HYPERLIPIDEMIA LDL GOAL <50: ICD-10-CM

## 2025-07-29 DIAGNOSIS — I25.10 CORONARY ARTERY DISEASE INVOLVING NATIVE CORONARY ARTERY OF NATIVE HEART WITHOUT ANGINA PECTORIS: Primary | ICD-10-CM

## 2025-07-29 DIAGNOSIS — E78.5 HYPERLIPIDEMIA LDL GOAL <70: ICD-10-CM

## 2025-07-29 DIAGNOSIS — I50.32 HEART FAILURE WITH IMPROVED EJECTION FRACTION (HFIMPEF): ICD-10-CM

## 2025-07-29 DIAGNOSIS — D53.9 MACROCYTIC ANEMIA: ICD-10-CM

## 2025-07-29 LAB
ALBUMIN SERPL-MCNC: 4.2 G/DL (ref 3.5–5.2)
ALBUMIN/GLOB SERPL: 1.4 G/DL
ALP SERPL-CCNC: 58 U/L (ref 39–117)
ALT SERPL W P-5'-P-CCNC: 14 U/L (ref 1–33)
ANION GAP SERPL CALCULATED.3IONS-SCNC: 13 MMOL/L (ref 5–15)
AST SERPL-CCNC: 26 U/L (ref 1–32)
BILIRUB SERPL-MCNC: 0.2 MG/DL (ref 0–1.2)
BUN SERPL-MCNC: 19 MG/DL (ref 8–23)
BUN/CREAT SERPL: 18.1 (ref 7–25)
CALCIUM SPEC-SCNC: 9.3 MG/DL (ref 8.6–10.5)
CHLORIDE SERPL-SCNC: 112 MMOL/L (ref 98–107)
CHOLEST SERPL-MCNC: 135 MG/DL (ref 0–200)
CO2 SERPL-SCNC: 18 MMOL/L (ref 22–29)
CREAT SERPL-MCNC: 1.05 MG/DL (ref 0.57–1)
EGFRCR SERPLBLD CKD-EPI 2021: 54.8 ML/MIN/1.73
FERRITIN SERPL-MCNC: 26.9 NG/ML (ref 13–150)
GLOBULIN UR ELPH-MCNC: 2.9 GM/DL
GLUCOSE SERPL-MCNC: 93 MG/DL (ref 65–99)
HDLC SERPL-MCNC: 57 MG/DL (ref 40–60)
LDLC SERPL CALC-MCNC: 58 MG/DL (ref 0–100)
LDLC/HDLC SERPL: 0.98 {RATIO}
POTASSIUM SERPL-SCNC: 5.2 MMOL/L (ref 3.5–5.2)
PROT SERPL-MCNC: 7.1 G/DL (ref 6–8.5)
SODIUM SERPL-SCNC: 143 MMOL/L (ref 136–145)
TRIGL SERPL-MCNC: 110 MG/DL (ref 0–150)
VLDLC SERPL-MCNC: 20 MG/DL (ref 5–40)

## 2025-07-29 PROCEDURE — 80053 COMPREHEN METABOLIC PANEL: CPT

## 2025-07-29 PROCEDURE — 99214 OFFICE O/P EST MOD 30 MIN: CPT | Performed by: NURSE PRACTITIONER

## 2025-07-29 PROCEDURE — 82728 ASSAY OF FERRITIN: CPT

## 2025-07-29 PROCEDURE — 1160F RVW MEDS BY RX/DR IN RCRD: CPT | Performed by: NURSE PRACTITIONER

## 2025-07-29 PROCEDURE — 1159F MED LIST DOCD IN RCRD: CPT | Performed by: NURSE PRACTITIONER

## 2025-07-29 PROCEDURE — 36415 COLL VENOUS BLD VENIPUNCTURE: CPT

## 2025-07-29 PROCEDURE — 80061 LIPID PANEL: CPT

## 2025-07-29 NOTE — ASSESSMENT & PLAN NOTE
Continue bisoprolol 5 mg daily  Continue spironolactone 25 mg daily  Continue valsartan 80 mg daily

## 2025-07-31 RX ORDER — VALSARTAN 80 MG/1
80 TABLET ORAL DAILY
Qty: 90 TABLET | Refills: 3 | Status: SHIPPED | OUTPATIENT
Start: 2025-07-31

## (undated) DEVICE — DRSNG TELFA PAD NONADH STR 1S 3X8IN

## (undated) DEVICE — INTRO ACCSR BLNT TP

## (undated) DEVICE — SINGLE-USE BIOPSY FORCEPS: Brand: RADIAL JAW 4

## (undated) DEVICE — DEV INFL MONARCH 25W

## (undated) DEVICE — CVR PROB ULTRASND/TRANSD W/GEL 18X120CM STRL

## (undated) DEVICE — SAFELINER SUCTION CANISTER 1000CC: Brand: DEROYAL

## (undated) DEVICE — DRAPE,HAND,STERILE: Brand: MEDLINE

## (undated) DEVICE — GUIDE CATHETER: Brand: MACH1™

## (undated) DEVICE — GLIDESHEATH SLENDER STAINLESS STEEL KIT: Brand: GLIDESHEATH SLENDER

## (undated) DEVICE — SUT PROLN 7/0 BV1 D/A 24IN 8702H

## (undated) DEVICE — KT CATH IMG DRAGONFLY OPTIS 2.7F 135CM

## (undated) DEVICE — NC TREK CORONARY DILATATION CATHETER 4.0 MM X 15 MM / RAPID-EXCHANGE: Brand: NC TREK

## (undated) DEVICE — PK CATH CARD 10

## (undated) DEVICE — Device: Brand: ASAHI SION BLUE

## (undated) DEVICE — DEV COMP RAD PRELUDESYNC 24CM

## (undated) DEVICE — DRP SURG UNIV BASIC BILAMINATE 53X77IN DISP

## (undated) DEVICE — SYR LUERLOK 50ML

## (undated) DEVICE — SYR CONTRL LUERLOK 10CC

## (undated) DEVICE — ANTIBACTERIAL UNDYED BRAIDED (POLYGLACTIN 910), SYNTHETIC ABSORBABLE SUTURE: Brand: COATED VICRYL

## (undated) DEVICE — CATH DIAG EXPO .056 FL3.5 6F 100CM

## (undated) DEVICE — CVR HNDL LIGHT RIGID

## (undated) DEVICE — CVR TRANSD FLX 3DIMEN 14X29.2CM LF STRL

## (undated) DEVICE — GLV SURG BIOGEL LTX PF 7 1/2

## (undated) DEVICE — CONTN GRAD MEAS TRIANG 32OZ BLK

## (undated) DEVICE — TUBING, SUCTION, 1/4" X 10', STRAIGHT: Brand: MEDLINE

## (undated) DEVICE — SYR LL TP 10ML STRL

## (undated) DEVICE — IRRIGATOR BULB ASEPTO 60CC STRL

## (undated) DEVICE — CATH DIAG EXPO M/ PK 6FR FL4/FR4 PIG 3PK

## (undated) DEVICE — LUBE GEL ENDOGLIDE 1.1OZ

## (undated) DEVICE — SUT MNCRYL PLS ANTIB UD 4/0 PC3 18IN

## (undated) DEVICE — NC TREK CORONARY DILATATION CATHETER 4.5 MM X 12 MM / RAPID-EXCHANGE: Brand: NC TREK

## (undated) DEVICE — PK VASC 10

## (undated) DEVICE — MODEL AT P65, P/N 701554-001KIT CONTENTS: HAND CONTROLLER, 3-WAY HIGH-PRESSURE STOPCOCK WITH ROTATING END AND PREMIUM HIGH-PRESSURE TUBING: Brand: ANGIOTOUCH® KIT

## (undated) DEVICE — MODEL BT2000 P/N 700287-012KIT CONTENTS: MANIFOLD WITH SALINE AND CONTRAST PORTS, SALINE TUBING WITH SPIKE AND HAND SYRINGE, TRANSDUCER: Brand: BT2000 AUTOMATED MANIFOLD KIT

## (undated) DEVICE — DECANTER BAG 9": Brand: MEDLINE INDUSTRIES, INC.

## (undated) DEVICE — NC TREK CORONARY DILATATION CATHETER 3.5 MM X 15 MM / RAPID-EXCHANGE: Brand: NC TREK

## (undated) DEVICE — THE BITE BLOCK MAXI, LATEX FREE STRAP IS USED TO PROTECT THE ENDOSCOPE INSERTION TUBE FROM BEING BITTEN BY THE PATIENT.

## (undated) DEVICE — NC TREK CORONARY DILATATION CATHETER 4.0 MM X 12 MM / RAPID-EXCHANGE: Brand: NC TREK

## (undated) DEVICE — HYBRID CO2 TUBING/CAP SET FOR OLYMPUS® SCOPES & CO2 SOURCE: Brand: ERBE

## (undated) DEVICE — ADHS SKIN PREMIERPRO EXOFIN TOPICAL HI/VISC .5ML

## (undated) DEVICE — COPILOT BLEEDBACK CONTROL VALVE: Brand: COPILOT

## (undated) DEVICE — SYR LUERLOK 20CC BX/50

## (undated) DEVICE — SPNG VERSALON 4X4 4PLY NONSTRL LF BG/200

## (undated) DEVICE — SNAP KOVER: Brand: UNBRANDED

## (undated) DEVICE — NC TREK CORONARY DILATATION CATHETER 3.0 MM X 15 MM / RAPID-EXCHANGE: Brand: NC TREK

## (undated) DEVICE — SOL IRR H2O BTL 1000ML STRL

## (undated) DEVICE — GW PERIPH GUIDERIGHT STD/EXCHNG/J/TIP SS 0.035IN 5X260CM

## (undated) DEVICE — TREK CORONARY DILATATION CATHETER 2.50 MM X 15 MM / RAPID-EXCHANGE: Brand: TREK

## (undated) DEVICE — APPL CHLORAPREP TINTED 26ML TEAL

## (undated) DEVICE — KT ORCA ORCAPOD DISP STRL

## (undated) DEVICE — SUT PROLN 6/0 BV1 D/A 30IN 8709H

## (undated) DEVICE — NDL HYPO ECLPS SFTY 22G 1 1/2IN

## (undated) DEVICE — SUT PROLN 3/0 SH D/A 36IN 8522H

## (undated) DEVICE — 3M™ STERI-DRAPE™ ISOLATION BAG, 10 PER CARTON / 4 CARTONS PER CASE, 1003: Brand: 3M™ STERI-DRAPE™

## (undated) DEVICE — THE DISPOSABLE ADVANCE CAPSULE ENDOSCOPE DELIVERY DEVICE IS A 2.5MM SINGLE SHEATHED DEVICE INDICATED FOR TRANSENDOSCOPIC DELIVERY OF CAPSULES (WITH THE DIMENSIONS OF 10.5MM - 11.5MM IN DIAMETER AND 23.5MM - 26.5MM IN LENGTH), TO THE STOMACH OR DUODENUM. THIS DEVICE IS INTENDED FOR PATIENTS WHO ARE EITHER UNABLE TO SWALLOW THE CAPSULE, OR UNABLE TO PASS THE CAPSULE BEYOND THE PYLORUS IN SUFFICIENT TIME TO COMPLETE THE DESIRED DIAGNOSTIC EVALUATION.: Brand: ADVANCE